# Patient Record
Sex: FEMALE | Race: WHITE | Employment: OTHER | URBAN - METROPOLITAN AREA
[De-identification: names, ages, dates, MRNs, and addresses within clinical notes are randomized per-mention and may not be internally consistent; named-entity substitution may affect disease eponyms.]

---

## 2017-02-22 ENCOUNTER — OFFICE VISIT (OUTPATIENT)
Dept: INTERNAL MEDICINE CLINIC | Age: 62
End: 2017-02-22

## 2017-02-22 VITALS
WEIGHT: 230 LBS | TEMPERATURE: 98.2 F | DIASTOLIC BLOOD PRESSURE: 71 MMHG | HEART RATE: 61 BPM | BODY MASS INDEX: 34.86 KG/M2 | SYSTOLIC BLOOD PRESSURE: 111 MMHG | OXYGEN SATURATION: 97 % | HEIGHT: 68 IN | RESPIRATION RATE: 16 BRPM

## 2017-02-22 DIAGNOSIS — G62.9 PERIPHERAL POLYNEUROPATHY: ICD-10-CM

## 2017-02-22 DIAGNOSIS — F32.A DEPRESSION, UNSPECIFIED DEPRESSION TYPE: ICD-10-CM

## 2017-02-22 DIAGNOSIS — I10 ESSENTIAL HYPERTENSION: Primary | ICD-10-CM

## 2017-02-22 DIAGNOSIS — R73.9 HYPERGLYCEMIA: ICD-10-CM

## 2017-02-22 DIAGNOSIS — E78.5 HYPERLIPIDEMIA, UNSPECIFIED HYPERLIPIDEMIA TYPE: ICD-10-CM

## 2017-02-22 RX ORDER — LISINOPRIL 10 MG/1
TABLET ORAL
Qty: 90 TAB | Refills: 2 | Status: SHIPPED | OUTPATIENT
Start: 2017-02-22 | End: 2017-02-22 | Stop reason: SDUPTHER

## 2017-02-22 RX ORDER — LISINOPRIL 10 MG/1
10 TABLET ORAL DAILY
Qty: 90 TAB | Refills: 3 | Status: SHIPPED | OUTPATIENT
Start: 2017-02-22 | End: 2018-01-09 | Stop reason: SDUPTHER

## 2017-02-22 RX ORDER — BUPROPION HYDROCHLORIDE 300 MG/1
TABLET ORAL
Qty: 90 TAB | Refills: 3 | Status: SHIPPED | OUTPATIENT
Start: 2017-02-22 | End: 2018-01-09 | Stop reason: SDUPTHER

## 2017-02-22 RX ORDER — OMEPRAZOLE 20 MG/1
CAPSULE, DELAYED RELEASE ORAL
Qty: 90 CAP | Refills: 2 | Status: SHIPPED | OUTPATIENT
Start: 2017-02-22 | End: 2017-09-28 | Stop reason: SDUPTHER

## 2017-02-22 RX ORDER — ATORVASTATIN CALCIUM 20 MG/1
20 TABLET, FILM COATED ORAL DAILY
Qty: 90 TAB | Refills: 3 | Status: SHIPPED | OUTPATIENT
Start: 2017-02-22 | End: 2018-01-09 | Stop reason: SDUPTHER

## 2017-02-22 RX ORDER — RISPERIDONE 2 MG/1
TABLET, FILM COATED ORAL
Qty: 90 TAB | Refills: 3 | Status: SHIPPED | OUTPATIENT
Start: 2017-02-22 | End: 2018-04-15 | Stop reason: SDUPTHER

## 2017-02-22 RX ORDER — OMEPRAZOLE 20 MG/1
20 CAPSULE, DELAYED RELEASE ORAL DAILY
Qty: 90 CAP | Refills: 3 | Status: SHIPPED | OUTPATIENT
Start: 2017-02-22 | End: 2017-09-28 | Stop reason: SDUPTHER

## 2017-02-22 RX ORDER — BUPROPION HYDROCHLORIDE 300 MG/1
TABLET ORAL
Qty: 90 TAB | Refills: 2 | Status: SHIPPED | OUTPATIENT
Start: 2017-02-22 | End: 2017-02-22 | Stop reason: SDUPTHER

## 2017-02-22 RX ORDER — ATENOLOL 50 MG/1
TABLET ORAL
Qty: 90 TAB | Refills: 2 | Status: SHIPPED | OUTPATIENT
Start: 2017-02-22 | End: 2017-02-22 | Stop reason: SDUPTHER

## 2017-02-22 RX ORDER — OMEPRAZOLE 20 MG/1
CAPSULE, DELAYED RELEASE ORAL
Qty: 90 CAP | Refills: 2 | Status: CANCELLED | OUTPATIENT
Start: 2017-02-22

## 2017-02-22 RX ORDER — ATENOLOL 50 MG/1
TABLET ORAL
Qty: 90 TAB | Refills: 3 | Status: SHIPPED | OUTPATIENT
Start: 2017-02-22 | End: 2018-01-09 | Stop reason: SDUPTHER

## 2017-02-22 RX ORDER — RISPERIDONE 2 MG/1
TABLET, FILM COATED ORAL
Qty: 90 TAB | Refills: 2 | Status: SHIPPED | OUTPATIENT
Start: 2017-02-22 | End: 2017-02-22 | Stop reason: SDUPTHER

## 2017-02-22 RX ORDER — HYDROCHLOROTHIAZIDE 25 MG/1
25 TABLET ORAL DAILY
Qty: 90 TAB | Refills: 3 | Status: SHIPPED | OUTPATIENT
Start: 2017-02-22 | End: 2018-01-09 | Stop reason: SDUPTHER

## 2017-02-22 RX ORDER — ATORVASTATIN CALCIUM 20 MG/1
TABLET, FILM COATED ORAL
Qty: 90 TAB | Refills: 2 | Status: SHIPPED | OUTPATIENT
Start: 2017-02-22 | End: 2017-02-22 | Stop reason: SDUPTHER

## 2017-02-22 RX ORDER — HYDROCHLOROTHIAZIDE 25 MG/1
TABLET ORAL
Qty: 90 TAB | Refills: 2 | Status: SHIPPED | OUTPATIENT
Start: 2017-02-22 | End: 2017-02-22 | Stop reason: SDUPTHER

## 2017-02-22 NOTE — PROGRESS NOTES
SUBJECTIVE:   Ms. Timi Headley is a 57 y.o. female who is here for follow up of routine medical issues. Her  is my patient Glen Olivas. Chief Complaint   Patient presents with    Hypertension    Follow-up     pt here today for 4 month f.u       She has worsened constipation. Takes senna every day now \"used to be every other day. \"   L ankle pain is a bit worse. It is recalled that she saw orthopedist for L ankle pain. \"She wanted me to wear a device\", that was expensive. The pain got better with new shoes. Hurts if sits at desk too long. Hip pain is \"occasionally bothering me when I walk. \"    Back is doing okay. She saw Dr. Mae for knee pain. Has gotten injections. Neuropathy: She is still having a lot of numbness and tingling in feet. \"Pretty severe. \"  Doing the same as last visit. It is recalled that she has had high blood sugars, as well as an A1C that was up. L>R hip pain is better. We have discussed the R hip pain before in 2015: Discussed how she had a L bone spur, and favored that side. Now wearing soft cushioned soles. Palpitations have gotten better. Saw Dr. Tamia Vegas planning to follow up further unless becomes problem again. We noted in 2014: She was having palpitations and saw Dr. Margie Dockery in January. She had EKG, echo and stress test--\"I think everything is okay. \"  The palpitations went away since getting treated for INDER. She is on CPAP for INDER. Mood okay, \"I have been depressed since I was a teenager. \"  \"I tried to go off the meds, but I got down in the dumps. \"  Back on meds. No SI at this time. Sees Dr. Octavio Lara currently (previously Dr. Ashlee Dodd). At this time, she is otherwise doing well and has brought no other complaints to my attention today. For a list of the medical issues addressed today, see the assessment and plan below.     PMH:   Past Medical History:   Diagnosis Date    Cancer [facility])     skin cancer removed from nose    Depression 1/9/2014  GERD (gastroesophageal reflux disease)     Hot flashes     Hyperglycemia 2014    Hyperlipidemia 2014    Hypertension     Left bundle branch block     Neuropathy, peripheral ([address] 2014    INDER on CPAP 2014       Past Surgical History:   Procedure Laterality Date    COLONOSCOPY  2010     Dr. Navya Cesar          HX [facility]; resection of skin cancer from nose       All: She is allergic to erythromycin. (rash). Current Outpatient Prescriptions   Medication Sig    atenolol (TENORMIN) 50 mg tablet TAKE 1 TABLET DAILY    lisinopril (PRINIVIL, ZESTRIL) 10 mg tablet TAKE 1 TABLET DAILY    omeprazole (PRILOSEC) 20 mg capsule TAKE 1 CAPSULE DAILY    atorvastatin (LIPITOR) 20 mg tablet TAKE 1 TABLET DAILY    hydroCHLOROthiazide (HYDRODIURIL) 25 mg tablet TAKE 1 TABLET DAILY    risperiDONE (RISPERDAL) 2 mg tablet TAKE 1 TABLET NIGHTLY AS NEEDED    buPROPion XL (WELLBUTRIN XL) 300 mg XL tablet TAKE 1 TABLET EVERY MORNING    omeprazole (PRILOSEC) 20 mg capsule     SENNOSIDES (SENOKOT PO) Take  by mouth.  Cholecalciferol, Vitamin D3, (VITAMIN D3) 2,000 unit cap capsule Take  by mouth daily.  multivitamin, stress formula (STRESS TAB) tablet Take 1 Tab by mouth daily.  ascorbic acid (VITAMIN C) 1,000 mg tablet Take 500 mg by mouth daily.  vitamin e (E GEMS) 100 unit capsule Take 400 Units by mouth daily.  calcium-vitamin D (CALCIUM 500+D) 500 mg(1,250mg) -200 unit per tablet Take 2 tablets by mouth daily.  aspirin delayed-release 81 mg tablet Take 81 mg by mouth daily.  albuterol (PROVENTIL HFA, VENTOLIN HFA, PROAIR HFA) 90 mcg/actuation inhaler Take 1 Puff by inhalation every four (4) hours as needed for Wheezing.  DOCOSAHEXANOIC ACID/EPA (FISH OIL PO) Take 1 tablet by mouth daily. No current facility-administered medications for this visit. FH: Mother  breast cancer.   Father  of CHF.    SH: Retired insurance . She reports that she has never smoked. She does not have any smokeless tobacco history on file. She reports that she does not drink alcohol or use illicit drugs. ROS: See above; Complete ROS otherwise negative. OBJECTIVE:   Vitals:   Visit Vitals    /71 (BP 1 Location: Left arm, BP Patient Position: Sitting)    Pulse 61    Temp 98.2 °F (36.8 °C) (Oral)    Resp 16    Ht 5' 8\" (1.727 m)    Wt 230 lb (104.3 kg)    SpO2 97%    BMI 34.97 kg/m2      Gen: Pleasant 57 y.o.  female in NAD. HEENT: PERRLA. EOMI. OP moist and pink. Neck: Supple. No LAD. HEART: RRR, No M/G/R.    LUNGS: CTAB No W/R. ABDOMEN: S, NT, ND, BS+. EXTREMITIES: Warm. No C/C/E.  MUSCULOSKELETAL: Normal ROM, muscle strength 5/5 all groups. NEURO: Alert and oriented x 3. Cranial nerves grossly intact. No focal sensory or motor deficits noted. SKIN: Warm. Dry. No rashes or other lesions noted. Lab Results   Component Value Date/Time    Hemoglobin A1c 5.8 10/17/2016 09:19 AM     Lab Results   Component Value Date/Time    Sodium 141 10/17/2016 09:19 AM    Potassium 4.6 10/17/2016 09:19 AM    Chloride 102 10/17/2016 09:19 AM    CO2 26 10/17/2016 09:19 AM    Glucose 115 10/17/2016 09:19 AM    BUN 16 10/17/2016 09:19 AM    Creatinine 1.08 10/17/2016 09:19 AM    BUN/Creatinine ratio 15 10/17/2016 09:19 AM    GFR est AA 63 10/17/2016 09:19 AM    GFR est non-AA 55 10/17/2016 09:19 AM    Calcium 10.0 10/17/2016 09:19 AM         ASSESSMENT/ PLAN:     Hip pain: L arthritis. Constipation: Recommend adding miralax + more fiber. Neuropathy, LE: Stable. Likely arising from her metabolic syndrome / prediabetes. Sometimes neuropathy predates the diagnosis of DM. Neg RPR, ESR, TSH, UJNE, in 2014    Hypertension: Normal today; watch this for now. - atenolol (TENORMIN) 50 mg tablet; Take 1 Tab by mouth daily. - hydrochlorothiazide (HYDRODIURIL) 25 mg tablet;  Take 1 Tab by mouth daily.  - METABOLIC PANEL, COMPREHENSIVE  - LIPID PANEL  - CBC WITH AUTOMATED DIFF    Depression: Stable. - risperiDONE (RISPERDAL) 2 mg tablet; Take 1 Tab by mouth nightly as needed. - buPROPion XL (WELLBUTRIN XL) 300 mg XL tablet; Take 1 Tab by mouth every morning. Hyperglycemia: \"Prediabetes\" or \"borderline DM\". - METABOLIC PANEL, COMPREHENSIVE  - HEMOGLOBIN A1C    GERD (gastroesophageal reflux disease): Stable. - omeprazole (PRILOSEC) 20 mg capsule; Take 1 Cap by mouth daily. Hyperlipidemia: Reviewed her labs. - atorvastatin (LIPITOR) 20 mg tablet; Take 1 Tab by mouth daily.  - LIPID PANEL    INDER on CPAP:  - REFERRAL TO SLEEP STUDIES    Overweight: Increasing again--work on diet and exercise. Wt Readings from Last 3 Encounters:   02/22/17 230 lb (104.3 kg)   12/15/16 229 lb (103.9 kg)   12/06/16 229 lb (103.9 kg)       Follow-up Disposition:  Return in about 4 months (around 6/22/2017) for HTN. I have reviewed the patient's medications and risks/side effects/benefits were discussed. Diagnosis(-es) explained to patient and questions answered. Literature provided where appropriate.

## 2017-02-22 NOTE — ACP (ADVANCE CARE PLANNING)
1. Have you been to the ER, urgent care clinic since your last visit? Hospitalized since your last visit?no    2. Have you seen or consulted any other health care providers outside of the [address] [place] [address] since your last visit? Include any pap smears or colon screening.  no

## 2017-02-22 NOTE — MR AVS SNAPSHOT
Visit Information Date & Time Provider Department Dept. Phone Encounter #  
 2/22/2017  1:45 PM Polly Diop, [address] [place] [address],[address] [phone] [id number] Follow-up Instructions Return in about 4 months (around 6/22/2017) for HTN. Upcoming Health Maintenance Date Due  
 PAP AKA CERVICAL CYTOLOGY 7/1/2016 BREAST CANCER SCRN MAMMOGRAM 10/18/2018 COLONOSCOPY 8/30/2024 DTaP/Tdap/Td series (2 - Td) 12/2/2024 Allergies as of 2/22/2017  Review Complete On: 2/22/2017 By: Polly Diop MD  
  
 Severity Noted Reaction Type Reactions Erythromycin Medium 08/30/2010   Systemic Rash Current Immunizations  Reviewed on 12/2/2014 Name Date Influenza Vaccine 9/1/2013 Influenza Vaccine (Quad) PF 9/29/2016 Influenza Vaccine PF 10/6/2015, 9/16/2014 Tdap 12/2/2014 Zoster Vaccine, Live 12/1/2012 Not reviewed this visit You Were Diagnosed With   
  
 Codes Comments Essential hypertension    -  Primary ICD-10-CM: I10 
ICD-9-CM: 401.9 Peripheral polyneuropathy     ICD-10-CM: G62.9 ICD-9-CM: 356.9 Hyperlipidemia, unspecified hyperlipidemia type     ICD-10-CM: E78.5 ICD-9-CM: 272.4 Hyperglycemia     ICD-10-CM: R73.9 ICD-9-CM: 790.29 Depression, unspecified depression type     ICD-10-CM: F32.9 ICD-9-CM: 362 Vitals BP  
  
  
  
  
  
 111/71 (BP 1 Location: Left arm, BP Patient Position: Sitting) Vitals History BMI and BSA Data Body Mass Index Body Surface Area 34.97 kg/m 2 2.24 m 2 Preferred Pharmacy Pharmacy Name Phone [facility] [facility] [address] [phone] Your Updated Medication List  
  
   
This list is accurate as of: 2/22/17  2:29 PM.  Always use your most recent med list.  
  
  
  
  
 aspirin delayed-release 81 mg tablet Take 81 mg by mouth daily. atenolol 50 mg tablet Commonly known as:  TENORMIN  
TAKE 1 TABLET DAILY atorvastatin 20 mg tablet Commonly known as:  LIPITOR Take 1 Tab by mouth daily. buPROPion  mg XL tablet Commonly known as:  WELLBUTRIN XL  
TAKE 1 TABLET EVERY MORNING  
  
 CALCIUM 500+D 500 mg(1,250mg) -200 unit per tablet Generic drug:  calcium-vitamin D Take 2 tablets by mouth daily. hydroCHLOROthiazide 25 mg tablet Commonly known as:  HYDRODIURIL Take 1 Tab by mouth daily. lisinopril 10 mg tablet Commonly known as:  Marge Lisa Take 1 Tab by mouth daily. multivitamin, stress formula tablet Commonly known as:  STRESS TAB Take 1 Tab by mouth daily. * omeprazole 20 mg capsule Commonly known as:  PRILOSEC  
TAKE 1 CAPSULE DAILY  
  
 * omeprazole 20 mg capsule Commonly known as:  PRILOSEC Take 1 Cap by mouth daily. risperiDONE 2 mg tablet Commonly known as:  RisperDAL TAKE 1 TABLET NIGHTLY AS NEEDED  
  
 SENOKOT PO Take  by mouth. VITAMIN C 1,000 mg tablet Generic drug:  ascorbic acid (vitamin C) Take 500 mg by mouth daily. VITAMIN D3 2,000 unit Cap capsule Generic drug:  Cholecalciferol (Vitamin D3) Take  by mouth daily. vitamin e 100 unit capsule Commonly known as:  E GEMS Take 400 Units by mouth daily. * Notice: This list has 2 medication(s) that are the same as other medications prescribed for you. Read the directions carefully, and ask your doctor or other care provider to review them with you. Prescriptions Sent to Pharmacy Refills  
 atenolol (TENORMIN) 50 mg tablet 3 Sig: TAKE 1 TABLET DAILY Class: Normal  
 Pharmacy: [id number] [facility] Ph #: [phone]  
 lisinopril (PRINIVIL, ZESTRIL) 10 mg tablet 3 Sig: Take 1 Tab by mouth daily. Class: Normal  
 Pharmacy: [address] [facility], [address] [facility] Ph #: [phone]  Route: Oral  
 atorvastatin (LIPITOR) 20 mg tablet 3  
 Sig: Take 1 Tab by mouth daily. Class: Normal  
 Pharmacy: [address] [facility], [address] [facility] Ph #: [phone] Route: Oral  
 hydroCHLOROthiazide (HYDRODIURIL) 25 mg tablet 3 Sig: Take 1 Tab by mouth daily. Class: Normal  
 Pharmacy: [address] [facility], [address] [facility] Ph #: [phone] Route: Oral  
 risperiDONE (RISPERDAL) 2 mg tablet 3 Sig: TAKE 1 TABLET NIGHTLY AS NEEDED Class: Normal  
 Pharmacy: [address] [place] [address], [address] [place] [address] Ph #: [phone]  
 buPROPion XL (WELLBUTRIN XL) 300 mg XL tablet 3 Sig: TAKE 1 TABLET EVERY MORNING Class: Normal  
 Pharmacy: [address] [place] [address], [address] [place] [address] Ph #: [phone]  
 omeprazole (PRILOSEC) 20 mg capsule 3 Sig: Take 1 Cap by mouth daily. Class: Normal  
 Pharmacy: [address] [facility], [address] [facility] Ph #: [phone] Route: Oral  
  
Follow-up Instructions Return in about 4 months (around 6/22/2017) for HTN. To-Do List   
 05/23/2017 Lab:  CBC WITH AUTOMATED DIFF   
  
 05/23/2017 Lab:  HEMOGLOBIN A1C WITH EAG   
  
 05/23/2017 Lab:  LIPID PANEL   
  
 05/23/2017 Lab:  METABOLIC PANEL, COMPREHENSIVE Patient Instructions Learning About Healthy Weight What is a healthy weight? A healthy weight is the weight at which you feel good about yourself and have energy for work and play. It's also one that lowers your risk for health problems. What can you do to stay at a healthy weight? It can be hard to stay at a healthy weight, especially when fast food, vending-machine snacks, and processed foods are so easy to find. And with your busy lifestyle, activity may be low on your list of things to do. But staying at a healthy weight may be easier than you think. Here are some dos and don'ts for staying at a healthy weight: 
Do eat healthy foods The kinds of foods you eat have a big impact on both your weight and your health. Reaching and staying at a healthy weight is not about going on a diet. It's about making healthier food choices every day and changing your diet for good. Healthy eating means eating a variety of foods so that you get all the nutrients you need. Your body needs protein, carbohydrate, and fats for energy. They keep your heart beating, your brain active, and your muscles working. On most days, try to eat from each food group. This means eating a variety of: · Whole grains, such as whole wheat breads and pastas. · Fruits and vegetables. · Dairy products, such as low-fat milk, yogurt, and cheese. · Lean proteins, such as all types of fish, chicken without the skin, and beans. Don't have too much or too little of one thing. All foods, if eaten in moderation, can be part of healthy eating. Even sweets can be okay. If your favorite foods are high in fat, salt, sugar, or calories, limit how often you eat them. Eat smaller servings, or look for healthy substitutes. Do watch what you eat Many people eat more than their bodies need. Part of staying at a healthy weight means learning how much food you really need from day to day and not eating more than that. Even with healthy foods, eating too much can make you gain weight. Having a well-balanced diet means that you eat enough, but not too much, and that your food gives you the nutrients you need to stay healthy. So listen to your body. Eat when you're hungry. Stop when you feel satisfied. It's a good idea to have healthy snacks ready for when you get hungry. Keep healthy snacks with you at work, in your car, and at home. If you have a healthy snack easily available, you'll be less likely to pick a candy bar or bag of chips from a vending machine instead.  
Some healthy snacks you might want to keep on hand are fruit, low-fat yogurt, string cheese, low-fat microwave popcorn, raisins and other dried fruit, nuts, whole wheat crackers, pretzels, carrots, celery sticks, and broccoli. Do some physical activity A big part of reaching and staying at a healthy weight is being active. When you're active, you burn calories. This makes it easier to reach and stay at a healthy weight. When you're active on a regular basis, your body burns more calories, even when you're at rest. Being active helps you lose fat and build lean muscle. Try to be active for at least 1 hour every day. This may sound like a lot, but it's okay to be active in smaller blocks of time that add up to 1 hour a day. Any activity that makes your heart beat faster and keeps it there for a while counts. A brisk walk, run, or swim will get your heart beating faster. So will climbing stairs, shooting baskets, or cycling. Even some household chores like vacuuming and mowing the lawn will get your heart rate up. Pick activities that you enjoyones that make your heart beat faster, your muscles stronger, and your muscles and joints more flexible. If you find more than one thing you like doing, do them all. You don't have to do the same thing every day. Don't diet Diets don't work. Diets are temporary. Because you give up so much when you diet, you may be hungry and think about food all the time. And after you stop dieting, you also may overeat to make up for what you missed. Most people who diet end up gaining back the pounds they lostand more. Remember that healthy bodies come in lots of shapes and sizes. Everyone can get healthier by eating better and being more active. Where can you learn more? Go to http://chayito-eugenio.info/. Enter 823 8766 in the search box to learn more about \"Learning About Healthy Weight. \" Current as of: February 16, 2016 Content Version: 11.1 © 4435-3697 [employer].  Care instructions adapted under license by [address] (which disclaims liability or warranty for this information). If you have questions about a medical condition or this instruction, always ask your healthcare professional. [address] any warranty or liability for your use of this information. Introducing [facility]! Dear Cristina Cooper: 
Thank you for requesting a SUPR account. Our records indicate that you already have an active SUPR account. You can access your account anytime at [email or web address] Did you know that you can access your hospital and ER discharge instructions at any time in SUPR? You can also review all of your test results from your hospital stay or ER visit. Additional Information If you have questions, please visit the Frequently Asked Questions section of the SUPR website at [email or web address] Remember, SUPR is NOT to be used for urgent needs. For medical emergencies, dial 911. Now available from your iPhone and Android! Please provide this summary of care documentation to your next provider. Your primary care clinician is listed as [place]. If you have any questions after today's visit, please call [phone].

## 2017-02-22 NOTE — PATIENT INSTRUCTIONS
Learning About Healthy Weight  What is a healthy weight? A healthy weight is the weight at which you feel good about yourself and have energy for work and play. It's also one that lowers your risk for health problems. What can you do to stay at a healthy weight? It can be hard to stay at a healthy weight, especially when fast food, vending-machine snacks, and processed foods are so easy to find. And with your busy lifestyle, activity may be low on your list of things to do. But staying at a healthy weight may be easier than you think. Here are some dos and don'ts for staying at a healthy weight:  Do eat healthy foods  The kinds of foods you eat have a big impact on both your weight and your health. Reaching and staying at a healthy weight is not about going on a diet. It's about making healthier food choices every day and changing your diet for good. Healthy eating means eating a variety of foods so that you get all the nutrients you need. Your body needs protein, carbohydrate, and fats for energy. They keep your heart beating, your brain active, and your muscles working. On most days, try to eat from each food group. This means eating a variety of:  · Whole grains, such as whole wheat breads and pastas. · Fruits and vegetables. · Dairy products, such as low-fat milk, yogurt, and cheese. · Lean proteins, such as all types of fish, chicken without the skin, and beans. Don't have too much or too little of one thing. All foods, if eaten in moderation, can be part of healthy eating. Even sweets can be okay. If your favorite foods are high in fat, salt, sugar, or calories, limit how often you eat them. Eat smaller servings, or look for healthy substitutes. Do watch what you eat  Many people eat more than their bodies need. Part of staying at a healthy weight means learning how much food you really need from day to day and not eating more than that.  Even with healthy foods, eating too much can make you gain weight. Having a well-balanced diet means that you eat enough, but not too much, and that your food gives you the nutrients you need to stay healthy. So listen to your body. Eat when you're hungry. Stop when you feel satisfied. It's a good idea to have healthy snacks ready for when you get hungry. Keep healthy snacks with you at work, in your car, and at home. If you have a healthy snack easily available, you'll be less likely to pick a candy bar or bag of chips from a vending machine instead. Some healthy snacks you might want to keep on hand are fruit, low-fat yogurt, string cheese, low-fat microwave popcorn, raisins and other dried fruit, nuts, whole wheat crackers, pretzels, carrots, celery sticks, and broccoli. Do some physical activity  A big part of reaching and staying at a healthy weight is being active. When you're active, you burn calories. This makes it easier to reach and stay at a healthy weight. When you're active on a regular basis, your body burns more calories, even when you're at rest. Being active helps you lose fat and build lean muscle. Try to be active for at least 1 hour every day. This may sound like a lot, but it's okay to be active in smaller blocks of time that add up to 1 hour a day. Any activity that makes your heart beat faster and keeps it there for a while counts. A brisk walk, run, or swim will get your heart beating faster. So will climbing stairs, shooting baskets, or cycling. Even some household chores like vacuuming and mowing the lawn will get your heart rate up. Pick activities that you enjoy--ones that make your heart beat faster, your muscles stronger, and your muscles and joints more flexible. If you find more than one thing you like doing, do them all. You don't have to do the same thing every day. Don't diet  Diets don't work. Diets are temporary. Because you give up so much when you diet, you may be hungry and think about food all the time.  And after you stop dieting, you also may overeat to make up for what you missed. Most people who diet end up gaining back the pounds they lost--and more. Remember that healthy bodies come in lots of shapes and sizes. Everyone can get healthier by eating better and being more active. Where can you learn more? Go to http://chayito-eugenio.info/. Enter 005 8240 in the search box to learn more about \"Learning About Healthy Weight. \"  Current as of: February 16, 2016  Content Version: 11.1  © 6891-8545 [employer]. Care instructions adapted under license by "[employer]" (which disclaims liability or warranty for this information). If you have questions about a medical condition or this instruction, always ask your healthcare professional. [address] any warranty or liability for your use of this information.

## 2017-04-04 ENCOUNTER — PATIENT MESSAGE (OUTPATIENT)
Dept: INTERNAL MEDICINE CLINIC | Age: 62
End: 2017-04-04

## 2017-04-05 ENCOUNTER — CLINICAL SUPPORT (OUTPATIENT)
Dept: INTERNAL MEDICINE CLINIC | Age: 62
End: 2017-04-05

## 2017-04-05 DIAGNOSIS — R00.2 PALPITATIONS: Primary | ICD-10-CM

## 2017-04-05 NOTE — PROGRESS NOTES
Has had some symptoms that worry her, including nonexertional chest pains, and some palpitations the other day. She would like EKG checked. EKG today shows LBBB, and sinus bradycardia. Not much different from 2014. If she develops more CP, then would recommend ER.   Might otherwise consider making appt with her cardiologist.

## 2017-04-06 NOTE — TELEPHONE ENCOUNTER
From: Aniya Olivas  To: Jayden Charlton MD  Sent: 4/4/2017 9:39 AM EDT  Subject: Non-Urgent Medical Question    Please advise asap if you can do an EKG there. I think I need one, but have a $6,000 deductible on my insurance for specialists/hospitals. Thank you.

## 2017-06-14 ENCOUNTER — LAB ONLY (OUTPATIENT)
Dept: INTERNAL MEDICINE CLINIC | Age: 62
End: 2017-06-14

## 2017-06-14 DIAGNOSIS — I10 ESSENTIAL HYPERTENSION: ICD-10-CM

## 2017-06-14 DIAGNOSIS — E78.5 HYPERLIPIDEMIA, UNSPECIFIED HYPERLIPIDEMIA TYPE: ICD-10-CM

## 2017-06-14 DIAGNOSIS — R73.9 HYPERGLYCEMIA: ICD-10-CM

## 2017-06-15 LAB
ALBUMIN SERPL-MCNC: 4.3 G/DL (ref 3.6–4.8)
ALBUMIN/GLOB SERPL: 2 {RATIO} (ref 1.2–2.2)
ALP SERPL-CCNC: 57 IU/L (ref 39–117)
ALT SERPL-CCNC: 22 IU/L (ref 0–32)
AST SERPL-CCNC: 16 IU/L (ref 0–40)
BASOPHILS # BLD AUTO: 0 X10E3/UL (ref 0–0.2)
BASOPHILS NFR BLD AUTO: 1 %
BILIRUB SERPL-MCNC: 0.4 MG/DL (ref 0–1.2)
BUN SERPL-MCNC: 23 MG/DL (ref 8–27)
BUN/CREAT SERPL: 21 (ref 12–28)
CALCIUM SERPL-MCNC: 10.5 MG/DL (ref 8.7–10.3)
CHLORIDE SERPL-SCNC: 103 MMOL/L (ref 96–106)
CHOLEST SERPL-MCNC: 143 MG/DL (ref 100–199)
CO2 SERPL-SCNC: 25 MMOL/L (ref 18–29)
CREAT SERPL-MCNC: 1.07 MG/DL (ref 0.57–1)
EOSINOPHIL # BLD AUTO: 0.2 X10E3/UL (ref 0–0.4)
EOSINOPHIL NFR BLD AUTO: 4 %
ERYTHROCYTE [DISTWIDTH] IN BLOOD BY AUTOMATED COUNT: 13.7 % (ref 12.3–15.4)
EST. AVERAGE GLUCOSE BLD GHB EST-MCNC: 126 MG/DL
GLOBULIN SER CALC-MCNC: 2.2 G/DL (ref 1.5–4.5)
GLUCOSE SERPL-MCNC: 117 MG/DL (ref 65–99)
HBA1C MFR BLD: 6 % (ref 4.8–5.6)
HCT VFR BLD AUTO: 36.6 % (ref 34–46.6)
HDLC SERPL-MCNC: 52 MG/DL
HGB BLD-MCNC: 11.9 G/DL (ref 11.1–15.9)
IMM GRANULOCYTES # BLD: 0 X10E3/UL (ref 0–0.1)
IMM GRANULOCYTES NFR BLD: 0 %
LDLC SERPL CALC-MCNC: 73 MG/DL (ref 0–99)
LYMPHOCYTES # BLD AUTO: 1.1 X10E3/UL (ref 0.7–3.1)
LYMPHOCYTES NFR BLD AUTO: 29 %
MCH RBC QN AUTO: 29.7 PG (ref 26.6–33)
MCHC RBC AUTO-ENTMCNC: 32.5 G/DL (ref 31.5–35.7)
MCV RBC AUTO: 91 FL (ref 79–97)
MONOCYTES # BLD AUTO: 0.4 X10E3/UL (ref 0.1–0.9)
MONOCYTES NFR BLD AUTO: 12 %
NEUTROPHILS # BLD AUTO: 2 X10E3/UL (ref 1.4–7)
NEUTROPHILS NFR BLD AUTO: 54 %
PLATELET # BLD AUTO: 269 X10E3/UL (ref 150–379)
POTASSIUM SERPL-SCNC: 4.5 MMOL/L (ref 3.5–5.2)
PROT SERPL-MCNC: 6.5 G/DL (ref 6–8.5)
RBC # BLD AUTO: 4.01 X10E6/UL (ref 3.77–5.28)
SODIUM SERPL-SCNC: 142 MMOL/L (ref 134–144)
TRIGL SERPL-MCNC: 88 MG/DL (ref 0–149)
VLDLC SERPL CALC-MCNC: 18 MG/DL (ref 5–40)
WBC # BLD AUTO: 3.7 X10E3/UL (ref 3.4–10.8)

## 2017-06-26 ENCOUNTER — OFFICE VISIT (OUTPATIENT)
Dept: INTERNAL MEDICINE CLINIC | Age: 62
End: 2017-06-26

## 2017-06-26 VITALS
TEMPERATURE: 97.9 F | WEIGHT: 233.6 LBS | OXYGEN SATURATION: 98 % | SYSTOLIC BLOOD PRESSURE: 128 MMHG | DIASTOLIC BLOOD PRESSURE: 69 MMHG | RESPIRATION RATE: 14 BRPM | HEART RATE: 60 BPM | BODY MASS INDEX: 35.4 KG/M2 | HEIGHT: 68 IN

## 2017-06-26 DIAGNOSIS — E78.5 HYPERLIPIDEMIA, UNSPECIFIED HYPERLIPIDEMIA TYPE: ICD-10-CM

## 2017-06-26 DIAGNOSIS — I10 ESSENTIAL HYPERTENSION: Primary | ICD-10-CM

## 2017-06-26 DIAGNOSIS — R73.9 HYPERGLYCEMIA: ICD-10-CM

## 2017-06-26 DIAGNOSIS — R68.89 HEAT INTOLERANCE: ICD-10-CM

## 2017-06-26 DIAGNOSIS — R61 UNEXPLAINED NIGHT SWEATS: ICD-10-CM

## 2017-06-26 NOTE — PROGRESS NOTES
SUBJECTIVE:   Ms. Farideh Magallanes is a 57 y.o. female who is here for follow up of routine medical issues. Her  is my patient [place]. Chief Complaint   Patient presents with    Hypertension    Follow-up     pt here today for 4 month f.u       She has a high deductible plan ($6000)--she is waiting   \"I sweat a lot. \"  Can occur any time. Hot flashes. No fevers / chills. No N/V. No URI symptoms. She has a little SOB sometimes, and MUÑOZ, but no cough. She still has constipation. Takes senna-S and stool softener daily. L ankle pain \"still bothers me at times. \"  It is recalled that she saw orthopedist for L ankle pain. \"She wanted me to wear a device\", that was expensive. The pain got better with new shoes. Hurts if sits at desk too long. Back is doing okay. She saw Dr. Arian Jackson for knee pain--doing better. Has gotten injections. Neuropathy: She is still having a lot of numbness and tingling in feet. \"Pretty severe. \"  Doing the same as last visit. It is recalled that she has had high blood sugars, as well as an A1C that was up. L>R hip pain is better. We have discussed the R hip pain before in 2015: Discussed how she had a L bone spur, and favored that side. Now wearing soft cushioned soles. Palpitations have gotten better. Saw Dr. Fabrice Almanza planning to follow up further unless becomes problem again. We noted in 2014: She was having palpitations and saw Dr. Ellie Gillette in January. She had EKG, echo and stress test--\"I think everything is okay. \"  The palpitations went away since getting treated for INDER. She is on CPAP for INDER. Mood okay, \"I have been depressed since I was a teenager. \"  \"I tried to go off the meds, but I got down in the dumps. \"  Back on meds. No SI at this time. Sees Dr. Christine Carbajal currently (previously Dr. Stalin Calabrese). At this time, she is otherwise doing well and has brought no other complaints to my attention today.   For a list of the medical issues addressed today, see the assessment and plan below. PMH:   Past Medical History:   Diagnosis Date    Cancer [facility])     skin cancer removed from nose    Depression 2014    GERD (gastroesophageal reflux disease)     Hot flashes     Hyperglycemia 2014    Hyperlipidemia 2014    Hypertension     Left bundle branch block     Neuropathy, peripheral ([address] 2014    INDER on CPAP 2014       Past Surgical History:   Procedure Laterality Date    COLONOSCOPY  2010     Dr. Thais Yanes          HX [place]      Dr. Connor Cárdenas; resection of skin cancer from nose       All: She is allergic to erythromycin. (rash). Current Outpatient Prescriptions   Medication Sig    atenolol (TENORMIN) 50 mg tablet TAKE 1 TABLET DAILY    lisinopril (PRINIVIL, ZESTRIL) 10 mg tablet Take 1 Tab by mouth daily.  atorvastatin (LIPITOR) 20 mg tablet Take 1 Tab by mouth daily.  hydroCHLOROthiazide (HYDRODIURIL) 25 mg tablet Take 1 Tab by mouth daily.  risperiDONE (RISPERDAL) 2 mg tablet TAKE 1 TABLET NIGHTLY AS NEEDED    buPROPion XL (WELLBUTRIN XL) 300 mg XL tablet TAKE 1 TABLET EVERY MORNING    SENNOSIDES (SENOKOT PO) Take  by mouth.  Cholecalciferol, Vitamin D3, (VITAMIN D3) 2,000 unit cap capsule Take  by mouth daily.  multivitamin, stress formula (STRESS TAB) tablet Take 1 Tab by mouth daily.  ascorbic acid (VITAMIN C) 1,000 mg tablet Take 500 mg by mouth daily.  vitamin e (E GEMS) 100 unit capsule Take 400 Units by mouth daily.  calcium-vitamin D (CALCIUM 500+D) 500 mg(1,250mg) -200 unit per tablet Take 2 tablets by mouth daily.  aspirin delayed-release 81 mg tablet Take 81 mg by mouth daily.  omeprazole (PRILOSEC) 20 mg capsule TAKE 1 CAPSULE DAILY    omeprazole (PRILOSEC) 20 mg capsule Take 1 Cap by mouth daily. No current facility-administered medications for this visit. FH: Mother  breast cancer.   Father  of CHF.    SH: Retired insurance . She reports that she has never smoked. She does not have any smokeless tobacco history on file. She reports that she does not drink alcohol or use illicit drugs. ROS: See above; Complete ROS otherwise negative. OBJECTIVE:   Vitals:   Visit Vitals    /69 (BP 1 Location: Left arm, BP Patient Position: Sitting)    Pulse 60    Temp 97.9 °F (36.6 °C) (Oral)    Resp 14    Ht 5' 8\" (1.727 m)    Wt 233 lb 9.6 oz (106 kg)    SpO2 98%    BMI 35.52 kg/m2      Gen: Pleasant 57 y.o.  female in NAD. HEENT: PERRLA. EOMI. OP moist and pink. Neck: Supple. No LAD. HEART: RRR, No M/G/R.    LUNGS: CTAB No W/R. ABDOMEN: S, NT, ND, BS+. EXTREMITIES: Warm. No C/C/E.  MUSCULOSKELETAL: Normal ROM, muscle strength 5/5 all groups. NEURO: Alert and oriented x 3. Cranial nerves grossly intact. No focal sensory or motor deficits noted. SKIN: Warm. Dry. No rashes or other lesions noted. Lab Results   Component Value Date/Time    Hemoglobin A1c 6.0 06/14/2017 09:41 AM     Lab Results   Component Value Date/Time    Sodium 142 06/14/2017 09:41 AM    Potassium 4.5 06/14/2017 09:41 AM    Chloride 103 06/14/2017 09:41 AM    CO2 25 06/14/2017 09:41 AM    Glucose 117 06/14/2017 09:41 AM    BUN 23 06/14/2017 09:41 AM    Creatinine 1.07 06/14/2017 09:41 AM    BUN/Creatinine ratio 21 06/14/2017 09:41 AM    GFR est AA 63 06/14/2017 09:41 AM    GFR est non-AA 55 06/14/2017 09:41 AM    Calcium 10.5 06/14/2017 09:41 AM         ASSESSMENT/ PLAN:     Sweats / heat intolerance. DDx includes infections, TB, thyroid and other endocrine disorders. Orders Placed This Encounter    QUANTIFERON TB GOLD    XR CHEST PA LAT     Standing Status:   Future     Standing Expiration Date:   7/26/2018     Order Specific Question:   Reason for Exam     Answer:   Dyspnea, sweats    TSH 3RD GENERATION    SED RATE (ESR)       Hypertension: Normal today; watch this for now.    - atenolol (TENORMIN) 50 mg tablet; Take 1 Tab by mouth daily. - hydrochlorothiazide (HYDRODIURIL) 25 mg tablet; Take 1 Tab by mouth daily.  - METABOLIC PANEL, COMPREHENSIVE  - LIPID PANEL  - CBC WITH AUTOMATED DIFF    Hip pain: L arthritis. Stable. Constipation: Ongoing. Controlled with laxative and stool softener. Consider adding more fiber to diet. Neuropathy, LE: Stable. Likely arising from her metabolic syndrome / prediabetes. Sometimes neuropathy predates the diagnosis of DM. Neg RPR, ESR, TSH, JUNE, in 2014    Depression: Stable. No SI.   - risperiDONE (RISPERDAL) 2 mg tablet; Take 1 Tab by mouth nightly as needed. - buPROPion XL (WELLBUTRIN XL) 300 mg XL tablet; Take 1 Tab by mouth every morning. Hyperglycemia: \"Prediabetes\" or \"borderline DM\". - METABOLIC PANEL, COMPREHENSIVE  - HEMOGLOBIN A1C    GERD (gastroesophageal reflux disease): Stable. - omeprazole (PRILOSEC) 20 mg capsule; Take 1 Cap by mouth daily. Hyperlipidemia: Reviewed her labs. - atorvastatin (LIPITOR) 20 mg tablet; Take 1 Tab by mouth daily.  - LIPID PANEL    INDER on CPAP:  - REFERRAL TO SLEEP STUDIES    Overweight: Increasing again--work on diet and exercise. Wt Readings from Last 3 Encounters:   06/26/17 233 lb 9.6 oz (106 kg)   02/22/17 230 lb (104.3 kg)   12/15/16 229 lb (103.9 kg)       Follow-up Disposition:  Return in about 6 months (around 12/26/2017) for HTN. I have reviewed the patient's medications and risks/side effects/benefits were discussed. Diagnosis(-es) explained to patient and questions answered. Literature provided where appropriate.

## 2017-06-26 NOTE — MR AVS SNAPSHOT
Visit Information Date & Time Provider Department Dept. Phone Encounter #  
 6/26/2017  4:00 PM Niurka Dai, [address] [phone] [id number] Follow-up Instructions Return in about 4 months (around 10/26/2017) for HTN. Upcoming Health Maintenance Date Due INFLUENZA AGE 9 TO ADULT 8/1/2017 BREAST CANCER SCRN MAMMOGRAM 10/18/2018 PAP AKA CERVICAL CYTOLOGY 12/14/2019 COLONOSCOPY 8/30/2024 DTaP/Tdap/Td series (2 - Td) 12/2/2024 Allergies as of 6/26/2017  Review Complete On: 6/26/2017 By: Niurka Dai MD  
  
 Severity Noted Reaction Type Reactions Erythromycin Medium 08/30/2010   Systemic Rash Current Immunizations  Reviewed on 12/2/2014 Name Date Influenza Vaccine 9/1/2013 Influenza Vaccine (Quad) PF 9/29/2016 Influenza Vaccine PF 10/6/2015, 9/16/2014 Tdap 12/2/2014 Zoster Vaccine, Live 12/1/2012 Not reviewed this visit You Were Diagnosed With   
  
 Codes Comments Essential hypertension    -  Primary ICD-10-CM: I10 
ICD-9-CM: 401.9 Hyperglycemia     ICD-10-CM: R73.9 ICD-9-CM: 790.29 Hyperlipidemia, unspecified hyperlipidemia type     ICD-10-CM: E78.5 ICD-9-CM: 272.4 Unexplained night sweats     ICD-10-CM: R61 
ICD-9-CM: 780.8 Heat intolerance     ICD-10-CM: R68.89 ICD-9-CM: 780.99 Vitals BP Pulse Temp Resp Height(growth percentile) Weight(growth percentile) 128/69 (BP 1 Location: Left arm, BP Patient Position: Sitting) 60 97.9 °F (36.6 °C) (Oral) 14 5' 8\" (1.727 m) 233 lb 9.6 oz (106 kg) SpO2 BMI Smoking Status 98% 35.52 kg/m2 Never Smoker Vitals History BMI and BSA Data Body Mass Index Body Surface Area 35.52 kg/m 2 2.26 m 2 Preferred Pharmacy Pharmacy Name Phone [address], [facility] [phone] Your Updated Medication List  
  
   
 This list is accurate as of: 6/26/17  4:36 PM.  Always use your most recent med list.  
  
  
  
  
 aspirin delayed-release 81 mg tablet Take 81 mg by mouth daily. atenolol 50 mg tablet Commonly known as:  TENORMIN  
TAKE 1 TABLET DAILY  
  
 atorvastatin 20 mg tablet Commonly known as:  LIPITOR Take 1 Tab by mouth daily. buPROPion  mg XL tablet Commonly known as:  WELLBUTRIN XL  
TAKE 1 TABLET EVERY MORNING  
  
 CALCIUM 500+D 500 mg(1,250mg) -200 unit per tablet Generic drug:  calcium-vitamin D Take 2 tablets by mouth daily. hydroCHLOROthiazide 25 mg tablet Commonly known as:  HYDRODIURIL Take 1 Tab by mouth daily. lisinopril 10 mg tablet Commonly known as:  Eula Maxi Take 1 Tab by mouth daily. multivitamin, stress formula tablet Commonly known as:  STRESS TAB Take 1 Tab by mouth daily. * omeprazole 20 mg capsule Commonly known as:  PRILOSEC  
TAKE 1 CAPSULE DAILY  
  
 * omeprazole 20 mg capsule Commonly known as:  PRILOSEC Take 1 Cap by mouth daily. risperiDONE 2 mg tablet Commonly known as:  RisperDAL TAKE 1 TABLET NIGHTLY AS NEEDED  
  
 SENOKOT PO Take  by mouth. VITAMIN C 1,000 mg tablet Generic drug:  ascorbic acid (vitamin C) Take 500 mg by mouth daily. VITAMIN D3 2,000 unit Cap capsule Generic drug:  Cholecalciferol (Vitamin D3) Take  by mouth daily. vitamin e 100 unit capsule Commonly known as:  E GEMS Take 400 Units by mouth daily. * Notice: This list has 2 medication(s) that are the same as other medications prescribed for you. Read the directions carefully, and ask your doctor or other care provider to review them with you. We Performed the Following QUANTIFERON TB GOLD [[id number] Custom] SED RATE (ESR) [id number] CPT(R)] TSH 3RD GENERATION [34229 CPT(R)] Follow-up Instructions Return in about 4 months (around 10/26/2017) for HTN. To-Do List   
 06/26/2017 Imaging:  XR CHEST PA LAT   
  
 11/19/2017 Lab:  CBC WITH AUTOMATED DIFF   
  
 11/19/2017 Lab:  HEMOGLOBIN A1C WITH EAG   
  
 11/19/2017 Lab:  LIPID PANEL   
  
 11/19/2017 Lab:  METABOLIC PANEL, COMPREHENSIVE \[facility]\"" & HEALTH SERVICES! Dear Radha Ser: 
Thank you for requesting a [employer] account. Our records indicate that you already have an active Captalis account. You can access your account anytime at [email or web address] Did you know that you can access your hospital and ER discharge instructions at any time in Captalis? You can also review all of your test results from your hospital stay or ER visit. Additional Information If you have questions, please visit the Frequently Asked Questions section of the Captalis website at [email or web address] Remember, Captalis is NOT to be used for urgent needs. For medical emergencies, dial 911. Now available from your iPhone and Android! Please provide this summary of care documentation to your next provider. Your primary care clinician is listed as [place]. If you have any questions after today's visit, please call [phone].

## 2017-06-26 NOTE — PROGRESS NOTES
1. Have you been to the ER, urgent care clinic since your last visit? Hospitalized since your last visit? NO    2. Have you seen or consulted any other health care providers outside of the [address] [place] [address] since your last visit? Include any pap smears or colon screening.  NO

## 2017-06-28 DIAGNOSIS — R68.89 OTHER ABNORMAL CLINICAL FINDING: Primary | ICD-10-CM

## 2017-09-28 ENCOUNTER — CLINICAL SUPPORT (OUTPATIENT)
Dept: INTERNAL MEDICINE CLINIC | Age: 62
End: 2017-09-28

## 2017-09-28 VITALS
WEIGHT: 233 LBS | TEMPERATURE: 98 F | BODY MASS INDEX: 35.31 KG/M2 | RESPIRATION RATE: 18 BRPM | OXYGEN SATURATION: 100 % | HEIGHT: 68 IN

## 2017-09-28 DIAGNOSIS — Z23 ENCOUNTER FOR IMMUNIZATION: Primary | ICD-10-CM

## 2017-09-28 NOTE — MR AVS SNAPSHOT
Visit Information Date & Time Provider Department Dept. Phone Encounter #  
 9/28/2017  1:35 PM Carri Hopson  [phone] [id number] Follow-up Instructions Return if symptoms worsen or fail to improve. Your Appointments 10/24/2017 11:15 AM  
ROUTINE CARE with MD ALIX Hooper [facility]) Appt Note: 4 month follow up  
 [phone]  
[address] [facility] [address] [place] [address] Upcoming Health Maintenance Date Due INFLUENZA AGE 9 TO ADULT 8/1/2017 BREAST CANCER SCRN MAMMOGRAM 10/18/2018 PAP AKA CERVICAL CYTOLOGY 12/14/2019 COLONOSCOPY 8/30/2024 DTaP/Tdap/Td series (2 - Td) 12/2/2024 Allergies as of 9/28/2017  Review Complete On: 9/28/2017 By: Rena Ortiz LPN Severity Noted Reaction Type Reactions Erythromycin Medium 08/30/2010   Systemic Rash Current Immunizations  Reviewed on 12/2/2014 Name Date Influenza Vaccine 9/1/2013 Influenza Vaccine (Quad) PF  Incomplete, 9/29/2016 Influenza Vaccine PF 10/6/2015, 9/16/2014 Tdap 12/2/2014 Zoster Vaccine, Live 12/1/2012 Not reviewed this visit You Were Diagnosed With   
  
 Codes Comments Encounter for immunization    -  Primary ICD-10-CM: M10 ICD-9-CM: V03.89 Vitals Temp Resp Height(growth percentile) Weight(growth percentile) SpO2 BMI  
 98 °F (36.7 °C) (Oral) 18 5' 8\" (1.727 m) 233 lb (105.7 kg) 100% 35.43 kg/m2 OB Status Smoking Status Postmenopausal Never Smoker BMI and BSA Data Body Mass Index Body Surface Area  
 35.43 kg/m 2 2.25 m 2 Preferred Pharmacy Pharmacy Name Phone [address] [facility] [phone] Your Updated Medication List  
  
   
 This list is accurate as of: 9/28/17  2:42 PM.  Always use your most recent med list.  
  
  
  
  
 aspirin delayed-release 81 mg tablet Take 81 mg by mouth daily. atenolol 50 mg tablet Commonly known as:  TENORMIN  
TAKE 1 TABLET DAILY  
  
 atorvastatin 20 mg tablet Commonly known as:  LIPITOR Take 1 Tab by mouth daily. buPROPion  mg XL tablet Commonly known as:  WELLBUTRIN XL  
TAKE 1 TABLET EVERY MORNING  
  
 CALCIUM 500+D 500 mg(1,250mg) -200 unit per tablet Generic drug:  calcium-vitamin D Take 2 tablets by mouth daily. hydroCHLOROthiazide 25 mg tablet Commonly known as:  HYDRODIURIL Take 1 Tab by mouth daily. lisinopril 10 mg tablet Commonly known as:  Angie Drought Take 1 Tab by mouth daily. multivitamin, stress formula tablet Commonly known as:  STRESS TAB Take 1 Tab by mouth daily. risperiDONE 2 mg tablet Commonly known as:  RisperDAL TAKE 1 TABLET NIGHTLY AS NEEDED  
  
 SENOKOT PO Take  by mouth. VITAMIN C 1,000 mg tablet Generic drug:  ascorbic acid (vitamin C) Take 500 mg by mouth daily. VITAMIN D3 2,000 unit Cap capsule Generic drug:  Cholecalciferol (Vitamin D3) Take  by mouth daily. vitamin e 100 unit capsule Commonly known as:  E GEMS Take 400 Units by mouth daily. We Performed the Following INFLUENZA VIRUS VAC QUAD,SPLIT,PRESV FREE SYRINGE IM [id number] CPT(R)] Follow-up Instructions Return if symptoms worsen or fail to improve. Patient Instructions Vaccine Information Statement Influenza (Flu) Vaccine (Inactivated or Recombinant): What you need to know Many Vaccine Information Statements are available in Maltese and other languages. See www.immunize.org/vis Hojas de Información Sobre Vacunas están disponibles en Español y en muchos otros idiomas. Visite www.immunize.org/vis 1. Why get vaccinated? Influenza (flu) is a contagious disease that spreads around the [facility] every year, usually between October and May. Flu is caused by influenza viruses, and is spread mainly by coughing, sneezing, and close contact. Anyone can get flu. Flu strikes suddenly and can last several days. Symptoms vary by age, but can include: 
 fever/chills  sore throat  muscle aches  fatigue  cough  headache  runny or stuffy nose Flu can also lead to pneumonia and blood infections, and cause diarrhea and seizures in children. If you have a medical condition, such as heart or lung disease, flu can make it worse. Flu is more dangerous for some people. Infants and young children, people 72years of age and older, pregnant women, and people with certain health conditions or a weakened immune system are at greatest risk. Each year thousands of people in the [facility] die from flu, and many more are hospitalized. Flu vaccine can: 
 keep you from getting flu, 
 make flu less severe if you do get it, and 
 keep you from spreading flu to your family and other people. 2. Inactivated and recombinant flu vaccines A dose of flu vaccine is recommended every flu season. Children 6 months through 6years of age may need two doses during the same flu season. Everyone else needs only one dose each flu season. Some inactivated flu vaccines contain a very small amount of a mercury-based preservative called thimerosal. Studies have not shown thimerosal in vaccines to be harmful, but flu vaccines that do not contain thimerosal are available. There is no live flu virus in flu shots. They cannot cause the flu. There are many flu viruses, and they are always changing. Each year a new flu vaccine is made to protect against three or four viruses that are likely to cause disease in the upcoming flu season.  But even when the vaccine doesnt exactly match these viruses, it may still provide some protection Flu vaccine cannot prevent: 
 flu that is caused by a virus not covered by the vaccine, or 
 illnesses that look like flu but are not. It takes about 2 weeks for protection to develop after vaccination, and protection lasts through the flu season. 3. Some people should not get this vaccine Tell the person who is giving you the vaccine:  If you have any severe, life-threatening allergies. If you ever had a life-threatening allergic reaction after a dose of flu vaccine, or have a severe allergy to any part of this vaccine, you may be advised not to get vaccinated. Most, but not all, types of flu vaccine contain a small amount of egg protein.  If you ever had Guillain-Barré Syndrome (also called GBS). Some people with a history of GBS should not get this vaccine. This should be discussed with your doctor.  If you are not feeling well. It is usually okay to get flu vaccine when you have a mild illness, but you might be asked to come back when you feel better. 4. Risks of a vaccine reaction With any medicine, including vaccines, there is a chance of reactions. These are usually mild and go away on their own, but serious reactions are also possible. Most people who get a flu shot do not have any problems with it. Minor problems following a flu shot include:  
 soreness, redness, or swelling where the shot was given  hoarseness  sore, red or itchy eyes  cough  fever  aches  headache  itching  fatigue If these problems occur, they usually begin soon after the shot and last 1 or 2 days. More serious problems following a flu shot can include the following:  There may be a small increased risk of Guillain-Barré Syndrome (GBS) after inactivated flu vaccine.   This risk has been estimated at 1 or 2 additional cases per million people vaccinated. This is much lower than the risk of severe complications from flu, which can be prevented by flu vaccine.  Young children who get the flu shot along with pneumococcal vaccine (PCV13) and/or DTaP vaccine at the same time might be slightly more likely to have a seizure caused by fever. Ask your doctor for more information. Tell your doctor if a child who is getting flu vaccine has ever had a seizure. Problems that could happen after any injected vaccine:  People sometimes faint after a medical procedure, including vaccination. Sitting or lying down for about 15 minutes can help prevent fainting, and injuries caused by a fall. Tell your doctor if you feel dizzy, or have vision changes or ringing in the ears.  Some people get severe pain in the shoulder and have difficulty moving the arm where a shot was given. This happens very rarely.  Any medication can cause a severe allergic reaction. Such reactions from a vaccine are very rare, estimated at about 1 in a million doses, and would happen within a few minutes to a few hours after the vaccination. As with any medicine, there is a very remote chance of a vaccine causing a serious injury or death. The safety of vaccines is always being monitored. For more information, visit: www.cdc.gov/vaccinesafety/ 
 
5. What if there is a serious reaction? What should I look for?  Look for anything that concerns you, such as signs of a severe allergic reaction, very high fever, or unusual behavior. Signs of a severe allergic reaction can include hives, swelling of the face and throat, difficulty breathing, a fast heartbeat, dizziness, and weakness  usually within a few minutes to a few hours after the vaccination. What should I do?  
 
 If you think it is a severe allergic reaction or other emergency that cant wait, call 9-1-1 and get the person to the nearest hospital. Otherwise, call your doctor.  Reactions should be reported to the Vaccine Adverse Event Reporting System (VAERS). Your doctor should file this report, or you can do it yourself through  the VAERS web site at www.vaers. hhs.gov, or by calling 6-703.372.1717. VAERS does not give medical advice. 6. The National Vaccine Injury Compensation Program 
 
The [facility] Vaccine Injury Compensation Program (VICP) is a federal program that was created to compensate people who may have been injured by certain vaccines. Persons who believe they may have been injured by a vaccine can learn about the program and about filing a claim by calling 6-816.978.2175 or visiting the [employer] website at www.[facility].gov/vaccinecompensation. There is a time limit to file a claim for compensation. 7. How can I learn more?  Ask your healthcare provider. He or she can give you the vaccine package insert or suggest other sources of information.  Call your local or state health department.  Contact the Centers for Disease Control and Prevention (CDC): 
- Call 3-483.532.9334 (1-800-CDC-INFO) or 
- Visit CDCs website at www.cdc.gov/flu Vaccine Information Statement Inactivated Influenza Vaccine 8/7/2015 
[address] UGeovanna Leonard Rasp [id number] [facility] and [employer] Centers for Disease Control and Prevention Office Use Only Introducing [facility]! Dear Daria Vega: 
Thank you for requesting a [employer] account. Our records indicate that you already have an active [employer] account. You can access your account anytime at [email or web address] Did you know that you can access your hospital and ER discharge instructions at any time in [employer]? You can also review all of your test results from your hospital stay or ER visit. Additional Information If you have questions, please visit the Frequently Asked Questions section of the [employer] website at [email or web address] Remember, MyChart is NOT to be used for urgent needs. For medical emergencies, dial 911. Now available from your iPhone and Android! Please provide this summary of care documentation to your next provider. Your primary care clinician is listed as [place]. If you have any questions after today's visit, please call [phone].

## 2017-09-28 NOTE — PATIENT INSTRUCTIONS
Vaccine Information Statement    Influenza (Flu) Vaccine (Inactivated or Recombinant): What you need to know    Many Vaccine Information Statements are available in Azeri and other languages. See www.immunize.org/vis  Hojas de Información Sobre Vacunas están disponibles en Español y en muchos otros idiomas. Visite www.immunize.org/vis    1. Why get vaccinated? Influenza (flu) is a contagious disease that spreads around the [place] every year, usually between October and May. Flu is caused by influenza viruses, and is spread mainly by coughing, sneezing, and close contact. Anyone can get flu. Flu strikes suddenly and can last several days. Symptoms vary by age, but can include:   fever/chills   sore throat   muscle aches   fatigue   cough   headache    runny or stuffy nose    Flu can also lead to pneumonia and blood infections, and cause diarrhea and seizures in children. If you have a medical condition, such as heart or lung disease, flu can make it worse. Flu is more dangerous for some people. Infants and young children, people 72years of age and older, pregnant women, and people with certain health conditions or a weakened immune system are at greatest risk. Each year thousands of people in the [facility] die from flu, and many more are hospitalized. Flu vaccine can:   keep you from getting flu,   make flu less severe if you do get it, and   keep you from spreading flu to your family and other people. 2. Inactivated and recombinant flu vaccines    A dose of flu vaccine is recommended every flu season. Children 6 months through 6years of age may need two doses during the same flu season. Everyone else needs only one dose each flu season.        Some inactivated flu vaccines contain a very small amount of a mercury-based preservative called thimerosal. Studies have not shown thimerosal in vaccines to be harmful, but flu vaccines that do not contain thimerosal are available. There is no live flu virus in flu shots. They cannot cause the flu. There are many flu viruses, and they are always changing. Each year a new flu vaccine is made to protect against three or four viruses that are likely to cause disease in the upcoming flu season. But even when the vaccine doesnt exactly match these viruses, it may still provide some protection    Flu vaccine cannot prevent:   flu that is caused by a virus not covered by the vaccine, or   illnesses that look like flu but are not. It takes about 2 weeks for protection to develop after vaccination, and protection lasts through the flu season. 3. Some people should not get this vaccine    Tell the person who is giving you the vaccine:     If you have any severe, life-threatening allergies. If you ever had a life-threatening allergic reaction after a dose of flu vaccine, or have a severe allergy to any part of this vaccine, you may be advised not to get vaccinated. Most, but not all, types of flu vaccine contain a small amount of egg protein.  If you ever had Guillain-Barré Syndrome (also called GBS). Some people with a history of GBS should not get this vaccine. This should be discussed with your doctor.  If you are not feeling well. It is usually okay to get flu vaccine when you have a mild illness, but you might be asked to come back when you feel better. 4. Risks of a vaccine reaction    With any medicine, including vaccines, there is a chance of reactions. These are usually mild and go away on their own, but serious reactions are also possible. Most people who get a flu shot do not have any problems with it.      Minor problems following a flu shot include:    soreness, redness, or swelling where the shot was given     hoarseness   sore, red or itchy eyes   cough   fever   aches   headache   itching   fatigue  If these problems occur, they usually begin soon after the shot and last 1 or 2 days. More serious problems following a flu shot can include the following:     There may be a small increased risk of Guillain-Barré Syndrome (GBS) after inactivated flu vaccine. This risk has been estimated at 1 or 2 additional cases per million people vaccinated. This is much lower than the risk of severe complications from flu, which can be prevented by flu vaccine.  Young children who get the flu shot along with pneumococcal vaccine (PCV13) and/or DTaP vaccine at the same time might be slightly more likely to have a seizure caused by fever. Ask your doctor for more information. Tell your doctor if a child who is getting flu vaccine has ever had a seizure. Problems that could happen after any injected vaccine:      People sometimes faint after a medical procedure, including vaccination. Sitting or lying down for about 15 minutes can help prevent fainting, and injuries caused by a fall. Tell your doctor if you feel dizzy, or have vision changes or ringing in the ears.  Some people get severe pain in the shoulder and have difficulty moving the arm where a shot was given. This happens very rarely.  Any medication can cause a severe allergic reaction. Such reactions from a vaccine are very rare, estimated at about 1 in a million doses, and would happen within a few minutes to a few hours after the vaccination. As with any medicine, there is a very remote chance of a vaccine causing a serious injury or death. The safety of vaccines is always being monitored. For more information, visit: www.cdc.gov/vaccinesafety/    5. What if there is a serious reaction? What should I look for?  Look for anything that concerns you, such as signs of a severe allergic reaction, very high fever, or unusual behavior.     Signs of a severe allergic reaction can include hives, swelling of the face and throat, difficulty breathing, a fast heartbeat, dizziness, and weakness - usually within a few minutes to a few hours after the vaccination. What should I do?  If you think it is a severe allergic reaction or other emergency that cant wait, call 9-1-1 and get the person to the nearest hospital. Otherwise, call your doctor.  Reactions should be reported to the Vaccine Adverse Event Reporting System (VAERS). Your doctor should file this report, or you can do it yourself through  the VAERS web site at www.vaers. [facility].gov, or by calling 7-981.185.3676. VAERS does not give medical advice. 6. The National Vaccine Injury Compensation Program    The [facility] Vaccine Injury Compensation Program (VICP) is a federal program that was created to compensate people who may have been injured by certain vaccines. Persons who believe they may have been injured by a vaccine can learn about the program and about filing a claim by calling 3-361.505.4534 or visiting the [employer] website at www.[facility].gov/vaccinecompensation. There is a time limit to file a claim for compensation. 7. How can I learn more?  Ask your healthcare provider. He or she can give you the vaccine package insert or suggest other sources of information.  Call your local or state health department.  Contact the Centers for Disease Control and Prevention (CDC):  - Call 5-492.706.4920 (1-800-CDC-INFO) or  - Visit CDCs website at www.cdc.gov/flu    Vaccine Information Statement   Inactivated Influenza Vaccine   8/7/2015  42 CANDIDA Vargas 120AW-57    Department of Health and Human Services  Centers for Disease Control and Prevention    Office Use Only

## 2017-09-28 NOTE — PROGRESS NOTES
Chief Complaint   Patient presents with    Immunization/Injection     1. Have you been to the ER, urgent care clinic since your last visit? Hospitalized since your last visit? No    2. Have you seen or consulted any other health care providers outside of the [address] [place] [address] since your last visit? Include any pap smears or colon screening. No    In the event something were to happen to you and you were unable to speak on your behalf, do you have an Advance Directive/ Living Will in place stating your wishes? No     If yes, do we have a copy on file: N/A    If no, would you like information:   Patient declines at this time. Verbal order with readback received to administer 0.5 ml of Influenza Vaccine Flulaval Quadrivalent 2017/2018 Formula. After obtaining written consent from the patient, Flu vaccine was administered to left deltoid by Ankur Samano LPN. NDC: [id number], Lot:[id number], Exp: 04/30/2018 Manf: [employer].  Patient provided VIS & observed with no s/s of adverse reactions.

## 2017-10-24 ENCOUNTER — OFFICE VISIT (OUTPATIENT)
Dept: INTERNAL MEDICINE CLINIC | Age: 62
End: 2017-10-24

## 2017-10-24 VITALS
DIASTOLIC BLOOD PRESSURE: 58 MMHG | HEART RATE: 48 BPM | HEIGHT: 68 IN | WEIGHT: 233.6 LBS | OXYGEN SATURATION: 97 % | TEMPERATURE: 98.1 F | RESPIRATION RATE: 16 BRPM | SYSTOLIC BLOOD PRESSURE: 105 MMHG | BODY MASS INDEX: 35.4 KG/M2

## 2017-10-24 DIAGNOSIS — E78.5 HYPERLIPIDEMIA, UNSPECIFIED HYPERLIPIDEMIA TYPE: ICD-10-CM

## 2017-10-24 DIAGNOSIS — I10 ESSENTIAL HYPERTENSION: Primary | ICD-10-CM

## 2017-10-24 DIAGNOSIS — R06.00 DYSPNEA, UNSPECIFIED TYPE: ICD-10-CM

## 2017-10-24 DIAGNOSIS — K21.9 GASTROESOPHAGEAL REFLUX DISEASE WITHOUT ESOPHAGITIS: ICD-10-CM

## 2017-10-24 DIAGNOSIS — R73.9 HYPERGLYCEMIA: ICD-10-CM

## 2017-10-24 NOTE — PROGRESS NOTES
SUBJECTIVE:   Ms. Marcy Leal is a 57 y.o. female who is here for follow up of routine medical issues. Her  is my patient Glen Olivas. Chief Complaint   Patient presents with    Hypertension     pt here today for routine f.u    Hip Pain     pt c/o pain in L hip when walking       She is still having hot flashes and sweats. Can occur any time. No fevers / chills. No N/V. No URI symptoms. She has a little SOB sometimes, and MUÑOZ, but no cough. She still has constipation--\"It's a little better. Off laxative but I still take the stool softener daily. L ankle pain \"still bothers me at times. \"  It is recalled that she saw orthopedist for L ankle pain. \"She wanted me to wear a device\", that was expensive. The pain got better with new shoes. Hurts if sits at desk too long. Back is doing okay. She saw Dr. Mae for knee pain--doing better. Has gotten injections. Neuropathy: She is still having a lot of numbness and tingling in feet. \"Pretty severe. \"  Doing the same as last visit. It is recalled that she has had high blood sugars, as well as an A1C that was up. L>R hip pain is better. We have discussed the R hip pain before in 2015: Discussed how she had a L bone spur, and favored that side. Now wearing soft cushioned soles. Palpitations have gotten better. Saw Dr. Nehal Amanda planning to follow up further unless becomes problem again. We noted in 2014: She was having palpitations and saw Dr. Sven Champion in January. She had EKG, echo and stress test--\"I think everything is okay. \"  The palpitations went away since getting treated for INDER. She is on CPAP for INDER. Mood okay, stress better. As noted before: \"I have been depressed since I was a teenager. \"  \"I tried to go off the meds, but I got down in the dumps. \"  Back on meds. No SI at this time. Sees Dr. Cuba Bang currently (previously Dr. Jonathan Mcgraw).    At this time, she is otherwise doing well and has brought no other complaints to my attention today. For a list of the medical issues addressed today, see the assessment and plan below. PMH:   Past Medical History:   Diagnosis Date    Cancer ([address]     skin cancer removed from nose    Depression 2014    GERD (gastroesophageal reflux disease)     Hot flashes     Hyperglycemia 2014    Hyperlipidemia 2014    Hypertension     Left bundle branch block     Neuropathy, peripheral 2014    INDER on CPAP 2014       Past Surgical History:   Procedure Laterality Date    COLONOSCOPY  2010     Dr. Marie Mean          HX Silva Flores; resection of skin cancer from nose       All: She is allergic to erythromycin. (rash). Current Outpatient Prescriptions   Medication Sig    DOCUSATE CALCIUM (STOOL SOFTENER PO) Take  by mouth.  atenolol (TENORMIN) 50 mg tablet TAKE 1 TABLET DAILY    lisinopril (PRINIVIL, ZESTRIL) 10 mg tablet Take 1 Tab by mouth daily.  atorvastatin (LIPITOR) 20 mg tablet Take 1 Tab by mouth daily.  hydroCHLOROthiazide (HYDRODIURIL) 25 mg tablet Take 1 Tab by mouth daily.  risperiDONE (RISPERDAL) 2 mg tablet TAKE 1 TABLET NIGHTLY AS NEEDED    buPROPion XL (WELLBUTRIN XL) 300 mg XL tablet TAKE 1 TABLET EVERY MORNING    Cholecalciferol, Vitamin D3, (VITAMIN D3) 2,000 unit cap capsule Take  by mouth daily.  multivitamin, stress formula (STRESS TAB) tablet Take 1 Tab by mouth daily.  ascorbic acid (VITAMIN C) 1,000 mg tablet Take 500 mg by mouth daily.  vitamin e (E GEMS) 100 unit capsule Take 400 Units by mouth daily.  calcium-vitamin D (CALCIUM 500+D) 500 mg(1,250mg) -200 unit per tablet Take 2 tablets by mouth daily.  aspirin delayed-release 81 mg tablet Take 81 mg by mouth daily.  SENNOSIDES (SENOKOT PO) Take  by mouth. No current facility-administered medications for this visit. FH: Mother  breast cancer. Father  of CHF. SH: Retired insurance . She reports that she has never smoked. She has never used smokeless tobacco. She reports that she does not drink alcohol or use illicit drugs. ROS: See above; Complete ROS otherwise negative. OBJECTIVE:   Vitals:   Visit Vitals    /58 (BP 1 Location: Left arm, BP Patient Position: Sitting)    Pulse (!) 48    Temp 98.1 °F (36.7 °C) (Oral)    Resp 16    Ht 5' 8\" (1.727 m)    Wt 233 lb 9.6 oz (106 kg)    SpO2 97%    BMI 35.52 kg/m2      Gen: Pleasant 57 y.o.  female in NAD. HEENT: PERRLA. EOMI. OP moist and pink. Neck: Supple. No LAD. HEART: RRR, No M/G/R.    LUNGS: CTAB No W/R. ABDOMEN: S, NT, ND, BS+. EXTREMITIES: Warm. No C/C/E.  MUSCULOSKELETAL: Normal ROM, muscle strength 5/5 all groups. NEURO: Alert and oriented x 3. Cranial nerves grossly intact. No focal sensory or motor deficits noted. SKIN: Warm. Dry. No rashes or other lesions noted. Lab Results   Component Value Date/Time    Hemoglobin A1c 6.0 06/14/2017 09:41 AM     Lab Results   Component Value Date/Time    Sodium 142 06/14/2017 09:41 AM    Potassium 4.5 06/14/2017 09:41 AM    Chloride 103 06/14/2017 09:41 AM    CO2 25 06/14/2017 09:41 AM    Glucose 117 06/14/2017 09:41 AM    BUN 23 06/14/2017 09:41 AM    Creatinine 1.07 06/14/2017 09:41 AM    BUN/Creatinine ratio 21 06/14/2017 09:41 AM    GFR est AA 63 06/14/2017 09:41 AM    GFR est non-AA 55 06/14/2017 09:41 AM    Calcium 10.5 06/14/2017 09:41 AM         ASSESSMENT/ PLAN:     Sweats / heat intolerance. DDx includes infections, TB, thyroid and other endocrine disorders. Awaiting labs. Hypertension: Normal today; watch this for now. - atenolol (TENORMIN) 50 mg tablet; Take 1 Tab by mouth daily. - hydrochlorothiazide (HYDRODIURIL) 25 mg tablet; Take 1 Tab by mouth daily.  - METABOLIC PANEL, COMPREHENSIVE  - LIPID PANEL  - CBC WITH AUTOMATED DIFF    Hip pain: L arthritis. A bit worse.   Sees Dr. Renny Wood as desired. Constipation: Ongoing. Controlled with stool softener. Consider adding more fiber to diet. Neuropathy, LE: Stable. Likely arising from her metabolic syndrome / prediabetes. Sometimes neuropathy predates the diagnosis of DM. Neg RPR, ESR, TSH, JUNE, in 2014    Depression: Stable. No SI.   - risperiDONE (RISPERDAL) 2 mg tablet; Take 1 Tab by mouth nightly as needed. - buPROPion XL (WELLBUTRIN XL) 300 mg XL tablet; Take 1 Tab by mouth every morning. Hyperglycemia: \"Prediabetes\" or \"borderline DM\". - METABOLIC PANEL, COMPREHENSIVE  - HEMOGLOBIN A1C    GERD (gastroesophageal reflux disease): Stable. - omeprazole (PRILOSEC) 20 mg capsule; Take 1 Cap by mouth daily. Hyperlipidemia: Reviewed her labs. - atorvastatin (LIPITOR) 20 mg tablet; Take 1 Tab by mouth daily.  - LIPID PANEL    INDER on CPAP:  - REFERRAL TO SLEEP STUDIES    Overweight: stable--work on diet and exercise. Wt Readings from Last 3 Encounters:   10/24/17 233 lb 9.6 oz (106 kg)   09/28/17 233 lb (105.7 kg)   06/26/17 233 lb 9.6 oz (106 kg)       Follow-up Disposition:  Return in about 4 months (around 2/24/2018) for HTN. I have reviewed the patient's medications and risks/side effects/benefits were discussed. Diagnosis(-es) explained to patient and questions answered. Literature provided where appropriate.

## 2017-10-24 NOTE — PROGRESS NOTES
1. Have you been to the ER, urgent care clinic since your last visit? Hospitalized since your last visit?no    2. Have you seen or consulted any other health care providers outside of the [address] [place] [address] since your last visit? Include any pap smears or colon screening.  no

## 2017-10-24 NOTE — MR AVS SNAPSHOT
Visit Information Date & Time Provider Department Dept. Phone Encounter #  
 10/24/2017 11:15 AM Jackelyn Mcnamara, [address] [id number] Follow-up Instructions Return in about 4 months (around 2/24/2018) for HTN. Follow-up and Disposition History Upcoming Health Maintenance Date Due  
 BREAST CANCER SCRN MAMMOGRAM 10/18/2018 PAP AKA CERVICAL CYTOLOGY 12/14/2019 COLONOSCOPY 8/30/2024 DTaP/Tdap/Td series (2 - Td) 12/2/2024 Allergies as of 10/24/2017  Review Complete On: 10/24/2017 By: Aliya Medrano Severity Noted Reaction Type Reactions Erythromycin Medium 08/30/2010   Systemic Rash Current Immunizations  Reviewed on 9/28/2017 Name Date Influenza Vaccine 9/1/2013 Influenza Vaccine (Quad) PF 9/28/2017, 9/29/2016 Influenza Vaccine PF 10/6/2015, 9/16/2014 Tdap 12/2/2014 Zoster Vaccine, Live 12/1/2012 Not reviewed this visit You Were Diagnosed With   
  
 Codes Comments Essential hypertension    -  Primary ICD-10-CM: I10 
ICD-9-CM: 401.9 Hyperglycemia     ICD-10-CM: R73.9 ICD-9-CM: 790.29 Gastroesophageal reflux disease without esophagitis     ICD-10-CM: K21.9 ICD-9-CM: 530.81 Hyperlipidemia, unspecified hyperlipidemia type     ICD-10-CM: E78.5 ICD-9-CM: 272.4 Dyspnea, unspecified type     ICD-10-CM: R06.00 
ICD-9-CM: 786.09 Vitals BP Pulse Temp Resp Height(growth percentile) Weight(growth percentile) 105/58 (BP 1 Location: Left arm, BP Patient Position: Sitting) (!) 48 98.1 °F (36.7 °C) (Oral) 16 5' 8\" (1.727 m) 233 lb 9.6 oz (106 kg) SpO2 BMI OB Status Smoking Status 97% 35.52 kg/m2 Postmenopausal Never Smoker Vitals History BMI and BSA Data Body Mass Index Body Surface Area 35.52 kg/m 2 2.26 m 2 Preferred Pharmacy Pharmacy Name Phone  [facility] [address] [place] [address] [phone] Your Updated Medication List  
  
   
This list is accurate as of: 10/24/17 11:39 AM.  Always use your most recent med list.  
  
  
  
  
 aspirin delayed-release 81 mg tablet Take 81 mg by mouth daily. atenolol 50 mg tablet Commonly known as:  TENORMIN  
TAKE 1 TABLET DAILY  
  
 atorvastatin 20 mg tablet Commonly known as:  LIPITOR Take 1 Tab by mouth daily. buPROPion  mg XL tablet Commonly known as:  WELLBUTRIN XL  
TAKE 1 TABLET EVERY MORNING  
  
 CALCIUM 500+D 500 mg(1,250mg) -200 unit per tablet Generic drug:  calcium-vitamin D Take 2 tablets by mouth daily. hydroCHLOROthiazide 25 mg tablet Commonly known as:  HYDRODIURIL Take 1 Tab by mouth daily. lisinopril 10 mg tablet Commonly known as:  Merilynn Leandro Take 1 Tab by mouth daily. multivitamin, stress formula tablet Commonly known as:  STRESS TAB Take 1 Tab by mouth daily. risperiDONE 2 mg tablet Commonly known as:  RisperDAL TAKE 1 TABLET NIGHTLY AS NEEDED  
  
 SENOKOT PO Take  by mouth. STOOL SOFTENER PO Take  by mouth. VITAMIN C 1,000 mg tablet Generic drug:  ascorbic acid (vitamin C) Take 500 mg by mouth daily. VITAMIN D3 2,000 unit Cap capsule Generic drug:  Cholecalciferol (Vitamin D3) Take  by mouth daily. vitamin e 100 unit capsule Commonly known as:  E GEMS Take 400 Units by mouth daily. Follow-up Instructions Return in about 4 months (around 2/24/2018) for HTN. To-Do List   
 10/24/2017 PFT:  PULMONARY FUNCTION TEST [facility]! Dear Mohinder Rice: 
Thank you for requesting a [facility] account. Our records indicate that you already have an active [facility] account. You can access your account anytime at [email or web address] Did you know that you can access your hospital and ER discharge instructions at any time in [facility]? You can also review all of your test results from your hospital stay or ER visit. Additional Information If you have questions, please visit the Frequently Asked Questions section of the [employer] website at [email or web address] Remember, [employer] is NOT to be used for urgent needs. For medical emergencies, dial 911. Now available from your iPhone and Android! Please provide this summary of care documentation to your next provider. Your primary care clinician is listed as [place]. If you have any questions after today's visit, please call [phone].

## 2018-01-08 ENCOUNTER — TELEPHONE (OUTPATIENT)
Dept: SLEEP MEDICINE | Age: 63
End: 2018-01-08

## 2018-01-08 DIAGNOSIS — G47.33 OBSTRUCTIVE SLEEP APNEA SYNDROME: Primary | ICD-10-CM

## 2018-01-08 NOTE — TELEPHONE ENCOUNTER
Patient called to get a new supply order faxed to [facility]. She is scheduled to see Dr. Irma Rose on 04/05/2018.

## 2018-01-09 RX ORDER — ATORVASTATIN CALCIUM 20 MG/1
20 TABLET, FILM COATED ORAL DAILY
Qty: 90 TAB | Refills: 3 | Status: SHIPPED | OUTPATIENT
Start: 2018-01-09 | End: 2018-12-18 | Stop reason: SDUPTHER

## 2018-01-09 RX ORDER — LISINOPRIL 10 MG/1
10 TABLET ORAL DAILY
Qty: 90 TAB | Refills: 3 | Status: SHIPPED | OUTPATIENT
Start: 2018-01-09 | End: 2018-12-18 | Stop reason: SDUPTHER

## 2018-01-09 RX ORDER — BUPROPION HYDROCHLORIDE 300 MG/1
TABLET ORAL
Qty: 90 TAB | Refills: 3 | Status: SHIPPED | OUTPATIENT
Start: 2018-01-09 | End: 2018-12-18 | Stop reason: SDUPTHER

## 2018-01-09 RX ORDER — ATENOLOL 50 MG/1
TABLET ORAL
Qty: 90 TAB | Refills: 3 | Status: SHIPPED | OUTPATIENT
Start: 2018-01-09 | End: 2018-12-18 | Stop reason: SDUPTHER

## 2018-01-09 RX ORDER — HYDROCHLOROTHIAZIDE 25 MG/1
25 TABLET ORAL DAILY
Qty: 90 TAB | Refills: 3 | Status: SHIPPED | OUTPATIENT
Start: 2018-01-09 | End: 2018-12-18 | Stop reason: SDUPTHER

## 2018-01-09 NOTE — TELEPHONE ENCOUNTER
From: Adelita Olivas  To:  Ronald Mistry MD  Sent: 1/9/2018 9:23 AM EST  Subject: Medication Renewal Request    Original authorizing provider: Ronald Mistry MD    [facility] would like a refill of the following medications:  atenolol (TENORMIN) 50 mg tablet Ronald Mistry MD]  lisinopril (PRINIVIL, ZESTRIL) 10 mg tablet Ronald Mistry MD]  atorvastatin (LIPITOR) 20 mg tablet Ronald Mistry MD]  hydroCHLOROthiazide (HYDRODIURIL) 25 mg tablet Ronald Mistry MD]  buPROPion XL (WELLBUTRIN XL) 300 mg XL tablet Ronald Mistry MD]    Preferred pharmacy: Other - [facility], [id number], [facility] [place]:

## 2018-01-10 ENCOUNTER — DOCUMENTATION ONLY (OUTPATIENT)
Dept: SLEEP MEDICINE | Age: 63
End: 2018-01-10

## 2018-01-31 DIAGNOSIS — E78.5 HYPERLIPIDEMIA, UNSPECIFIED HYPERLIPIDEMIA TYPE: ICD-10-CM

## 2018-01-31 DIAGNOSIS — I10 ESSENTIAL HYPERTENSION: Primary | ICD-10-CM

## 2018-01-31 DIAGNOSIS — R53.83 FATIGUE, UNSPECIFIED TYPE: ICD-10-CM

## 2018-01-31 DIAGNOSIS — R73.9 HYPERGLYCEMIA: ICD-10-CM

## 2018-02-19 ENCOUNTER — TELEPHONE (OUTPATIENT)
Dept: INTERNAL MEDICINE CLINIC | Age: 63
End: 2018-02-19

## 2018-02-19 DIAGNOSIS — Z77.018 EXPOSURE TO MERCURY: Primary | ICD-10-CM

## 2018-02-19 NOTE — TELEPHONE ENCOUNTER
----- Message from Mariangel Velasco sent at 2/19/2018  1:41 PM EST -----  Regarding: Non-Urgent Medical Question  Contact: [phone]  Dr. Eunice Ochoa,  I am sorry about the last minute notice, but I am going for my pre-appointment lab work tomorrow morning. In addition to the other blood work that you ordered, can you please order tests to determine what my mercury level is. I eat solid white tuna fish every day for several years, and have recently been looking into the health risks. I am going to cut back, but want to know where my levels are now. Thanks so much,  Kari MADERA

## 2018-02-20 ENCOUNTER — APPOINTMENT (OUTPATIENT)
Dept: INTERNAL MEDICINE CLINIC | Age: 63
End: 2018-02-20

## 2018-02-20 ENCOUNTER — HOSPITAL ENCOUNTER (OUTPATIENT)
Dept: LAB | Age: 63
Discharge: HOME | End: 2018-02-20
Payer: MEDICARE

## 2018-02-20 DIAGNOSIS — R06.00 DYSPNEA, UNSPECIFIED TYPE: ICD-10-CM

## 2018-02-20 DIAGNOSIS — R73.9 HYPERGLYCEMIA: ICD-10-CM

## 2018-02-20 DIAGNOSIS — I10 ESSENTIAL HYPERTENSION: ICD-10-CM

## 2018-02-20 DIAGNOSIS — E78.5 HYPERLIPIDEMIA, UNSPECIFIED HYPERLIPIDEMIA TYPE: ICD-10-CM

## 2018-02-20 PROCEDURE — 84439 ASSAY OF FREE THYROXINE: CPT

## 2018-02-20 PROCEDURE — 80061 LIPID PANEL: CPT

## 2018-02-20 PROCEDURE — 36415 COLL VENOUS BLD VENIPUNCTURE: CPT

## 2018-02-20 PROCEDURE — 83036 HEMOGLOBIN GLYCOSYLATED A1C: CPT

## 2018-02-20 PROCEDURE — 80053 COMPREHEN METABOLIC PANEL: CPT

## 2018-02-20 PROCEDURE — 84443 ASSAY THYROID STIM HORMONE: CPT

## 2018-02-20 PROCEDURE — 85025 COMPLETE CBC W/AUTO DIFF WBC: CPT

## 2018-02-21 LAB
ALBUMIN SERPL-MCNC: 4.3 G/DL (ref 3.6–4.8)
ALBUMIN/GLOB SERPL: 2 {RATIO} (ref 1.2–2.2)
ALP SERPL-CCNC: 61 IU/L (ref 39–117)
ALT SERPL-CCNC: 21 IU/L (ref 0–32)
AST SERPL-CCNC: 15 IU/L (ref 0–40)
BASOPHILS # BLD AUTO: 0.1 X10E3/UL (ref 0–0.2)
BASOPHILS NFR BLD AUTO: 1 %
BILIRUB SERPL-MCNC: 0.4 MG/DL (ref 0–1.2)
BUN SERPL-MCNC: 24 MG/DL (ref 8–27)
BUN/CREAT SERPL: 21 (ref 12–28)
CALCIUM SERPL-MCNC: 10.8 MG/DL (ref 8.7–10.3)
CHLORIDE SERPL-SCNC: 103 MMOL/L (ref 96–106)
CHOLEST SERPL-MCNC: 137 MG/DL (ref 100–199)
CO2 SERPL-SCNC: 25 MMOL/L (ref 18–29)
CREAT SERPL-MCNC: 1.14 MG/DL (ref 0.57–1)
EOSINOPHIL # BLD AUTO: 0.1 X10E3/UL (ref 0–0.4)
EOSINOPHIL NFR BLD AUTO: 3 %
ERYTHROCYTE [DISTWIDTH] IN BLOOD BY AUTOMATED COUNT: 13.8 % (ref 12.3–15.4)
EST. AVERAGE GLUCOSE BLD GHB EST-MCNC: 117 MG/DL
GFR SERPLBLD CREATININE-BSD FMLA CKD-EPI: 51 ML/MIN/{1.73_M2}
GFR SERPLBLD CREATININE-BSD FMLA CKD-EPI: 58 ML/MIN/{1.73_M2}
GLOBULIN SER CALC-MCNC: 2.2 G/L (ref 1.5–4.5)
GLUCOSE SERPL-MCNC: 124 MG/DL (ref 65–99)
HBA1C MFR BLD: 5.7 % (ref 4.8–5.6)
HCT VFR BLD AUTO: 36.2 % (ref 34–46.6)
HDLC SERPL-MCNC: 45 MG/DL
HGB BLD-MCNC: 11.8 G/DL (ref 11.1–15.9)
IMM GRANULOCYTES # BLD: 0 X10E3/UL (ref 0–0.1)
IMM GRANULOCYTES NFR BLD: 0 %
LDLC SERPL CALC-MCNC: 71 MG/DL (ref 0–99)
LYMPHOCYTES # BLD AUTO: 1.3 X10E3/UL (ref 0.7–3.1)
LYMPHOCYTES NFR BLD AUTO: 30 %
MCH RBC QN AUTO: 29.5 PG (ref 26.6–33)
MCHC RBC AUTO-ENTMCNC: 32.6 G/DL (ref 31.5–35.7)
MCV RBC AUTO: 91 FL (ref 79–97)
MONOCYTES # BLD AUTO: 0.3 X10E3/UL (ref 0.1–0.9)
MONOCYTES NFR BLD AUTO: 7 %
NEUTROPHILS # BLD AUTO: 2.5 X10E3/UL (ref 1.4–7)
NEUTROPHILS NFR BLD AUTO: 59 %
PLATELET # BLD AUTO: 266 X10E3/UL (ref 150–379)
POTASSIUM SERPL-SCNC: 4.5 MMOL/L (ref 3.5–5.2)
PROT SERPL-MCNC: 6.5 G/DL (ref 6–8.5)
RBC # BLD AUTO: 4 X10E6/UL (ref 3.77–5.28)
SODIUM SERPL-SCNC: 142 MMOL/L (ref 134–144)
T4 FREE SERPL-MCNC: 1.25 NG/DL (ref 0.82–1.77)
TRIGL SERPL-MCNC: 105 MG/DL (ref 0–149)
TSH SERPL DL<=0.005 MIU/L-ACNC: 1.48 UIU/ML (ref 0.45–4.5)
VLDLC SERPL CALC-MCNC: 21 MG/DL (ref 5–40)
WBC # BLD AUTO: 4.3 X10E3/UL (ref 3.4–10.8)

## 2018-02-27 ENCOUNTER — OFFICE VISIT (OUTPATIENT)
Dept: INTERNAL MEDICINE CLINIC | Age: 63
End: 2018-02-27

## 2018-02-27 ENCOUNTER — HOSPITAL ENCOUNTER (OUTPATIENT)
Dept: LAB | Age: 63
Discharge: HOME | End: 2018-02-27
Payer: MEDICARE

## 2018-02-27 VITALS
SYSTOLIC BLOOD PRESSURE: 108 MMHG | WEIGHT: 234 LBS | DIASTOLIC BLOOD PRESSURE: 66 MMHG | HEIGHT: 68 IN | HEART RATE: 62 BPM | RESPIRATION RATE: 14 BRPM | BODY MASS INDEX: 35.46 KG/M2 | TEMPERATURE: 97.9 F | OXYGEN SATURATION: 98 %

## 2018-02-27 DIAGNOSIS — R73.9 HYPERGLYCEMIA: ICD-10-CM

## 2018-02-27 DIAGNOSIS — E78.5 HYPERLIPIDEMIA, UNSPECIFIED HYPERLIPIDEMIA TYPE: ICD-10-CM

## 2018-02-27 DIAGNOSIS — K21.9 GASTROESOPHAGEAL REFLUX DISEASE WITHOUT ESOPHAGITIS: ICD-10-CM

## 2018-02-27 DIAGNOSIS — E83.52 HYPERCALCEMIA: Primary | ICD-10-CM

## 2018-02-27 DIAGNOSIS — E83.52 HYPERCALCEMIA: ICD-10-CM

## 2018-02-27 DIAGNOSIS — I10 ESSENTIAL HYPERTENSION: Primary | ICD-10-CM

## 2018-02-27 PROCEDURE — 36415 COLL VENOUS BLD VENIPUNCTURE: CPT

## 2018-02-27 PROCEDURE — 83735 ASSAY OF MAGNESIUM: CPT

## 2018-02-27 PROCEDURE — 82306 VITAMIN D 25 HYDROXY: CPT

## 2018-02-27 PROCEDURE — 80048 BASIC METABOLIC PNL TOTAL CA: CPT

## 2018-02-27 PROCEDURE — 83970 ASSAY OF PARATHORMONE: CPT

## 2018-02-27 PROCEDURE — 82330 ASSAY OF CALCIUM: CPT

## 2018-02-27 NOTE — PROGRESS NOTES
SUBJECTIVE:   Ms. Rony Echavarria is a 70 y.o. female who is here for follow up of routine medical issues. Her  is my patient Glen Olivas. Chief Complaint   Patient presents with    Hypertension     pt here today for routine f.u       She is still having hot flashes and sweats. Can occur any time. No fevers / chills. No N/V. She still has constipation--\"It's a little better. Off laxative but I still take the stool softener daily. \"I have a little plantar fasciitis. \"  L ankle pain \"still bothers me at times. \"  It is recalled that she saw orthopedist for L ankle pain. \"She wanted me to wear a device\", that was expensive. The pain got better with new shoes. Hurts if sits at desk too long. Back is doing okay. Knee is better. No longer seeing Dr. Mae for knee pain--Has gotten injections in past.   Neuropathy: She is still having a lot of numbness and tingling in feet. \"Pretty severe. \"  Doing the same as last visit. It is recalled that she has had high blood sugars, as well as an A1C that was up. L>R hip pain is better. We have discussed the R hip pain before in 2015: Discussed how she had a L bone spur, and favored that side. Now wearing soft cushioned soles. Palpitations have gotten better. Saw Dr. Matty Estrada planning to follow up further unless it becomes a problem again. We noted in 2014: She was having palpitations and saw Dr. Rebecca Lopez in January. She had EKG, echo and stress test--\"I think everything is okay. \"  The palpitations went away since getting treated for INDER. She is on CPAP for INDER. Mood okay, stress better. \"I pray and that helps. \" As noted before: \"I have been depressed since I was a teenager. \"  \"I tried to go off the meds, but I got down in the dumps. \"  Back on meds. No SI at this time. Sees Dr. Anand Salgado currently (previously Dr. Atiya Osborne). At this time, she is otherwise doing well and has brought no other complaints to my attention today.   For a list of the medical issues addressed today, see the assessment and plan below. PMH:   Past Medical History:   Diagnosis Date    Cancer ([facility] [address]     skin cancer removed from nose    Depression 2014    GERD (gastroesophageal reflux disease)     Hot flashes     Hyperglycemia 2014    Hyperlipidemia 2014    Hypertension     Left bundle branch block     Neuropathy, peripheral 2014    INDER on CPAP 2014       Past Surgical History:   Procedure Laterality Date    COLONOSCOPY  2010     Dr. James Mancini          HX Ramon Sep      Dr. Romelle Cooks; resection of skin cancer from nose       All: She is allergic to erythromycin. (rash). Current Outpatient Prescriptions   Medication Sig    atenolol (TENORMIN) 50 mg tablet TAKE 1 TABLET DAILY    lisinopril (PRINIVIL, ZESTRIL) 10 mg tablet Take 1 Tab by mouth daily.  atorvastatin (LIPITOR) 20 mg tablet Take 1 Tab by mouth daily.  hydroCHLOROthiazide (HYDRODIURIL) 25 mg tablet Take 1 Tab by mouth daily.  buPROPion XL (WELLBUTRIN XL) 300 mg XL tablet TAKE 1 TABLET EVERY MORNING    DOCUSATE CALCIUM (STOOL SOFTENER PO) Take  by mouth.  risperiDONE (RISPERDAL) 2 mg tablet TAKE 1 TABLET NIGHTLY AS NEEDED    Cholecalciferol, Vitamin D3, (VITAMIN D3) 2,000 unit cap capsule Take  by mouth daily.  multivitamin, stress formula (STRESS TAB) tablet Take 1 Tab by mouth daily.  ascorbic acid (VITAMIN C) 1,000 mg tablet Take 500 mg by mouth daily.  vitamin e (E GEMS) 100 unit capsule Take 400 Units by mouth daily.  calcium-vitamin D (CALCIUM 500+D) 500 mg(1,250mg) -200 unit per tablet Take 2 tablets by mouth daily.  aspirin delayed-release 81 mg tablet Take 81 mg by mouth daily.  SENNOSIDES (SENOKOT PO) Take  by mouth. No current facility-administered medications for this visit. FH: Mother  breast cancer. Father  of CHF.     SH: Retired insurance underwriting technician. She reports that she has never smoked. She has never used smokeless tobacco. She reports that she does not drink alcohol or use illicit drugs. ROS: See above; Complete ROS otherwise negative. OBJECTIVE:   Vitals:   Visit Vitals    /66 (BP 1 Location: Left arm, BP Patient Position: Sitting)    Pulse 62    Temp 97.9 °F (36.6 °C) (Oral)    Resp 14    Ht 5' 8\" (1.727 m)    Wt 234 lb (106.1 kg)    SpO2 98%    BMI 35.58 kg/m2      Gen: Pleasant 70 y.o.  female in NAD. HEENT: PERRLA. EOMI. OP moist and pink. Neck: Supple. No LAD. HEART: RRR, No M/G/R.    LUNGS: CTAB No W/R. ABDOMEN: S, NT, ND, BS+. EXTREMITIES: Warm. No C/C/E.  MUSCULOSKELETAL: Normal ROM, muscle strength 5/5 all groups. NEURO: Alert and oriented x 3. Cranial nerves grossly intact. No focal sensory or motor deficits noted. SKIN: Warm. Dry. No rashes or other lesions noted. Lab Results   Component Value Date/Time    Hemoglobin A1c 5.7 (H) 02/20/2018 09:33 AM     Lab Results   Component Value Date/Time    Sodium 142 02/20/2018 09:33 AM    Potassium 4.5 02/20/2018 09:33 AM    Chloride 103 02/20/2018 09:33 AM    CO2 25 02/20/2018 09:33 AM    Glucose 124 (H) 02/20/2018 09:33 AM    BUN 24 02/20/2018 09:33 AM    Creatinine 1.14 (H) 02/20/2018 09:33 AM    BUN/Creatinine ratio 21 02/20/2018 09:33 AM    GFR est AA 58 (L) 02/20/2018 09:33 AM    GFR est non-AA 51 (L) 02/20/2018 09:33 AM    Calcium 10.8 (H) 02/20/2018 09:33 AM         ASSESSMENT/ PLAN:     Sweats / heat intolerance. DDx includes infections, TB, thyroid and other endocrine disorders. Labs have been unremarkable. Could be transient hypoglycemia (gvien her prediabetes), or hormonal causes/post-menopause. Hypercalcemia: Ca 10.8, though Ca chinedu = 10.3. It is noted that she is up to date on mammograms. Check Xray chest--might detect bony anomalies, masses, sarcoid. Check PTH, Mg, ionized ca. Hypertension: Normal today; watch this for now.    - atenolol (TENORMIN) 50 mg tablet; Take 1 Tab by mouth daily. - hydrochlorothiazide (HYDRODIURIL) 25 mg tablet; Take 1 Tab by mouth daily.  - METABOLIC PANEL, COMPREHENSIVE  - LIPID PANEL  - CBC WITH AUTOMATED DIFF    Hip pain: L arthritis. A bit better. Has seen Dr. Azeb Hilton as desired. Constipation: Ongoing. Controlled with stool softener. Consider adding more fiber to diet. Neuropathy, LE: Stable. Ongoing. Likely arising from her metabolic syndrome / prediabetes. Sometimes neuropathy predates the diagnosis of DM. Neg RPR, ESR, TSH, JUNE, in 2014    Depression: Stable. No SI.   - risperiDONE (RISPERDAL) 2 mg tablet; Take 1 Tab by mouth nightly as needed. - buPROPion XL (WELLBUTRIN XL) 300 mg XL tablet; Take 1 Tab by mouth every morning. Hyperglycemia: \"Prediabetes\" or \"borderline DM\". - METABOLIC PANEL, COMPREHENSIVE  - HEMOGLOBIN A1C    GERD (gastroesophageal reflux disease): Stable. - omeprazole (PRILOSEC) 20 mg capsule; Take 1 Cap by mouth daily. Hyperlipidemia: Reviewed her labs. - atorvastatin (LIPITOR) 20 mg tablet; Take 1 Tab by mouth daily.  - LIPID PANEL    INDER on CPAP:  - REFERRAL TO SLEEP STUDIES    Overweight: stable--work on diet and exercise. Wt Readings from Last 3 Encounters:   02/27/18 234 lb (106.1 kg)   10/24/17 233 lb 9.6 oz (106 kg)   09/28/17 233 lb (105.7 kg)       Follow-up Disposition:  Return in about 4 months (around 6/27/2018) for HTN. I have reviewed the patient's medications and risks/side effects/benefits were discussed. Diagnosis(-es) explained to patient and questions answered. Literature provided where appropriate.

## 2018-02-27 NOTE — PROGRESS NOTES
1. Have you been to the ER, urgent care clinic since your last visit? Hospitalized since your last visit?no    2. Have you seen or consulted any other health care providers outside of the [address] [place] [address] since your last visit? Include any pap smears or colon screening.  no

## 2018-02-27 NOTE — MR AVS SNAPSHOT
[address] [facility] 
[phone] Patient: Kari Olivas MRN: [id number] FGN:[birth year] Visit Information Date & Time Provider Department Dept. Phone Encounter #  
 2/27/2018 10:30 AM Patricia Leal, [address] [id number] Follow-up Instructions Return in about 4 months (around 6/27/2018) for HTN. Your Appointments 4/12/2018 10:00 AM  
Any with Rosa Maria Moyer MD  
[facility] ([facility]) Appt Note: 1 year f/up- bring machine; pt r/s  
 [address] [facility], [address] [id number] [address] [place] [address], [address] [address] Upcoming Health Maintenance Date Due  
 GLAUCOMA SCREENING Q2Y 1/7/2018 OSTEOPOROSIS SCREENING (DEXA) 1/7/2018 Pneumococcal 65+ Low/Medium Risk (1 of 2 - PCV13) 1/7/2018 MEDICARE YEARLY EXAM 1/7/2018 BREAST CANCER SCRN MAMMOGRAM 10/18/2018 COLONOSCOPY 8/30/2024 DTaP/Tdap/Td series (2 - Td) 12/2/2024 Allergies as of 2/27/2018  Review Complete On: 2/27/2018 By: Patricia Leal MD  
  
 Severity Noted Reaction Type Reactions Erythromycin Medium 08/30/2010   Systemic Rash Current Immunizations  Reviewed on 9/28/2017 Name Date Influenza Vaccine 9/1/2013 Influenza Vaccine (Quad) PF 9/28/2017, 9/29/2016 Influenza Vaccine PF 10/6/2015, 9/16/2014 Tdap 12/2/2014 Zoster Vaccine, Live 12/1/2012 Not reviewed this visit You Were Diagnosed With   
  
 Codes Comments Essential hypertension    -  Primary ICD-10-CM: I10 
ICD-9-CM: 401.9 Hyperglycemia     ICD-10-CM: R73.9 ICD-9-CM: 790.29 Gastroesophageal reflux disease without esophagitis     ICD-10-CM: K21.9 ICD-9-CM: 530.81 Hyperlipidemia, unspecified hyperlipidemia type     ICD-10-CM: E78.5 ICD-9-CM: 272.4 Hypercalcemia     ICD-10-CM: K45.11 
ICD-9-CM: 275.42 Vitals BP Pulse Temp Resp Height(growth percentile) Weight(growth percentile) 108/66 (BP 1 Location: Left arm, BP Patient Position: Sitting) 62 97.9 °F (36.6 °C) (Oral) 14 5' 8\" (1.727 m) 234 lb (106.1 kg) SpO2 BMI OB Status Smoking Status 98% 35.58 kg/m2 Postmenopausal Never Smoker Vitals History BMI and BSA Data Body Mass Index Body Surface Area 35.58 kg/m 2 2.26 m 2 Preferred Pharmacy Pharmacy Name Phone [id number] [place], [address] [phone] Your Updated Medication List  
  
   
This list is accurate as of 2/27/18 11:03 AM.  Always use your most recent med list.  
  
  
  
  
 aspirin delayed-release 81 mg tablet Take 81 mg by mouth daily. atenolol 50 mg tablet Commonly known as:  TENORMIN  
TAKE 1 TABLET DAILY  
  
 atorvastatin 20 mg tablet Commonly known as:  LIPITOR Take 1 Tab by mouth daily. buPROPion  mg XL tablet Commonly known as:  WELLBUTRIN XL  
TAKE 1 TABLET EVERY MORNING  
  
 CALCIUM 500+D 500 mg(1,250mg) -200 unit per tablet Generic drug:  calcium-vitamin D Take 2 tablets by mouth daily. hydroCHLOROthiazide 25 mg tablet Commonly known as:  HYDRODIURIL Take 1 Tab by mouth daily. lisinopril 10 mg tablet Commonly known as:  Rheta Rehan Take 1 Tab by mouth daily. multivitamin, stress formula tablet Commonly known as:  STRESS TAB Take 1 Tab by mouth daily. risperiDONE 2 mg tablet Commonly known as:  RisperDAL TAKE 1 TABLET NIGHTLY AS NEEDED  
  
 SENOKOT PO Take  by mouth. STOOL SOFTENER PO Take  by mouth. VITAMIN C 1,000 mg tablet Generic drug:  ascorbic acid (vitamin C) Take 500 mg by mouth daily. VITAMIN D3 2,000 unit Cap capsule Generic drug:  Cholecalciferol (Vitamin D3) Take  by mouth daily. vitamin e 100 unit capsule Commonly known as:  E GEMS Take 400 Units by mouth daily. We Performed the Following PTH INTACT [69489 CPT(R)] VITAMIN D, 25 HYDROXY [id number] CPT(R)] Follow-up Instructions Return in about 4 months (around 6/27/2018) for HTN. To-Do List   
 02/27/2018 Imaging:  XR CHEST PA LAT Introducing [facility]! Dear Jing Soto: 
Thank you for requesting a [employer]wn [employer] account. Our records indicate that you already have an active [employer] account. You can access your account anytime at [email or web address] Did you know that you can access your hospital and ER discharge instructions at any time in [employer]wn [employer] Scale? You can also review all of your test results from your hospital stay or ER visit. Additional Information If you have questions, please visit the Frequently Asked Questions section of the [employer]wn [employer] website at [email or web address] Remember, [employer] is NOT to be used for urgent needs. For medical emergencies, dial 911. Now available from your iPhone and Android! Please provide this summary of care documentation to your next provider. Your primary care clinician is listed as [place]. If you have any questions after today's visit, please call [phone].

## 2018-02-28 LAB
25(OH)D3+25(OH)D2 SERPL-MCNC: 45.2 NG/ML (ref 30–100)
BUN SERPL-MCNC: 18 MG/DL (ref 8–27)
BUN/CREAT SERPL: 16 (ref 12–28)
CA-I SERPL ISE-MCNC: 6 MG/DL (ref 4.5–5.6)
CALCIUM SERPL-MCNC: 11.2 MG/DL (ref 8.7–10.3)
CHLORIDE SERPL-SCNC: 101 MMOL/L (ref 96–106)
CO2 SERPL-SCNC: 30 MMOL/L (ref 18–29)
CREAT SERPL-MCNC: 1.1 MG/DL (ref 0.57–1)
GFR SERPLBLD CREATININE-BSD FMLA CKD-EPI: 53 ML/MIN/1.73
GFR SERPLBLD CREATININE-BSD FMLA CKD-EPI: 61 ML/MIN/1.73
GLUCOSE SERPL-MCNC: 90 MG/DL (ref 65–99)
MAGNESIUM SERPL-MCNC: 2.2 MG/DL (ref 1.6–2.3)
POTASSIUM SERPL-SCNC: 4.4 MMOL/L (ref 3.5–5.2)
PTH-INTACT SERPL-MCNC: 42 PG/ML (ref 15–65)
SODIUM SERPL-SCNC: 142 MMOL/L (ref 134–144)

## 2018-03-02 ENCOUNTER — TELEPHONE (OUTPATIENT)
Dept: INTERNAL MEDICINE CLINIC | Age: 63
End: 2018-03-02

## 2018-04-04 ENCOUNTER — TELEPHONE (OUTPATIENT)
Dept: SLEEP MEDICINE | Age: 63
End: 2018-04-04

## 2018-04-04 NOTE — TELEPHONE ENCOUNTER
Patient called to cancel upcoming 1 year CPAP follow up. She declined rescheduling and stated she is discontinuing usage of pap therapy and will follow up with our office if she decides to start therapy again in future.

## 2018-04-16 RX ORDER — RISPERIDONE 2 MG/1
TABLET, FILM COATED ORAL
Qty: 90 TAB | Refills: 3 | Status: SHIPPED | OUTPATIENT
Start: 2018-04-16 | End: 2019-03-15 | Stop reason: SDUPTHER

## 2018-04-16 NOTE — TELEPHONE ENCOUNTER
PCP: Jenna Vega MD    Last appt: 2/27/2018  Future Appointments  Date Time Provider Mady Tejada   6/26/2018 10:30 AM Jenna Vega MD [facility] 87       Requested Prescriptions     Pending Prescriptions Disp Refills    risperiDONE (RISPERDAL) 2 mg tablet 90 Tab 3     Sig: TAKE 1 TABLET NIGHTLY AS NEEDED

## 2018-04-16 NOTE — TELEPHONE ENCOUNTER
From: Yariel Olivas  To:  Taryn Victor MD  Sent: 4/15/2018 2:39 PM EDT  Subject: Medication Renewal Request    Original authorizing provider: Taryn Victor MD    Enriqueta Santiago would like a refill of the following medications:  risperiDONE (RISPERDAL) 2 mg tablet Taryn Victor MD]    Preferred pharmacy: [id number]- [address]:

## 2018-04-17 ENCOUNTER — TELEPHONE (OUTPATIENT)
Dept: INTERNAL MEDICINE CLINIC | Age: 63
End: 2018-04-17

## 2018-04-17 NOTE — TELEPHONE ENCOUNTER
Prior Authorization approval obtained for risperidone 2mg. Approval valid 03/18/18-04/17/19.   [facility] notified [phone]

## 2018-05-03 ENCOUNTER — OFFICE VISIT (OUTPATIENT)
Dept: SLEEP MEDICINE | Age: 63
End: 2018-05-03

## 2018-05-03 ENCOUNTER — DOCUMENTATION ONLY (OUTPATIENT)
Dept: SLEEP MEDICINE | Age: 63
End: 2018-05-03

## 2018-05-03 VITALS
SYSTOLIC BLOOD PRESSURE: 130 MMHG | DIASTOLIC BLOOD PRESSURE: 71 MMHG | OXYGEN SATURATION: 97 % | TEMPERATURE: 98.2 F | WEIGHT: 226 LBS | BODY MASS INDEX: 34.25 KG/M2 | HEIGHT: 68 IN | HEART RATE: 67 BPM

## 2018-05-03 DIAGNOSIS — G47.33 OBSTRUCTIVE SLEEP APNEA SYNDROME: Primary | ICD-10-CM

## 2018-05-03 DIAGNOSIS — I10 ESSENTIAL HYPERTENSION: ICD-10-CM

## 2018-05-03 NOTE — PROGRESS NOTES
Patient was given list of DME companies in [facility].  She will call back with her choice of DME company

## 2018-05-03 NOTE — PATIENT INSTRUCTIONS
[address] [facility] [place]. [place], [address]  Tel.  [phone]  Fax. 100 [facility] 60  [place], [address]  Tel.  [phone]  Fax. [phone] [address]  Tel.  [phone]  Fax. [phone]     PROPER SLEEP HYGIENE    What to avoid  · Do not have drinks with caffeine, such as coffee or black tea, for 8 hours before bed. · Do not smoke or use other types of tobacco near bedtime. Nicotine is a stimulant and can keep you awake. · Avoid drinking alcohol late in the evening, because it can cause you to wake in the middle of the night. · Do not eat a big meal close to bedtime. If you are hungry, eat a light snack. · Do not drink a lot of water close to bedtime, because the need to urinate may wake you up during the night. · Do not read or watch TV in bed. Use the bed only for sleeping and sexual activity. What to try  · Go to bed at the same time every night, and wake up at the same time every morning. Do not take naps during the day. · Keep your bedroom quiet, dark, and cool. · Get regular exercise, but not within 3 to 4 hours of your bedtime. .  · Sleep on a comfortable pillow and mattress. · If watching the clock makes you anxious, turn it facing away from you so you cannot see the time. · If you worry when you lie down, start a worry book. Well before bedtime, write down your worries, and then set the book and your concerns aside. · Try meditation or other relaxation techniques before you go to bed. · If you cannot fall asleep, get up and go to another room until you feel sleepy. Do something relaxing. Repeat your bedtime routine before you go to bed again. · Make your house quiet and calm about an hour before bedtime. Turn down the lights, turn off the TV, log off the computer, and turn down the volume on music. This can help you relax after a busy day.     Drowsy Driving  The [address] [place] [address] Road Traffic Safety Administration cites drowsiness as a causing factor in more than 984,600 police reported crashes annually, resulting in 76,000 injuries and 1,500 deaths. Other surveys suggest 55% of people polled have driven while drowsy in the past year, 23% had fallen asleep but not crashed, 3% crashed, and 2% had and accident due to drowsy driving. Who is at risk? Young Drivers: One study of drowsy driving accidents states that 55% of the drivers were under 25 years. Of those, 75% were male. Shift Workers and Travelers: People who work overnight or travel across time zones frequently are at higher risk of experiencing Circadian Rhythm Disorders. They are trying to work and function when their body is programed to sleep. Sleep Deprived: Lack of sleep has a serious impact on your ability to pay attention or focus on a task. Consistently getting less than the average of 8 hours your body needs creates partial or cumulative sleep deprivation. Untreated Sleep Disorders: Sleep Apnea, Narcolepsy, R.L.S., and other sleep disorders (untreated) prevent a person from getting enough restful sleep. This leads to excessive daytime sleepiness and increases the risk for drowsy driving accidents by up to 7 times. Medications / Alcohol: Even over the counter medications can cause drowsiness. Medications that impair a drivers attention should have a warning label. Alcohol naturally makes you sleepy and on its own can cause accidents. Combined with excessive drowsiness its effects are amplified. Signs of Drowsy Driving:   * You don't remember driving the last few miles   * You may drift out of your lady   * You are unable to focus and your thoughts wander   * You may yawn more often than normal   * You have difficulty keeping your eyes open / nodding off   * Missing traffic signs, speeding, or tailgating  Prevention-   Good sleep hygiene, lifestyle and behavioral choices have the most impact on drowsy driving.  There is no substitute for sleep and the average person requires 8 hours nightly. If you find yourself driving drowsy, stop and sleep. Consider the sleep hygiene tips provided during your visit as well. Medication Refill Policy: Refills for all medications require 1 week advance notice. Please have your pharmacy fax a refill request. We are unable to fax, or call in \"controled substance\" medications and you will need to pick these prescriptions up from our office. [employer] Activation    Thank you for requesting access to Graffiti. Please follow the instructions below to securely access and download your online medical record. Graffiti allows you to send messages to your doctor, view your test results, renew your prescriptions, schedule appointments, and more. How Do I Sign Up? 1. In your internet browser, go to [email or web address]. 2. Click on the First Time User? Click Here link in the Sign In box. You will see the New Member Sign Up page. 3. Enter your Graffiti Access Code exactly as it appears below. You will not need to use this code after youve completed the sign-up process. If you do not sign up before the expiration date, you must request a new code. Graffiti Access Code: Activation code not generated  Current Graffiti Status: Active (This is the date your Graffiti access code will )    4. Enter the last four digits of your Social Security Number (xxxx) and Date of Birth (mm/dd/yyyy) as indicated and click Submit. You will be taken to the next sign-up page. 5. Create a Graffiti ID. This will be your Graffiti login ID and cannot be changed, so think of one that is secure and easy to remember. 6. Create a Graffiti password. You can change your password at any time. 7. Enter your Password Reset Question and Answer. This can be used at a later time if you forget your password. 8. Enter your e-mail address. You will receive e-mail notification when new information is available in [address]   9. Click Sign Up. You can now view and download portions of your medical record. 10. Click the Download Summary menu link to download a portable copy of your medical information. Additional Information    If you have questions, please call [phone]. Remember, [employer] is NOT to be used for urgent needs. For medical emergencies, dial 911.

## 2018-05-03 NOTE — PROGRESS NOTES
.                    [address] [facility]. [place], [address]  Tel.  [phone]  Fax. 100 [facility] 60  [place], [address]  Tel.  [phone]  Fax. [phone] [address] [facility] [address]   Tel.  [phone]  Fax. [phone]     S>Kari Olivas is a 70 y.o. female seen for a positive airway pressure follow-up. She reports no problems using the device. The following problems are identified:    Drowsiness no Problems exhaling no   Snoring no Forget to put on Yes, wanted to stop since it took too much room on her nightstand but she slept terribly and went back to it (bought a cart for it)   Mask Comfortable yes Can't fall asleep no   Dry Mouth no Mask falls off no   Air Leaking no Frequent awakenings no     Download reviewed. She admits that her sleep has improved. Therapy Apnea Index averaged over PAP use: 1.8 /hr which reflects improved sleep breathing condition. Allergies   Allergen Reactions    Erythromycin Rash       She has a current medication list which includes the following prescription(s): risperidone, atenolol, lisinopril, atorvastatin, hydrochlorothiazide, bupropion xl, cholecalciferol (vitamin d3), multivitamin, stress formula, ascorbic acid (vitamin c), vitamin e, calcium-vitamin d, aspirin delayed-release, docusate sodium, and sennosides. .      She  has a past medical history of Cancer ([address] Depression (1/9/2014); GERD (gastroesophageal reflux disease); Hot flashes; Hyperglycemia (4/8/2014); Hyperlipidemia (4/8/2014); Hypertension; Left bundle branch block; Neuropathy, peripheral (4/9/2014); and INDER on CPAP (4/8/2014). [place] Sleepiness Score: 1   and Modified F.O.S.Q. Score Total / 2: 19   which reflect improved sleep quality over therapy time.     O>    Visit Vitals    /71    Pulse 67    Temp 98.2 °F (36.8 °C)    Ht 5' 8\" (1.727 m)    Wt 226 lb (102.5 kg)    SpO2 97%    BMI 34.36 kg/m2           General:   Alert, oriented, not in distress   Neck:   No JVD    Chest/Lungs:  symetrical lung expansion , no accessory muscle use    Extremities:  no obvious rashes , negative edema    Neuro:  No focal deficits ; No obvious tremor    Psych:  Normal affect ,  Normal countenance ;         A>    ICD-10-CM ICD-9-CM    1. Obstructive sleep apnea syndrome G47.33 327.23 AMB SUPPLY ORDER   2. Essential hypertension I10 401.9      AHI = 69(2013). On CPAP :  10 cmH2O. Compliant:      yes    Therapeutic Response:  Positive    P>      * Follow-up Disposition:  Return in about 1 year (around 5/3/2019). she will continue on her current pressure settings. I have ordered replacement supplies  PAP therapy is effective and remains necessary for treatment of sleep apnea    * She was asked to contact our office for any problems regarding PAP therapy. * Counseling was provided regarding the importance of regular PAP use and on proper sleep hygiene and safe driving. * Re-enforced proper and regular cleaning for the device. 2. Hypertension - she continues on her current regimen. I have reviewed the relationship between hypertension as it relates to sleep-disordered breathing.      Selam Christie MD  Diplomate in Sleep Medicine  [facility]

## 2018-05-04 ENCOUNTER — DOCUMENTATION ONLY (OUTPATIENT)
Dept: SLEEP MEDICINE | Age: 63
End: 2018-05-04

## 2018-05-24 ENCOUNTER — TELEPHONE (OUTPATIENT)
Dept: INTERNAL MEDICINE CLINIC | Age: 63
End: 2018-05-24

## 2018-05-24 NOTE — TELEPHONE ENCOUNTER
Pt would like to have see if she can have shots and Medicare Wellness done on 6/26/18 with her 4 month f/u  or if she needs separate appt.  Best contact is [phone]       Message received & copied from [facility]

## 2018-06-01 ENCOUNTER — HOSPITAL ENCOUNTER (OUTPATIENT)
Dept: ULTRASOUND IMAGING | Age: 63
Discharge: HOME | End: 2018-06-01
Attending: INTERNAL MEDICINE
Payer: MEDICARE

## 2018-06-01 ENCOUNTER — OFFICE VISIT (OUTPATIENT)
Dept: INTERNAL MEDICINE CLINIC | Age: 63
End: 2018-06-01

## 2018-06-01 VITALS
OXYGEN SATURATION: 97 % | HEIGHT: 68 IN | HEART RATE: 56 BPM | TEMPERATURE: 97.4 F | RESPIRATION RATE: 16 BRPM | WEIGHT: 224.6 LBS | BODY MASS INDEX: 34.04 KG/M2 | SYSTOLIC BLOOD PRESSURE: 118 MMHG | DIASTOLIC BLOOD PRESSURE: 69 MMHG

## 2018-06-01 DIAGNOSIS — M79.605 LEFT LEG PAIN: ICD-10-CM

## 2018-06-01 DIAGNOSIS — M79.605 LEFT LEG PAIN: Primary | ICD-10-CM

## 2018-06-01 PROCEDURE — 93971 EXTREMITY STUDY: CPT

## 2018-06-01 NOTE — PROGRESS NOTES
1. Have you been to the ER, urgent care clinic since your last visit? Hospitalized since your last visit?no  2. Have you seen or consulted any other health care providers outside of the [facility] since your last visit? Include any pap smears or colon screening.  no

## 2018-06-01 NOTE — PROGRESS NOTES
SUBJECTIVE  Ms. Angelito Connors presents today acutely for     Chief Complaint   Patient presents with    Leg Pain     pt here today c.o pain in L leg x 1 week; pt states that pain is above L knee; pt rates pain 3/10    Swelling     pt c.o bilateral ankle swelling       For a few days. She is concerned about DVT. No dyspnea, pleurisy, CP.     OBJECTIVE  Visit Vitals    /69 (BP 1 Location: Left arm, BP Patient Position: Sitting)    Pulse (!) 56    Temp 97.4 °F (36.3 °C) (Oral)    Resp 16    Ht 5' 8\" (1.727 m)    Wt 224 lb 9.6 oz (101.9 kg)    SpO2 97%    BMI 34.15 kg/m2     Gen: Pleasant 70 y.o.  female in NAD.      HEART: RRR, No M/G/R.   LUNGS: CTAB No W/R.   ABDOMEN: S, NT, ND, BS+.   EXTREMITIES: Minimal pain to palpation L distal thigh. Trace to 1+ edema L > R.    ASSESSMENT / PLAN    ICD-10-CM ICD-9-CM    1. Left leg pain M79.605 729.5 DUPLEX LOWER EXT VENOUS LEFT       I have reviewed with the patient details of the assessment and plan and all questions were answered. Relevant patient education was performed. Follow-up Disposition: As planned.

## 2018-06-01 NOTE — MR AVS SNAPSHOT
[address] 
[phone] Patient: Kari Olivas MRN: [id number] ZMT:[birth year] Visit Information Date & Time Provider Department Dept. Phone Encounter #  
 6/1/2018 11:45 AM Bhavik Malave, [address] [id number] Your Appointments 6/26/2018  8:45 AM  
ROUTINE CARE with Bhavik Malave MD  
[facility] Appt Note: follow up and Wellness [phone]  
[address] [facility]  [address] Upcoming Health Maintenance Date Due  
 GLAUCOMA SCREENING Q2Y 1/7/2018 Bone Densitometry (Dexa) Screening 1/7/2018 Pneumococcal 65+ Low/Medium Risk (1 of 2 - PCV13) 1/7/2018 MEDICARE YEARLY EXAM 3/14/2018 Influenza Age 5 to Adult 8/1/2018 BREAST CANCER SCRN MAMMOGRAM 10/18/2018 COLONOSCOPY 8/30/2024 DTaP/Tdap/Td series (2 - Td) 12/2/2024 Allergies as of 6/1/2018  Review Complete On: 6/1/2018 By: Bhavik Malave MD  
  
 Severity Noted Reaction Type Reactions Erythromycin Medium 08/30/2010   Systemic Rash Current Immunizations  Reviewed on 9/28/2017 Name Date Influenza Vaccine 9/1/2013 Influenza Vaccine (Quad) PF 9/28/2017, 9/29/2016 Influenza Vaccine PF 10/6/2015, 9/16/2014 Tdap 12/2/2014 Zoster Vaccine, Live 12/1/2012 Not reviewed this visit You Were Diagnosed With   
  
 Codes Comments Left leg pain    -  Primary ICD-10-CM: M79.605 ICD-9-CM: 729.5 Vitals BP Pulse Temp Resp Height(growth percentile) Weight(growth percentile)  
 118/69 (BP 1 Location: Left arm, BP Patient Position: Sitting) (!) 56 97.4 °F (36.3 °C) (Oral) 16 5' 8\" (1.727 m) 224 lb 9.6 oz (101.9 kg) SpO2 BMI OB Status Smoking Status 97% 34.15 kg/m2 Postmenopausal Never Smoker Vitals History BMI and BSA Data Body Mass Index Body Surface Area  
 34.15 kg/m 2 2.21 m 2 Preferred Pharmacy Pharmacy Name Phone [id number] [place], [address] [phone] Your Updated Medication List  
  
   
This list is accurate as of 6/1/18 12:20 PM.  Always use your most recent med list.  
  
  
  
  
 aspirin delayed-release 81 mg tablet Take 81 mg by mouth daily. atenolol 50 mg tablet Commonly known as:  TENORMIN  
TAKE 1 TABLET DAILY  
  
 atorvastatin 20 mg tablet Commonly known as:  LIPITOR Take 1 Tab by mouth daily. buPROPion  mg XL tablet Commonly known as:  WELLBUTRIN XL  
TAKE 1 TABLET EVERY MORNING  
  
 CALCIUM 500+D 500 mg(1,250mg) -200 unit per tablet Generic drug:  calcium-vitamin D Take 2 tablets by mouth daily. hydroCHLOROthiazide 25 mg tablet Commonly known as:  HYDRODIURIL Take 1 Tab by mouth daily. lisinopril 10 mg tablet Commonly known as:  [place] Take 1 Tab by mouth daily. multivitamin, stress formula tablet Commonly known as:  STRESS TAB Take 1 Tab by mouth daily. risperiDONE 2 mg tablet Commonly known as:  RisperDAL TAKE 1 TABLET NIGHTLY AS NEEDED  
  
 SENOKOT PO Take  by mouth. STOOL SOFTENER PO Take  by mouth. VITAMIN C 1,000 mg tablet Generic drug:  ascorbic acid (vitamin C) Take 500 mg by mouth daily. VITAMIN D3 2,000 unit Cap capsule Generic drug:  Cholecalciferol (Vitamin D3) Take  by mouth daily. vitamin e 100 unit capsule Commonly known as:  E GEMS Take 400 Units by mouth daily. To-Do List   
 06/01/2018 Imaging:  DUPLEX LOWER EXT VENOUS LEFT   
  
 06/01/2018 4:30 PM  
  Appointment with Otis White at [facility] Ultrasound ([phone]) No Prep  GENERAL INSTRUCTIONS 1.  Bring any non [facility] facility films/reports pertaining to the area being studied with you on the day of appointment. 2. A written order with a valid diagnosis and Physicians signature is required for all scheduled tests. 3. Check in at registration 30 minutes before your appointment time unless you were instructed to do otherwise. Introducing [facility]! Dear Zakia Reilly: 
Thank you for requesting a [facility] account. Our records indicate that you already have an active [facility] account. You can access your account anytime at [email or web address] Did you know that you can access your hospital and ER discharge instructions at any time in [facility]? You can also review all of your test results from your hospital stay or ER visit. Additional Information If you have questions, please visit the Frequently Asked Questions section of the [facility] website at [email or web address] Remember, [facility] is NOT to be used for urgent needs. For medical emergencies, dial 911. Now available from your iPhone and Android! Please provide this summary of care documentation to your next provider. Your primary care clinician is listed as [place]. If you have any questions after today's visit, please call [phone].

## 2018-06-02 NOTE — PROGRESS NOTES
Please call the patient and let the patient know that her test result(s) is/are normal.  Thanks. Barbie Phillip.

## 2018-06-04 ENCOUNTER — TELEPHONE (OUTPATIENT)
Dept: INTERNAL MEDICINE CLINIC | Age: 63
End: 2018-06-04

## 2018-06-04 NOTE — TELEPHONE ENCOUNTER
----- Message from Laila Mazariegos MD sent at 6/2/2018 11:33 AM EDT -----  Please call the patient and let the patient know that her test result(s) is/are normal.  Thanks. Beth.

## 2018-06-04 NOTE — TELEPHONE ENCOUNTER
Identified patient 2 identifiers verified. Patient aware of duplex results and is till reporting pain in legs. Patient encouraged to call the office if she needed a sooner appointment.

## 2018-06-26 ENCOUNTER — OFFICE VISIT (OUTPATIENT)
Dept: INTERNAL MEDICINE CLINIC | Age: 63
End: 2018-06-26

## 2018-06-26 VITALS
RESPIRATION RATE: 20 BRPM | OXYGEN SATURATION: 97 % | HEIGHT: 68 IN | TEMPERATURE: 98.9 F | SYSTOLIC BLOOD PRESSURE: 110 MMHG | HEART RATE: 60 BPM | BODY MASS INDEX: 34.34 KG/M2 | WEIGHT: 226.6 LBS | DIASTOLIC BLOOD PRESSURE: 60 MMHG

## 2018-06-26 DIAGNOSIS — M25.552 LEFT HIP PAIN: ICD-10-CM

## 2018-06-26 DIAGNOSIS — Z00.00 WELCOME TO MEDICARE PREVENTIVE VISIT: Primary | ICD-10-CM

## 2018-06-26 DIAGNOSIS — Z23 ENCOUNTER FOR IMMUNIZATION: ICD-10-CM

## 2018-06-26 DIAGNOSIS — I10 ESSENTIAL HYPERTENSION: ICD-10-CM

## 2018-06-26 DIAGNOSIS — R73.9 HYPERGLYCEMIA: ICD-10-CM

## 2018-06-26 DIAGNOSIS — Z13.39 SCREENING FOR ALCOHOLISM: ICD-10-CM

## 2018-06-26 NOTE — PROGRESS NOTES
This is a \"Welcome to [employer]"  Initial Preventive Physical Examination (IPPE) providing Personalized Prevention Plan Services (Performed in the first 12 months of enrollment)    I have reviewed the patient's medical history in detail and updated the computerized patient record. History     Past Medical History:   Diagnosis Date    Cancer ([address]     skin cancer removed from nose    Depression 2014    GERD (gastroesophageal reflux disease)     Hot flashes     Hyperglycemia 2014    Hyperlipidemia 2014    Hypertension     Left bundle branch block     Neuropathy, peripheral 2014    INDER on CPAP 2014      Past Surgical History:   Procedure Laterality Date    COLONOSCOPY  2010     Dr. Seb Ramirez          HX Kevin Estrada; resection of skin cancer from nose     Current Outpatient Prescriptions   Medication Sig Dispense Refill    risperiDONE (RISPERDAL) 2 mg tablet TAKE 1 TABLET NIGHTLY AS NEEDED 90 Tab 3    atenolol (TENORMIN) 50 mg tablet TAKE 1 TABLET DAILY 90 Tab 3    lisinopril (PRINIVIL, ZESTRIL) 10 mg tablet Take 1 Tab by mouth daily. 90 Tab 3    atorvastatin (LIPITOR) 20 mg tablet Take 1 Tab by mouth daily. 90 Tab 3    hydroCHLOROthiazide (HYDRODIURIL) 25 mg tablet Take 1 Tab by mouth daily. 90 Tab 3    buPROPion XL (WELLBUTRIN XL) 300 mg XL tablet TAKE 1 TABLET EVERY MORNING 90 Tab 3    Cholecalciferol, Vitamin D3, (VITAMIN D3) 2,000 unit cap capsule Take  by mouth daily.  multivitamin, stress formula (STRESS TAB) tablet Take 1 Tab by mouth daily.  ascorbic acid (VITAMIN C) 1,000 mg tablet Take 500 mg by mouth daily.  vitamin e (E GEMS) 100 unit capsule Take 400 Units by mouth daily.  calcium-vitamin D (CALCIUM 500+D) 500 mg(1,250mg) -200 unit per tablet Take 2 tablets by mouth daily.  DOCUSATE CALCIUM (STOOL SOFTENER PO) Take  by mouth.       SENNOSIDES (SENOKOT PO) Take  by mouth.      aspirin delayed-release 81 mg tablet Take 81 mg by mouth daily. Allergies   Allergen Reactions    Erythromycin Rash     Family History   Problem Relation Age of Onset    Heart Disease Mother     Heart Disease Father      Social History   Substance Use Topics    Smoking status: Never Smoker    Smokeless tobacco: Never Used    Alcohol use No     Diet, Lifestyle: No special diet    Exercise level: sedentary    Depression Risk Screen   Insomnia some days--awakens \"thinking about things. \"  Denies feeling down/depressed--\"The bupropion works. \"       Alcohol Risk Screen   You do not drink alcohol or very rarely. Functional Ability and Level of Safety   Hearing Loss  Hearing is good. Vision Screening  Vision screening done--see nurse notes. Activities of Daily Living  The home contains: Shower bench, ramp, handrails and grab bars  Patient does total self care    Fall Risk Screen  Fall Risk Assessment, last 12 mths 2/27/2018   Able to walk? Yes   Fall in past 12 months? No       Abuse Screen  Patient is not abused    Screening EKG   EKG order placed: Yes    Patient Care Team   Patient Care Team:  Baron Gary MD as PCP - General (Internal Medicine)  Baron Gary MD (Internal Medicine)  Dotty Roman MD as Physician (Sleep Medicine)     End of Life Planning   Advanced care planning directives were discussed with the patient and /or family/caregiver. Assessment/Plan   Education and counseling provided:  Are appropriate based on today's review and evaluation        Health Maintenance Due   Topic Date Due    GLAUCOMA SCREENING Q2Y  01/07/2018    Bone Densitometry (Dexa) Screening  01/07/2018    Pneumococcal 65+ Low/Medium Risk (1 of 2 - PCV13) 01/07/2018    MEDICARE YEARLY EXAM  03/14/2018    BREAST CANCER SCRN MAMMOGRAM  10/18/2018       Discussed the patient's BMI with her.   The BMI follow up plan is as follows:     dietary management education, guidance, and counseling  encourage exercise  monitor weight  prescribed dietary intake    An After Visit Summary was printed and given to the patient.

## 2018-06-26 NOTE — MR AVS SNAPSHOT
[address] 
[phone] Patient: Kari Olivas MRN: [id number] LAJ:2[birth year]8 Visit Information Date & Time Provider Department Dept. Phone Encounter #  
 6/26/2018  8:45 AM Jenna Vega, [address] [place] [address],[address] [phone] [id number] Follow-up Instructions Return in about 4 months (around 10/26/2018) for HTN. Upcoming Health Maintenance Date Due Pneumococcal 65+ Low/Medium Risk (1 of 2 - PCV13) 1/7/2018 MEDICARE YEARLY EXAM 3/14/2018 BREAST CANCER SCRN MAMMOGRAM 10/18/2018 Influenza Age 5 to Adult 8/1/2018 GLAUCOMA SCREENING Q2Y 1/1/2020 COLONOSCOPY 8/30/2024 DTaP/Tdap/Td series (2 - Td) 12/2/2024 Allergies as of 6/26/2018  Review Complete On: 6/26/2018 By: Jenna Vega MD  
  
 Severity Noted Reaction Type Reactions Erythromycin Medium 08/30/2010   Systemic Rash Current Immunizations  Reviewed on 9/28/2017 Name Date Influenza Vaccine 9/1/2013 Influenza Vaccine (Quad) PF 9/28/2017, 9/29/2016 Influenza Vaccine PF 10/6/2015, 9/16/2014 Pneumococcal Conjugate (PCV-13)  Incomplete Tdap 12/2/2014 Zoster Vaccine, Live 12/1/2012 Not reviewed this visit You Were Diagnosed With   
  
 Codes Comments Welcome to Medicare preventive visit    -  Primary ICD-10-CM: Z00.00 ICD-9-CM: V70.0 Screening for alcoholism     ICD-10-CM: Z13.89 ICD-9-CM: V79.1 Encounter for immunization     ICD-10-CM: A68 ICD-9-CM: V03.89 Left hip pain     ICD-10-CM: M25.552 ICD-9-CM: 719.45 Essential hypertension     ICD-10-CM: I10 
ICD-9-CM: 401.9 Hyperglycemia     ICD-10-CM: R73.9 ICD-9-CM: 790.29 Vitals BP Pulse Temp Resp Height(growth percentile) Weight(growth percentile) 110/60 (BP 1 Location: Left arm, BP Patient Position: Sitting) 60 98.9 °F (37.2 °C) (Oral) 20 5' 8\" (1.727 m) 226 lb 9.6 oz (102.8 kg) SpO2 BMI OB Status Smoking Status 97% 34.45 kg/m2 Postmenopausal Never Smoker Vitals History BMI and BSA Data Body Mass Index Body Surface Area 34.45 kg/m 2 2.22 m 2 Preferred Pharmacy Pharmacy Name Phone [id number] [address] [phone] Your Updated Medication List  
  
   
This list is accurate as of 6/26/18  9:27 AM.  Always use your most recent med list.  
  
  
  
  
 aspirin delayed-release 81 mg tablet Take 81 mg by mouth daily. atenolol 50 mg tablet Commonly known as:  TENORMIN  
TAKE 1 TABLET DAILY  
  
 atorvastatin 20 mg tablet Commonly known as:  LIPITOR Take 1 Tab by mouth daily. buPROPion  mg XL tablet Commonly known as:  WELLBUTRIN XL  
TAKE 1 TABLET EVERY MORNING  
  
 CALCIUM 500+D 500 mg(1,250mg) -200 unit per tablet Generic drug:  calcium-vitamin D Take 2 tablets by mouth daily. hydroCHLOROthiazide 25 mg tablet Commonly known as:  HYDRODIURIL Take 1 Tab by mouth daily. lisinopril 10 mg tablet Commonly known as:  Luis Boehringer Take 1 Tab by mouth daily. multivitamin, stress formula tablet Commonly known as:  STRESS TAB Take 1 Tab by mouth daily. risperiDONE 2 mg tablet Commonly known as:  RisperDAL TAKE 1 TABLET NIGHTLY AS NEEDED  
  
 SENOKOT PO Take  by mouth. STOOL SOFTENER PO Take  by mouth.  
  
 varicella-zoster recombinant (PF) 50 mcg/0.5 mL Susr injection Commonly known as:  SHINGRIX (PF)  
0.5 mL by IntraMUSCular route once for 1 dose. VITAMIN C 1,000 mg tablet Generic drug:  ascorbic acid (vitamin C) Take 500 mg by mouth daily. VITAMIN D3 2,000 unit Cap capsule Generic drug:  Cholecalciferol (Vitamin D3) Take  by mouth daily. vitamin e 100 unit capsule Commonly known as:  E GEMS Take 400 Units by mouth daily. Prescriptions Printed Refills  
 varicella-zoster recombinant, PF, (SHINGRIX, PF,) 50 mcg/0.5 mL susr injection 1 Si.5 mL by IntraMUSCular route once for 1 dose. Class: Print Route: IntraMUSCular We Performed the Following AMB POC EKG ROUTINE W/ 12 LEADS, SCREEN () [ [facility]] PNEUMOCOCCAL CONJ VACCINE 13 VALENT IM [id number] CPT(R)] OR ANNUAL ALCOHOL SCREEN 15 MIN [id number] [facility]] REFERRAL TO ORTHOPEDIC SURGERY [REF62 Custom] Follow-up Instructions Return in about 4 months (around 10/26/2018) for HTN. To-Do List   
 2018 Lab:  CBC WITH AUTOMATED DIFF   
  
 2018 Lab:  HEMOGLOBIN A1C WITH EAG   
  
 2018 Lab:  LIPID PANEL   
  
 2018 Lab:  METABOLIC PANEL, COMPREHENSIVE Referral Information Referral ID Referred By Referred To  
  
 [id number] Dianna Brumfield MD   
   [address] [address] [address] [facility] Phone: [phone] Fax: [phone] Visits Status Start Date End Date 1 New Request 18 If your referral has a status of pending review or denied, additional information will be sent to support the outcome of this decision. Patient Instructions Medicare Wellness Visit, Female The best way to live healthy is to have a lifestyle where you eat a well-balanced diet, exercise regularly, limit alcohol use, and quit all forms of tobacco/nicotine, if applicable. Regular preventive services are another way to keep healthy. Preventive services (vaccines, screening tests, monitoring & exams) can help personalize your care plan, which helps you manage your own care. Screening tests can find health problems at the earliest stages, when they are easiest to treat.  
  
[facility] follows the current, evidence-based guidelines published by the [facility] [place] (USPSTF) when recommending preventive services for our patients. Because we follow these guidelines, sometimes recommendations change over time as research supports it. (For example, mammograms used to be recommended annually. Even though Medicare will still pay for an annual mammogram, the newer guidelines recommend a mammogram every two years for women of average risk.) Of course, you and your provider may decide to screen more often for some diseases, based on your risk and co-morbidities (chronic disease you are already diagnosed with). Preventive services for you include: - Medicare offers their members a free annual wellness visit, which is time for you and your primary care provider to discuss and plan for your preventive service needs. Take advantage of this benefit every year! 
 
-All people over age 72 should receive the recommended pneumonia vaccines. Current USPSTF guidelines recommend a series of two vaccines for the best pneumonia protection.  
 
-All adults should have a yearly flu vaccine and a tetanus vaccine every 10 years. All adults age 61 years should receive a shingles vaccine once in their lifetime.   
 
-A bone mass density test is recommended when a woman turns 65 to screen for osteoporosis. This test is only recommended once as a screening. Some providers will use this same test as a disease monitoring tool if you already have osteoporosis.  
 
-All adults age 38-68 years who are overweight should have a diabetes screening test once every three years.  
 
-Other screening tests & preventive services for persons with diabetes include: an eye exam to screen for diabetic retinopathy, a kidney function test, a foot exam, and stricter control over your cholesterol.  
 
-Cardiovascular screening for adults with routine risk involves an electrocardiogram (ECG) at intervals determined by the provider.  
 
-Colorectal cancer screenings should be done for adults age 54-65 years with normal risk. There are a number of acceptable methods of screening for this type of cancer. Each test has its own benefits and drawbacks. Discuss with your provider what is most appropriate for you during your annual wellness visit. The different tests include: colonoscopy (considered the best screening method), a fecal occult blood test, a fecal DNA test, and sigmoidoscopy. -Breast cancer screenings are recommended every other year for women of normal risk age 54-69 years.  
 
-Cervical cancer screenings for women over age 72 are only recommended with certain risk factors.  
 
-All adults born between [facility] should be screened once for Hepatitis C. Here is a list of your current Health Maintenance items (your personalized list of preventive services) with a due date: 
Health Maintenance Due Topic Date Due  Glaucoma Screening   01/07/2018  Bone Mineral Density   01/07/2018  Pneumococcal Vaccine (1 of 2 - PCV13) 01/07/2018 [facility] Annual Well Visit  03/14/2018  Breast Cancer Screening  10/18/2018 Body Mass Index: Care Instructions Your Care Instructions Body mass index (BMI) can help you see if your weight is raising your risk for health problems. It uses a formula to compare how much you weigh with how tall you are. · A BMI lower than 18.5 is considered underweight. · A BMI between 18.5 and 24.9 is considered healthy. · A BMI between 25 and 29.9 is considered overweight. A BMI of 30 or higher is considered obese. If your BMI is in the normal range, it means that you have a lower risk for weight-related health problems. If your BMI is in the overweight or obese range, you may be at increased risk for weight-related health problems, such as high blood pressure, heart disease, stroke, arthritis or joint pain, and diabetes.  If your BMI is in the underweight range, you may be at increased risk for health problems such as fatigue, lower protection (immunity) against illness, muscle loss, bone loss, hair loss, and hormone problems. BMI is just one measure of your risk for weight-related health problems. You may be at higher risk for health problems if you are not active, you eat an unhealthy diet, or you drink too much alcohol or use tobacco products. Follow-up care is a key part of your treatment and safety. Be sure to make and go to all appointments, and call your doctor if you are having problems. It's also a good idea to know your test results and keep a list of the medicines you take. How can you care for yourself at home? · Practice healthy eating habits. This includes eating plenty of fruits, vegetables, whole grains, lean protein, and low-fat dairy. · If your doctor recommends it, get more exercise. Walking is a good choice. Bit by bit, increase the amount you walk every day. Try for at least 30 minutes on most days of the week. · Do not smoke. Smoking can increase your risk for health problems. If you need help quitting, talk to your doctor about stop-smoking programs and medicines. These can increase your chances of quitting for good. · Limit alcohol to 2 drinks a day for men and 1 drink a day for women. Too much alcohol can cause health problems. If you have a BMI higher than 25 · Your doctor may do other tests to check your risk for weight-related health problems. This may include measuring the distance around your waist. A waist measurement of more than 40 inches in men or 35 inches in women can increase the risk of weight-related health problems. · Talk with your doctor about steps you can take to stay healthy or improve your health. You may need to make lifestyle changes to lose weight and stay healthy, such as changing your diet and getting regular exercise. If you have a BMI lower than 18.5 · Your doctor may do other tests to check your risk for health problems.  
· Talk with your doctor about steps you can take to stay healthy or improve your health. You may need to make lifestyle changes to gain or maintain weight and stay healthy, such as getting more healthy foods in your diet and doing exercises to build muscle. Where can you learn more? Go to http://chayito-eugenio.info/. Enter S176 in the search box to learn more about \"Body Mass Index: Care Instructions. \" Current as of: October 13, 2016 Content Version: 11.4 © 6238-2786 [employer]. Care instructions adapted under license by [employer] (which disclaims liability or warranty for this information). If you have questions about a medical condition or this instruction, always ask your healthcare professional. [address] [address] any warranty or liability for your use of this information. Insomnia: Care Instructions Your Care Instructions Insomnia is the inability to sleep well. It is a common problem for most people at some time. Insomnia may make it hard for you to get to sleep, stay asleep, or sleep as long as you need to. This can make you tired and grouchy during the day. It can also make you forgetful, less effective at work, and unhappy. Insomnia can be caused by conditions such as depression or anxiety. Pain can also affect your ability to sleep. When these problems are solved, the insomnia usually clears up. But sometimes bad sleep habits can cause insomnia. If insomnia is affecting your work or your enjoyment of life, you can take steps to improve your sleep. Follow-up care is a key part of your treatment and safety. Be sure to make and go to all appointments, and call your doctor if you are having problems. It's also a good idea to know your test results and keep a list of the medicines you take. How can you care for yourself at home? What to avoid · Do not have drinks with caffeine, such as coffee or black tea, for 8 hours before bed. · Do not smoke or use other types of tobacco near bedtime. Nicotine is a stimulant and can keep you awake. · Avoid drinking alcohol late in the evening, because it can cause you to wake in the middle of the night. · Do not eat a big meal close to bedtime. If you are hungry, eat a light snack. · Do not drink a lot of water close to bedtime, because the need to urinate may wake you up during the night. · Do not read or watch TV in bed. Use the bed only for sleeping and sexual activity. What to try · Go to bed at the same time every night, and wake up at the same time every morning. Do not take naps during the day. · Keep your bedroom quiet, dark, and cool. · Sleep on a comfortable pillow and mattress. · If watching the clock makes you anxious, turn it facing away from you so you cannot see the time. · If you worry when you lie down, start a worry book. Well before bedtime, write down your worries, and then set the book and your concerns aside. · Try meditation or other relaxation techniques before you go to bed. · If you cannot fall asleep, get up and go to another room until you feel sleepy. Do something relaxing. Repeat your bedtime routine before you go to bed again. · Make your house quiet and calm about an hour before bedtime. Turn down the lights, turn off the TV, log off the computer, and turn down the volume on music. This can help you relax after a busy day. When should you call for help? Watch closely for changes in your health, and be sure to contact your doctor if: 
? · Your efforts to improve your sleep do not work. ? · Your insomnia gets worse. ? · You have been feeling down, depressed, or hopeless or have lost interest in things that you usually enjoy. Where can you learn more? Go to http://chayito-eugenio.info/. Enter P513 in the search box to learn more about \"Insomnia: Care Instructions. \" Current as of: July 26, 2016 Content Version: 11.4 © 7701-2609 [employer]. Care instructions adapted under license by [employer] (which disclaims liability or warranty for this information). If you have questions about a medical condition or this instruction, always ask your healthcare professional. [address] any warranty or liability for your use of this information. OTC sleep aids to try as a second step: 1. Sedating antihistamines: Benadryl, Unisom, Tylenol PM, etc.  
2. Melatonin Introducing [facility]! Dear Moreno Hair: 
Thank you for requesting a [facility] account. Our records indicate that you already have an active [facility] account. You can access your account anytime at [email or web address] Did you know that you can access your hospital and ER discharge instructions at any time in [facility]? You can also review all of your test results from your hospital stay or ER visit. Additional Information If you have questions, please visit the Frequently Asked Questions section of the [facility] website at [email or web address] Remember, [facility] is NOT to be used for urgent needs. For medical emergencies, dial 911. Now available from your iPhone and Android! Please provide this summary of care documentation to your next provider. Your primary care clinician is listed as [place]. If you have any questions after today's visit, please call [phone].

## 2018-06-26 NOTE — PATIENT INSTRUCTIONS
Medicare Wellness Visit, Female    The best way to live healthy is to have a lifestyle where you eat a well-balanced diet, exercise regularly, limit alcohol use, and quit all forms of tobacco/nicotine, if applicable. Regular preventive services are another way to keep healthy. Preventive services (vaccines, screening tests, monitoring & exams) can help personalize your care plan, which helps you manage your own care. Screening tests can find health problems at the earliest stages, when they are easiest to treat. [facility] follows the current, evidence-based guidelines published by the [facility] ([facility]) when recommending preventive services for our patients. Because we follow these guidelines, sometimes recommendations change over time as research supports it. (For example, mammograms used to be recommended annually. Even though Medicare will still pay for an annual mammogram, the newer guidelines recommend a mammogram every two years for women of average risk.)    Of course, you and your provider may decide to screen more often for some diseases, based on your risk and co-morbidities (chronic disease you are already diagnosed with). Preventive services for you include:    - Medicare offers their members a free annual wellness visit, which is time for you and your primary care provider to discuss and plan for your preventive service needs. Take advantage of this benefit every year!    -All people over age 72 should receive the recommended pneumonia vaccines. Current USPSTF guidelines recommend a series of two vaccines for the best pneumonia protection.     -All adults should have a yearly flu vaccine and a tetanus vaccine every 10 years. All adults age 61 years should receive a shingles vaccine once in their lifetime.      -A bone mass density test is recommended when a woman turns 65 to screen for osteoporosis.  This test is only recommended once as a screening. Some providers will use this same test as a disease monitoring tool if you already have osteoporosis. -All adults age 38-68 years who are overweight should have a diabetes screening test once every three years.     -Other screening tests & preventive services for persons with diabetes include: an eye exam to screen for diabetic retinopathy, a kidney function test, a foot exam, and stricter control over your cholesterol.     -Cardiovascular screening for adults with routine risk involves an electrocardiogram (ECG) at intervals determined by the provider.     -Colorectal cancer screenings should be done for adults age 54-65 years with normal risk. There are a number of acceptable methods of screening for this type of cancer. Each test has its own benefits and drawbacks. Discuss with your provider what is most appropriate for you during your annual wellness visit. The different tests include: colonoscopy (considered the best screening method), a fecal occult blood test, a fecal DNA test, and sigmoidoscopy. -Breast cancer screenings are recommended every other year for women of normal risk age 54-69 years.     -Cervical cancer screenings for women over age 72 are only recommended with certain risk factors.     -All adults born between [facility] should be screened once for Hepatitis C. Here is a list of your current Health Maintenance items (your personalized list of preventive services) with a due date:  Health Maintenance Due   Topic Date Due    Glaucoma Screening   01/07/2018    Bone Mineral Density   01/07/2018    Pneumococcal Vaccine (1 of 2 - PCV13) 01/07/2018    Annual Well Visit  03/14/2018    Breast Cancer Screening  10/18/2018            Body Mass Index: Care Instructions  Your Care Instructions    Body mass index (BMI) can help you see if your weight is raising your risk for health problems. It uses a formula to compare how much you weigh with how tall you are.   · A BMI lower than 18.5 is considered underweight. · A BMI between 18.5 and 24.9 is considered healthy. · A BMI between 25 and 29.9 is considered overweight. A BMI of 30 or higher is considered obese. If your BMI is in the normal range, it means that you have a lower risk for weight-related health problems. If your BMI is in the overweight or obese range, you may be at increased risk for weight-related health problems, such as high blood pressure, heart disease, stroke, arthritis or joint pain, and diabetes. If your BMI is in the underweight range, you may be at increased risk for health problems such as fatigue, lower protection (immunity) against illness, muscle loss, bone loss, hair loss, and hormone problems. BMI is just one measure of your risk for weight-related health problems. You may be at higher risk for health problems if you are not active, you eat an unhealthy diet, or you drink too much alcohol or use tobacco products. Follow-up care is a key part of your treatment and safety. Be sure to make and go to all appointments, and call your doctor if you are having problems. It's also a good idea to know your test results and keep a list of the medicines you take. How can you care for yourself at home? · Practice healthy eating habits. This includes eating plenty of fruits, vegetables, whole grains, lean protein, and low-fat dairy. · If your doctor recommends it, get more exercise. Walking is a good choice. Bit by bit, increase the amount you walk every day. Try for at least 30 minutes on most days of the week. · Do not smoke. Smoking can increase your risk for health problems. If you need help quitting, talk to your doctor about stop-smoking programs and medicines. These can increase your chances of quitting for good. · Limit alcohol to 2 drinks a day for men and 1 drink a day for women. Too much alcohol can cause health problems.   If you have a BMI higher than 25  · Your doctor may do other tests to check your risk for weight-related health problems. This may include measuring the distance around your waist. A waist measurement of more than 40 inches in men or 35 inches in women can increase the risk of weight-related health problems. · Talk with your doctor about steps you can take to stay healthy or improve your health. You may need to make lifestyle changes to lose weight and stay healthy, such as changing your diet and getting regular exercise. If you have a BMI lower than 18.5  · Your doctor may do other tests to check your risk for health problems. · Talk with your doctor about steps you can take to stay healthy or improve your health. You may need to make lifestyle changes to gain or maintain weight and stay healthy, such as getting more healthy foods in your diet and doing exercises to build muscle. Where can you learn more? Go to http://chayito-eugenio.info/. Enter S176 in the search box to learn more about \"Body Mass Index: Care Instructions. \"  Current as of: October 13, 2016  Content Version: 11.4  © 6674-3226 [employer]. Care instructions adapted under license by [employer] (which disclaims liability or warranty for this information). If you have questions about a medical condition or this instruction, always ask your healthcare professional. [place] [address] any warranty or liability for your use of this information. Insomnia: Care Instructions  Your Care Instructions    Insomnia is the inability to sleep well. It is a common problem for most people at some time. Insomnia may make it hard for you to get to sleep, stay asleep, or sleep as long as you need to. This can make you tired and grouchy during the day. It can also make you forgetful, less effective at work, and unhappy. Insomnia can be caused by conditions such as depression or anxiety. Pain can also affect your ability to sleep.  When these problems are solved, the insomnia usually clears up. But sometimes bad sleep habits can cause insomnia. If insomnia is affecting your work or your enjoyment of life, you can take steps to improve your sleep. Follow-up care is a key part of your treatment and safety. Be sure to make and go to all appointments, and call your doctor if you are having problems. It's also a good idea to know your test results and keep a list of the medicines you take. How can you care for yourself at home? What to avoid  · Do not have drinks with caffeine, such as coffee or black tea, for 8 hours before bed. · Do not smoke or use other types of tobacco near bedtime. Nicotine is a stimulant and can keep you awake. · Avoid drinking alcohol late in the evening, because it can cause you to wake in the middle of the night. · Do not eat a big meal close to bedtime. If you are hungry, eat a light snack. · Do not drink a lot of water close to bedtime, because the need to urinate may wake you up during the night. · Do not read or watch TV in bed. Use the bed only for sleeping and sexual activity. What to try  · Go to bed at the same time every night, and wake up at the same time every morning. Do not take naps during the day. · Keep your bedroom quiet, dark, and cool. · Sleep on a comfortable pillow and mattress. · If watching the clock makes you anxious, turn it facing away from you so you cannot see the time. · If you worry when you lie down, start a worry book. Well before bedtime, write down your worries, and then set the book and your concerns aside. · Try meditation or other relaxation techniques before you go to bed. · If you cannot fall asleep, get up and go to another room until you feel sleepy. Do something relaxing. Repeat your bedtime routine before you go to bed again. · Make your house quiet and calm about an hour before bedtime. Turn down the lights, turn off the TV, log off the computer, and turn down the volume on music.  This can help you relax after a busy day. When should you call for help? Watch closely for changes in your health, and be sure to contact your doctor if:  ? · Your efforts to improve your sleep do not work. ? · Your insomnia gets worse. ? · You have been feeling down, depressed, or hopeless or have lost interest in things that you usually enjoy. Where can you learn more? Go to http://chayito-eugenio.info/. Enter P513 in the search box to learn more about \"Insomnia: Care Instructions. \"  Current as of: July 26, 2016  Content Version: 11.4  © 4695-2197 [employer]. Care instructions adapted under license by [employer] (which disclaims liability or warranty for this information). If you have questions about a medical condition or this instruction, always ask your healthcare professional. [address] any warranty or liability for your use of this information. OTC sleep aids to try as a second step:   1.  Sedating antihistamines: Benadryl, Unisom, Tylenol PM, etc.   2. Melatonin

## 2018-06-26 NOTE — PROGRESS NOTES
SUBJECTIVE  Ms. Quynh Chávez presents today acutely for     Chief Complaint   Patient presents with    Welcome To Medicare    Hip Pain     pt c/o pain in L hip/leg; pt states that pain has gotten worse in the past month; pt stats that when weight is placed on leg, there pain; pt wants to discuss getting a referal     Swelling     pt concerned of swelling in both ankles    Insomnia     pt states that she wakes up in the middle of that night and can not get back to sleep       Hip pain present for a year or so, progressively worsening. Ankle swelling. She was here for this on June 1. She had normal duplex scan. OBJECTIVE  Visit Vitals    /60 (BP 1 Location: Left arm, BP Patient Position: Sitting)    Pulse 60    Temp 98.9 °F (37.2 °C) (Oral)    Resp 20    Ht 5' 8\" (1.727 m)    Wt 226 lb 9.6 oz (102.8 kg)    SpO2 97%    BMI 34.45 kg/m2     Gen: Pleasant 70 y.o.  female in NAD.   HEENT: PERRLA. EOMI. OP moist and pink.  Neck: Supple.  No LAD.  HEART: RRR, No M/G/R.   LUNGS: CTAB No W/R.   ABDOMEN: S, NT, ND, BS+.   EXTREMITIES: Warm. 2+ edema. MUSCULOSKELETAL: Normal ROM, muscle strength 5/5 all groups. NEURO: Alert and oriented x 3.  Cranial nerves grossly intact.  No focal sensory or motor deficits noted. SKIN: Warm. Dry. No rashes or other lesions noted. ASSESSMENT / PLAN    ICD-10-CM ICD-9-CM    1. Left hip pain M25.552 719.45 REFERRAL TO ORTHOPEDIC SURGERY   2. Essential hypertension U96 331.5 METABOLIC PANEL, COMPREHENSIVE      CBC WITH AUTOMATED DIFF      LIPID PANEL   3. Hyperglycemia R73.9 790.29 HEMOGLOBIN A1C WITH EAG     Re insomnia, handout on sleep hygiene. Can try OTC agents such as Unisom. I have reviewed with the patient details of the assessment and plan and all questions were answered. Relevant patient education was performed. Follow-up Disposition:  Return in about 4 months (around 10/26/2018) for HTN.

## 2018-07-16 ENCOUNTER — TELEPHONE (OUTPATIENT)
Dept: SLEEP MEDICINE | Age: 63
End: 2018-07-16

## 2018-07-16 DIAGNOSIS — G47.33 OBSTRUCTIVE SLEEP APNEA (ADULT) (PEDIATRIC): Primary | ICD-10-CM

## 2018-07-16 NOTE — TELEPHONE ENCOUNTER
Patient would like to get new device. Prefers the [employer]med. Needs an order sent to [facility]. Patient would like to be notified of status.

## 2018-07-20 ENCOUNTER — DOCUMENTATION ONLY (OUTPATIENT)
Dept: SLEEP MEDICINE | Age: 63
End: 2018-07-20

## 2018-09-24 ENCOUNTER — OFFICE VISIT (OUTPATIENT)
Dept: SLEEP MEDICINE | Age: 63
End: 2018-09-24

## 2018-09-24 VITALS
DIASTOLIC BLOOD PRESSURE: 74 MMHG | BODY MASS INDEX: 34.71 KG/M2 | WEIGHT: 229 LBS | OXYGEN SATURATION: 97 % | HEART RATE: 60 BPM | HEIGHT: 68 IN | SYSTOLIC BLOOD PRESSURE: 128 MMHG

## 2018-09-24 DIAGNOSIS — G47.33 OBSTRUCTIVE SLEEP APNEA (ADULT) (PEDIATRIC): Primary | ICD-10-CM

## 2018-09-24 DIAGNOSIS — I10 ESSENTIAL HYPERTENSION: ICD-10-CM

## 2018-09-24 NOTE — PROGRESS NOTES
[address] [facility] [place]. [place], [address]  Tel.  [phone]  Fax. [address] [facility] 60  [place], [address] [facility]  Tel.  [phone]  Fax. [phone] [address] [facility] [place] [address]al   Tel.  [phone]  Fax. [phone]     S>Kari Olivas is a 70 y.o. female seen for a positive airway pressure follow-up. She reports no problems using the device. The following problems are identified:    Drowsiness no Problems exhaling no   Snoring no Forget to put on no   Mask Comfortable yes Can't fall asleep no   Dry Mouth no Mask falls off no   Air Leaking Yes at times Frequent awakenings no       Download reviewed. She admits that her sleep has improved. Therapy Apnea Index averaged over PAP use: 1 /hr which reflects improved sleep breathing condition. Allergies   Allergen Reactions    Erythromycin Rash       She has a current medication list which includes the following prescription(s): risperidone, atenolol, lisinopril, atorvastatin, hydrochlorothiazide, bupropion xl, cholecalciferol (vitamin d3), multivitamin, stress formula, ascorbic acid (vitamin c), vitamin e, calcium-vitamin d, aspirin delayed-release, docusate sodium, and sennosides. .      She  has a past medical history of Cancer ([address] Depression (1/9/2014); GERD (gastroesophageal reflux disease); Hot flashes; Hyperglycemia (4/8/2014); Hyperlipidemia (4/8/2014); Hypertension; Left bundle branch block; Neuropathy, peripheral (4/9/2014); and INDER on CPAP (4/8/2014). Oxford Sleepiness Score: 3   and Modified F.O.S.Q. Score Total / 2: 18.5   which reflect improved sleep quality over therapy time.     O>    Visit Vitals    /74    Pulse 60    Ht 5' 8\" (1.727 m)    Wt 229 lb (103.9 kg)    SpO2 97%    BMI 34.82 kg/m2           General:   Alert, oriented, not in distress   Neck:   No JVD    Chest/Lungs:  symetrical lung expansion , no accessory muscle use    Extremities:  no obvious rashes , negative edema    Neuro:  No focal deficits ; No obvious tremor    Psych:  Normal affect ,  Normal countenance ;           A>    ICD-10-CM ICD-9-CM    1. Obstructive sleep apnea (adult) (pediatric) G47.33 327.23 AMB SUPPLY ORDER   2. Essential hypertension I10 401.9      AHI = 69(2013). On CPAP :  9-11 cmH2O. Compliant:      yes    Therapeutic Response:  Positive    P>      * Follow-up Disposition:  Return in about 1 year (around 9/24/2019). she will continue on her current pressure settings. I have reviewed medicare requirements regarding PAP usage    * She was asked to contact our office for any problems regarding PAP therapy. * Counseling was provided regarding the importance of regular PAP use and on proper sleep hygiene and safe driving. * Re-enforced proper and regular cleaning for the device. I have reviewed/discussed the above normal BMI with the patient. I have recommended the following interventions: dietary management education, guidance, and counseling . .     2.,Hypertension - she continues on her current regimen. I have reviewed the relationship between hypertension as it relates to sleep-disordered breathing.      Electronically signed by    Renetta Mota MD  Diplomate in Sleep Medicine  [facility]

## 2018-09-24 NOTE — PATIENT INSTRUCTIONS
[address] [facility] [place]. [place], [address]  Tel.  [phone]  Fax. 100 [facility] 60  [place], [address]  Tel.  [phone]  Fax. [phone] [address]  Tel.  [phone]  Fax. [phone]     PROPER SLEEP HYGIENE    What to avoid  · Do not have drinks with caffeine, such as coffee or black tea, for 8 hours before bed. · Do not smoke or use other types of tobacco near bedtime. Nicotine is a stimulant and can keep you awake. · Avoid drinking alcohol late in the evening, because it can cause you to wake in the middle of the night. · Do not eat a big meal close to bedtime. If you are hungry, eat a light snack. · Do not drink a lot of water close to bedtime, because the need to urinate may wake you up during the night. · Do not read or watch TV in bed. Use the bed only for sleeping and sexual activity. What to try  · Go to bed at the same time every night, and wake up at the same time every morning. Do not take naps during the day. · Keep your bedroom quiet, dark, and cool. · Get regular exercise, but not within 3 to 4 hours of your bedtime. .  · Sleep on a comfortable pillow and mattress. · If watching the clock makes you anxious, turn it facing away from you so you cannot see the time. · If you worry when you lie down, start a worry book. Well before bedtime, write down your worries, and then set the book and your concerns aside. · Try meditation or other relaxation techniques before you go to bed. · If you cannot fall asleep, get up and go to another room until you feel sleepy. Do something relaxing. Repeat your bedtime routine before you go to bed again. · Make your house quiet and calm about an hour before bedtime. Turn down the lights, turn off the TV, log off the computer, and turn down the volume on music. This can help you relax after a busy day.     Drowsy Driving  The [address] [place] [address] Road Traffic Safety Administration cites drowsiness as a causing factor in more than 379,163 police reported crashes annually, resulting in 76,000 injuries and 1,500 deaths. Other surveys suggest 55% of people polled have driven while drowsy in the past year, 23% had fallen asleep but not crashed, 3% crashed, and 2% had and accident due to drowsy driving. Who is at risk? Young Drivers: One study of drowsy driving accidents states that 55% of the drivers were under 25 years. Of those, 75% were male. Shift Workers and Travelers: People who work overnight or travel across time zones frequently are at higher risk of experiencing Circadian Rhythm Disorders. They are trying to work and function when their body is programed to sleep. Sleep Deprived: Lack of sleep has a serious impact on your ability to pay attention or focus on a task. Consistently getting less than the average of 8 hours your body needs creates partial or cumulative sleep deprivation. Untreated Sleep Disorders: Sleep Apnea, Narcolepsy, R.L.S., and other sleep disorders (untreated) prevent a person from getting enough restful sleep. This leads to excessive daytime sleepiness and increases the risk for drowsy driving accidents by up to 7 times. Medications / Alcohol: Even over the counter medications can cause drowsiness. Medications that impair a drivers attention should have a warning label. Alcohol naturally makes you sleepy and on its own can cause accidents. Combined with excessive drowsiness its effects are amplified. Signs of Drowsy Driving:   * You don't remember driving the last few miles   * You may drift out of your lady   * You are unable to focus and your thoughts wander   * You may yawn more often than normal   * You have difficulty keeping your eyes open / nodding off   * Missing traffic signs, speeding, or tailgating  Prevention-   Good sleep hygiene, lifestyle and behavioral choices have the most impact on drowsy driving.  There is no substitute for sleep and the average person requires 8 hours nightly. If you find yourself driving drowsy, stop and sleep. Consider the sleep hygiene tips provided during your visit as well. Medication Refill Policy: Refills for all medications require 1 week advance notice. Please have your pharmacy fax a refill request. We are unable to fax, or call in \"controled substance\" medications and you will need to pick these prescriptions up from our office. [employer] Activation    Thank you for requesting access to [employer]ffiti. Please follow the instructions below to securely access and download your online medical record. Graffiti allows you to send messages to your doctor, view your test results, renew your prescriptions, schedule appointments, and more. How Do I Sign Up? 1. In your internet browser, go to [email or web address]. 2. Click on the First Time User? Click Here link in the Sign In box. You will see the New Member Sign Up page. 3. Enter your Graffiti Access Code exactly as it appears below. You will not need to use this code after youve completed the sign-up process. If you do not sign up before the expiration date, you must request a new code. Graffiti Access Code: Activation code not generated  Current Graffiti Status: Active (This is the date your Graffiti access code will )    4. Enter the last four digits of your Social Security Number (xxxx) and Date of Birth (mm/dd/yyyy) as indicated and click Submit. You will be taken to the next sign-up page. 5. Create a Graffiti ID. This will be your Graffiti login ID and cannot be changed, so think of one that is secure and easy to remember. 6. Create a Graffiti password. You can change your password at any time. 7. Enter your Password Reset Question and Answer. This can be used at a later time if you forget your password. 8. Enter your e-mail address. You will receive e-mail notification when new information is available in [address]   9. Click Sign Up. You can now view and download portions of your medical record. 10. Click the Download Summary menu link to download a portable copy of your medical information. Additional Information    If you have questions, please call [phone]. Remember, [employer] is NOT to be used for urgent needs. For medical emergencies, dial 911.

## 2018-09-25 ENCOUNTER — DOCUMENTATION ONLY (OUTPATIENT)
Dept: SLEEP MEDICINE | Age: 63
End: 2018-09-25

## 2018-10-26 ENCOUNTER — OFFICE VISIT (OUTPATIENT)
Dept: INTERNAL MEDICINE CLINIC | Age: 63
End: 2018-10-26

## 2018-10-26 VITALS
DIASTOLIC BLOOD PRESSURE: 71 MMHG | RESPIRATION RATE: 14 BRPM | TEMPERATURE: 98.8 F | WEIGHT: 227.6 LBS | BODY MASS INDEX: 34.49 KG/M2 | OXYGEN SATURATION: 97 % | SYSTOLIC BLOOD PRESSURE: 120 MMHG | HEIGHT: 68 IN | HEART RATE: 73 BPM

## 2018-10-26 DIAGNOSIS — Z23 ENCOUNTER FOR IMMUNIZATION: Primary | ICD-10-CM

## 2018-10-26 NOTE — PROGRESS NOTES
SUBJECTIVE:   Ms. Aaron Duron is a 70 y.o. female who is here for follow up of routine medical issues. Her  is my patient Glen Olivas. Chief Complaint   Patient presents with    Hypertension     4 month f.u    Immunization/Injection     pt wants to discuss flu shot and pneumonia shot     Hip Pain     pt c.o pain in R hip and upper thigh x 3 months; pt takes ibuprofen ; pt has pain when walking; pt rates pain at 4/10       Now working for [employer] as . She has some pain in R proximal thigh, into groin. No swelling. Constipation is better. L ankle pain \"still bothers me at times. \"  It is recalled that she saw orthopedist for L ankle pain. \"She wanted me to wear a device\", that was expensive. The pain got better with new shoes. Hurts if sits at desk too long. Back is doing okay. Knee is better. No longer seeing Dr. Mae for knee pain--Has gotten injections in past.   Neuropathy: Unschanged numbness and tingling in feet. It is recalled that she has had high blood sugars, as well as an A1C that was up. L>R hip pain is better. We have discussed the R hip pain before in 2015: Discussed how she had a L bone spur, and favored that side. Now wearing soft cushioned soles. Palpitations have gotten better. Saw Dr. Mathew Wells planning to follow up further unless it becomes a problem again. We noted in 2014: She was having palpitations and saw Dr. Rudy Marie in January. She had EKG, echo and stress test--\"I think everything is okay. \"  The palpitations went away since getting treated for INDER. She is on CPAP for INDER. Mood okay, stress better. \"I pray and that helps. \" As noted before: \"I have been depressed since I was a teenager. \"  \"I tried to go off the meds, but I got down in the dumps. \"  Back on meds. No SI at this time. Sees Dr. Tejinder Anna currently (previously Dr. Inocencio Alvarado). At this time, she is otherwise doing well and has brought no other complaints to my attention today. For a list of the medical issues addressed today, see the assessment and plan below. PMH:   Past Medical History:   Diagnosis Date    Cancer ([facility] [address]     skin cancer removed from nose    Depression 2014    GERD (gastroesophageal reflux disease)     Hot flashes     Hyperglycemia 2014    Hyperlipidemia 2014    Hypertension     Left bundle branch block     Neuropathy, peripheral 2014    INDER on CPAP 2014       Past Surgical History:   Procedure Laterality Date    COLONOSCOPY  2010     Dr. Deuce Werner          HX [facility]      Dr. Ocasio Counts; resection of skin cancer from nose       All: She is allergic to erythromycin. (rash). Current Outpatient Medications   Medication Sig    risperiDONE (RISPERDAL) 2 mg tablet TAKE 1 TABLET NIGHTLY AS NEEDED    atenolol (TENORMIN) 50 mg tablet TAKE 1 TABLET DAILY    lisinopril (PRINIVIL, ZESTRIL) 10 mg tablet Take 1 Tab by mouth daily.  atorvastatin (LIPITOR) 20 mg tablet Take 1 Tab by mouth daily.  hydroCHLOROthiazide (HYDRODIURIL) 25 mg tablet Take 1 Tab by mouth daily.  buPROPion XL (WELLBUTRIN XL) 300 mg XL tablet TAKE 1 TABLET EVERY MORNING    Cholecalciferol, Vitamin D3, (VITAMIN D3) 2,000 unit cap capsule Take  by mouth daily.  multivitamin, stress formula (STRESS TAB) tablet Take 1 Tab by mouth daily.  ascorbic acid (VITAMIN C) 1,000 mg tablet Take 500 mg by mouth daily.  vitamin e (E GEMS) 100 unit capsule Take 400 Units by mouth daily.  calcium-vitamin D (CALCIUM 500+D) 500 mg(1,250mg) -200 unit per tablet Take 2 tablets by mouth daily.  aspirin delayed-release 81 mg tablet Take 81 mg by mouth daily.  DOCUSATE CALCIUM (STOOL SOFTENER PO) Take  by mouth.  SENNOSIDES (SENOKOT PO) Take  by mouth. No current facility-administered medications for this visit. FH: Mother  breast cancer. Father  of CHF.     SH: Retired insurance underwriting technician. She reports that  has never smoked. she has never used smokeless tobacco. She reports that she does not drink alcohol or use drugs. ROS: See above; Complete ROS otherwise negative. OBJECTIVE:   Vitals:   Visit Vitals  /71 (BP 1 Location: Left arm, BP Patient Position: Sitting)   Pulse 73   Temp 98.8 °F (37.1 °C) (Oral)   Resp 14   Ht 5' 8\" (1.727 m)   Wt 227 lb 9.6 oz (103.2 kg)   SpO2 97%   BMI 34.61 kg/m²      Gen: Pleasant 70 y.o.  female in NAD. HEENT: PERRLA. EOMI. OP moist and pink. Neck: Supple. No LAD. HEART: RRR, No M/G/R.    LUNGS: CTAB No W/R. ABDOMEN: S, NT, ND, BS+. EXTREMITIES: Warm. No C/C/E.  MUSCULOSKELETAL: Normal ROM, muscle strength 5/5 all groups. NEURO: Alert and oriented x 3. Cranial nerves grossly intact. No focal sensory or motor deficits noted. SKIN: Warm. Dry. No rashes or other lesions noted. Lab Results   Component Value Date/Time    Hemoglobin A1c 5.7 (H) 02/20/2018 09:33 AM     Lab Results   Component Value Date/Time    Sodium 142 02/27/2018 11:21 AM    Potassium 4.4 02/27/2018 11:21 AM    Chloride 101 02/27/2018 11:21 AM    CO2 30 (H) 02/27/2018 11:21 AM    Glucose 90 02/27/2018 11:21 AM    BUN 18 02/27/2018 11:21 AM    Creatinine 1.10 (H) 02/27/2018 11:21 AM    BUN/Creatinine ratio 16 02/27/2018 11:21 AM    GFR est AA 61 02/27/2018 11:21 AM    GFR est non-AA 53 (L) 02/27/2018 11:21 AM    Calcium 11.2 (H) 02/27/2018 11:21 AM         ASSESSMENT/ PLAN:     Hypertension: Normal today; watch this for now. - atenolol (TENORMIN) 50 mg tablet; Take 1 Tab by mouth daily. - hydrochlorothiazide (HYDRODIURIL) 25 mg tablet; Take 1 Tab by mouth daily.  - METABOLIC PANEL, COMPREHENSIVE  - LIPID PANEL  - CBC WITH AUTOMATED DIFF    Hip pain: Can return to Dr. Dl Klein. OTC analgesics. Constipation: Controlled with stool softener. Neuropathy, LE: Stable. Ongoing. Likely arising from her metabolic syndrome / prediabetes.   Sometimes neuropathy predates the diagnosis of DM. Neg RPR, ESR, TSH, JUNE, in 2014    Depression: Stable. No SI.   - risperiDONE (RISPERDAL) 2 mg tablet; Take 1 Tab by mouth nightly as needed. - buPROPion XL (WELLBUTRIN XL) 300 mg XL tablet; Take 1 Tab by mouth every morning. Hyperglycemia: \"Prediabetes\" / Gregor Gross DM\". - METABOLIC PANEL, COMPREHENSIVE  - HEMOGLOBIN A1C    GERD (gastroesophageal reflux disease): Stable. - omeprazole (PRILOSEC) 20 mg capsule; Take 1 Cap by mouth daily. Hyperlipidemia: Reviewed her labs. - atorvastatin (LIPITOR) 20 mg tablet; Take 1 Tab by mouth daily.  - LIPID PANEL    INDER on CPAP:  - REFERRAL TO SLEEP STUDIES    Obesity: stable--work on diet and exercise. Wt Readings from Last 3 Encounters:   10/26/18 227 lb 9.6 oz (103.2 kg)   09/24/18 229 lb (103.9 kg)   06/26/18 226 lb 9.6 oz (102.8 kg)       Follow-up Disposition:  Return in about 4 months (around 2/26/2019) for HTN. I have reviewed the patient's medications and risks/side effects/benefits were discussed. Diagnosis(-es) explained to patient and questions answered. Literature provided where appropriate.

## 2018-11-15 ENCOUNTER — APPOINTMENT (OUTPATIENT)
Dept: INTERNAL MEDICINE CLINIC | Age: 63
End: 2018-11-15

## 2018-11-15 ENCOUNTER — HOSPITAL ENCOUNTER (OUTPATIENT)
Dept: LAB | Age: 63
Discharge: HOME | End: 2018-11-15
Payer: MEDICARE

## 2018-11-15 DIAGNOSIS — R73.9 HYPERGLYCEMIA: ICD-10-CM

## 2018-11-15 DIAGNOSIS — I10 ESSENTIAL HYPERTENSION: ICD-10-CM

## 2018-11-15 PROCEDURE — 80053 COMPREHEN METABOLIC PANEL: CPT

## 2018-11-15 PROCEDURE — 83036 HEMOGLOBIN GLYCOSYLATED A1C: CPT

## 2018-11-15 PROCEDURE — 80061 LIPID PANEL: CPT

## 2018-11-15 PROCEDURE — 36415 COLL VENOUS BLD VENIPUNCTURE: CPT

## 2018-11-15 PROCEDURE — 85025 COMPLETE CBC W/AUTO DIFF WBC: CPT

## 2018-11-16 ENCOUNTER — TELEPHONE (OUTPATIENT)
Dept: INTERNAL MEDICINE CLINIC | Age: 63
End: 2018-11-16

## 2018-11-16 LAB
ALBUMIN SERPL-MCNC: 4.2 G/DL (ref 3.6–4.8)
ALBUMIN/GLOB SERPL: 2 {RATIO} (ref 1.2–2.2)
ALP SERPL-CCNC: 69 IU/L (ref 39–117)
ALT SERPL-CCNC: 31 IU/L (ref 0–32)
AST SERPL-CCNC: 18 IU/L (ref 0–40)
BASOPHILS # BLD AUTO: 0 X10E3/UL (ref 0–0.2)
BASOPHILS NFR BLD AUTO: 1 %
BILIRUB SERPL-MCNC: 0.3 MG/DL (ref 0–1.2)
BUN SERPL-MCNC: 23 MG/DL (ref 8–27)
BUN/CREAT SERPL: 19 (ref 12–28)
CALCIUM SERPL-MCNC: 10.9 MG/DL (ref 8.7–10.3)
CHLORIDE SERPL-SCNC: 107 MMOL/L (ref 96–106)
CHOLEST SERPL-MCNC: 112 MG/DL (ref 100–199)
CO2 SERPL-SCNC: 26 MMOL/L (ref 20–29)
CREAT SERPL-MCNC: 1.2 MG/DL (ref 0.57–1)
EOSINOPHIL # BLD AUTO: 0.2 X10E3/UL (ref 0–0.4)
EOSINOPHIL NFR BLD AUTO: 4 %
ERYTHROCYTE [DISTWIDTH] IN BLOOD BY AUTOMATED COUNT: 13.4 % (ref 12.3–15.4)
EST. AVERAGE GLUCOSE BLD GHB EST-MCNC: 126 MG/DL
GLOBULIN SER CALC-MCNC: 2.1 G/DL (ref 1.5–4.5)
GLUCOSE SERPL-MCNC: 114 MG/DL (ref 65–99)
HBA1C MFR BLD: 6 % (ref 4.8–5.6)
HCT VFR BLD AUTO: 34.3 % (ref 34–46.6)
HDLC SERPL-MCNC: 45 MG/DL
HGB BLD-MCNC: 11.3 G/DL (ref 11.1–15.9)
IMM GRANULOCYTES # BLD: 0 X10E3/UL (ref 0–0.1)
IMM GRANULOCYTES NFR BLD: 0 %
LDLC SERPL CALC-MCNC: 53 MG/DL (ref 0–99)
LYMPHOCYTES # BLD AUTO: 1.5 X10E3/UL (ref 0.7–3.1)
LYMPHOCYTES NFR BLD AUTO: 30 %
MCH RBC QN AUTO: 30 PG (ref 26.6–33)
MCHC RBC AUTO-ENTMCNC: 32.9 G/DL (ref 31.5–35.7)
MCV RBC AUTO: 91 FL (ref 79–97)
MONOCYTES # BLD AUTO: 0.4 X10E3/UL (ref 0.1–0.9)
MONOCYTES NFR BLD AUTO: 9 %
NEUTROPHILS # BLD AUTO: 2.7 X10E3/UL (ref 1.4–7)
NEUTROPHILS NFR BLD AUTO: 56 %
PLATELET # BLD AUTO: 253 X10E3/UL (ref 150–379)
POTASSIUM SERPL-SCNC: 4.7 MMOL/L (ref 3.5–5.2)
PROT SERPL-MCNC: 6.3 G/DL (ref 6–8.5)
RBC # BLD AUTO: 3.77 X10E6/UL (ref 3.77–5.28)
SODIUM SERPL-SCNC: 146 MMOL/L (ref 134–144)
TRIGL SERPL-MCNC: 70 MG/DL (ref 0–149)
VLDLC SERPL CALC-MCNC: 14 MG/DL (ref 5–40)
WBC # BLD AUTO: 4.8 X10E3/UL (ref 3.4–10.8)

## 2018-12-18 RX ORDER — LISINOPRIL 10 MG/1
TABLET ORAL
Qty: 90 TAB | Refills: 2 | Status: SHIPPED | OUTPATIENT
Start: 2018-12-18 | End: 2019-01-11 | Stop reason: DRUGHIGH

## 2018-12-18 RX ORDER — HYDROCHLOROTHIAZIDE 25 MG/1
TABLET ORAL
Qty: 90 TAB | Refills: 2 | Status: SHIPPED | OUTPATIENT
Start: 2018-12-18 | End: 2019-01-11

## 2018-12-18 RX ORDER — ATENOLOL 50 MG/1
TABLET ORAL
Qty: 90 TAB | Refills: 2 | Status: SHIPPED | OUTPATIENT
Start: 2018-12-18 | End: 2019-09-11 | Stop reason: SDUPTHER

## 2018-12-18 RX ORDER — ATORVASTATIN CALCIUM 20 MG/1
TABLET, FILM COATED ORAL
Qty: 90 TAB | Refills: 2 | Status: SHIPPED | OUTPATIENT
Start: 2018-12-18 | End: 2019-09-11 | Stop reason: SDUPTHER

## 2018-12-18 RX ORDER — BUPROPION HYDROCHLORIDE 300 MG/1
TABLET ORAL
Qty: 90 TAB | Refills: 2 | Status: SHIPPED | OUTPATIENT
Start: 2018-12-18 | End: 2019-09-11 | Stop reason: SDUPTHER

## 2019-01-11 ENCOUNTER — OFFICE VISIT (OUTPATIENT)
Dept: INTERNAL MEDICINE CLINIC | Age: 64
End: 2019-01-11

## 2019-01-11 VITALS
OXYGEN SATURATION: 99 % | DIASTOLIC BLOOD PRESSURE: 78 MMHG | HEIGHT: 68 IN | TEMPERATURE: 98.9 F | HEART RATE: 69 BPM | BODY MASS INDEX: 35.4 KG/M2 | WEIGHT: 233.6 LBS | SYSTOLIC BLOOD PRESSURE: 142 MMHG | RESPIRATION RATE: 16 BRPM

## 2019-01-11 DIAGNOSIS — E83.52 HYPERCALCEMIA: ICD-10-CM

## 2019-01-11 DIAGNOSIS — M25.551 RIGHT HIP PAIN: ICD-10-CM

## 2019-01-11 DIAGNOSIS — G57.12 MERALGIA PARAESTHETICA, LEFT: ICD-10-CM

## 2019-01-11 DIAGNOSIS — M79.651 RIGHT THIGH PAIN: ICD-10-CM

## 2019-01-11 DIAGNOSIS — R73.9 BORDERLINE HYPERGLYCEMIA: ICD-10-CM

## 2019-01-11 DIAGNOSIS — M79.672 LEFT FOOT PAIN: Primary | ICD-10-CM

## 2019-01-11 PROBLEM — E66.01 SEVERE OBESITY (HCC): Status: ACTIVE | Noted: 2019-01-11

## 2019-01-11 RX ORDER — HYDROCHLOROTHIAZIDE 25 MG/1
12.5 TABLET ORAL DAILY
Qty: 90 TAB | Refills: 3 | Status: SHIPPED | OUTPATIENT
Start: 2019-01-11 | End: 2019-03-28

## 2019-01-11 RX ORDER — LISINOPRIL 20 MG/1
20 TABLET ORAL DAILY
Qty: 90 TAB | Refills: 11 | Status: SHIPPED | OUTPATIENT
Start: 2019-01-11 | End: 2019-09-24

## 2019-01-11 NOTE — PROGRESS NOTES
SUBJECTIVE  Ms. Ariana Amanda presents today acutely for     Chief Complaint   Patient presents with    Numbness     pt here today c.o burning/numbness & tingling in L leg x 2 months    Leg Pain     pt c.o pain in muscle in upper thigh of R thigh/hip for several months    Results     pt wants to discuss lab results       The left leg has been bothering her for 1 1/2 - 2 months. Has some R proximal thigh \"muscle pain\"--She told us about this on October 23:     R hip pain. \"The hip hurts when I walk and when I lay on my right side. I have two bone spurs on my left foot that cause me to walk funny, and I wonder if it's all related. \"    She has questions about labs, which we reviewed today. Shows IFG and CKD3. She is concerned about HCTZ causing DM and other health problems. OBJECTIVE  Visit Vitals  /78 (BP 1 Location: Left arm, BP Patient Position: Sitting)   Pulse 69   Temp 98.9 °F (37.2 °C) (Oral)   Resp 16   Ht 5' 8\" (1.727 m)   Wt 233 lb 9.6 oz (106 kg)   SpO2 99%   BMI 35.52 kg/m²     Gen: Pleasant 75 y.o.  female in NAD.   HEENT: PERRLA. EOMI. OP moist and pink.  Neck: Supple.  No LAD.  HEART: RRR, No M/G/R.   LUNGS: CTAB No W/R.   ABDOMEN: S, NT, ND, BS+.   EXTREMITIES: Warm. No C/C/E. MUSCULOSKELETAL: Normal ROM, muscle strength 5/5 all groups. NEURO: Alert and oriented x 3.  Cranial nerves grossly intact.  No focal sensory or motor deficits noted. SKIN: Warm. Dry. No rashes or other lesions noted. ASSESSMENT / PLAN    ICD-10-CM ICD-9-CM    1. Left foot pain M79.672 729.5 REFERRAL TO PODIATRY   2. Meralgia paraesthetica, left G57.12 355.1 Handout given. 3. Right hip pain M25.551 719.45 REFERRAL TO ORTHOPEDICS   4. Right thigh pain M79.651 729.5 REFERRAL TO ORTHOPEDICS   5. Borderline hyperglycemia R96.9 730.63 METABOLIC PANEL, BASIC   6.  Hypercalcemia E03.93 536.58 METABOLIC PANEL, BASIC       I have reviewed with the patient details of the assessment and plan and all questions were answered. Relevant patient education was performed. Follow-up Disposition:  Return if symptoms worsen or fail to improve.

## 2019-01-11 NOTE — PATIENT INSTRUCTIONS
Meralgia Paresthetica: Care Instructions  Your Care Instructions  Meralgia paresthetica (say \"muh-RAL-juh twc-rag-[id number]-ick-uh\") is pain and numbness in the outer part of your thigh. The pain might get worse after you walk or stand for a long time. This pain and numbness occur when a nerve in your thigh is pinched (compressed). Sometimes the problem is caused by wearing tight clothing or being overweight. Most of the time the problem goes away on its own in a few months. Lowering any pressure on the thigh area may help. Wear loose clothes, and lose weight if you need to. Follow-up care is a key part of your treatment and safety. Be sure to make and go to all appointments, and call your doctor if you are having problems. It's also a good idea to know your test results and keep a list of the medicines you take. How can you care for yourself at home? · Most times the problem gets better on its own. Try wearing loose clothing to see if this helps. · Lose weight if you need to. Talk with your doctor if you need help. When should you call for help? Watch closely for changes in your health, and be sure to contact your doctor if:    · You have new symptoms, such as pain that gets worse or new numbness in your thigh.     · You do not get better as expected. Where can you learn more? Go to http://chayito-eugenio.info/. Enter J405 in the search box to learn more about \"Meralgia Paresthetica: Care Instructions. \"  Current as of: June 4, 2018  Content Version: 11.8  © 4228-7513 [employer]. Care instructions adapted under license by [employer] (which disclaims liability or warranty for this information). If you have questions about a medical condition or this instruction, always ask your healthcare professional. [address] any warranty or liability for your use of this information. Look into Metformin for Impaired Fasting Glucose.

## 2019-01-11 NOTE — PROGRESS NOTES
1. Have you been to the ER, urgent care clinic since your last visit? Hospitalized since your last visit?no    2. Have you seen or consulted any other health care providers outside of the [address] [place] [address] since your last visit? Include any pap smears or colon screening.  no

## 2019-03-15 RX ORDER — RISPERIDONE 2 MG/1
TABLET, FILM COATED ORAL
Qty: 90 TAB | Refills: 3 | Status: SHIPPED | OUTPATIENT
Start: 2019-03-15 | End: 2019-06-06 | Stop reason: SDUPTHER

## 2019-03-28 ENCOUNTER — OFFICE VISIT (OUTPATIENT)
Dept: INTERNAL MEDICINE CLINIC | Age: 64
End: 2019-03-28

## 2019-03-28 VITALS
DIASTOLIC BLOOD PRESSURE: 74 MMHG | HEART RATE: 59 BPM | HEIGHT: 68 IN | OXYGEN SATURATION: 97 % | BODY MASS INDEX: 35.71 KG/M2 | SYSTOLIC BLOOD PRESSURE: 131 MMHG | TEMPERATURE: 97.9 F | RESPIRATION RATE: 18 BRPM | WEIGHT: 235.6 LBS

## 2019-03-28 DIAGNOSIS — Z99.89 OSA ON CPAP: ICD-10-CM

## 2019-03-28 DIAGNOSIS — I10 ESSENTIAL HYPERTENSION: Primary | ICD-10-CM

## 2019-03-28 DIAGNOSIS — E78.00 PURE HYPERCHOLESTEROLEMIA: ICD-10-CM

## 2019-03-28 DIAGNOSIS — R73.9 HYPERGLYCEMIA: ICD-10-CM

## 2019-03-28 DIAGNOSIS — E66.01 SEVERE OBESITY (HCC): ICD-10-CM

## 2019-03-28 DIAGNOSIS — G47.33 OSA ON CPAP: ICD-10-CM

## 2019-03-28 NOTE — PROGRESS NOTES
SUBJECTIVE:   Ms. Flores Perla is a 75 y.o. female who is here for follow up of routine medical issues. Her  is my patient Glen Olivas. Chief Complaint   Patient presents with    Hypertension     pt here today for routine f.u    Palpitations     pt states that since she no longer taking hydrochlorothiazide- she has been getting palpitations     She stopped working. \"I was hearing bad things about the HCTZ so I stopped it. \"   She has some pain in R proximal thigh, into groin. No swelling. Hot flashes are a frequent occurrence. Constipation is better. L ankle pain \"still bothers me at times. \"  It is recalled that she saw orthopedist for L ankle pain. \"She wanted me to wear a device\", that was expensive. The pain got better with new shoes. Hurts if sits at desk too long. Back is doing okay. Knee is better. No longer seeing Dr. Alondra Vazquez for knee pain--Has gotten injections in past.   Neuropathy: Unschanged numbness and tingling in feet. It is recalled that she has had high blood sugars, as well as an A1C that was up. L>R hip pain is better. We have discussed the R hip pain before in 2015: Discussed how she had a L bone spur, and favored that side. Now wearing soft cushioned soles. Palpitations have persisted. Saw Dr. Marlys Cisse in 2013. We noted in 2014: She was having palpitations and saw Dr. Nabil Ruiz in January. She had EKG, echo and stress test--\"I think everything is okay. \"  The palpitations went away since getting treated for INDER. She is on CPAP for INDER. Mood okay, stress better. \"I have to admit I'm back in a bit of a funk. \" As noted before: \"I have been depressed since I was a teenager. \"  \"I tried to go off the meds, but I got down in the dumps. \"  Back on meds. No SI at this time. Sees Dr. Adria De Los Santos currently (previously Dr. Hagan Friend). At this time, she is otherwise doing well and has brought no other complaints to my attention today.   For a list of the medical issues addressed today, see the assessment and plan below. PMH:   Past Medical History:   Diagnosis Date    Cancer ([facility] [address]     skin cancer removed from nose    Depression 2014    GERD (gastroesophageal reflux disease)     Hot flashes     Hyperglycemia 2014    Hyperlipidemia 2014    Hypertension     Left bundle branch block     Neuropathy, peripheral 2014    INDER on CPAP 2014       Past Surgical History:   Procedure Laterality Date    COLONOSCOPY  2010     Dr. Luz France          HX [facility]; resection of skin cancer from nose       All: She is allergic to erythromycin. (rash). Current Outpatient Medications   Medication Sig    risperiDONE (RISPERDAL) 2 mg tablet TAKE 1 TABLET NIGHTLY AS NEEDED    lisinopril (PRINIVIL, ZESTRIL) 20 mg tablet Take 1 Tab by mouth daily.  buPROPion XL (WELLBUTRIN XL) 300 mg XL tablet TAKE 1 TABLET BY MOUTH EVERY MORNING    atenolol (TENORMIN) 50 mg tablet TAKE 1 TABLET BY MOUTH EVERY DAY    atorvastatin (LIPITOR) 20 mg tablet TAKE 1 TABLET BY MOUTH EVERY DAY    Cholecalciferol, Vitamin D3, (VITAMIN D3) 2,000 unit cap capsule Take  by mouth daily.  multivitamin, stress formula (STRESS TAB) tablet Take 1 Tab by mouth daily.  ascorbic acid (VITAMIN C) 1,000 mg tablet Take 500 mg by mouth daily.  vitamin e (E GEMS) 100 unit capsule Take 400 Units by mouth daily.  aspirin delayed-release 81 mg tablet Take 81 mg by mouth daily.  hydroCHLOROthiazide (HYDRODIURIL) 25 mg tablet Take 0.5 Tabs by mouth daily. No current facility-administered medications for this visit. FH: Mother  breast cancer. Father  of CHF. SH: Retired insurance . She reports that she has never smoked. She has never used smokeless tobacco. She reports that she does not drink alcohol or use drugs. ROS: See above; Complete ROS otherwise negative. OBJECTIVE:   Vitals:   Visit Vitals  /74 (BP 1 Location: Left arm, BP Patient Position: Sitting)   Pulse (!) 59   Temp 97.9 °F (36.6 °C) (Oral)   Resp 18   Ht 5' 8\" (1.727 m)   Wt 235 lb 9.6 oz (106.9 kg)   SpO2 97%   BMI 35.82 kg/m²      Gen: Pleasant 75 y.o.  female in NAD. HEENT: PERRLA. EOMI. OP moist and pink. Neck: Supple. No LAD. HEART: RRR, No M/G/R.    LUNGS: CTAB No W/R. ABDOMEN: S, NT, ND, BS+. EXTREMITIES: Warm. No C/C/E.  MUSCULOSKELETAL: Normal ROM, muscle strength 5/5 all groups. NEURO: Alert and oriented x 3. Cranial nerves grossly intact. No focal sensory or motor deficits noted. SKIN: Warm. Dry. No rashes or other lesions noted. Lab Results   Component Value Date/Time    Hemoglobin A1c 6.0 (H) 11/15/2018 09:06 AM     Lab Results   Component Value Date/Time    Sodium 146 (H) 11/15/2018 09:06 AM    Potassium 4.7 11/15/2018 09:06 AM    Chloride 107 (H) 11/15/2018 09:06 AM    CO2 26 11/15/2018 09:06 AM    Glucose 114 (H) 11/15/2018 09:06 AM    BUN 23 11/15/2018 09:06 AM    Creatinine 1.20 (H) 11/15/2018 09:06 AM    BUN/Creatinine ratio 19 11/15/2018 09:06 AM    GFR est AA 55 (L) 11/15/2018 09:06 AM    GFR est non-AA 48 (L) 11/15/2018 09:06 AM    Calcium 10.9 (H) 11/15/2018 09:06 AM         ASSESSMENT/ PLAN:     Hypertension: borderline today; watch this for now. - atenolol (TENORMIN) 50 mg tablet; Take 1 Tab by mouth daily.  - METABOLIC PANEL, COMPREHENSIVE  - LIPID PANEL  - CBC WITH AUTOMATED DIFF    Hip pain: Can return to Dr. Soraya Velez. OTC analgesics. Constipation: Controlled with stool softener. Neuropathy, LE: Stable. Ongoing. Likely arising from her metabolic syndrome / prediabetes. Sometimes neuropathy predates the diagnosis of DM. Neg RPR, ESR, TSH, JUNE, in 2014    Depression: Stable. No SI.   - risperiDONE (RISPERDAL) 2 mg tablet; Take 1 Tab by mouth nightly as needed. - buPROPion XL (WELLBUTRIN XL) 300 mg XL tablet;  Take 1 Tab by mouth every morning. Hyperglycemia: \"Prediabetes\" / [address]enny [address] DM\". - METABOLIC PANEL, COMPREHENSIVE  - HEMOGLOBIN A1C    GERD (gastroesophageal reflux disease): Stable. - omeprazole (PRILOSEC) 20 mg capsule; Take 1 Cap by mouth daily. Hyperlipidemia: Reviewed her labs. - atorvastatin (LIPITOR) 20 mg tablet; Take 1 Tab by mouth daily.  - LIPID PANEL    INDER on CPAP:  - REFERRAL TO SLEEP STUDIES    Obesity: stable--work on diet and exercise. Wt Readings from Last 3 Encounters:   03/28/19 235 lb 9.6 oz (106.9 kg)   01/11/19 233 lb 9.6 oz (106 kg)   10/26/18 227 lb 9.6 oz (103.2 kg)       Follow-up and Dispositions    · Return in about 6 months (around 9/28/2019) for HTN. I have reviewed the patient's medications and risks/side effects/benefits were discussed. Diagnosis(-es) explained to patient and questions answered. Literature provided where appropriate.

## 2019-03-28 NOTE — PROGRESS NOTES
1. Have you been to the ER, urgent care clinic since your last visit? Hospitalized since your last visit?no    2. Have you seen or consulted any other health care providers outside of the [address] [place] [address] since your last visit? Include any pap smears or colon screening.  no

## 2019-03-29 ENCOUNTER — HOSPITAL ENCOUNTER (OUTPATIENT)
Dept: LAB | Age: 64
Discharge: HOME | End: 2019-03-29
Payer: MEDICARE

## 2019-03-29 PROCEDURE — 80061 LIPID PANEL: CPT

## 2019-03-29 PROCEDURE — 36415 COLL VENOUS BLD VENIPUNCTURE: CPT

## 2019-03-29 PROCEDURE — 85025 COMPLETE CBC W/AUTO DIFF WBC: CPT

## 2019-03-29 PROCEDURE — 80053 COMPREHEN METABOLIC PANEL: CPT

## 2019-03-29 PROCEDURE — 83036 HEMOGLOBIN GLYCOSYLATED A1C: CPT

## 2019-03-30 LAB
ALBUMIN SERPL-MCNC: 4.3 G/DL (ref 3.6–4.8)
ALBUMIN/GLOB SERPL: 2 {RATIO} (ref 1.2–2.2)
ALP SERPL-CCNC: 83 IU/L (ref 39–117)
ALT SERPL-CCNC: 23 IU/L (ref 0–32)
AST SERPL-CCNC: 18 IU/L (ref 0–40)
BASOPHILS # BLD AUTO: 0.1 X10E3/UL (ref 0–0.2)
BASOPHILS NFR BLD AUTO: 1 %
BILIRUB SERPL-MCNC: 0.3 MG/DL (ref 0–1.2)
BUN SERPL-MCNC: 14 MG/DL (ref 8–27)
BUN/CREAT SERPL: 12 (ref 12–28)
CALCIUM SERPL-MCNC: 11.3 MG/DL (ref 8.7–10.3)
CHLORIDE SERPL-SCNC: 107 MMOL/L (ref 96–106)
CHOLEST SERPL-MCNC: 140 MG/DL (ref 100–199)
CO2 SERPL-SCNC: 24 MMOL/L (ref 20–29)
CREAT SERPL-MCNC: 1.15 MG/DL (ref 0.57–1)
EOSINOPHIL # BLD AUTO: 0.1 X10E3/UL (ref 0–0.4)
EOSINOPHIL NFR BLD AUTO: 4 %
ERYTHROCYTE [DISTWIDTH] IN BLOOD BY AUTOMATED COUNT: 13.5 % (ref 12.3–15.4)
EST. AVERAGE GLUCOSE BLD GHB EST-MCNC: 120 MG/DL
GLOBULIN SER CALC-MCNC: 2.2 G/DL (ref 1.5–4.5)
GLUCOSE SERPL-MCNC: 106 MG/DL (ref 65–99)
HBA1C MFR BLD: 5.8 % (ref 4.8–5.6)
HCT VFR BLD AUTO: 36.7 % (ref 34–46.6)
HDLC SERPL-MCNC: 53 MG/DL
HGB BLD-MCNC: 12 G/DL (ref 11.1–15.9)
IMM GRANULOCYTES # BLD AUTO: 0 X10E3/UL (ref 0–0.1)
IMM GRANULOCYTES NFR BLD AUTO: 0 %
LDLC SERPL CALC-MCNC: 72 MG/DL (ref 0–99)
LYMPHOCYTES # BLD AUTO: 1.1 X10E3/UL (ref 0.7–3.1)
LYMPHOCYTES NFR BLD AUTO: 27 %
MCH RBC QN AUTO: 29.6 PG (ref 26.6–33)
MCHC RBC AUTO-ENTMCNC: 32.7 G/DL (ref 31.5–35.7)
MCV RBC AUTO: 91 FL (ref 79–97)
MONOCYTES # BLD AUTO: 0.3 X10E3/UL (ref 0.1–0.9)
MONOCYTES NFR BLD AUTO: 8 %
NEUTROPHILS # BLD AUTO: 2.4 X10E3/UL (ref 1.4–7)
NEUTROPHILS NFR BLD AUTO: 60 %
PLATELET # BLD AUTO: 247 X10E3/UL (ref 150–379)
POTASSIUM SERPL-SCNC: 4.7 MMOL/L (ref 3.5–5.2)
PROT SERPL-MCNC: 6.5 G/DL (ref 6–8.5)
RBC # BLD AUTO: 4.05 X10E6/UL (ref 3.77–5.28)
SODIUM SERPL-SCNC: 142 MMOL/L (ref 134–144)
TRIGL SERPL-MCNC: 75 MG/DL (ref 0–149)
VLDLC SERPL CALC-MCNC: 15 MG/DL (ref 5–40)
WBC # BLD AUTO: 3.9 X10E3/UL (ref 3.4–10.8)

## 2019-06-06 RX ORDER — RISPERIDONE 2 MG/1
TABLET, FILM COATED ORAL
Qty: 90 TAB | Refills: 0 | Status: SHIPPED | OUTPATIENT
Start: 2019-06-06 | End: 2019-09-12 | Stop reason: SDUPTHER

## 2019-09-11 RX ORDER — BUPROPION HYDROCHLORIDE 300 MG/1
TABLET ORAL
Qty: 90 TAB | Refills: 2 | Status: SHIPPED | OUTPATIENT
Start: 2019-09-11 | End: 2020-06-22 | Stop reason: SDUPTHER

## 2019-09-11 RX ORDER — ATORVASTATIN CALCIUM 20 MG/1
TABLET, FILM COATED ORAL
Qty: 90 TAB | Refills: 2 | Status: SHIPPED | OUTPATIENT
Start: 2019-09-11 | End: 2020-06-01

## 2019-09-11 RX ORDER — ATENOLOL 50 MG/1
TABLET ORAL
Qty: 90 TAB | Refills: 2 | Status: SHIPPED | OUTPATIENT
Start: 2019-09-11 | End: 2020-05-19 | Stop reason: ALTCHOICE

## 2019-09-12 RX ORDER — RISPERIDONE 2 MG/1
TABLET, FILM COATED ORAL
Qty: 90 TAB | Refills: 0 | Status: SHIPPED | OUTPATIENT
Start: 2019-09-12 | End: 2019-12-09 | Stop reason: SDUPTHER

## 2019-09-24 ENCOUNTER — OFFICE VISIT (OUTPATIENT)
Dept: INTERNAL MEDICINE CLINIC | Age: 64
End: 2019-09-24

## 2019-09-24 VITALS
RESPIRATION RATE: 20 BRPM | TEMPERATURE: 98 F | HEIGHT: 68 IN | OXYGEN SATURATION: 98 % | DIASTOLIC BLOOD PRESSURE: 58 MMHG | BODY MASS INDEX: 35.92 KG/M2 | SYSTOLIC BLOOD PRESSURE: 117 MMHG | WEIGHT: 237 LBS | HEART RATE: 53 BPM

## 2019-09-24 DIAGNOSIS — E78.5 HYPERLIPIDEMIA, UNSPECIFIED HYPERLIPIDEMIA TYPE: ICD-10-CM

## 2019-09-24 DIAGNOSIS — R73.9 HYPERGLYCEMIA: ICD-10-CM

## 2019-09-24 DIAGNOSIS — G62.9 PERIPHERAL POLYNEUROPATHY: ICD-10-CM

## 2019-09-24 DIAGNOSIS — I10 ESSENTIAL HYPERTENSION: Primary | ICD-10-CM

## 2019-09-24 RX ORDER — CANDESARTAN 32 MG/1
32 TABLET ORAL DAILY
Qty: 90 TAB | Refills: 3 | Status: SHIPPED | OUTPATIENT
Start: 2019-09-24 | End: 2019-09-25

## 2019-09-24 NOTE — PROGRESS NOTES
1. Have you been to the ER, urgent care clinic since your last visit? Hospitalized since your last visit?no    2. Have you seen or consulted any other health care providers outside of the [address] [place] [address] since your last visit? Include any pap smears or colon screening.  no  ]

## 2019-09-24 NOTE — PROGRESS NOTES
SUBJECTIVE:   Ms. Jose López is a 75 y.o. female who is here for follow up of routine medical issues. Her  is my patient Glen Olivas. Chief Complaint   Patient presents with    Hypertension     6 month f.u     She has a cough, \"tickle\" which she thinks is from CPAP. Now clean. Hot flashes are still a frequent occurrence. \"Constant, all day all night. \"  Sees gynecologist yearly. Has had mammogram.  Had dexa. She went to eye doctor recently. Constipation is better. L ankle pain \"still bothers me at times. \"  It is recalled that she saw orthopedist for L ankle pain. \"She wanted me to wear a device\", that was expensive. The pain got better with new shoes. Hurts if sits at desk too long. Back is doing okay. Knee is better. No longer seeing Dr. Mae for knee pain--Has gotten injections in past.   Neuropathy: Unschanged numbness and tingling in feet. It is recalled that she has had high blood sugars, as well as an A1C that was up. Hip pain: Still present, R side. \"When I lay on my right hip. \" We have discussed the R hip pain before in 2015: Discussed how she had a L bone spur, and favored that side. Now wearing soft cushioned soles. Palpitations have persisted. Saw Dr. Neftali Downs in 2013. We noted in 2014: She was having palpitations and saw Dr. Kirk Chanel in January. She had EKG, echo and stress test--\"I think everything is okay. \"  The palpitations went away since getting treated for INDER. She is on CPAP for INDER. Mood okay, stress better. \"I have to admit I'm back in a bit of a funk. \" As noted before: \"I have been depressed since I was a teenager. \"  \"I tried to go off the meds, but I got down in the dumps. \"  Back on meds. No SI at this time. Sees Dr. Shwetha Tian currently (previously Dr. Xiomara Irizarry). At this time, she is otherwise doing well and has brought no other complaints to my attention today.   For a list of the medical issues addressed today, see the assessment and plan below.    PMH:   Past Medical History:   Diagnosis Date    Cancer ([facility] [address]     skin cancer removed from nose    Depression 2014    GERD (gastroesophageal reflux disease)     Hot flashes     Hyperglycemia 2014    Hyperlipidemia 2014    Hypertension     Left bundle branch block     Neuropathy, peripheral 2014    INDER on CPAP 2014       Past Surgical History:   Procedure Laterality Date    COLONOSCOPY  2010     Dr. Ede Zyaas          HX Asa Chloride      Dr. Ana Garcia; resection of skin cancer from nose       All: She is allergic to erythromycin. (rash). Current Outpatient Medications   Medication Sig    risperiDONE (RISPERDAL) 2 mg tablet TAKE 1 TABLET NIGHTLY AS NEEDED    atenolol (TENORMIN) 50 mg tablet TAKE 1 TABLET BY MOUTH EVERY DAY    atorvastatin (LIPITOR) 20 mg tablet TAKE 1 TABLET BY MOUTH EVERY DAY    buPROPion XL (WELLBUTRIN XL) 300 mg XL tablet TAKE 1 TABLET BY MOUTH EVERY MORNING    lisinopril (PRINIVIL, ZESTRIL) 20 mg tablet Take 1 Tab by mouth daily.  Cholecalciferol, Vitamin D3, (VITAMIN D3) 2,000 unit cap capsule Take  by mouth daily.  multivitamin, stress formula (STRESS TAB) tablet Take 1 Tab by mouth daily.  ascorbic acid (VITAMIN C) 1,000 mg tablet Take 500 mg by mouth daily.  vitamin e (E GEMS) 100 unit capsule Take 400 Units by mouth daily.  aspirin delayed-release 81 mg tablet Take 81 mg by mouth daily. No current facility-administered medications for this visit. FH: Mother  breast cancer. Father  of CHF. SH: Retired insurance . She reports that she has never smoked. She has never used smokeless tobacco. She reports that she does not drink alcohol or use drugs. ROS: See above; Complete ROS otherwise negative.      OBJECTIVE:   Vitals:   Visit Vitals  /58 (BP 1 Location: Left arm, BP Patient Position: Sitting)   Pulse (!) 53   Temp 98 °F (36.7 °C) (Oral)   Resp 20   Ht 5' 8\" (1.727 m)   Wt 237 lb (107.5 kg)   SpO2 98%   BMI 36.04 kg/m²      Gen: Pleasant 75 y.o.  female in NAD. HEENT: PERRLA. EOMI. OP moist and pink. Neck: Supple. No LAD. HEART: RRR, No M/G/R.    LUNGS: CTAB No W/R. ABDOMEN: S, NT, ND, BS+. EXTREMITIES: Warm. No C/C/E.  MUSCULOSKELETAL: Normal ROM, muscle strength 5/5 all groups. NEURO: Alert and oriented x 3. Cranial nerves grossly intact. No focal sensory or motor deficits noted. SKIN: Warm. Dry. No rashes or other lesions noted. Lab Results   Component Value Date/Time    Hemoglobin A1c 5.8 (H) 03/29/2019 09:21 AM     Lab Results   Component Value Date/Time    Sodium 142 03/29/2019 09:21 AM    Potassium 4.7 03/29/2019 09:21 AM    Chloride 107 (H) 03/29/2019 09:21 AM    CO2 24 03/29/2019 09:21 AM    Glucose 106 (H) 03/29/2019 09:21 AM    BUN 14 03/29/2019 09:21 AM    Creatinine 1.15 (H) 03/29/2019 09:21 AM    BUN/Creatinine ratio 12 03/29/2019 09:21 AM    GFR est AA 57 (L) 03/29/2019 09:21 AM    GFR est non-AA 50 (L) 03/29/2019 09:21 AM    Calcium 11.3 (H) 03/29/2019 09:21 AM         ASSESSMENT/ PLAN:     Cough: Replace lisinopril with ARB    Hypertension: borderline today; watch this for now. - atenolol (TENORMIN) 50 mg tablet; Take 1 Tab by mouth daily.  - METABOLIC PANEL, COMPREHENSIVE  - LIPID PANEL  - CBC WITH AUTOMATED DIFF    Hip pain: Still present, R side, mainly. Can return to Dr. Maya Res. OTC analgesics. Constipation: Controlled with stool softener. Neuropathy, LE: Stable. Ongoing. Likely arising from her metabolic syndrome / prediabetes. Sometimes neuropathy predates the diagnosis of DM. Neg RPR, ESR, TSH, JUNE, in 2014    Depression: Stable. No SI.   - risperiDONE (RISPERDAL) 2 mg tablet; Take 1 Tab by mouth nightly as needed. - buPROPion XL (WELLBUTRIN XL) 300 mg XL tablet; Take 1 Tab by mouth every morning. Hyperglycemia: \"Prediabetes\" / Tresea Rump DM\".    - METABOLIC PANEL, COMPREHENSIVE  - HEMOGLOBIN A1C    GERD (gastroesophageal reflux disease): Stable. - omeprazole (PRILOSEC) 20 mg capsule; Take 1 Cap by mouth daily. Hyperlipidemia: Reviewed her labs. - atorvastatin (LIPITOR) 20 mg tablet; Take 1 Tab by mouth daily.  - LIPID PANEL    INDER on CPAP:    Obesity: stable--work on diet and exercise. Wt Readings from Last 3 Encounters:   09/24/19 237 lb (107.5 kg)   03/28/19 235 lb 9.6 oz (106.9 kg)   01/11/19 233 lb 9.6 oz (106 kg)       Follow-up and Dispositions    · Return in about 6 months (around 3/24/2020) for HTN. I have reviewed the patient's medications and risks/side effects/benefits were discussed. Diagnosis(-es) explained to patient and questions answered. Literature provided where appropriate.

## 2019-09-24 NOTE — PATIENT INSTRUCTIONS
Office Policies    Phone calls/patient messages:            Please allow up to 24 hours for someone in the office to contact you about your call or message. Be mindful your provider may be out of the office or your message may require further review. We encourage you to use Launch[employer] for your messages as this is a faster, more efficient way to communicate with our office                         Medication Refills:            Prescription medications require 48-72 business hours to process. We encourage you to use [employer]Side for your refills. For controlled medications: Please allow 72 business hours to process. Certain medications may require you to  a written prescription at our office. NO narcotic/controlled medications will be prescribed after 4pm Monday through Friday or on weekends              Form/Paperwork Completion:            Please note a $25 fee may incur for all paperwork for completed by our providers. We ask that you allow 7-10 business days. Pre-payment is due prior to picking up/faxing the completed form. You may also download your forms to LaunchSide to have your doctor print off.

## 2019-09-25 ENCOUNTER — DOCUMENTATION ONLY (OUTPATIENT)
Dept: INTERNAL MEDICINE CLINIC | Age: 64
End: 2019-09-25

## 2019-09-25 RX ORDER — OLMESARTAN MEDOXOMIL 40 MG/1
40 TABLET ORAL DAILY
Qty: 30 TAB | Refills: 2 | Status: SHIPPED | OUTPATIENT
Start: 2019-09-25 | End: 2019-10-21

## 2019-09-25 NOTE — PROGRESS NOTES
Pharmacy Progress Note - Medication Access    Contacted [facility] relating to Ms. Kari Olivas 64 y.o.'s candesartan prescription and cost.   Candesartan's copay is $76 w/ insurance. Confirmed with pharmacy. Given patient's concern about recalls, olmesartan is an alternative that was not affected by recent ARB recalls.    Olmesartan 40 mg daily x 1 month is only $4.      Thank you for the consult,  Kimberly Paredes, PharmD, CDE

## 2019-10-02 ENCOUNTER — CLINICAL SUPPORT (OUTPATIENT)
Dept: INTERNAL MEDICINE CLINIC | Age: 64
End: 2019-10-02

## 2019-10-02 ENCOUNTER — HOSPITAL ENCOUNTER (OUTPATIENT)
Dept: LAB | Age: 64
Discharge: HOME | End: 2019-10-02
Payer: MEDICARE

## 2019-10-02 DIAGNOSIS — Z23 ENCOUNTER FOR IMMUNIZATION: ICD-10-CM

## 2019-10-02 PROCEDURE — 85025 COMPLETE CBC W/AUTO DIFF WBC: CPT

## 2019-10-02 PROCEDURE — 83036 HEMOGLOBIN GLYCOSYLATED A1C: CPT

## 2019-10-02 PROCEDURE — 36415 COLL VENOUS BLD VENIPUNCTURE: CPT

## 2019-10-02 PROCEDURE — 80061 LIPID PANEL: CPT

## 2019-10-02 PROCEDURE — 80053 COMPREHEN METABOLIC PANEL: CPT

## 2019-10-02 NOTE — PROGRESS NOTES
Pt here for flu clinic. After obtaining consent, injected 0.5ml in the right deltoid. No complaints from pt.

## 2019-10-03 ENCOUNTER — TELEPHONE (OUTPATIENT)
Dept: INTERNAL MEDICINE CLINIC | Age: 64
End: 2019-10-03

## 2019-10-03 LAB
ALBUMIN SERPL-MCNC: 4 G/DL (ref 3.6–4.8)
ALBUMIN/GLOB SERPL: 1.8 {RATIO} (ref 1.2–2.2)
ALP SERPL-CCNC: 89 IU/L (ref 39–117)
ALT SERPL-CCNC: 21 IU/L (ref 0–32)
AST SERPL-CCNC: 15 IU/L (ref 0–40)
BASOPHILS # BLD AUTO: 0 X10E3/UL (ref 0–0.2)
BASOPHILS NFR BLD AUTO: 1 %
BILIRUB SERPL-MCNC: 0.4 MG/DL (ref 0–1.2)
BUN SERPL-MCNC: 18 MG/DL (ref 8–27)
BUN/CREAT SERPL: 16 (ref 12–28)
CALCIUM SERPL-MCNC: 11.1 MG/DL (ref 8.7–10.3)
CHLORIDE SERPL-SCNC: 107 MMOL/L (ref 96–106)
CHOLEST SERPL-MCNC: 127 MG/DL (ref 100–199)
CO2 SERPL-SCNC: 27 MMOL/L (ref 20–29)
CREAT SERPL-MCNC: 1.11 MG/DL (ref 0.57–1)
EOSINOPHIL # BLD AUTO: 0.2 X10E3/UL (ref 0–0.4)
EOSINOPHIL NFR BLD AUTO: 4 %
ERYTHROCYTE [DISTWIDTH] IN BLOOD BY AUTOMATED COUNT: 12.1 % (ref 12.3–15.4)
EST. AVERAGE GLUCOSE BLD GHB EST-MCNC: 120 MG/DL
GLOBULIN SER CALC-MCNC: 2.2 G/DL (ref 1.5–4.5)
GLUCOSE SERPL-MCNC: 116 MG/DL (ref 65–99)
HBA1C MFR BLD: 5.8 % (ref 4.8–5.6)
HCT VFR BLD AUTO: 34.7 % (ref 34–46.6)
HDLC SERPL-MCNC: 46 MG/DL
HGB BLD-MCNC: 11.6 G/DL (ref 11.1–15.9)
IMM GRANULOCYTES # BLD AUTO: 0 X10E3/UL (ref 0–0.1)
IMM GRANULOCYTES NFR BLD AUTO: 0 %
LDLC SERPL CALC-MCNC: 65 MG/DL (ref 0–99)
LYMPHOCYTES # BLD AUTO: 1.2 X10E3/UL (ref 0.7–3.1)
LYMPHOCYTES NFR BLD AUTO: 27 %
MCH RBC QN AUTO: 30.4 PG (ref 26.6–33)
MCHC RBC AUTO-ENTMCNC: 33.4 G/DL (ref 31.5–35.7)
MCV RBC AUTO: 91 FL (ref 79–97)
MONOCYTES # BLD AUTO: 0.5 X10E3/UL (ref 0.1–0.9)
MONOCYTES NFR BLD AUTO: 12 %
NEUTROPHILS # BLD AUTO: 2.4 X10E3/UL (ref 1.4–7)
NEUTROPHILS NFR BLD AUTO: 56 %
PLATELET # BLD AUTO: 255 X10E3/UL (ref 150–450)
POTASSIUM SERPL-SCNC: 4.4 MMOL/L (ref 3.5–5.2)
PROT SERPL-MCNC: 6.2 G/DL (ref 6–8.5)
RBC # BLD AUTO: 3.81 X10E6/UL (ref 3.77–5.28)
SODIUM SERPL-SCNC: 144 MMOL/L (ref 134–144)
TRIGL SERPL-MCNC: 82 MG/DL (ref 0–149)
VLDLC SERPL CALC-MCNC: 16 MG/DL (ref 5–40)
WBC # BLD AUTO: 4.2 X10E3/UL (ref 3.4–10.8)

## 2019-10-21 ENCOUNTER — TELEPHONE (OUTPATIENT)
Dept: INTERNAL MEDICINE CLINIC | Age: 64
End: 2019-10-21

## 2019-10-21 RX ORDER — IRBESARTAN 300 MG/1
300 TABLET ORAL
Qty: 30 TAB | Refills: 11 | Status: SHIPPED | OUTPATIENT
Start: 2019-10-21 | End: 2019-10-24

## 2019-10-21 RX ORDER — TELMISARTAN 80 MG/1
80 TABLET ORAL DAILY
Qty: 30 TAB | Refills: 11 | Status: SHIPPED | OUTPATIENT
Start: 2019-10-21 | End: 2019-10-22 | Stop reason: SDUPTHER

## 2019-10-21 NOTE — TELEPHONE ENCOUNTER
Today [facility] pharmacy faxed our office stating that olmesartan is on backorder due to recall; pharmacy states they have plenty of losartan 100mg cap.     Please escribe

## 2019-10-22 ENCOUNTER — TELEPHONE (OUTPATIENT)
Dept: INTERNAL MEDICINE CLINIC | Age: 64
End: 2019-10-22

## 2019-10-22 RX ORDER — TELMISARTAN 80 MG/1
80 TABLET ORAL DAILY
Qty: 30 TAB | Refills: 11 | Status: SHIPPED | OUTPATIENT
Start: 2019-10-22 | End: 2019-10-24

## 2019-10-22 NOTE — TELEPHONE ENCOUNTER
Identified patient 2 identifiers verified. Patient wants -to know if the Macardis would be okay for her to take with Kidney disease. [facility] has not received the [facility] proscription yet.

## 2019-10-23 NOTE — TELEPHONE ENCOUNTER
I called pt, 2 pt identifies verified, pt notified that lab work will be checked at her next visit and that micardis was sent her her pharmacy.

## 2019-10-24 RX ORDER — LOSARTAN POTASSIUM 100 MG/1
100 TABLET ORAL DAILY
Qty: 30 TAB | Refills: 11 | Status: SHIPPED | OUTPATIENT
Start: 2019-10-24 | End: 2020-09-01 | Stop reason: SDUPTHER

## 2019-11-04 DIAGNOSIS — R35.0 URINARY FREQUENCY: Primary | ICD-10-CM

## 2019-12-09 ENCOUNTER — OFFICE VISIT (OUTPATIENT)
Dept: SLEEP MEDICINE | Age: 64
End: 2019-12-09

## 2019-12-09 ENCOUNTER — DOCUMENTATION ONLY (OUTPATIENT)
Dept: SLEEP MEDICINE | Age: 64
End: 2019-12-09

## 2019-12-09 VITALS
BODY MASS INDEX: 36.83 KG/M2 | SYSTOLIC BLOOD PRESSURE: 130 MMHG | OXYGEN SATURATION: 96 % | TEMPERATURE: 98.1 F | DIASTOLIC BLOOD PRESSURE: 68 MMHG | WEIGHT: 243 LBS | HEIGHT: 68 IN | HEART RATE: 78 BPM

## 2019-12-09 DIAGNOSIS — G47.33 OBSTRUCTIVE SLEEP APNEA (ADULT) (PEDIATRIC): Primary | ICD-10-CM

## 2019-12-09 DIAGNOSIS — I10 ESSENTIAL HYPERTENSION: ICD-10-CM

## 2019-12-09 RX ORDER — RISPERIDONE 2 MG/1
TABLET, FILM COATED ORAL
Qty: 90 TAB | Refills: 0 | Status: SHIPPED | OUTPATIENT
Start: 2019-12-09 | End: 2020-08-12 | Stop reason: SDUPTHER

## 2019-12-09 RX ORDER — LISINOPRIL 20 MG/1
TABLET ORAL
COMMUNITY
Start: 2019-12-08 | End: 2020-01-30

## 2019-12-09 NOTE — PATIENT INSTRUCTIONS
[address] [place], [address]  Tel.  [phone]  Fax. [address]  [place], [address]  Tel.  [phone]  Fax. [phone] [address] [facility] [address]  Tel.  [phone]  Fax. [phone]     PROPER SLEEP HYGIENE    What to avoid  · Do not have drinks with caffeine, such as coffee or black tea, for 8 hours before bed. · Do not smoke or use other types of tobacco near bedtime. Nicotine is a stimulant and can keep you awake. · Avoid drinking alcohol late in the evening, because it can cause you to wake in the middle of the night. · Do not eat a big meal close to bedtime. If you are hungry, eat a light snack. · Do not drink a lot of water close to bedtime, because the need to urinate may wake you up during the night. · Do not read or watch TV in bed. Use the bed only for sleeping and sexual activity. What to try  · Go to bed at the same time every night, and wake up at the same time every morning. Do not take naps during the day. · Keep your bedroom quiet, dark, and cool. · Get regular exercise, but not within 3 to 4 hours of your bedtime. .  · Sleep on a comfortable pillow and mattress. · If watching the clock makes you anxious, turn it facing away from you so you cannot see the time. · If you worry when you lie down, start a worry book. Well before bedtime, write down your worries, and then set the book and your concerns aside. · Try meditation or other relaxation techniques before you go to bed. · If you cannot fall asleep, get up and go to another room until you feel sleepy. Do something relaxing. Repeat your bedtime routine before you go to bed again. · Make your house quiet and calm about an hour before bedtime. Turn down the lights, turn off the TV, log off the computer, and turn down the volume on music. This can help you relax after a busy day.     Drowsy Driving  The [address] [place] [address] Road Traffic Safety Administration cites drowsiness as a causing factor in more than 583,607 police reported crashes annually, resulting in 76,000 injuries and 1,500 deaths. Other surveys suggest 55% of people polled have driven while drowsy in the past year, 23% had fallen asleep but not crashed, 3% crashed, and 2% had and accident due to drowsy driving. Who is at risk? Young Drivers: One study of drowsy driving accidents states that 55% of the drivers were under 25 years. Of those, 75% were male. Shift Workers and Travelers: People who work overnight or travel across time zones frequently are at higher risk of experiencing Circadian Rhythm Disorders. They are trying to work and function when their body is programed to sleep. Sleep Deprived: Lack of sleep has a serious impact on your ability to pay attention or focus on a task. Consistently getting less than the average of 8 hours your body needs creates partial or cumulative sleep deprivation. Untreated Sleep Disorders: Sleep Apnea, Narcolepsy, R.L.S., and other sleep disorders (untreated) prevent a person from getting enough restful sleep. This leads to excessive daytime sleepiness and increases the risk for drowsy driving accidents by up to 7 times. Medications / Alcohol: Even over the counter medications can cause drowsiness. Medications that impair a drivers attention should have a warning label. Alcohol naturally makes you sleepy and on its own can cause accidents. Combined with excessive drowsiness its effects are amplified. Signs of Drowsy Driving:   * You don't remember driving the last few miles   * You may drift out of your lady   * You are unable to focus and your thoughts wander   * You may yawn more often than normal   * You have difficulty keeping your eyes open / nodding off   * Missing traffic signs, speeding, or tailgating  Prevention-   Good sleep hygiene, lifestyle and behavioral choices have the most impact on drowsy driving.  There is no substitute for sleep and the average person requires 8 hours nightly. If you find yourself driving drowsy, stop and sleep. Consider the sleep hygiene tips provided during your visit as well. Medication Refill Policy: Refills for all medications require 1 week advance notice. Please have your pharmacy fax a refill request. We are unable to fax, or call in \"controled substance\" medications and you will need to pick these prescriptions up from our office. [employer] Activation    Thank you for requesting access to Invaluable. Please follow the instructions below to securely access and download your online medical record. Invaluable allows you to send messages to your doctor, view your test results, renew your prescriptions, schedule appointments, and more. How Do I Sign Up? 1. In your internet browser, go to [email or web address]. 2. Click on the First Time User? Click Here link in the Sign In box. You will see the New Member Sign Up page. 3. Enter your Invaluable Access Code exactly as it appears below. You will not need to use this code after youve completed the sign-up process. If you do not sign up before the expiration date, you must request a new code. Invaluable Access Code: Activation code not generated  Current Invaluable Status: Active (This is the date your Invaluable access code will )    4. Enter the last four digits of your Social Security Number (xxxx) and Date of Birth (mm/dd/yyyy) as indicated and click Submit. You will be taken to the next sign-up page. 5. Create a Invaluable ID. This will be your Invaluable login ID and cannot be changed, so think of one that is secure and easy to remember. 6. Create a Invaluable password. You can change your password at any time. 7. Enter your Password Reset Question and Answer. This can be used at a later time if you forget your password. 8. Enter your e-mail address. You will receive e-mail notification when new information is available in [address]   9. Click Sign Up. You can now view and download portions of your medical record. 10. Click the Download Summary menu link to download a portable copy of your medical information. Additional Information    If you have questions, please call [phone]. Remember, [employer]ious is NOT to be used for urgent needs. For medical emergencies, dial 911.

## 2019-12-09 NOTE — PROGRESS NOTES
[address] [facility] [facility]. [place], [address]  Tel.  [phone]  Fax. [address] [facility] [address]  [place], [address] [facility]  Tel.  [phone]  Fax. [phone] [address]   Tel.  [phone]  Fax. [phone]     S>Kari Olivas is a 75 y.o. female seen for a positive airway pressure follow-up. She reports no problems using the device. The following problems are identified:    Drowsiness no Problems exhaling no   Snoring no Forget to put on no   Mask Comfortable yes Can't fall asleep no   Dry Mouth no Mask falls off no   Air Leaking no Frequent awakenings no     Download reviewed. She admits that her sleep has improved. Therapy Apnea Index averaged over PAP use: 1 /hr which reflects improved sleep breathing condition. Allergies   Allergen Reactions    Erythromycin Rash    Erythromycin With Ethanol Rash       She has a current medication list which includes the following prescription(s): losartan, risperidone, atenolol, atorvastatin, bupropion xl, cholecalciferol, multivitamin, stress formula, ascorbic acid (vitamin c), vitamin e, aspirin delayed-release, and lisinopril. .      She  has a past medical history of Cancer [facility]), Depression (1/9/2014), GERD (gastroesophageal reflux disease), Hot flashes, Hyperglycemia (4/8/2014), Hyperlipidemia (4/8/2014), Hypertension, Left bundle branch block, Neuropathy, peripheral (4/9/2014), and INDER on CPAP (4/8/2014). [place] Sleepiness Score: 6   and Modified F.O.S.Q. Score Total / 2: 19   which reflect improved sleep quality over therapy time.     O>    Visit Vitals  /68 (BP 1 Location: Left arm, BP Patient Position: Sitting)   Pulse 78   Temp 98.1 °F (36.7 °C)   Ht 5' 8\" (1.727 m)   Wt 243 lb (110.2 kg)   SpO2 96%   BMI 36.95 kg/m²           General:   Alert, oriented, not in distress   Neck:   No JVD    Chest/Lungs:  symetrical lung expansion , no accessory muscle use    Extremities:  no obvious rashes , negative edema    Neuro:  No focal deficits ; No obvious tremor    Psych:  Normal affect ,  Normal countenance ;         A>    ICD-10-CM ICD-9-CM    1. Obstructive sleep apnea (adult) (pediatric) G47.33 327.23 AMB SUPPLY ORDER   2. Essential hypertension I10 401.9      AHI = 69(2013). On CPAP :  9-11 cmH2O. Compliant:      yes    Therapeutic Response:  Positive    P>      *   she is compliant with PAP therapy and PAP continues to benefit patient and remains necessary for control of her sleep apnea. she will continue on her current pressure settings. I have ordered replacement supplies  I have counseled the patient regarding the benefits of weight loss. * She was asked to contact our office for any problems regarding PAP therapy. * Counseling was provided regarding the importance of regular PAP use and on proper sleep hygiene and safe driving. * Re-enforced proper and regular cleaning for the device. 2. Hypertension - she continues on her current regimen. I have reviewed the relationship between hypertension as it relates to sleep-disordered breathing.      Electronically signed by    Berenice Salgado MD  Diplomate in Sleep Medicine  [facility]

## 2020-01-14 ENCOUNTER — PATIENT MESSAGE (OUTPATIENT)
Dept: INTERNAL MEDICINE CLINIC | Age: 65
End: 2020-01-14

## 2020-01-14 DIAGNOSIS — K21.9 GASTROESOPHAGEAL REFLUX DISEASE WITHOUT ESOPHAGITIS: Primary | ICD-10-CM

## 2020-01-14 RX ORDER — OMEPRAZOLE 20 MG/1
20 CAPSULE, DELAYED RELEASE ORAL DAILY
Qty: 90 CAP | Refills: 1 | Status: SHIPPED | OUTPATIENT
Start: 2020-01-14 | End: 2020-06-24

## 2020-01-14 NOTE — TELEPHONE ENCOUNTER
From: Alli Olivas  To: Natan Robles MD  Sent: 1/14/2020 12:27 PM EST  Subject: Prescription Question    Dr. Nehal Zurita,  For the past 3 months or so, I have been getting acid reflux at night and sometimes during the day. I am now sleeping on 2 pillows, which helps. But, I previously took omeprazole 20 mg for a long time, and would like to start taking that again. Could you please send a prescription to [address] for omeprazole? FYI, I am having surgery on 2/5/2020 for post menstral bleeding since 11/2019. I believe the surgery is for a D & C and biopsy. I would like to link my records at [facility] with Amber Lindo, but do not see that they participate. How can I send my records from their office to you? Thank you,  Marbin Rodriguez.

## 2020-01-14 NOTE — TELEPHONE ENCOUNTER
PCP: Bud Zaman MD    Last appt: 10/2/2019  Future Appointments   Date Time Provider Mady Tejada   3/26/2020 10:30 AM Bud Zaman MD [address] [address]   12/15/2020 10:40 AM Raul Campbell  [address]       Requested Prescriptions     Pending Prescriptions Disp Refills    omeprazole (PRILOSEC) 20 mg capsule 90 Cap 1     Sig: Take 1 Cap by mouth daily.

## 2020-01-14 NOTE — TELEPHONE ENCOUNTER
From: Erick Olivas  To: Aurelio Kurtz MD  Sent: 1/14/2020 12:30 PM EST  Subject: Prescription Question    On the request for the prescription for omeprazole, please send quantity of 90 days with refills. Thank you.

## 2020-01-30 NOTE — PERIOP NOTES
[facility]  Ambulatory Surgery Unit  Pre-operative Instructions    Surgery/Procedure Date  2/05/2020            Tentative Arrival Time 0745    1. On the day of your surgery/procedure, please report to the Ambulatory Surgery Unit Registration Desk and sign in at your designated time. The Ambulatory Surgery Unit is located in [facility] on the [facility] side of the [facility] across from the [address] [place] [address]. Please have all of your health insurance cards and a photo ID. 2. You must have someone with you to drive you home, as you should not drive a car for 24 hours following anesthesia. Please make arrangements for a responsible adult friend or family member to stay with you for at least the first 24 hours after your surgery. 3. Do not have anything to eat or drink (including water, gum, mints, coffee, juice) 12mn. This may not apply to medications prescribed by your physician. (Please note below the special instructions with medications to take the morning of surgery, if applicable.)    4. We recommend you do not drink any alcoholic beverages for 24 hours before and after your surgery. 5. Contact your surgeons office for instructions on the following medications: non-steroidal anti-inflammatory drugs (i.e. Advil, Aleve), vitamins, and supplements. (Some surgeons will want you to stop these medications prior to surgery and others may allow you to take them)   **If you are currently taking Plavix, Coumadin, Aspirin and/or other blood-thinning agents, contact your surgeon for instructions. ** Your surgeon will partner with the physician prescribing these medications to determine if it is safe to stop or if you need to continue taking. Please do not stop taking these medications without instructions from your surgeon.     6. In an effort to help prevent surgical site infection, we ask that you shower with an anti-bacterial soap (i.e. Dial/Safeguard, or the soap provided to you at your preadmission testing appointment) for 3 days prior to and on the morning of surgery, using a fresh towel after each shower. (Please begin this process with fresh bed linens.) Do not apply any lotions, powders, or deodorants after the shower on the day of your procedure. If applicable, please do not shave the operative site for 48 hours prior to surgery. 7. Wear comfortable clothes. Wear glasses instead of contacts. Do not bring any jewelry or money (other than copays or fees as instructed). Do not wear make-up, particularly mascara, the morning of your surgery. Do not wear nail polish, particularly if you are having foot /hand surgery. Wear your hair loose or down, no ponytails, buns, richie pins or clips. All body piercings must be removed. 8. You should understand that if you do not follow these instructions your surgery may be cancelled. If your physical condition changes (i.e. fever, cold or flu) please contact your surgeon as soon as possible. 9. It is important that you be on time. If a situation occurs where you may be late, or if you have any questions or problems, please call [phone].    10. Your surgery time may be subject to change. You will receive a phone call the day prior to surgery to confirm your arrival time. Special Instructions: Take all medications and inhalers, as prescribed, on the morning of surgery with a sip of water EXCEPT:none      I understand a pre-operative phone call will be made to verify my surgery time. In the event that I am not available, I give permission for a message to be left on my answering service and/or with another person?       Yes    Reviewed instructions with pt./ verbalizes understanding         ___________________      ___________________      ________________  (Signature of Patient)          (Witness)                   (Date and Time)

## 2020-02-04 ENCOUNTER — ANESTHESIA EVENT (OUTPATIENT)
Dept: SURGERY | Age: 65
End: 2020-02-04
Payer: MEDICARE

## 2020-02-05 ENCOUNTER — HOSPITAL ENCOUNTER (OUTPATIENT)
Age: 65
Setting detail: OUTPATIENT SURGERY
Discharge: HOME | End: 2020-02-05
Attending: OBSTETRICS & GYNECOLOGY | Admitting: OBSTETRICS & GYNECOLOGY
Payer: MEDICARE

## 2020-02-05 ENCOUNTER — ANESTHESIA (OUTPATIENT)
Dept: SURGERY | Age: 65
End: 2020-02-05
Payer: MEDICARE

## 2020-02-05 VITALS
RESPIRATION RATE: 17 BRPM | BODY MASS INDEX: 36.53 KG/M2 | SYSTOLIC BLOOD PRESSURE: 161 MMHG | WEIGHT: 241 LBS | DIASTOLIC BLOOD PRESSURE: 70 MMHG | HEART RATE: 58 BPM | TEMPERATURE: 96.8 F | OXYGEN SATURATION: 98 % | HEIGHT: 68 IN

## 2020-02-05 PROCEDURE — 88305 TISSUE EXAM BY PATHOLOGIST: CPT

## 2020-02-05 PROCEDURE — 77030037417 HC DEV TISS RMVL HOLO -H: Performed by: OBSTETRICS & GYNECOLOGY

## 2020-02-05 PROCEDURE — 77030021352 HC CBL LD SYS DISP COVD -B: Performed by: OBSTETRICS & GYNECOLOGY

## 2020-02-05 PROCEDURE — 74011250636 HC RX REV CODE- 250/636: Performed by: REGISTERED NURSE

## 2020-02-05 PROCEDURE — 76030000000 HC AMB SURG OR TIME 0.5 TO 1: Performed by: OBSTETRICS & GYNECOLOGY

## 2020-02-05 PROCEDURE — 76210000035 HC AMBSU PH I REC 1 TO 1.5 HR: Performed by: OBSTETRICS & GYNECOLOGY

## 2020-02-05 PROCEDURE — 74011000250 HC RX REV CODE- 250: Performed by: REGISTERED NURSE

## 2020-02-05 PROCEDURE — 77030003666 HC NDL SPINAL BD -A: Performed by: OBSTETRICS & GYNECOLOGY

## 2020-02-05 PROCEDURE — 77030018836 HC SOL IRR NACL ICUM -A: Performed by: OBSTETRICS & GYNECOLOGY

## 2020-02-05 PROCEDURE — 74011250637 HC RX REV CODE- 250/637: Performed by: OBSTETRICS & GYNECOLOGY

## 2020-02-05 PROCEDURE — 74011250636 HC RX REV CODE- 250/636: Performed by: ANESTHESIOLOGY

## 2020-02-05 PROCEDURE — 76210000046 HC AMBSU PH II REC FIRST 0.5 HR: Performed by: OBSTETRICS & GYNECOLOGY

## 2020-02-05 PROCEDURE — 77030033137 HC TBNG OUTFLO AQUILEX ST HOLO -B: Performed by: OBSTETRICS & GYNECOLOGY

## 2020-02-05 PROCEDURE — 76060000061 HC AMB SURG ANES 0.5 TO 1 HR: Performed by: OBSTETRICS & GYNECOLOGY

## 2020-02-05 PROCEDURE — 77030033136 HC TBNG INFLO AQUILEX ST HOLO -C: Performed by: OBSTETRICS & GYNECOLOGY

## 2020-02-05 RX ORDER — DIPHENHYDRAMINE HYDROCHLORIDE 50 MG/ML
12.5 INJECTION, SOLUTION INTRAMUSCULAR; INTRAVENOUS AS NEEDED
Status: DISCONTINUED | OUTPATIENT
Start: 2020-02-05 | End: 2020-02-05 | Stop reason: HOSPADM

## 2020-02-05 RX ORDER — KETOROLAC TROMETHAMINE 30 MG/ML
INJECTION, SOLUTION INTRAMUSCULAR; INTRAVENOUS AS NEEDED
Status: DISCONTINUED | OUTPATIENT
Start: 2020-02-05 | End: 2020-02-05 | Stop reason: HOSPADM

## 2020-02-05 RX ORDER — SODIUM CHLORIDE 0.9 % (FLUSH) 0.9 %
5-40 SYRINGE (ML) INJECTION EVERY 8 HOURS
Status: DISCONTINUED | OUTPATIENT
Start: 2020-02-05 | End: 2020-02-05 | Stop reason: HOSPADM

## 2020-02-05 RX ORDER — LIDOCAINE HYDROCHLORIDE 20 MG/ML
INJECTION, SOLUTION EPIDURAL; INFILTRATION; INTRACAUDAL; PERINEURAL AS NEEDED
Status: DISCONTINUED | OUTPATIENT
Start: 2020-02-05 | End: 2020-02-05 | Stop reason: HOSPADM

## 2020-02-05 RX ORDER — LIDOCAINE HYDROCHLORIDE 10 MG/ML
INJECTION, SOLUTION EPIDURAL; INFILTRATION; INTRACAUDAL; PERINEURAL AS NEEDED
Status: DISCONTINUED | OUTPATIENT
Start: 2020-02-05 | End: 2020-02-05 | Stop reason: HOSPADM

## 2020-02-05 RX ORDER — PROPOFOL 10 MG/ML
INJECTION, EMULSION INTRAVENOUS
Status: DISCONTINUED | OUTPATIENT
Start: 2020-02-05 | End: 2020-02-05 | Stop reason: HOSPADM

## 2020-02-05 RX ORDER — LIDOCAINE HYDROCHLORIDE 10 MG/ML
0.1 INJECTION, SOLUTION EPIDURAL; INFILTRATION; INTRACAUDAL; PERINEURAL AS NEEDED
Status: DISCONTINUED | OUTPATIENT
Start: 2020-02-05 | End: 2020-02-05 | Stop reason: HOSPADM

## 2020-02-05 RX ORDER — MIDAZOLAM HYDROCHLORIDE 1 MG/ML
INJECTION, SOLUTION INTRAMUSCULAR; INTRAVENOUS AS NEEDED
Status: DISCONTINUED | OUTPATIENT
Start: 2020-02-05 | End: 2020-02-05 | Stop reason: HOSPADM

## 2020-02-05 RX ORDER — IBUPROFEN 600 MG/1
600 TABLET ORAL
Qty: 30 TAB | Refills: 0 | Status: SHIPPED | OUTPATIENT
Start: 2020-02-05 | End: 2020-02-14

## 2020-02-05 RX ORDER — MORPHINE SULFATE 10 MG/ML
2 INJECTION, SOLUTION INTRAMUSCULAR; INTRAVENOUS
Status: DISCONTINUED | OUTPATIENT
Start: 2020-02-05 | End: 2020-02-05 | Stop reason: HOSPADM

## 2020-02-05 RX ORDER — SODIUM CHLORIDE 0.9 % (FLUSH) 0.9 %
5-40 SYRINGE (ML) INJECTION AS NEEDED
Status: DISCONTINUED | OUTPATIENT
Start: 2020-02-05 | End: 2020-02-05 | Stop reason: HOSPADM

## 2020-02-05 RX ORDER — FENTANYL CITRATE 50 UG/ML
25 INJECTION, SOLUTION INTRAMUSCULAR; INTRAVENOUS
Status: DISCONTINUED | OUTPATIENT
Start: 2020-02-05 | End: 2020-02-05 | Stop reason: HOSPADM

## 2020-02-05 RX ORDER — OXYCODONE AND ACETAMINOPHEN 5; 325 MG/1; MG/1
1 TABLET ORAL
Status: DISCONTINUED | OUTPATIENT
Start: 2020-02-05 | End: 2020-02-05 | Stop reason: HOSPADM

## 2020-02-05 RX ORDER — SODIUM CHLORIDE, SODIUM LACTATE, POTASSIUM CHLORIDE, CALCIUM CHLORIDE 600; 310; 30; 20 MG/100ML; MG/100ML; MG/100ML; MG/100ML
25 INJECTION, SOLUTION INTRAVENOUS CONTINUOUS
Status: DISCONTINUED | OUTPATIENT
Start: 2020-02-05 | End: 2020-02-05 | Stop reason: HOSPADM

## 2020-02-05 RX ORDER — ACETAMINOPHEN 10 MG/ML
1000 INJECTION, SOLUTION INTRAVENOUS ONCE
Status: COMPLETED | OUTPATIENT
Start: 2020-02-05 | End: 2020-02-05

## 2020-02-05 RX ORDER — HYDROMORPHONE HYDROCHLORIDE 1 MG/ML
.2-.5 INJECTION, SOLUTION INTRAMUSCULAR; INTRAVENOUS; SUBCUTANEOUS ONCE
Status: DISCONTINUED | OUTPATIENT
Start: 2020-02-05 | End: 2020-02-05 | Stop reason: HOSPADM

## 2020-02-05 RX ADMIN — Medication 3 AMPULE: at 08:42

## 2020-02-05 RX ADMIN — KETOROLAC TROMETHAMINE 30 MG: 30 INJECTION, SOLUTION INTRAMUSCULAR; INTRAVENOUS at 09:45

## 2020-02-05 RX ADMIN — SODIUM CHLORIDE, SODIUM LACTATE, POTASSIUM CHLORIDE, AND CALCIUM CHLORIDE 25 ML/HR: 600; 310; 30; 20 INJECTION, SOLUTION INTRAVENOUS at 08:37

## 2020-02-05 RX ADMIN — LIDOCAINE HYDROCHLORIDE 60 MG: 20 INJECTION, SOLUTION EPIDURAL; INFILTRATION; INTRACAUDAL; PERINEURAL at 09:33

## 2020-02-05 RX ADMIN — PROPOFOL 120 MCG/KG/MIN: 10 INJECTION, EMULSION INTRAVENOUS at 09:33

## 2020-02-05 RX ADMIN — MIDAZOLAM HYDROCHLORIDE 2 MG: 1 INJECTION, SOLUTION INTRAMUSCULAR; INTRAVENOUS at 09:27

## 2020-02-05 RX ADMIN — ACETAMINOPHEN 1000 MG: 10 INJECTION, SOLUTION INTRAVENOUS at 08:37

## 2020-02-05 NOTE — INTERVAL H&P NOTE
H&P Update: Kari Olivas was seen and examined. History and physical has been reviewed. The patient has been examined.  There have been no significant clinical changes since the completion of the originally dated History and Physical.

## 2020-02-05 NOTE — ANESTHESIA PREPROCEDURE EVALUATION
Relevant Problems   No relevant active problems       Anesthetic History   No history of anesthetic complications            Review of Systems / Medical History  Patient summary reviewed, nursing notes reviewed and pertinent labs reviewed    Pulmonary        Sleep apnea: CPAP           Neuro/Psych             Comments: Peripheral neuropathy Cardiovascular    Hypertension              Exercise tolerance: >4 METS  Comments: LBBB (not new)    Stress Test negative 2014   GI/Hepatic/Renal     GERD           Endo/Other        Obesity     Other Findings   Comments: prediabetes           Physical Exam    Airway  Mallampati: III  TM Distance: < 4 cm  Neck ROM: normal range of motion   Mouth opening: Normal     Cardiovascular    Rhythm: regular  Rate: normal         Dental  No notable dental hx       Pulmonary  Breath sounds clear to auscultation               Abdominal  GI exam deferred       Other Findings            Anesthetic Plan    ASA: 3  Anesthesia type: MAC and general - backup          Induction: Intravenous  Anesthetic plan and risks discussed with: Patient      Took BB at 7 am

## 2020-02-05 NOTE — H&P
Pre-operative Evaluation / History & Physical    Sent From: Sent To: [address]    [facility]   Phone: [phone] Fax: [phone]      Patient Information  Patient Name Ted Taylor Sex ALIREZA    1955 Age 66yo   Address [address] [place] [address] Phone H: [phone]  M: [phone]   Primary Insurance [employer] ([address])  ID: [id number]  Policy Reyes: HÉCTOR [facility] (Medicare Supplement)  ID: [id number]  Policy Reyes: HÉCTOR [address] [facility] Visit and Medical History  Chief Complaint None recorded   History of Present Illness Rm 1 77year old patient presents for a pre op visit. Hysteroscopy, Directed Endometrial Biopsies with Myosure 2020  Patient reports her bleeding is a little heavier than it was before. Patient presents for follow up of  bleeding. Started seeing some very light bleeding back in November. it was mostly pink or bright red. it was not heavy. she would see in on the tissue when she wiped and sometimes on her liner. refused office EMB. ultrasound:  Suboptimal. Long 5.4 cm xap 4.6 cm x tr 5.8 cm. Vol 74.9 cm³. Position: anteverted   Myometrium: homogeneous   Endometrium: could not be seen clearly. Endometrial thickness, total 20.4 mm. Cervix details: normal. Cervical length 40.3 mm. Right Ovary Not visualized, the ovary was not visualized transvaginally or transabdominally. Left Ovary Not visualized, the ovary was not visualized transvaginally or transabdominally. Cul de Sac No free fluid visualized. Impression Thickened endometrium, otherwise normal uterus. Non visualization of the ovaries with otherwise normal adnexa. No free fluid. Past Medical History Discussed Past Medical History  Hypercholesterolemia (high cholesterol): Y  Hypertension (high blood pressure):  Y  Renal / Kidney Disease: Y - possible kidney disease  Sleep Apnea: (no answer) - CPAP   Surgical History Reviewed Surgical History      delivery   Tonsillectomy   Gynecological / Obstetrical History Reviewed GYN History  Date of LMP: (Notes: postmenopausal). Menses Monthly: N. Date of Last Pap Smear: 01/10/2018 (Notes: NIL). Date of HPV testin2016 (Notes: negative). Abnormal Pap: N. Sexually Active?: N.  STIs/STDs: N.  Current Birth Control Method: None. Date of Last Mammogram: 2019 (Notes: BIRADS 2). Most Recent Bone Density: 2018 (Notes: normal, next due in 5 years). Date of Last Colonoscopy: 2010 (Notes: normal, q10).    Social History Discussed Social History  OB/GYN Social History  Tobacco Smoking Status: Never smoker  Most Recent Tobacco Use Screenin2019  Are you working: Retired  On average, how many days per week do you engage in moderate to strenuous EXERCISE (like walking fast, running, jogging, dancing, swimming, biking, or other activities that cause a light or heavy sweat)?: 0  How often do you have a DRINK containing ALCOHOL?: Never   Family History Discussed Family History    Mother - Malignant tumor of breast (onset age: 62)                    Paternal [de-identified] - Malignant tumor of kidney   Maternal Aunt - Malignant tumor of colon     - Heart disease   Paternal Uncle - Leukemia   Maternal Uncle                  - Heart disease  - x2      Allergies List Reviewed Allergies     ERYTHROMYCIN WITH ETHANOL: Rash (Moderate)       Medications Reviewed Medications     Adult Low Dose Aspirin 81 mg tablet,delayed release  Prescribed by [facility] MD  Internal Note: Entered By: Orlin Alberto - Team 2 MECSigned By: Mukul Carballo MDUncoded: NBMN: N, start 2006   filled    atenoloL 50 mg tablet  Prescribed by [facility] MD  Internal Note: Entered By: Francisca Lee MASigned By: Za Espinosa MDUncoded: NBMN: N, start 2009   filled    atorvastatin 20 mg tablet  Take 1 tablet(s) every day by oral route. 11/14/19   entered    buPROPion HCL  mg 24 hr tablet, extended release  Prescribed by [facility]  Internal Note: Entered By: Joyce Moore MA- Team 3 MECSigned By: Dimas Perez MDUncoded: NBMN: N, start 12/07/2016 12/07/16   filled    losartan 100 mg tablet  Take 1 tablet(s) every day by oral route. 11/14/19   entered    miSOPROStoL 200 mcg tablet  place one tablet vaginally the night prior to procedure 01/23/20   prescribed                     Multivitamins tablet  Prescribed by [facility]  Internal Note: Entered By: Susan Valadez - Team 2 MECSigned By: Dimas Perez MDUncoded: NBMN: N, start 03/02/2006 03/02/06   filled    omeprazole 20 mg capsule,delayed release  Take 1 capsule(s) every day by oral route. 01/23/20   entered    risperiDONE 2 mg tablet  Prescribed by [facility]  Internal Note: Entered By: Joyce Moore MA- Team 3 MECSigned By: Dimas Perez MDUncoded: NBMN: N, start 12/07/2016 12/07/16   filled    Vitamin C 100 mg tablet  Internal Note: OTCEntered By: Joyce Moore MA- Team 3 MECSigned By: Dimas Perez MDUncoded: NBMN: N, start 12/07/2016 12/07/16   filled    Vitamin D3 11/14/19   entered    vitamin E 400 unit capsule  Prescribed by [facility]  Internal Note: Entered By: Susan Valadez - Team 2 MECSigned By: Dimas Perez MDUncoded: NBMN: N, start 08/25/2014 08/25/14   filled       Review of Systems Additionally reports: Except as noted in the HPI, the review of systems is negative for General, Breast, , Resp, GI, CV, Endo, MS, Psych and Heme. Vital Signs Ht:                  5 ft 8 in (172.72 cm) 01/23/2020 10:00 am Wt:                  242.3 lbs (109.91 kg) 01/23/2020 10:00 am BMI:                  36.8 01/23/2020 10:00 am   BP:                  152/76 L arm 01/23/2020 10:06 am          Physical Exam Patient is a 69-year-old female.     Constitutional: General Appearance: well developed and nourished and pleasant. Level of Distress: no acute distress. Ambulation: ambulating normally. Head: Head: normocephalic. Eyes: Extraocular Movements extraocular movements intact. Abdomen: Inspection and Palpation: no tenderness, guarding, masses, rebound tenderness, or CVA tenderness and soft and non-distended. Liver: non-tender; no masses. Spleen: no masses. Hernia: none palpable. Female : External genitalia: no lesions, rash, or erythema. Vagina: no mass or tenderness; atrophic mucosa and abnormal discharge (pink, watery discharge noted); and non-erythematous mucosa. Cervix: no discharge or cervical lesion. Uterus: midline, non-tender, no mass, and not enlarged. Adnexae: no adnexal mass or tenderness. Bladder and Urethra: no urethral discharge or mass. Lymphatics: Inguinal no inguinal lymphadenopathy. Mental Status Exam: Orientation oriented to person, place, and time. exam by dr Sherryle Burden and Plan 1. Endometrium thickened -  Reviewed imaging findings. Recommend hysteroscopy, directed endometrial sampling with myosure  Reviewed risks of bleeding, infection, uterine perforation with damage to bowel or bladder, inability to complete procedure, need for additional procedures. Reviewed postop pain and bleeding expectations. All questions answered and consent signed.   Follow up for postop appt 2 weeks postop.  plan preop cytotec  declines percocet  R93.89: Abnormal findings on diagnostic imaging of other specified body structures   misoprostol 200 mcg tablet -  place one tablet vaginally the night prior to procedure     Qty: 1 tablet(s)     Refills: 0     Pharmacy: [facility]/PHARMACY #[id number]     Return to Office  Alban Spencer MD for Surgery at [facility] on 02/05/2020 at 09:05 AM  Bee Morales MD for Established Patient at [facility] on 02/11/2020 at 10:30 AM   606/706 [facility] Mammography [address] [place] [address] for Mammogram, Routine at [facility] Office on 02/11/2020 at 10:00 AM   Марина Corral MD for Established Patient at [facility] Office on 02/20/2020 at 10:00 AM   Current Problems (Diagnoses) Reviewed Problems     Microscopic hematuria - Onset: 11/18/2019   Postmenopausal bleeding - Onset: 11/20/2019 - 2019 refused EMB   Right lower quadrant pain - Onset: 01/29/2019    Hysteroscopy, Directed Endometrial Biopsies with Myosure [id number] 9:05am FRANCES Pineda per anesthesia         Electronically Signed by: Leydi Juarez MD    _____________________________________________   Ordered/Documented by:   Visit Date: 01/23/2020

## 2020-02-05 NOTE — PERIOP NOTES
Catalina Morris Jasperruth  1955  [id number]    Situation:  Verbal report given from: KATELYN King CRNA, RN  Procedure: Procedure(s):   HYSTEROSCOPY/DIRECTED ENDOMETRIAL BIOPSIES WITH MYOSURE (CHOICE)    Background:    Preoperative diagnosis: THICK ENDOMETRIUM/POST MENOPAUSAL BLEEDING    Postoperative diagnosis: THICK ENDOMETRIUM/POST MENOPAUSAL BLEEDING    :  Dr. Elena Bailey    Assistant(s): Circ-1: Gracy Jane RN  Scrub Tech-1: Krystal Juarez    Specimens:   ID Type Source Tests Collected by Time Destination   1 : Endometrial polyp and currettings Preservative Endometrial  Toby Lynn MD 2/5/2020 1013 Pathology       Assessment:  Intra-procedure medications         Anesthesia gave intra-procedure sedation and medications, see anesthesia flow sheet     Intravenous fluids: LR@ KVO     Vital signs stable       Recommendation:

## 2020-02-05 NOTE — OP NOTES
HYSTEROSCOPY WITH MYOSURE FULL OP NOTE           DATE OF PROCEDURE:  2/5/20     PREOPERATIVE DIAGNOSIS:  Postmenopausal bleeding, thickened endometrium     POSTOPERATIVE: Same, plus endometrial polyp     PROCEDURE: Hysteroscopic polypectomy with directed endometrial sampling with myosure     SURGEON:  Nini Soto MD     ASSISTANT: none     ANESTHESIA: MAC and paracervical block     EBL:  minimal     FINDINGS: large polyp filling the entire uterine cavity and into the cervix. Resected to its base. Fluid deficit 450cc     Specimen: Endometrial polyp and curettings. PROCEDURE: Patient was placed on the operating table in the supine position. Time out was done to confirm the operating procedure, surgeon, patient and site. Once confirmed by the team, procedure was started. Patient was placed under MAC. She was prepped and draped in the usual fashion for vaginal surgery. Cervix was visualized with the aid of a Graves vaginal speculum and grasped with a single-tooth tenaculum. Approximately 10 cc of 1% lidocaine was then injected to create a paracervical block in the usual fashion. The cervix was then dilated to 6mm. The MyoSure hysteroscope was then inserted into the uterus under direct visualization. Survey of the uterus revealed a large polyp filling the entire uterine cavity and into the cervix. This tissue was resected with the MyoSure Lite device. Global sampling of the endometrium was then done with the MyoSure. There was noted to be good hemostasis and no evidence of uterine perforation. The hysteroscope was then removed from the uterus. All instruments were then removed from the uterus. The tenaculum was removed and tenaculum sites were made hemostatic with pressure. All instruments were removed. She tolerated the procedure well and was transferred to the PACU in stable condition. Instrument counts were correct x 2.

## 2020-02-05 NOTE — ANESTHESIA POSTPROCEDURE EVALUATION
Procedure(s): HYSTEROSCOPY/DIRECTED ENDOMETRIAL BIOPSIES WITH MYOSURE (CHOICE).     total IV anesthesia, general    Anesthesia Post Evaluation      Multimodal analgesia: multimodal analgesia used between 6 hours prior to anesthesia start to PACU discharge  Patient location during evaluation: PACU  Patient participation: complete - patient participated  Level of consciousness: sleepy but conscious and responsive to verbal stimuli  Pain score: 3  Airway patency: patent  Anesthetic complications: no  Cardiovascular status: acceptable  Respiratory status: acceptable  Hydration status: acceptable  Post anesthesia nausea and vomiting:  none      Vitals Value Taken Time   /68 2/5/2020 10:53 AM   Temp 35.9 °C (96.6 °F) 2/5/2020 10:33 AM   Pulse 59 2/5/2020 10:53 AM   Resp 17 2/5/2020 10:53 AM   SpO2 100 % 2/5/2020 10:53 AM

## 2020-02-05 NOTE — PERIOP NOTES
Permission received to review discharge instructions and discuss private health information with , Stan Wilson. Patient states that  , Stan Parkslulu will be with pt for at least 24 hours following today's procedure. Ariela Hammond applied at this time pt declined having it hooked to air.

## 2020-02-05 NOTE — PERIOP NOTES
Pt presents to recovery room with low temperature. Maryneal blanket applied. Pt reports mild cramping, but tolerable for her    1102- brought back to bedside, updated on pt's status. Micheal blanket remains in effect, pt does not feel cold, will continue to monitor. 1122-D/c instructions reviewed, all questions answered. Reviewed when to call the doctor, when to take pain meds and what meds to take, diet and activity. 1155- Pt voided x 1 in bathroom. Pt's temperature normalized, pain remains tolerable. 1200-Transported via w/c to awaiting transportation.

## 2020-02-05 NOTE — DISCHARGE INSTRUCTIONS
>>>You received Toradol during your surgery. You may not take any form of NSAIDS (non steroidal anti inflammatory drugs) such as Advil, Ibuprofen, Aleve, Motrin until 3:45 pm today.<<<      >>>You received an IV form of Tylenol 1000mg (Ofirmev) during your surgery, you may take tylenol (or pain medication containing Tylenol or Acetaminophen) in 4 hours at 12:40 pm today. <<<    After Care Instructions For Your Hysteroscopy      1. You may resume your usual diet once the nausea resolves. Initially, try sips of warm fluids and a bland diet. 2. Avoid heavy lifting and straining. Gradually increase your activity. First, try walking and doing light activity around the house. Resume your normal habits if no significant discomfort or bleeding develops. Most women can return to work within one to four days after this procedure. 3. You may take showers. Avoid using a tub bath, swimming pool or hot tub until after your check-up. 4. Do not place anything in your vagina until after your postoperative visit. Do not   douche, use tampons, or have intercourse because this may cause bleeding and   infection. 5. You may initially experience a heavy bloody discharge. This should not be more than your menstrual flow. Over the next several days, the flow should steadily decrease. 6. Typically following the procedure, there is little or no pain. You may feel cramps in your lower abdomen. Tylenol may relieve mild cramping. If pain medication does not improve your symptoms, you should contact your physician. 7. Contact the office if you have excessive bleeding (saturating a pad an hour for two hours or passing large clots). It is also necessary to speak with your physician if you develop chills, a temperature greater than 100.4, difficulty voiding or burning on urination. 8. Your physician may want to see you in the office after your D&C. Please call for an appointment if this has not already been arranged. Our office phone number is [phone]. If appropriate, the microscopic results from your procedure will be discussed at this follow-up visit. DO NOT TAKE TYLENOL/ACETAMINOPHEN WITH PERCOCET, [employer]ORT[employer], [address] TAKE NARCOTIC PAIN MEDICATIONS WITH FOOD     Narcotics tend to be constipating, we suggest taking a stool softener such as Colace or Miralax (follow package instructions). DO NOT DRIVE WHILE TAKING NARCOTIC PAIN MEDICATIONS. DO NOT TAKE SLEEPING MEDICATIONS OR ANTIANXIETY MEDICATIONS WHILE TAKING NARCOTIC PAIN MEDICATIONS,  ESPECIALLY THE NIGHT OF ANESTHESIA! CPAP PATIENTS BE SURE TO WEAR MACHINE WHENEVER NAPPING OR SLEEPING! DISCHARGE SUMMARY from Nurse    The following personal items collected during your admission are returned to you:   Dental Appliance: Dental Appliances: None  Vision: Visual Aid: Glasses(reading )  Hearing Aid:    Jewelry: Jewelry: None  Clothing: Clothing: Other (comment)(street clothes)  Other Valuables: Other Valuables: Cell Phone, Eyeglasses(cell and glasses with )  Valuables sent to safe:        PATIENT INSTRUCTIONS:    After General Anesthesia or Intravenous Sedation, for 24 hours or while taking prescription Narcotics:        Someone should be with you for the next 24 hours. For your own safety, a responsible adult must drive you home. · Limit your activities  · Recommended activity: Rest today, up with assistance today. Do not climb stairs or shower unattended for the next 24 hours. · Please start with a soft bland diet and advance as tolerated (no nausea) to regular diet. · If you have a sore throat you should try the following: fluids, warm salt water gargles, or throat lozenges. If it does not improve after several days please follow up with your primary physician.   · Do not drive and operate hazardous machinery  · Do not make important personal or business decisions  · Do  not drink alcoholic beverages  · If you have not urinated within 8 hours after discharge, please contact your surgeon on call. Report the following to your surgeon:  · Excessive pain, swelling, redness or odor of or around the surgical area  · Temperature over 100.5  · Nausea and vomiting lasting longer than 4 hours or if unable to take medications  · Any signs of decreased circulation or nerve impairment to extremity: change in color, persistent  numbness, tingling, coldness or increase pain      · You will receive a Post Operative Call from one of the Recovery Room Nurses on the day after your surgery to check on you. It is very important for us to know how you are recovering after your surgery. If you have an issue or need to speak with someone, please call your surgeon, do not wait for the post operative call. · You may receive an e-mail or letter in the mail from [employer] regarding your experience with us in the Ambulatory Surgery Unit. Your feedback is valuable to us and we appreciate your participation in the survey. · If the above instructions are not adequate or you are having problems after your surgery, call the physician at their office number. · We wish you a speedy recovery ? What to do at Home:      *  Please give a list of your current medications to your Primary Care Provider. *  Please update this list whenever your medications are discontinued, doses are      changed, or new medications (including over-the-counter products) are added. *  Please carry medication information at all times in case of emergency situations. If you have not received your influenza and/or pneumococcal vaccine, please follow up with your primary care physician. The discharge information has been reviewed with the patient and caregiver. The patient and caregiver verbalized understanding.

## 2020-02-11 ENCOUNTER — OFFICE VISIT (OUTPATIENT)
Dept: INTERNAL MEDICINE CLINIC | Age: 65
End: 2020-02-11

## 2020-02-11 VITALS
RESPIRATION RATE: 16 BRPM | HEIGHT: 68 IN | SYSTOLIC BLOOD PRESSURE: 152 MMHG | TEMPERATURE: 98.6 F | BODY MASS INDEX: 36.71 KG/M2 | OXYGEN SATURATION: 97 % | DIASTOLIC BLOOD PRESSURE: 80 MMHG | WEIGHT: 242.2 LBS | HEART RATE: 70 BPM

## 2020-02-11 DIAGNOSIS — J20.9 ACUTE BRONCHITIS, UNSPECIFIED ORGANISM: Primary | ICD-10-CM

## 2020-02-11 DIAGNOSIS — R05.9 COUGH: Primary | ICD-10-CM

## 2020-02-11 DIAGNOSIS — I10 ESSENTIAL HYPERTENSION: Primary | ICD-10-CM

## 2020-02-11 DIAGNOSIS — R73.9 HYPERGLYCEMIA: ICD-10-CM

## 2020-02-11 DIAGNOSIS — R05.9 COUGH: ICD-10-CM

## 2020-02-11 LAB
QUICKVUE INFLUENZA TEST: NEGATIVE
VALID INTERNAL CONTROL?: YES

## 2020-02-11 RX ORDER — DOXYCYCLINE 100 MG/1
100 CAPSULE ORAL 2 TIMES DAILY
Qty: 14 CAP | Refills: 0 | Status: SHIPPED | OUTPATIENT
Start: 2020-02-11 | End: 2020-02-18

## 2020-02-11 RX ORDER — BENZONATATE 100 MG/1
100 CAPSULE ORAL
Qty: 30 CAP | Refills: 1 | Status: SHIPPED | OUTPATIENT
Start: 2020-02-11 | End: 2020-02-18

## 2020-02-11 NOTE — PROGRESS NOTES
SUBJECTIVE  Ms. Jose Guadalupe Fry presents today acutely for     Chief Complaint   Patient presents with    URI     pt here today c.o coughing up thick green mucous from deep within chest, sore throat, and discomfort in lungs x 5 days     She had surgery a week ago--hysteroscopy. The next day she developed URI symptoms, as above. Incidentally, she has been found to have endometrial cancer and will need hysterectomy. OBJECTIVE  Visit Vitals  /80 (BP 1 Location: Left arm, BP Patient Position: Sitting)   Pulse 70   Temp 98.6 °F (37 °C) (Oral)   Resp 16   Ht 5' 8\" (1.727 m)   Wt 242 lb 3.2 oz (109.9 kg)   SpO2 97%   BMI 36.83 kg/m²     Gen: Pleasant 77 y.o.  female in NAD.   HEENT: PERRLA. EOMI. OP moist and pink.  Neck: Supple.  No LAD.  HEART: RRR, No M/G/R.   LUNGS: Coarse upper airway sounds; frequent cough.    ABDOMEN: S, NT, ND, BS+.   EXTREMITIES: Warm. ASSESSMENT / PLAN    ICD-10-CM ICD-9-CM    1. Acute bronchitis, unspecified organism J20.9 466.0 doxycycline (MONODOX) 100 mg capsule      AMB POC RAPID INFLUENZA TEST   2. Cough R05 786.2 XR CHEST PA LAT      benzonatate (TESSALON) 100 mg capsule     I have reviewed with the patient details of the assessment and plan and all questions were answered. Relevant patient education was performed. Follow-up and Dispositions    · Return if symptoms worsen or fail to improve.

## 2020-02-11 NOTE — PATIENT INSTRUCTIONS
Office Policies    Phone calls/patient messages:            Please allow up to 24 hours for someone in the office to contact you about your call or message. Be mindful your provider may be out of the office or your message may require further review. We encourage you to use [employer] for your messages as this is a faster, more efficient way to communicate with our office                         Medication Refills:            Prescription medications require 48-72 business hours to process. We encourage you to use [employer] for your refills. For controlled medications: Please allow 72 business hours to process. Certain medications may require you to  a written prescription at our office. NO narcotic/controlled medications will be prescribed after 4pm Monday through Friday or on weekends              Form/Paperwork Completion:            Please note a $25 fee may incur for all paperwork for completed by our providers. We ask that you allow 7-10 business days. Pre-payment is due prior to picking up/faxing the completed form. You may also download your forms to [employer] to have your doctor print off.      1. Have you been to the ER, urgent care clinic since your last visit? Hospitalized since your last visit?no  2. Have you seen or consulted any other health care providers outside of the [address] [place] [address] since your last visit? Include any pap smears or colon screening.  no

## 2020-02-12 ENCOUNTER — OFFICE VISIT (OUTPATIENT)
Dept: GYNECOLOGY | Age: 65
End: 2020-02-12

## 2020-02-12 VITALS
WEIGHT: 241.8 LBS | DIASTOLIC BLOOD PRESSURE: 72 MMHG | BODY MASS INDEX: 36.65 KG/M2 | HEIGHT: 68 IN | HEART RATE: 61 BPM | SYSTOLIC BLOOD PRESSURE: 142 MMHG

## 2020-02-12 DIAGNOSIS — E78.5 HYPERLIPIDEMIA, UNSPECIFIED HYPERLIPIDEMIA TYPE: ICD-10-CM

## 2020-02-12 DIAGNOSIS — G47.33 OSA ON CPAP: ICD-10-CM

## 2020-02-12 DIAGNOSIS — E66.01 SEVERE OBESITY (HCC): Primary | ICD-10-CM

## 2020-02-12 DIAGNOSIS — C54.1 ENDOMETRIAL CANCER (HCC): ICD-10-CM

## 2020-02-12 DIAGNOSIS — I10 ESSENTIAL HYPERTENSION: ICD-10-CM

## 2020-02-12 DIAGNOSIS — Z99.89 OSA ON CPAP: ICD-10-CM

## 2020-02-12 NOTE — PROGRESS NOTES
Referred by Dr. Ramy Reese for endometrial cancer, pt states she has a rash on her \"bottom\", she noticed one week before her surgery on 2/5/2020, she states it has progressively gotten worse, no current vaginal bleeding, she states she has had a cough and saw her PCP yesterday, treated with ABX and Tessalon , no fever, no nausea or vomiting    1. Have you been to the ER, urgent care clinic since your last visit? Hospitalized since your last visit? Yes, outpatient surgery    2. Have you seen or consulted any other health care providers outside of the [address] [place] [address] since your last visit? Include any pap smears or colon screening.    Yes, Dr. Ramy Reese

## 2020-02-12 NOTE — LETTER
2/12/20 Patient: Kari Olivas YOB: 1955 Date of Visit: 2/12/2020 Juan Wall MD 
[address] [id number] VIA In Basket Dear Juan Wall MD, Thank you for referring Ms. Kari Olivas to [facility] for evaluation. My notes for this consultation are attached. If you have questions, please do not hesitate to call me. I look forward to following your patient along with you.  
 
 
Sincerely, 
 
Arlen Alexandre MD

## 2020-02-12 NOTE — PROGRESS NOTES
[address] [place] [address] [address], [address] [facility] [phone]  F [phone]    Office Note  Patient ID:  Name:  Deja Castañeda  MRN:  [id number]  :  1955/65 y.o. Date:  2020      HISTORY OF PRESENT ILLNESS:  Ms. Deja Castañeda is a 77 y.o.  postmenopausal female who presents in consultation from Dr. Trixie Jones for FIGO Grade 1 endometrial cancer. The patient first reports vaginal spotting/bleeding in 2019. She was ultimately referred to Dr. Trixie Jones by her PCP. On 2020 underwent hysteroscopy/D&C with final pathology consistent with FIGO Grade 1 endometrial cancer. Reports some spotting since her surgery. History of urinary incontinence for which she wears a pad. Denies hematuria or hematochezia. Denies change in appetite or bowel habits, nausea, vomiting, diarrhea, CP, SOB, fevers, or chills. Reports long history of constipation for which she uses senna S. Pertinent PMH/PSH: h/o , obesity, HTN, INDER on CPAP      Active, no restrictions. Pathology Review:   2020:  Endometrium and polyp, curetting:  Well differentiated endometrioid adenocarcinoma, FIGO grade 1 in a background of extensive atypical complex hyperplasia. ROS:  A comprehensive review of systems was negative except for that written in the History of Present Illness.  , 10 point ROS    OB/GYN ROS:  Per HPI    ECOG ndGndrndanddndend:nd nd2nd Problem List:  Patient Active Problem List    Diagnosis Date Noted    Endometrial cancer ([address] 2020    Severe obesity ([address] 2019    Nuclear cataract 2016    Posterior subcapsular polar senile cataract 2016    Neuropathy, peripheral 2014    Hypertension 2014    Hyperglycemia 2014    GERD (gastroesophageal reflux disease) 2014    Hyperlipidemia 2014    INDER on CPAP 2014    Depression 2014     PMH:  Past Medical History:   Diagnosis Date    Cancer ([address]     skin cancer removed from nose    Depression 2014    GERD (gastroesophageal reflux disease)     Hyperlipidemia     Hypertension     Left bundle branch block     Neuropathy, peripheral 2014    INDER on CPAP 2014    compliant      PSH:  Past Surgical History:   Procedure Laterality Date    COLONOSCOPY  2010     Dr. Izzy Mendoza HX GYN          HX HEENT      tonsil    HX OTHER SURGICAL      hysteroscopy surg      Social History:  Social History     Tobacco Use    Smoking status: Never Smoker    Smokeless tobacco: Never Used   Substance Use Topics    Alcohol use: No     Alcohol/week: 0.0 standard drinks      Family History:  Family History   Problem Relation Age of Onset    Heart Disease Mother     Breast Cancer Mother     Heart Disease Father     Kidney Disease Paternal Aunt         renal cancer    Colon Cancer Maternal Aunt     Cancer Paternal Uncle         leukemia      Medications: (reviewed)  Current Outpatient Medications   Medication Sig    doxycycline (MONODOX) 100 mg capsule Take 1 Cap by mouth two (2) times a day for 7 days.  benzonatate (TESSALON) 100 mg capsule Take 1 Cap by mouth three (3) times daily as needed for Cough for up to 7 days.  omeprazole (PRILOSEC) 20 mg capsule Take 1 Cap by mouth daily.  risperiDONE (RISPERDAL) 2 mg tablet TAKE 1 TABLET NIGHTLY AS NEEDED    losartan (COZAAR) 100 mg tablet Take 1 Tab by mouth daily.  atenolol (TENORMIN) 50 mg tablet TAKE 1 TABLET BY MOUTH EVERY DAY    atorvastatin (LIPITOR) 20 mg tablet TAKE 1 TABLET BY MOUTH EVERY DAY    buPROPion XL (WELLBUTRIN XL) 300 mg XL tablet TAKE 1 TABLET BY MOUTH EVERY MORNING    Cholecalciferol, Vitamin D3, (VITAMIN D3) 2,000 unit cap capsule Take  by mouth daily.  multivitamin, stress formula (STRESS TAB) tablet Take 1 Tab by mouth daily.  ascorbic acid (VITAMIN C) 1,000 mg tablet Take 500 mg by mouth daily.     vitamin e (E GEMS) 100 unit capsule Take 400 Units by mouth daily.  aspirin delayed-release 81 mg tablet Take 81 mg by mouth daily.  ibuprofen (MOTRIN) 600 mg tablet Take 1 Tab by mouth every eight (8) hours as needed for Pain. No current facility-administered medications for this visit. Allergies: (reviewed)  Allergies   Allergen Reactions    Erythromycin Rash    Erythromycin With Ethanol Rash        Gyn History:   Last pap: normal 12/2016  History of abnormal pap: denies      OBJECTIVE:    Physical Exam:  VITAL SIGNS: Vitals:    02/12/20 1046   BP: 142/72   Pulse: 61   Weight: 241 lb 12.8 oz (109.7 kg)   Height: 5' 8\" (1.727 m)     Body mass index is 36.77 kg/m². GENERAL MARISSA: Conversant, alert, oriented. No acute distress. HEENT: HEENT. No thyroid enlargement. No JVD. Neck: Supple without restrictions. RESPIRATORY: Clear to auscultation and percussion to the bases. No CVAT. CARDIOVASC: RRR without murmur/rub. GASTROINT: soft, non-tender, without masses or organomegaly, well-healed midline incision   MUSCULOSKEL: no joint tenderness, deformity or swelling       EXTREMITIES: extremities normal, atraumatic, no cyanosis or edema   PELVIC: Exam chaperoned by nurse. Normal appearing external genitalia. On speculum exam, normal appearing vagina and cervix. Atrophic vagina. On bimanual exam, the cervix and uterus are normal size and mobile. No evidence of adnexal masses or nodularity. RECTAL: deferred   STORMY SURVEY: No suspicious lymphadenopathy or edema noted. NEURO: Grossly intact. No acute deficit.        Lab Date as available:    Lab Results   Component Value Date/Time    WBC 4.2 10/02/2019 08:50 AM    HGB 11.6 10/02/2019 08:50 AM    HCT 34.7 10/02/2019 08:50 AM    PLATELET 156 34/65/9140 08:50 AM    MCV 91 10/02/2019 08:50 AM     Lab Results   Component Value Date/Time    Sodium 144 10/02/2019 08:50 AM    Potassium 4.4 10/02/2019 08:50 AM    Chloride 107 (H) 10/02/2019 08:50 AM    CO2 27 10/02/2019 08:50 AM    Glucose 116 (H) 10/02/2019 08:50 AM    BUN 18 10/02/2019 08:50 AM    Creatinine 1.11 (H) 10/02/2019 08:50 AM    BUN/Creatinine ratio 16 10/02/2019 08:50 AM    GFR est AA 60 10/02/2019 08:50 AM    GFR est non-AA 52 (L) 10/02/2019 08:50 AM    Calcium 11.1 (H) 10/02/2019 08:50 AM         IMPRESSION/PLAN:    Ms. Vida Peterson is a 77 y.o. female with a working diagnosis of FIGO Grade 1 endometrial cancer    Problems:     Patient Active Problem List    Diagnosis Date Noted    [facility]omet[facility] 02/12/2020    Severe obesity ([facility] 01/11/2019    Nuclear cataract 02/05/2016    Posterior subcapsular polar senile cataract 02/05/2016    Neuropathy, peripheral 04/09/2014    Hypertension 04/08/2014    Hyperglycemia 04/08/2014    GERD (gastroesophageal reflux disease) 04/08/2014    Hyperlipidemia 04/08/2014    INDER on CPAP 04/08/2014    Depression 01/09/2014       I reviewed Ms. Kari Olivas's course to date, including her medical records, recent studies, physical exam, and review of symptoms. Counseled patient regarding standard of care recommendations for endometrial cancer including surgical staging. Plan for robotic-assisted TLH/BSO/SLND, possible pelvic and/or para-aortic LND, possible exploratory laparotomy on 2/20/2020. Plan for CBCD, CMP, HbA1c, EKG, and CXR prior to surgery. Counseled patient regarding risks, benefits, indications, and alternatives to surgery. Plan to sign consents day of surgery. All questions and concerns were addressed with the patient and she is comfortable with the plan.      Defined Sensitive Document    >50% of total time allocated to visit dedicated to counseling, 60 minutes total.    Signed By: Deepika Emanuel MD     2/12/2020/8:57 AM

## 2020-02-12 NOTE — H&P (VIEW-ONLY)
[facility], [address] [place] [phone]  F [phone] Office Note Patient ID: 
Name:  Rick Olivas MRN:  [id number] :   y.o. Date:  2020 HISTORY OF PRESENT ILLNESS: 
Ms. Hermelindo Villegas is a 77 y.o.  postmenopausal female who presents in consultation from Dr. Mik Bueno for FIGO Grade 1 endometrial cancer. The patient first reports vaginal spotting/bleeding in 2019. She was ultimately referred to Dr. Mik Bueno by her PCP. On 2020 underwent hysteroscopy/D&C with final pathology consistent with FIGO Grade 1 endometrial cancer. Reports some spotting since her surgery. History of urinary incontinence for which she wears a pad. Denies hematuria or hematochezia. Denies change in appetite or bowel habits, nausea, vomiting, diarrhea, CP, SOB, fevers, or chills. Reports long history of constipation for which she uses senna S. Pertinent PMH/PSH: h/o , obesity, HTN, INDER on CPAP Active, no restrictions. Pathology Review:  
2020: 
Endometrium and polyp, curetting: 
Well differentiated endometrioid adenocarcinoma, FIGO grade 1 in a background of extensive atypical complex hyperplasia. ROS: 
A comprehensive review of systems was negative except for that written in the History of Present Illness. , 10 point ROS 
 
OB/GYN ROS: 
Per HPI 
 
ECOG ndGndrndanddndend:nd nd2nd Problem List: 
Patient Active Problem List  
 Diagnosis Date Noted  Endometrial cancer ([address] 2020  Severe obesity ([address] 2019  Nuclear cataract 2016  Posterior subcapsular polar senile cataract 2016  Neuropathy, peripheral 2014  Hypertension 2014  Hyperglycemia 2014  GERD (gastroesophageal reflux disease) 2014  Hyperlipidemia 2014  INDER on CPAP 2014  Depression 2014 PMH: 
Past Medical History:  
Diagnosis Date  Cancer ([address] skin cancer removed from nose  Depression 2014  GERD (gastroesophageal reflux disease)  Hyperlipidemia  Hypertension  Left bundle branch block   Neuropathy, peripheral 2014  INDER on CPAP 2014  
 compliant PSH: 
Past Surgical History:  
Procedure Laterality Date  COLONOSCOPY  2010 Dr. Michelle Gutiérrez  HX GYN    
   HX HEENT    
 tonsil  HX OTHER SURGICAL    
 hysteroscopy surg Social History: 
Social History Tobacco Use  Smoking status: Never Smoker  Smokeless tobacco: Never Used Substance Use Topics  Alcohol use: No  
  Alcohol/week: 0.0 standard drinks Family History: 
Family History Problem Relation Age of Onset  Heart Disease Mother  Breast Cancer Mother  Heart Disease Father  Kidney Disease Paternal Aunt   
     renal cancer  Colon Cancer Maternal Aunt  Cancer Paternal Uncle   
     leukemia Medications: (reviewed) Current Outpatient Medications Medication Sig  
 doxycycline (MONODOX) 100 mg capsule Take 1 Cap by mouth two (2) times a day for 7 days.  benzonatate (TESSALON) 100 mg capsule Take 1 Cap by mouth three (3) times daily as needed for Cough for up to 7 days.  omeprazole (PRILOSEC) 20 mg capsule Take 1 Cap by mouth daily.  risperiDONE (RISPERDAL) 2 mg tablet TAKE 1 TABLET NIGHTLY AS NEEDED  
 losartan (COZAAR) 100 mg tablet Take 1 Tab by mouth daily.  atenolol (TENORMIN) 50 mg tablet TAKE 1 TABLET BY MOUTH EVERY DAY  atorvastatin (LIPITOR) 20 mg tablet TAKE 1 TABLET BY MOUTH EVERY DAY  buPROPion XL (WELLBUTRIN XL) 300 mg XL tablet TAKE 1 TABLET BY MOUTH EVERY MORNING  Cholecalciferol, Vitamin D3, (VITAMIN D3) 2,000 unit cap capsule Take  by mouth daily.  multivitamin, stress formula (STRESS TAB) tablet Take 1 Tab by mouth daily.  ascorbic acid (VITAMIN C) 1,000 mg tablet Take 500 mg by mouth daily.  vitamin e (E GEMS) 100 unit capsule Take 400 Units by mouth daily.  aspirin delayed-release 81 mg tablet Take 81 mg by mouth daily.  ibuprofen (MOTRIN) 600 mg tablet Take 1 Tab by mouth every eight (8) hours as needed for Pain. No current facility-administered medications for this visit. Allergies: (reviewed) Allergies Allergen Reactions  Erythromycin Rash  Erythromycin With Ethanol Rash Gyn History:  
Last pap: normal 12/2016 History of abnormal pap: denies OBJECTIVE: 
 
Physical Exam: VITAL SIGNS: Vitals:  
 02/12/20 1046 BP: 142/72 Pulse: 61 Weight: 241 lb 12.8 oz (109.7 kg) Height: 5' 8\" (1.727 m) Body mass index is 36.77 kg/m². GENERAL MARISSA: Conversant, alert, oriented. No acute distress. HEENT: HEENT. No thyroid enlargement. No JVD. Neck: Supple without restrictions. RESPIRATORY: Clear to auscultation and percussion to the bases. No CVAT. CARDIOVASC: RRR without murmur/rub. GASTROINT: soft, non-tender, without masses or organomegaly, well-healed midline incision MUSCULOSKEL: no joint tenderness, deformity or swelling EXTREMITIES: extremities normal, atraumatic, no cyanosis or edema PELVIC: Exam chaperoned by nurse. Normal appearing external genitalia. On speculum exam, normal appearing vagina and cervix. Atrophic vagina. On bimanual exam, the cervix and uterus are normal size and mobile. No evidence of adnexal masses or nodularity. RECTAL: deferred STORMY SURVEY: No suspicious lymphadenopathy or edema noted. NEURO: Grossly intact. No acute deficit. Lab Date as available: 
 
Lab Results Component Value Date/Time WBC 4.2 10/02/2019 08:50 AM  
 HGB 11.6 10/02/2019 08:50 AM  
 HCT 34.7 10/02/2019 08:50 AM  
 PLATELET 994 43/61/7475 08:50 AM  
 MCV 91 10/02/2019 08:50 AM  
 
Lab Results Component Value Date/Time  Sodium 144 10/02/2019 08:50 AM  
 Potassium 4.4 10/02/2019 08:50 AM  
 Chloride 107 (H) 10/02/2019 08:50 AM  
 CO2 27 10/02/2019 08:50 AM  
 Glucose 116 (H) 10/02/2019 08:50 AM  
 BUN 18 10/02/2019 08:50 AM  
 Creatinine 1.11 (H) 10/02/2019 08:50 AM  
 BUN/Creatinine ratio 16 10/02/2019 08:50 AM  
 GFR est AA 60 10/02/2019 08:50 AM  
 GFR est non-AA 52 (L) 10/02/2019 08:50 AM  
 Calcium 11.1 (H) 10/02/2019 08:50 AM  
 
 
 
IMPRESSION/PLAN: 
 
Ms. Kingston Patterson is a 77 y.o. female with a working diagnosis of FIGO Grade 1 endometrial cancer Problems: 
  
Patient Active Problem List  
 Diagnosis Date Noted  Endometrial cancer ([facility] 02/12/2020  Severe obesity ([facility] 01/11/2019  Nuclear cataract 02/05/2016  Posterior subcapsular polar senile cataract 02/05/2016  Neuropathy, peripheral 04/09/2014  Hypertension 04/08/2014  Hyperglycemia 04/08/2014  GERD (gastroesophageal reflux disease) 04/08/2014  Hyperlipidemia 04/08/2014  INDER on CPAP 04/08/2014  Depression 01/09/2014 I reviewed Ms. Kari Olivas's course to date, including her medical records, recent studies, physical exam, and review of symptoms. Counseled patient regarding standard of care recommendations for endometrial cancer including surgical staging. Plan for robotic-assisted TLH/BSO/SLND, possible pelvic and/or para-aortic LND, possible exploratory laparotomy on 2/20/2020. Plan for CBCD, CMP, HbA1c, EKG, and CXR prior to surgery. Counseled patient regarding risks, benefits, indications, and alternatives to surgery. Plan to sign consents day of surgery. All questions and concerns were addressed with the patient and she is comfortable with the plan.   
 
Defined Sensitive Document 
 
>50% of total time allocated to visit dedicated to counseling, 60 minutes total. 
 
Signed By: Chito Erickson MD   
 2/12/2020/8:57 AM

## 2020-02-14 ENCOUNTER — HOSPITAL ENCOUNTER (OUTPATIENT)
Dept: PREADMISSION TESTING | Age: 65
Discharge: HOME | End: 2020-02-14
Payer: MEDICARE

## 2020-02-14 VITALS
BODY MASS INDEX: 36.68 KG/M2 | WEIGHT: 242 LBS | SYSTOLIC BLOOD PRESSURE: 131 MMHG | DIASTOLIC BLOOD PRESSURE: 55 MMHG | HEIGHT: 68 IN | TEMPERATURE: 97.9 F | HEART RATE: 65 BPM

## 2020-02-14 LAB
ALBUMIN SERPL-MCNC: 3.7 G/DL (ref 3.5–5)
ALBUMIN/GLOB SERPL: 1.5 {RATIO} (ref 1.1–2.2)
ALP SERPL-CCNC: 100 U/L (ref 45–117)
ALT SERPL-CCNC: 29 U/L (ref 12–78)
ANION GAP SERPL CALC-SCNC: 3 MMOL/L (ref 5–15)
AST SERPL-CCNC: 14 U/L (ref 15–37)
ATRIAL RATE: 60 BPM
BASOPHILS # BLD: 0.1 K/UL (ref 0–0.1)
BASOPHILS NFR BLD: 1 % (ref 0–1)
BILIRUB SERPL-MCNC: 0.3 MG/DL (ref 0.2–1)
BUN SERPL-MCNC: 16 MG/DL (ref 6–20)
BUN/CREAT SERPL: 14 (ref 12–20)
CALCIUM SERPL-MCNC: 10.9 MG/DL (ref 8.5–10.1)
CALCULATED P AXIS, ECG09: 63 DEGREES
CALCULATED R AXIS, ECG10: 79 DEGREES
CALCULATED T AXIS, ECG11: 18 DEGREES
CHLORIDE SERPL-SCNC: 107 MMOL/L (ref 97–108)
CO2 SERPL-SCNC: 31 MMOL/L (ref 21–32)
CREAT SERPL-MCNC: 1.11 MG/DL (ref 0.55–1.02)
DIAGNOSIS, 93000: NORMAL
DIFFERENTIAL METHOD BLD: ABNORMAL
EOSINOPHIL # BLD: 0.2 K/UL (ref 0–0.4)
EOSINOPHIL NFR BLD: 3 % (ref 0–7)
ERYTHROCYTE [DISTWIDTH] IN BLOOD BY AUTOMATED COUNT: 13.1 % (ref 11.5–14.5)
EST. AVERAGE GLUCOSE BLD GHB EST-MCNC: 120 MG/DL
GLOBULIN SER CALC-MCNC: 2.5 G/DL (ref 2–4)
GLUCOSE SERPL-MCNC: 109 MG/DL (ref 65–100)
HBA1C MFR BLD: 5.8 % (ref 4–5.6)
HCT VFR BLD AUTO: 35.5 % (ref 35–47)
HGB BLD-MCNC: 11.4 G/DL (ref 11.5–16)
IMM GRANULOCYTES # BLD AUTO: 0 K/UL (ref 0–0.04)
IMM GRANULOCYTES NFR BLD AUTO: 0 % (ref 0–0.5)
LYMPHOCYTES # BLD: 1.2 K/UL (ref 0.8–3.5)
LYMPHOCYTES NFR BLD: 24 % (ref 12–49)
MCH RBC QN AUTO: 30.4 PG (ref 26–34)
MCHC RBC AUTO-ENTMCNC: 32.1 G/DL (ref 30–36.5)
MCV RBC AUTO: 94.7 FL (ref 80–99)
MONOCYTES # BLD: 0.6 K/UL (ref 0–1)
MONOCYTES NFR BLD: 12 % (ref 5–13)
NEUTS SEG # BLD: 3.1 K/UL (ref 1.8–8)
NEUTS SEG NFR BLD: 60 % (ref 32–75)
NRBC # BLD: 0 K/UL (ref 0–0.01)
NRBC BLD-RTO: 0 PER 100 WBC
P-R INTERVAL, ECG05: 176 MS
PLATELET # BLD AUTO: 236 K/UL (ref 150–400)
PMV BLD AUTO: 10.2 FL (ref 8.9–12.9)
POTASSIUM SERPL-SCNC: 4.4 MMOL/L (ref 3.5–5.1)
PROT SERPL-MCNC: 6.2 G/DL (ref 6.4–8.2)
Q-T INTERVAL, ECG07: 462 MS
QRS DURATION, ECG06: 164 MS
QTC CALCULATION (BEZET), ECG08: 462 MS
RBC # BLD AUTO: 3.75 M/UL (ref 3.8–5.2)
SODIUM SERPL-SCNC: 141 MMOL/L (ref 136–145)
VENTRICULAR RATE, ECG03: 60 BPM
WBC # BLD AUTO: 5.2 K/UL (ref 3.6–11)

## 2020-02-14 PROCEDURE — 83036 HEMOGLOBIN GLYCOSYLATED A1C: CPT

## 2020-02-14 PROCEDURE — 93005 ELECTROCARDIOGRAM TRACING: CPT

## 2020-02-14 PROCEDURE — 80053 COMPREHEN METABOLIC PANEL: CPT

## 2020-02-14 PROCEDURE — 85025 COMPLETE CBC W/AUTO DIFF WBC: CPT

## 2020-02-14 RX ORDER — ASCORBIC ACID 500 MG
500 TABLET ORAL
COMMUNITY

## 2020-02-17 DIAGNOSIS — I44.7 LEFT BUNDLE BRANCH BLOCK: Primary | ICD-10-CM

## 2020-02-17 NOTE — PROGRESS NOTES
Roberta Suárez,     Can you see if Ms. Rodney Olivas has a Cardiologist? She has a slightly abnormal EKG. If she does not then we will need to get her a referral and clearance from Cardiology prior to surgery.      Thanks,    Roge Cuenca MD

## 2020-02-17 NOTE — PROGRESS NOTES
I spoke with patient, she does not have a cardiologist, can you please put in referral so we can get page to set her up with one

## 2020-02-18 ENCOUNTER — TELEPHONE (OUTPATIENT)
Dept: INTERNAL MEDICINE CLINIC | Age: 65
End: 2020-02-18

## 2020-02-18 ENCOUNTER — OFFICE VISIT (OUTPATIENT)
Dept: CARDIOLOGY CLINIC | Age: 65
End: 2020-02-18

## 2020-02-18 VITALS
HEIGHT: 68 IN | RESPIRATION RATE: 16 BRPM | HEART RATE: 68 BPM | BODY MASS INDEX: 36.37 KG/M2 | OXYGEN SATURATION: 98 % | SYSTOLIC BLOOD PRESSURE: 140 MMHG | WEIGHT: 240 LBS | DIASTOLIC BLOOD PRESSURE: 68 MMHG

## 2020-02-18 DIAGNOSIS — E66.01 SEVERE OBESITY (HCC): ICD-10-CM

## 2020-02-18 DIAGNOSIS — I10 ESSENTIAL HYPERTENSION: ICD-10-CM

## 2020-02-18 DIAGNOSIS — I44.7 LEFT BUNDLE BRANCH BLOCK: Primary | ICD-10-CM

## 2020-02-18 DIAGNOSIS — C54.1 ENDOMETRIAL CANCER (HCC): ICD-10-CM

## 2020-02-18 DIAGNOSIS — E78.5 HYPERLIPIDEMIA, UNSPECIFIED HYPERLIPIDEMIA TYPE: ICD-10-CM

## 2020-02-18 NOTE — PROGRESS NOTES
[facility]: Promise Armstrong  [phone]          Cardiology Consult/Progress Note      Requesting/referring provider: , Dr. Pily Dominguez  Reason for Consult:cardiac clearance    HPI: Nisa Brock, a 77y.o. year-old who presents for evaluation for perioperative risk stratification. She was recently diagnosed with endometrial cancer and is planned to undergo hyterectomy. She was identified to have LBBB on her ECG and she has been referred for cardiac clearance. She reports no palpitation, syncope, chest pain, pedal edema. She has baseline dyspnea which she attributes to beng overweight and this has not significantly changes in past few months. She is able to walk 2-4 block on a flat surface and able to go up 2 flights of stairs without limiting symptoms. No heart failure symptoms are reported. She has no prior history of CKD, CVA or CHF. Incidentally she reports that she was identified to have a left bundle branch block and possible mitral valve prolapse many years ago. She underwent an echocardiogram in 2014 which had shown normal LV systolic function no significant mitral regurgitation despite having left bundle branch block. ECG February 2020: Sinus rhythm, left bundle branch block  Echocardiogram 2014: EF 50 to 54% no significant valvular abnormalities. Assessment/Plan:  1. Essential hypertension  2. Hyperlipidemia  3. History of possible mitral valve prolapse without significant mitral regurgitation  4. History of longstanding left bundle branch block  5. Morbid obesity  6. Recently diagnosed endometrial cancer  7. Obstructive sleep apnea  8. Peripheral neuropathy of uncertain etiology      Mrs. Hairston was seen at the request of Dr. Flavio Narayanan for preoperative risk stratification prior to upcoming radical hysterectomy for endometrial cancer. Patient has reasonable functional tolerance equivalent or more than 4 METS.   She does not require any further testing from an ischemic standpoint. Specifically she has denied any symptoms consistent with ischemia. She does not demonstrate any surgical signs consistent with aortic stenosis or significant heart failure. She should be at usual risk for upcoming surgery. Recently identified left bundle branch block appears to be chronic and was also mentioned back in 2014. This does not require any immediate interventions. Long-term will be reasonable to follow-up with echocardiogram to ensure that her LV systolic function does not deteriorate. This could be done electively in the next 5 to 6 months. Surgery does not need to be held up for the specific reason. I suggested her on benefits of weight loss, continued CPAP for sleep apnea, treatment of hypertension and hyperlipidemia. Blood pressure as well as lipids are at recommended targets. []    High complexity decision making was performed  []    Patient is at high-risk of decompensation with multiple organ involvement  She  has a past medical history of Arthritis, Cancer ([facility] [address] (2005), Depression (1/9/2014), Endometrial cancer ([facility] [address] (2020), GERD (gastroesophageal reflux disease), Hyperlipidemia, Hypertension, Left bundle branch block (2013), Neuropathy, peripheral (4/9/2014), and INDER on CPAP (4/8/2014). Review of system:Patient reports no dyspnea/PND/Orthpnea/CP. She reports no cough/fever/focal ne urological deficits/abdominal pain. All other systems negative except as above.    Family History   Problem Relation Age of Onset    Heart Disease Mother     Breast Cancer Mother     Heart Disease Father     Kidney Disease Paternal Aunt         renal cancer    Colon Cancer Maternal Aunt     Cancer Paternal Uncle         leukemia    Anesth Problems Neg Hx       Social History     Socioeconomic History    Marital status:      Spouse name: Not on file    Number of children: Not on file    Years of education: Not on file    Highest education level: Not on file   Tobacco Use    Smoking status: Never Smoker    Smokeless tobacco: Never Used   Substance and Sexual Activity    Alcohol use: No     Alcohol/week: 0.0 standard drinks    Drug use: No    Sexual activity: Not Currently      PE  Vitals:    02/18/20 0907   BP: 140/68   Pulse: 68   Resp: 16   SpO2: 98%   Weight: 240 lb (108.9 kg)   Height: 5' 7.75\" (1.721 m)    Body mass index is 36.76 kg/m². General:    Alert, cooperative, no distress. Psychiatric:    Normal Mood and affect    Eye/ENT:      Pupils equal, No asymmetry, Conjunctival pink. Able to hear voice at normal amplitude   Lungs:      Visibly symmetric chest expansion, No palpable tenderness. Clear to auscultation bilaterally. Heart[de-identified]    Regular rate and rhythm, S1, S2 normal, no murmur, click, rub or gallop. No JVD, Normal palpable peripheral pulses. No cyanosis   Abdomen:     Soft, non-tender. Bowel sounds normal. No masses,  No      organomegaly. Extremities:   Extremities normal, atraumatic, no edema. Neurologic:   CN II-XII grossly intact.  No gross focal deficits           Recent Labs:  Lab Results   Component Value Date/Time    Cholesterol, total 127 10/02/2019 08:50 AM    HDL Cholesterol 46 10/02/2019 08:50 AM    LDL, calculated 65 10/02/2019 08:50 AM    Triglyceride 82 10/02/2019 08:50 AM     Lab Results   Component Value Date/Time    Creatinine 1.11 (H) 02/14/2020 11:59 AM     Lab Results   Component Value Date/Time    BUN 16 02/14/2020 11:59 AM     Lab Results   Component Value Date/Time    Potassium 4.4 02/14/2020 11:59 AM     Lab Results   Component Value Date/Time    Hemoglobin A1c 5.8 (H) 02/14/2020 11:59 AM     Lab Results   Component Value Date/Time    HGB 11.4 (L) 02/14/2020 11:59 AM     Lab Results   Component Value Date/Time    PLATELET 789 21/65/1471 11:59 AM       Reviewed:  Past Medical History:   Diagnosis Date    Arthritis     OSTEO    Cancer ([facility] [address] 2005    skin cancer removed from nose    Depression 1/9/2014    Endometrial cancer ([facility] [address] 2020    GERD (gastroesophageal reflux disease)     Hyperlipidemia     Hypertension     Left bundle branch block 2013    Neuropathy, peripheral 4/9/2014    INDER on CPAP 4/8/2014    compliant     Social History     Tobacco Use   Smoking Status Never Smoker   Smokeless Tobacco Never Used     Social History     Substance and Sexual Activity   Alcohol Use No    Alcohol/week: 0.0 standard drinks     Allergies   Allergen Reactions    Erythromycin Rash     Family History   Problem Relation Age of Onset    Heart Disease Mother     Breast Cancer Mother     Heart Disease Father     Kidney Disease Paternal Aunt         renal cancer    Colon Cancer Maternal Aunt     Cancer Paternal Uncle         leukemia    Anesth Problems Neg Hx         Current Outpatient Medications   Medication Sig    ascorbic acid, vitamin C, (VITAMIN C) 500 mg tablet Take 500 mg by mouth daily (after breakfast).  omeprazole (PRILOSEC) 20 mg capsule Take 1 Cap by mouth daily. (Patient taking differently: Take 20 mg by mouth nightly.)    risperiDONE (RISPERDAL) 2 mg tablet TAKE 1 TABLET NIGHTLY AS NEEDED    losartan (COZAAR) 100 mg tablet Take 1 Tab by mouth daily. (Patient taking differently: Take 100 mg by mouth daily (after breakfast). )    atenolol (TENORMIN) 50 mg tablet TAKE 1 TABLET BY MOUTH EVERY DAY (Patient taking differently: daily (after breakfast). TAKE 1 TABLET BY MOUTH EVERY DAY)    atorvastatin (LIPITOR) 20 mg tablet TAKE 1 TABLET BY MOUTH EVERY DAY (Patient taking differently: daily (after breakfast). )    buPROPion XL (WELLBUTRIN XL) 300 mg XL tablet TAKE 1 TABLET BY MOUTH EVERY MORNING    Cholecalciferol, Vitamin D3, (VITAMIN D3) 2,000 unit cap capsule Take  by mouth daily (after breakfast).  multivitamin, stress formula (STRESS TAB) tablet Take 1 Tab by mouth daily.  vitamin e (E GEMS) 100 unit capsule Take 400 Units by mouth daily.     aspirin delayed-release 81 mg tablet Take 81 mg by mouth daily.  doxycycline (MONODOX) 100 mg capsule Take 1 Cap by mouth two (2) times a day for 7 days. (Patient taking differently: Take 100 mg by mouth two (2) times a day. Indications: STARTED ON 2/11/20- END 2/18/20)    benzonatate (TESSALON) 100 mg capsule Take 1 Cap by mouth three (3) times daily as needed for Cough for up to 7 days. (Patient taking differently: Take 100 mg by mouth three (3) times daily as needed for Cough. Indications: STARTED ON 2/11/20-END 2/18/20)     No current facility-administered medications for this visit. Mir Parham MD02/18/20 There are other unrelated non-urgent complaints, but due to the busy schedule and the amount of time I've already spent with her, time does not permit me to address these routine issues at today's visit. I've requested another appointment to review these additional issues. ATTENTION:   This medical record was transcribed using an electronic medical records/speech recognition system. Although proofread, it may and can contain electronic, spelling and other errors. Corrections may be executed at a later time. Please feel free to contact us for any clarifications as needed.     [employer] [facility]  [address], [address] [place] [address]

## 2020-02-18 NOTE — LETTER
2/18/20 Patient: Kari Olivas YOB: 1955 Date of Visit: 2/18/2020 Ollie Alexander MD 
[address] [address] VIA In Basket Dear Ollie Alexander MD, Thank you for referring Ms. Kari Olivas to [facility] for evaluation. My notes for this consultation are attached. If you have questions, please do not hesitate to call me. I look forward to following your patient along with you.  
 
 
Sincerely, 
 
Sarah Sam MD

## 2020-02-18 NOTE — TELEPHONE ENCOUNTER
Called, spoke to pt  Received two pt identifiers  Pt informed ACP notes are ready to be picked up. Pt verbalizes understanding of the instructions and has no further questions at this time.

## 2020-02-18 NOTE — TELEPHONE ENCOUNTER
Patient states she needs a call back in reference to getting the Advanced Directive completed today that was dropped off yesterday as patient has upcoming Surgery on Thursday, 2/20/20. Please call to advise if can't be done as patient needs to have completed Prior to Surgery.  Thank you

## 2020-02-20 ENCOUNTER — ANESTHESIA (OUTPATIENT)
Dept: SURGERY | Age: 65
End: 2020-02-20
Payer: MEDICARE

## 2020-02-20 ENCOUNTER — HOSPITAL ENCOUNTER (OUTPATIENT)
Age: 65
Setting detail: OBSERVATION
Discharge: HOME | End: 2020-02-21
Attending: OBSTETRICS & GYNECOLOGY | Admitting: OBSTETRICS & GYNECOLOGY
Payer: MEDICARE

## 2020-02-20 ENCOUNTER — ANESTHESIA EVENT (OUTPATIENT)
Dept: SURGERY | Age: 65
End: 2020-02-20
Payer: MEDICARE

## 2020-02-20 DIAGNOSIS — G89.18 POSTOPERATIVE PAIN: Primary | ICD-10-CM

## 2020-02-20 DIAGNOSIS — C54.1 ENDOMETRIAL CANCER (HCC): ICD-10-CM

## 2020-02-20 LAB
ABO + RH BLD: NORMAL
BLOOD GROUP ANTIBODIES SERPL: NORMAL
SPECIMEN EXP DATE BLD: NORMAL

## 2020-02-20 PROCEDURE — 99218 HC RM OBSERVATION: CPT

## 2020-02-20 PROCEDURE — 77030031492 HC PRT ACC BLNT AIRSEAL CNMD -B: Performed by: OBSTETRICS & GYNECOLOGY

## 2020-02-20 PROCEDURE — 77030008771 HC TU NG SALEM SUMP -A: Performed by: ANESTHESIOLOGY

## 2020-02-20 PROCEDURE — 74011250637 HC RX REV CODE- 250/637: Performed by: ANESTHESIOLOGY

## 2020-02-20 PROCEDURE — 74011000258 HC RX REV CODE- 258: Performed by: OBSTETRICS & GYNECOLOGY

## 2020-02-20 PROCEDURE — 74011250636 HC RX REV CODE- 250/636: Performed by: PHYSICIAN ASSISTANT

## 2020-02-20 PROCEDURE — 77030040830 HC CATH URETH FOL MDII -A: Performed by: OBSTETRICS & GYNECOLOGY

## 2020-02-20 PROCEDURE — 77030011640 HC PAD GRND REM COVD -A: Performed by: OBSTETRICS & GYNECOLOGY

## 2020-02-20 PROCEDURE — 77030035029 HC NDL INSUF VERES DISP COVD -B: Performed by: OBSTETRICS & GYNECOLOGY

## 2020-02-20 PROCEDURE — 74011000254 HC RX REV CODE- 254: Performed by: OBSTETRICS & GYNECOLOGY

## 2020-02-20 PROCEDURE — 76210000017 HC OR PH I REC 1.5 TO 2 HR: Performed by: OBSTETRICS & GYNECOLOGY

## 2020-02-20 PROCEDURE — 77030008756 HC TU IRR SUC STRY -B: Performed by: OBSTETRICS & GYNECOLOGY

## 2020-02-20 PROCEDURE — 86900 BLOOD TYPING SEROLOGIC ABO: CPT

## 2020-02-20 PROCEDURE — 88112 CYTOPATH CELL ENHANCE TECH: CPT

## 2020-02-20 PROCEDURE — 74011000250 HC RX REV CODE- 250: Performed by: OBSTETRICS & GYNECOLOGY

## 2020-02-20 PROCEDURE — 77030018836 HC SOL IRR NACL ICUM -A: Performed by: OBSTETRICS & GYNECOLOGY

## 2020-02-20 PROCEDURE — 76010000879 HC OR TIME 3.5 TO 4HR INTENSV - TIER 2: Performed by: OBSTETRICS & GYNECOLOGY

## 2020-02-20 PROCEDURE — 88342 IMHCHEM/IMCYTCHM 1ST ANTB: CPT

## 2020-02-20 PROCEDURE — 88309 TISSUE EXAM BY PATHOLOGIST: CPT

## 2020-02-20 PROCEDURE — 74011250636 HC RX REV CODE- 250/636: Performed by: ANESTHESIOLOGY

## 2020-02-20 PROCEDURE — 36415 COLL VENOUS BLD VENIPUNCTURE: CPT

## 2020-02-20 PROCEDURE — 77030003666 HC NDL SPINAL BD -A: Performed by: OBSTETRICS & GYNECOLOGY

## 2020-02-20 PROCEDURE — 77030026438 HC STYL ET INTUB CARD -A: Performed by: NURSE ANESTHETIST, CERTIFIED REGISTERED

## 2020-02-20 PROCEDURE — 77030002933 HC SUT MCRYL J&J -A: Performed by: OBSTETRICS & GYNECOLOGY

## 2020-02-20 PROCEDURE — 77030020263 HC SOL INJ SOD CL0.9% LFCR 1000ML: Performed by: OBSTETRICS & GYNECOLOGY

## 2020-02-20 PROCEDURE — 74011250637 HC RX REV CODE- 250/637: Performed by: PHYSICIAN ASSISTANT

## 2020-02-20 PROCEDURE — 77030020703 HC SEAL CANN DISP INTU -B: Performed by: OBSTETRICS & GYNECOLOGY

## 2020-02-20 PROCEDURE — 74011250636 HC RX REV CODE- 250/636: Performed by: NURSE ANESTHETIST, CERTIFIED REGISTERED

## 2020-02-20 PROCEDURE — 77030013079 HC BLNKT BAIR HGGR 3M -A: Performed by: NURSE ANESTHETIST, CERTIFIED REGISTERED

## 2020-02-20 PROCEDURE — 76060000038 HC ANESTHESIA 3.5 TO 4 HR: Performed by: OBSTETRICS & GYNECOLOGY

## 2020-02-20 PROCEDURE — 77030040361 HC SLV COMPR DVT MDII -B: Performed by: OBSTETRICS & GYNECOLOGY

## 2020-02-20 PROCEDURE — 77030016151 HC PROTCTR LNS DFOG COVD -B: Performed by: OBSTETRICS & GYNECOLOGY

## 2020-02-20 PROCEDURE — 77030008684 HC TU ET CUF COVD -B: Performed by: NURSE ANESTHETIST, CERTIFIED REGISTERED

## 2020-02-20 PROCEDURE — 77030022704 HC SUT VLOC COVD -B: Performed by: OBSTETRICS & GYNECOLOGY

## 2020-02-20 PROCEDURE — 77030010517 HC APPL SEAL FLOSEL BAXT -B: Performed by: OBSTETRICS & GYNECOLOGY

## 2020-02-20 PROCEDURE — 77030035277 HC OBTRTR BLDELSS DISP INTU -B: Performed by: OBSTETRICS & GYNECOLOGY

## 2020-02-20 PROCEDURE — 77030018846 HC SOL IRR STRL H20 ICUM -A

## 2020-02-20 PROCEDURE — 74011000250 HC RX REV CODE- 250: Performed by: NURSE ANESTHETIST, CERTIFIED REGISTERED

## 2020-02-20 PROCEDURE — 77030014650 HC SEAL MTRX FLOSEL BAXT -C: Performed by: OBSTETRICS & GYNECOLOGY

## 2020-02-20 PROCEDURE — 88307 TISSUE EXAM BY PATHOLOGIST: CPT

## 2020-02-20 PROCEDURE — 77030010507 HC ADH SKN DERMBND J&J -B: Performed by: OBSTETRICS & GYNECOLOGY

## 2020-02-20 PROCEDURE — 77030040922 HC BLNKT HYPOTHRM STRY -A

## 2020-02-20 RX ORDER — MIDAZOLAM HYDROCHLORIDE 1 MG/ML
INJECTION, SOLUTION INTRAMUSCULAR; INTRAVENOUS AS NEEDED
Status: DISCONTINUED | OUTPATIENT
Start: 2020-02-20 | End: 2020-02-20 | Stop reason: HOSPADM

## 2020-02-20 RX ORDER — HYDROMORPHONE HYDROCHLORIDE 1 MG/ML
0.5 INJECTION, SOLUTION INTRAMUSCULAR; INTRAVENOUS; SUBCUTANEOUS
Status: DISCONTINUED | OUTPATIENT
Start: 2020-02-20 | End: 2020-02-20 | Stop reason: HOSPADM

## 2020-02-20 RX ORDER — LIDOCAINE HYDROCHLORIDE 10 MG/ML
0.1 INJECTION, SOLUTION EPIDURAL; INFILTRATION; INTRACAUDAL; PERINEURAL AS NEEDED
Status: DISCONTINUED | OUTPATIENT
Start: 2020-02-20 | End: 2020-02-20 | Stop reason: HOSPADM

## 2020-02-20 RX ORDER — ROCURONIUM BROMIDE 10 MG/ML
INJECTION, SOLUTION INTRAVENOUS AS NEEDED
Status: DISCONTINUED | OUTPATIENT
Start: 2020-02-20 | End: 2020-02-20 | Stop reason: HOSPADM

## 2020-02-20 RX ORDER — RISPERIDONE 1 MG/1
2 TABLET, FILM COATED ORAL
Status: DISCONTINUED | OUTPATIENT
Start: 2020-02-20 | End: 2020-02-21 | Stop reason: HOSPADM

## 2020-02-20 RX ORDER — MORPHINE SULFATE 10 MG/ML
2 INJECTION, SOLUTION INTRAMUSCULAR; INTRAVENOUS
Status: DISCONTINUED | OUTPATIENT
Start: 2020-02-20 | End: 2020-02-20 | Stop reason: HOSPADM

## 2020-02-20 RX ORDER — ONDANSETRON 2 MG/ML
INJECTION INTRAMUSCULAR; INTRAVENOUS AS NEEDED
Status: DISCONTINUED | OUTPATIENT
Start: 2020-02-20 | End: 2020-02-20 | Stop reason: HOSPADM

## 2020-02-20 RX ORDER — ATENOLOL 50 MG/1
50 TABLET ORAL
Status: DISCONTINUED | OUTPATIENT
Start: 2020-02-21 | End: 2020-02-21 | Stop reason: HOSPADM

## 2020-02-20 RX ORDER — DIPHENHYDRAMINE HYDROCHLORIDE 50 MG/ML
12.5 INJECTION, SOLUTION INTRAMUSCULAR; INTRAVENOUS
Status: DISCONTINUED | OUTPATIENT
Start: 2020-02-20 | End: 2020-02-21 | Stop reason: HOSPADM

## 2020-02-20 RX ORDER — ACETAMINOPHEN 325 MG/1
650 TABLET ORAL
Qty: 60 TAB | Refills: 0 | Status: SHIPPED | OUTPATIENT
Start: 2020-02-20 | End: 2020-02-24

## 2020-02-20 RX ORDER — SODIUM CHLORIDE, SODIUM LACTATE, POTASSIUM CHLORIDE, CALCIUM CHLORIDE 600; 310; 30; 20 MG/100ML; MG/100ML; MG/100ML; MG/100ML
100 INJECTION, SOLUTION INTRAVENOUS CONTINUOUS
Status: DISCONTINUED | OUTPATIENT
Start: 2020-02-20 | End: 2020-02-20 | Stop reason: HOSPADM

## 2020-02-20 RX ORDER — HYDROMORPHONE HYDROCHLORIDE 2 MG/ML
INJECTION, SOLUTION INTRAMUSCULAR; INTRAVENOUS; SUBCUTANEOUS AS NEEDED
Status: DISCONTINUED | OUTPATIENT
Start: 2020-02-20 | End: 2020-02-20 | Stop reason: HOSPADM

## 2020-02-20 RX ORDER — SODIUM CHLORIDE, SODIUM LACTATE, POTASSIUM CHLORIDE, CALCIUM CHLORIDE 600; 310; 30; 20 MG/100ML; MG/100ML; MG/100ML; MG/100ML
50 INJECTION, SOLUTION INTRAVENOUS CONTINUOUS
Status: DISCONTINUED | OUTPATIENT
Start: 2020-02-20 | End: 2020-02-21 | Stop reason: HOSPADM

## 2020-02-20 RX ORDER — PANTOPRAZOLE SODIUM 20 MG/1
20 TABLET, DELAYED RELEASE ORAL
Status: DISCONTINUED | OUTPATIENT
Start: 2020-02-20 | End: 2020-02-21 | Stop reason: HOSPADM

## 2020-02-20 RX ORDER — DIPHENHYDRAMINE HYDROCHLORIDE 50 MG/ML
12.5 INJECTION, SOLUTION INTRAMUSCULAR; INTRAVENOUS AS NEEDED
Status: DISCONTINUED | OUTPATIENT
Start: 2020-02-20 | End: 2020-02-20 | Stop reason: HOSPADM

## 2020-02-20 RX ORDER — DOCUSATE SODIUM 100 MG/1
200 CAPSULE, LIQUID FILLED ORAL DAILY
Status: DISCONTINUED | OUTPATIENT
Start: 2020-02-21 | End: 2020-02-21 | Stop reason: HOSPADM

## 2020-02-20 RX ORDER — FENTANYL CITRATE 50 UG/ML
50 INJECTION, SOLUTION INTRAMUSCULAR; INTRAVENOUS AS NEEDED
Status: DISCONTINUED | OUTPATIENT
Start: 2020-02-20 | End: 2020-02-20 | Stop reason: HOSPADM

## 2020-02-20 RX ORDER — FENTANYL CITRATE 50 UG/ML
25 INJECTION, SOLUTION INTRAMUSCULAR; INTRAVENOUS
Status: COMPLETED | OUTPATIENT
Start: 2020-02-20 | End: 2020-02-20

## 2020-02-20 RX ORDER — SODIUM CHLORIDE 0.9 % (FLUSH) 0.9 %
5-40 SYRINGE (ML) INJECTION AS NEEDED
Status: DISCONTINUED | OUTPATIENT
Start: 2020-02-20 | End: 2020-02-20 | Stop reason: HOSPADM

## 2020-02-20 RX ORDER — FENTANYL CITRATE 50 UG/ML
INJECTION, SOLUTION INTRAMUSCULAR; INTRAVENOUS AS NEEDED
Status: DISCONTINUED | OUTPATIENT
Start: 2020-02-20 | End: 2020-02-20 | Stop reason: HOSPADM

## 2020-02-20 RX ORDER — ACETAMINOPHEN 325 MG/1
650 TABLET ORAL ONCE
Status: COMPLETED | OUTPATIENT
Start: 2020-02-20 | End: 2020-02-20

## 2020-02-20 RX ORDER — SODIUM CHLORIDE 0.9 % (FLUSH) 0.9 %
5-40 SYRINGE (ML) INJECTION EVERY 8 HOURS
Status: DISCONTINUED | OUTPATIENT
Start: 2020-02-20 | End: 2020-02-21 | Stop reason: HOSPADM

## 2020-02-20 RX ORDER — NALOXONE HYDROCHLORIDE 0.4 MG/ML
0.4 INJECTION, SOLUTION INTRAMUSCULAR; INTRAVENOUS; SUBCUTANEOUS AS NEEDED
Status: DISCONTINUED | OUTPATIENT
Start: 2020-02-20 | End: 2020-02-21 | Stop reason: HOSPADM

## 2020-02-20 RX ORDER — BUPROPION HYDROCHLORIDE 150 MG/1
150 TABLET, EXTENDED RELEASE ORAL 2 TIMES DAILY
Status: DISCONTINUED | OUTPATIENT
Start: 2020-02-21 | End: 2020-02-21 | Stop reason: HOSPADM

## 2020-02-20 RX ORDER — SODIUM CHLORIDE 9 MG/ML
25 INJECTION, SOLUTION INTRAVENOUS CONTINUOUS
Status: DISCONTINUED | OUTPATIENT
Start: 2020-02-20 | End: 2020-02-20 | Stop reason: HOSPADM

## 2020-02-20 RX ORDER — ONDANSETRON 2 MG/ML
4 INJECTION INTRAMUSCULAR; INTRAVENOUS
Status: DISCONTINUED | OUTPATIENT
Start: 2020-02-20 | End: 2020-02-21 | Stop reason: HOSPADM

## 2020-02-20 RX ORDER — PROPOFOL 10 MG/ML
INJECTION, EMULSION INTRAVENOUS AS NEEDED
Status: DISCONTINUED | OUTPATIENT
Start: 2020-02-20 | End: 2020-02-20 | Stop reason: HOSPADM

## 2020-02-20 RX ORDER — INDOCYANINE GREEN AND WATER 25 MG
KIT INJECTION AS NEEDED
Status: DISCONTINUED | OUTPATIENT
Start: 2020-02-20 | End: 2020-02-20 | Stop reason: HOSPADM

## 2020-02-20 RX ORDER — KETOROLAC TROMETHAMINE 30 MG/ML
15 INJECTION, SOLUTION INTRAMUSCULAR; INTRAVENOUS EVERY 6 HOURS
Status: DISCONTINUED | OUTPATIENT
Start: 2020-02-20 | End: 2020-02-21 | Stop reason: HOSPADM

## 2020-02-20 RX ORDER — SUCCINYLCHOLINE CHLORIDE 20 MG/ML
INJECTION INTRAMUSCULAR; INTRAVENOUS AS NEEDED
Status: DISCONTINUED | OUTPATIENT
Start: 2020-02-20 | End: 2020-02-20 | Stop reason: HOSPADM

## 2020-02-20 RX ORDER — SODIUM CHLORIDE 9 MG/ML
250 INJECTION, SOLUTION INTRAVENOUS AS NEEDED
Status: DISCONTINUED | OUTPATIENT
Start: 2020-02-20 | End: 2020-02-21 | Stop reason: HOSPADM

## 2020-02-20 RX ORDER — SODIUM CHLORIDE, SODIUM LACTATE, POTASSIUM CHLORIDE, CALCIUM CHLORIDE 600; 310; 30; 20 MG/100ML; MG/100ML; MG/100ML; MG/100ML
INJECTION, SOLUTION INTRAVENOUS
Status: DISCONTINUED | OUTPATIENT
Start: 2020-02-20 | End: 2020-02-20 | Stop reason: HOSPADM

## 2020-02-20 RX ORDER — SODIUM CHLORIDE 0.9 % (FLUSH) 0.9 %
5-40 SYRINGE (ML) INJECTION EVERY 8 HOURS
Status: DISCONTINUED | OUTPATIENT
Start: 2020-02-20 | End: 2020-02-20 | Stop reason: HOSPADM

## 2020-02-20 RX ORDER — BUPIVACAINE HYDROCHLORIDE 5 MG/ML
INJECTION, SOLUTION EPIDURAL; INTRACAUDAL AS NEEDED
Status: DISCONTINUED | OUTPATIENT
Start: 2020-02-20 | End: 2020-02-20 | Stop reason: HOSPADM

## 2020-02-20 RX ORDER — DEXAMETHASONE SODIUM PHOSPHATE 4 MG/ML
INJECTION, SOLUTION INTRA-ARTICULAR; INTRALESIONAL; INTRAMUSCULAR; INTRAVENOUS; SOFT TISSUE AS NEEDED
Status: DISCONTINUED | OUTPATIENT
Start: 2020-02-20 | End: 2020-02-20 | Stop reason: HOSPADM

## 2020-02-20 RX ORDER — ROPIVACAINE HYDROCHLORIDE 5 MG/ML
30 INJECTION, SOLUTION EPIDURAL; INFILTRATION; PERINEURAL AS NEEDED
Status: DISCONTINUED | OUTPATIENT
Start: 2020-02-20 | End: 2020-02-20 | Stop reason: HOSPADM

## 2020-02-20 RX ORDER — LIDOCAINE HYDROCHLORIDE 20 MG/ML
INJECTION, SOLUTION EPIDURAL; INFILTRATION; INTRACAUDAL; PERINEURAL AS NEEDED
Status: DISCONTINUED | OUTPATIENT
Start: 2020-02-20 | End: 2020-02-20 | Stop reason: HOSPADM

## 2020-02-20 RX ORDER — IBUPROFEN 200 MG
400 TABLET ORAL
Qty: 40 TAB | Refills: 0 | Status: SHIPPED | OUTPATIENT
Start: 2020-02-20 | End: 2020-03-25

## 2020-02-20 RX ORDER — TRAMADOL HYDROCHLORIDE 50 MG/1
50-100 TABLET ORAL
Qty: 20 TAB | Refills: 0 | Status: SHIPPED | OUTPATIENT
Start: 2020-02-20 | End: 2020-02-23

## 2020-02-20 RX ORDER — EPHEDRINE SULFATE/0.9% NACL/PF 50 MG/5 ML
SYRINGE (ML) INTRAVENOUS AS NEEDED
Status: DISCONTINUED | OUTPATIENT
Start: 2020-02-20 | End: 2020-02-20 | Stop reason: HOSPADM

## 2020-02-20 RX ORDER — ACETAMINOPHEN 325 MG/1
975 TABLET ORAL EVERY 6 HOURS
Status: DISCONTINUED | OUTPATIENT
Start: 2020-02-20 | End: 2020-02-21 | Stop reason: HOSPADM

## 2020-02-20 RX ORDER — MIDAZOLAM HYDROCHLORIDE 1 MG/ML
1 INJECTION, SOLUTION INTRAMUSCULAR; INTRAVENOUS AS NEEDED
Status: DISCONTINUED | OUTPATIENT
Start: 2020-02-20 | End: 2020-02-20 | Stop reason: HOSPADM

## 2020-02-20 RX ORDER — MIDAZOLAM HYDROCHLORIDE 1 MG/ML
0.5 INJECTION, SOLUTION INTRAMUSCULAR; INTRAVENOUS
Status: DISCONTINUED | OUTPATIENT
Start: 2020-02-20 | End: 2020-02-20 | Stop reason: HOSPADM

## 2020-02-20 RX ORDER — TRAMADOL HYDROCHLORIDE 50 MG/1
50-100 TABLET ORAL
Status: DISCONTINUED | OUTPATIENT
Start: 2020-02-20 | End: 2020-02-21 | Stop reason: HOSPADM

## 2020-02-20 RX ORDER — GLYCOPYRROLATE 0.2 MG/ML
INJECTION INTRAMUSCULAR; INTRAVENOUS AS NEEDED
Status: DISCONTINUED | OUTPATIENT
Start: 2020-02-20 | End: 2020-02-20 | Stop reason: HOSPADM

## 2020-02-20 RX ORDER — HYDROMORPHONE HYDROCHLORIDE 1 MG/ML
0.5 INJECTION, SOLUTION INTRAMUSCULAR; INTRAVENOUS; SUBCUTANEOUS
Status: DISCONTINUED | OUTPATIENT
Start: 2020-02-20 | End: 2020-02-21 | Stop reason: HOSPADM

## 2020-02-20 RX ORDER — DOCUSATE SODIUM 100 MG/1
100 CAPSULE, LIQUID FILLED ORAL 2 TIMES DAILY
Qty: 40 CAP | Refills: 1 | Status: SHIPPED | OUTPATIENT
Start: 2020-02-20 | End: 2020-03-25

## 2020-02-20 RX ORDER — SODIUM CHLORIDE 0.9 % (FLUSH) 0.9 %
5-40 SYRINGE (ML) INJECTION AS NEEDED
Status: DISCONTINUED | OUTPATIENT
Start: 2020-02-20 | End: 2020-02-21 | Stop reason: HOSPADM

## 2020-02-20 RX ORDER — NEOSTIGMINE METHYLSULFATE 1 MG/ML
INJECTION, SOLUTION INTRAVENOUS AS NEEDED
Status: DISCONTINUED | OUTPATIENT
Start: 2020-02-20 | End: 2020-02-20 | Stop reason: HOSPADM

## 2020-02-20 RX ORDER — ONDANSETRON 2 MG/ML
4 INJECTION INTRAMUSCULAR; INTRAVENOUS AS NEEDED
Status: DISCONTINUED | OUTPATIENT
Start: 2020-02-20 | End: 2020-02-20 | Stop reason: HOSPADM

## 2020-02-20 RX ORDER — SILVER NITRATE 38.21; 12.74 MG/1; MG/1
STICK TOPICAL AS NEEDED
Status: DISCONTINUED | OUTPATIENT
Start: 2020-02-20 | End: 2020-02-20 | Stop reason: HOSPADM

## 2020-02-20 RX ADMIN — ROCURONIUM BROMIDE 10 MG: 10 SOLUTION INTRAVENOUS at 09:15

## 2020-02-20 RX ADMIN — FENTANYL CITRATE 25 MCG: 50 INJECTION, SOLUTION INTRAMUSCULAR; INTRAVENOUS at 12:25

## 2020-02-20 RX ADMIN — ACETAMINOPHEN 650 MG: 325 TABLET ORAL at 07:52

## 2020-02-20 RX ADMIN — PHENYLEPHRINE HYDROCHLORIDE 40 MCG/MIN: 10 INJECTION INTRAVENOUS at 08:21

## 2020-02-20 RX ADMIN — PHENYLEPHRINE HYDROCHLORIDE 80 MCG: 10 INJECTION INTRAVENOUS at 08:25

## 2020-02-20 RX ADMIN — ROCURONIUM BROMIDE 10 MG: 10 SOLUTION INTRAVENOUS at 08:44

## 2020-02-20 RX ADMIN — MIDAZOLAM 2 MG: 1 INJECTION INTRAMUSCULAR; INTRAVENOUS at 07:53

## 2020-02-20 RX ADMIN — FENTANYL CITRATE 100 MCG: 50 INJECTION, SOLUTION INTRAMUSCULAR; INTRAVENOUS at 08:04

## 2020-02-20 RX ADMIN — Medication 10 MG: at 08:21

## 2020-02-20 RX ADMIN — PHENYLEPHRINE HYDROCHLORIDE 40 MCG: 10 INJECTION INTRAVENOUS at 08:21

## 2020-02-20 RX ADMIN — HYDROMORPHONE HYDROCHLORIDE 0.2 MG: 2 INJECTION, SOLUTION INTRAMUSCULAR; INTRAVENOUS; SUBCUTANEOUS at 09:20

## 2020-02-20 RX ADMIN — ONDANSETRON HYDROCHLORIDE 4 MG: 2 INJECTION, SOLUTION INTRAMUSCULAR; INTRAVENOUS at 08:15

## 2020-02-20 RX ADMIN — Medication 10 MG: at 08:25

## 2020-02-20 RX ADMIN — ROCURONIUM BROMIDE 25 MG: 10 SOLUTION INTRAVENOUS at 08:35

## 2020-02-20 RX ADMIN — SODIUM CHLORIDE, POTASSIUM CHLORIDE, SODIUM LACTATE AND CALCIUM CHLORIDE: 600; 310; 30; 20 INJECTION, SOLUTION INTRAVENOUS at 11:10

## 2020-02-20 RX ADMIN — FENTANYL CITRATE 25 MCG: 50 INJECTION, SOLUTION INTRAMUSCULAR; INTRAVENOUS at 12:40

## 2020-02-20 RX ADMIN — KETOROLAC TROMETHAMINE 15 MG: 30 INJECTION, SOLUTION INTRAMUSCULAR at 22:06

## 2020-02-20 RX ADMIN — FENTANYL CITRATE 25 MCG: 50 INJECTION, SOLUTION INTRAMUSCULAR; INTRAVENOUS at 12:15

## 2020-02-20 RX ADMIN — DEXAMETHASONE SODIUM PHOSPHATE 4 MG: 4 INJECTION, SOLUTION INTRAMUSCULAR; INTRAVENOUS at 08:15

## 2020-02-20 RX ADMIN — KETOROLAC TROMETHAMINE 15 MG: 30 INJECTION, SOLUTION INTRAMUSCULAR at 15:52

## 2020-02-20 RX ADMIN — FENTANYL CITRATE 25 MCG: 50 INJECTION, SOLUTION INTRAMUSCULAR; INTRAVENOUS at 12:00

## 2020-02-20 RX ADMIN — Medication 10 MG: at 08:18

## 2020-02-20 RX ADMIN — RISPERIDONE 2 MG: 1 TABLET ORAL at 22:06

## 2020-02-20 RX ADMIN — SODIUM CHLORIDE, SODIUM LACTATE, POTASSIUM CHLORIDE, AND CALCIUM CHLORIDE 50 ML/HR: 600; 310; 30; 20 INJECTION, SOLUTION INTRAVENOUS at 13:00

## 2020-02-20 RX ADMIN — Medication 10 ML: at 22:14

## 2020-02-20 RX ADMIN — SUCCINYLCHOLINE CHLORIDE 160 MG: 20 INJECTION, SOLUTION INTRAMUSCULAR; INTRAVENOUS at 08:07

## 2020-02-20 RX ADMIN — PROPOFOL 150 MG: 10 INJECTION, EMULSION INTRAVENOUS at 08:05

## 2020-02-20 RX ADMIN — HYDROMORPHONE HYDROCHLORIDE 0.5 MG: 2 INJECTION, SOLUTION INTRAMUSCULAR; INTRAVENOUS; SUBCUTANEOUS at 11:35

## 2020-02-20 RX ADMIN — SODIUM CHLORIDE, POTASSIUM CHLORIDE, SODIUM LACTATE AND CALCIUM CHLORIDE: 600; 310; 30; 20 INJECTION, SOLUTION INTRAVENOUS at 07:52

## 2020-02-20 RX ADMIN — ACETAMINOPHEN 975 MG: 325 TABLET ORAL at 18:30

## 2020-02-20 RX ADMIN — Medication 2 MG: at 10:53

## 2020-02-20 RX ADMIN — LIDOCAINE HYDROCHLORIDE 100 MG: 20 INJECTION, SOLUTION EPIDURAL; INFILTRATION; INTRACAUDAL; PERINEURAL at 08:04

## 2020-02-20 RX ADMIN — Medication 3 MG: at 10:43

## 2020-02-20 RX ADMIN — ROCURONIUM BROMIDE 10 MG: 10 SOLUTION INTRAVENOUS at 09:32

## 2020-02-20 RX ADMIN — GLYCOPYRROLATE 0.4 MG: 0.2 INJECTION, SOLUTION INTRAMUSCULAR; INTRAVENOUS at 10:53

## 2020-02-20 RX ADMIN — GLYCOPYRROLATE 0.6 MG: 0.2 INJECTION, SOLUTION INTRAMUSCULAR; INTRAVENOUS at 10:43

## 2020-02-20 RX ADMIN — SUGAMMADEX 200 MG: 100 INJECTION, SOLUTION INTRAVENOUS at 11:11

## 2020-02-20 RX ADMIN — GLYCOPYRROLATE 0.4 MG: 0.2 INJECTION, SOLUTION INTRAMUSCULAR; INTRAVENOUS at 08:26

## 2020-02-20 RX ADMIN — CEFOTETAN DISODIUM 2 G: 2 INJECTION, POWDER, FOR SOLUTION INTRAMUSCULAR; INTRAVENOUS at 08:03

## 2020-02-20 RX ADMIN — ROCURONIUM BROMIDE 5 MG: 10 SOLUTION INTRAVENOUS at 08:04

## 2020-02-20 NOTE — ROUTINE PROCESS
Patient: Olga Petty MRN: [id number]  SSN: [id number] YOB: 1955  Age: 77 y.o. Sex: female Patient is status post Procedure(s): 
ROBOTIC ASSISTED TOTAL LAPAROSCOPIC HYSTERECTOMY, BILATERAL SALPINGO OOPHERECTOMY, SENTINEL LYMPH NODE MAPPING AND BIOPSY. Surgeon(s) and Role: Courtney Chacon MD - Primary Local/Dose/Irrigation:  30ML MARCAINE 0.5% Peripheral IV 02/20/20 Left Forearm (Active) Site Assessment Clean, dry, & intact 2/20/2020  7:15 AM  
Phlebitis Assessment 0 2/20/2020  7:15 AM  
Infiltration Assessment 0 2/20/2020  7:15 AM  
Dressing Status Clean, dry, & intact 2/20/2020  7:15 AM  
Dressing Type Transparent 2/20/2020  7:15 AM  
Hub Color/Line Status Blue 2/20/2020  7:15 AM  
   
Peripheral IV 02/20/20 Left Hand (Active) Orogastric Tube 02/20/20 (Active) Airway - Endotracheal Tube 02/20/20 Oral (Active) Dressing/Packing:  Wound Abdomen 5 TROCAR SITES-Dressing Type: Topical skin adhesive/glue (02/20/20 1047) Wound Vagina-Dressing Type: Dagmar-pad (02/20/20 1047) Splint/Cast:  ] Other:  NA

## 2020-02-20 NOTE — PROGRESS NOTES
5440 TRANSFER - IN REPORT:    Verbal report received from Jennifer Howard, [facility] (name) on Kingston Patterson  being received from PACU (unit) for routine post - op      Report consisted of patients Situation, Background, Assessment and   Recommendations(SBAR). Information from the following report(s) SBAR was reviewed with the receiving nurse. Opportunity for questions and clarification was provided. Assessment completed upon patients arrival to unit and care assumed.

## 2020-02-20 NOTE — ANESTHESIA POSTPROCEDURE EVALUATION
Post-Anesthesia Evaluation and Assessment    Patient: Olga Petty MRN: [id number]  SSN: [id number]    YOB: 1955  Age: 77 y.o. Sex: female      I have evaluated the patient and they are stable and ready for discharge from the PACU. Cardiovascular Function/Vital Signs  Visit Vitals  /65   Pulse 63   Temp 35.9 °C (96.7 °F)   Resp 11   Wt 109.8 kg (242 lb)   SpO2 99%   BMI 37.07 kg/m²       Patient is status post General anesthesia for Procedure(s):  ROBOTIC ASSISTED TOTAL LAPAROSCOPIC HYSTERECTOMY, BILATERAL SALPINGO OOPHERECTOMY, SENTINEL LYMPH NODE MAPPING AND BIOPSY. Nausea/Vomiting: None    Postoperative hydration reviewed and adequate. Pain:  Pain Scale 1: FLACC (02/20/20 1245)  Pain Intensity 1: 4 (02/20/20 1240)   Managed    Neurological Status:   Neuro (WDL): Exceptions to WDL (02/20/20 1225)  Neuro  Neurologic State: Drowsy (02/20/20 1225)  LUE Motor Response: Purposeful (02/20/20 1225)  LLE Motor Response: Purposeful (02/20/20 1225)  RUE Motor Response: Purposeful (02/20/20 1225)  RLE Motor Response: Purposeful (02/20/20 1225)   At baseline    Mental Status, Level of Consciousness: Alert and  oriented to person, place, and time    Pulmonary Status:   O2 Device: Nasal cannula (02/20/20 1240)   Adequate oxygenation and airway patent    Complications related to anesthesia: None    Post-anesthesia assessment completed. No concerns    Signed By: Cristin Barajas MD     February 20, 2020              Procedure(s):  ROBOTIC ASSISTED TOTAL LAPAROSCOPIC HYSTERECTOMY, BILATERAL SALPINGO OOPHERECTOMY, SENTINEL LYMPH NODE MAPPING AND BIOPSY. general    Anesthesia Post Evaluation        Patient location during evaluation: PACU  Note status: Adequate.   Level of consciousness: responsive to verbal stimuli and sleepy but conscious  Pain management: satisfactory to patient  Airway patency: patent  Anesthetic complications: no  Cardiovascular status: acceptable  Respiratory status: acceptable  Hydration status: acceptable  Comments: +Post-Anesthesia Evaluation and Assessment    Patient: Kingston Patterson MRN: [id number]  SSN: [id number]   YOB: 1955  Age: 77 y.o. Sex: female          Cardiovascular Function/Vital Signs    /65 (BP 1 Location: Right arm, BP Patient Position: At rest)   Pulse 75   Temp 36.8 °C (98.2 °F)   Resp 18   Wt 109.8 kg (242 lb)   SpO2 95%   BMI 37.07 kg/m²     Patient is status post Procedure(s):  ROBOTIC ASSISTED TOTAL LAPAROSCOPIC HYSTERECTOMY, BILATERAL SALPINGO OOPHERECTOMY, SENTINEL LYMPH NODE MAPPING AND BIOPSY. Nausea/Vomiting: Controlled. Postoperative hydration reviewed and adequate. Pain:  Pain Scale 1: Numeric (0 - 10) (02/21/20 0693)  Pain Intensity 1: 0 (02/21/20 4310)   Managed. Neurological Status:   Neuro (WDL): Exceptions to WDL (02/20/20 1225)   At baseline. Mental Status and Level of Consciousness: Arousable. Pulmonary Status:   O2 Device: Room air (02/20/20 2013)   Adequate oxygenation and airway patent. Complications related to anesthesia: None    Post-anesthesia assessment completed. No concerns. I have evaluated the patient and the patient is stable and ready to be discharged from PACU . Signed By: Norma Perez MD    2/21/2020        Vitals Value Taken Time   /65 2/20/2020  1:00 PM   Temp 35.9 °C (96.7 °F) 2/20/2020  1:00 PM   Pulse 60 2/20/2020  1:10 PM   Resp 11 2/20/2020  1:10 PM   SpO2 92 % 2/20/2020  1:10 PM   Vitals shown include unvalidated device data.

## 2020-02-20 NOTE — PERIOP NOTES
TRANSFER - OUT REPORT:    Verbal report given to Sharmaine(name) on Kari Olivas  being transferred to 319(unit) for routine post - op       Report consisted of patients Situation, Background, Assessment and   Recommendations(SBAR). Time Pre op antibiotic given:0803  Anesthesia Stop time: 3813  Infante Present on Transfer to floor:yes  Order for Infante on Chart:yes  Discharge Prescriptions with Chart:no    Information from the following report(s) SBAR and MAR was reviewed with the receiving nurse. Opportunity for questions and clarification was provided. Is the patient on 02? YES       L/Min 2       Other     Is the patient on a monitor? NO    Is the nurse transporting with the patient? NO    Surgical Waiting Area notified of patient's transfer from PACU? YES      The following personal items collected during your admission accompanied patient upon transfer:   Dental Appliance: Dental Appliances: None  Vision:    Hearing Aid:    Jewelry: Jewelry: None  Clothing: Clothing: (bag of clothes returned to pt. bedside)  Other Valuables:  Other Valuables: None  Valuables sent to safe:

## 2020-02-20 NOTE — INTERVAL H&P NOTE
H&P Update: Kari Olivas was seen and examined. History and physical has been reviewed. The patient has been examined. There have been no significant clinical changes since the completion of the originally dated History and Physical. Cleared by Cardiology Cruz Rule) as normal risk.   
 
Vikki Holder MD

## 2020-02-20 NOTE — OP NOTES
Gynecologic Oncology Operative Report    Rick Schmitt  2020    Pre-operative dx:  1) FIGO Grade 1 Endometrial cancer     Post-operative dx:  1) FIGO Grade 1 Endometrial cancer      Procedure:    Robotic-assisted total laparoscopic hysterectomy,   Bilateral salpingo-oophorectomy,   Bilateral pelvic sentinel lymph node mapping and biopsy     Surgeon:  Margret Tejada MD     Assistant:  Roberto Carmona surgical assistance was required throughout the case due to the complexity of the procedure. He assisted with dissection, development of the retroperitoneal spaces, and identification of pertinent anatomy during the procedure. Anesthesia:  GETA     EBL:  <25cc    Antibiotics: Cefotetan 2 grams IV    VTE Prophylaxis: SCDs     Complications:  None    Implants: None     Specimens:  uterus, cervix, bilateral fallopian tubes and ovaries, pelvic washings, bilateral sentinel pelvic lymph nodes      Operative indications:  77 y.o. female with FIGO Grade 1 endometrial cancer    Operative findings: On laparoscopic survey, the uterus and bilateral tubes are normal appearing. The uterus was significantly adhered to the lower uterine segment and cervix secondary to her history of a . Normal appearing liver with some adhesions on the right side along the anterior abdominal wall. Normal appearing rectosigmoid colon with physiologic adhesions to the left pelvic side-wall. Normal appearing omentum and small bowel. Right and left sentinel lymph nodes identified along their respective external iliac vessels. Operative report: After the risks, benefits, indications, and alternatives of the procedure were discussed with the patient and informed consent was obtained, the patient was taken to the operating room. She was positively identified, administered general anesthesia, and then placed in the dorsal lithotomy position in [address] [place] [address]. An exam under anesthesia was performed.  She was then prepped and draped in the usual fashion. A grimes catheter was inserted. An open-sided speculum was used to visualize the cervix. The cervix was grasped with a single-tooth tenaculum and then progressively dilated using cervical dilators. Indocyanine green was then injected at 3 and 9 o'clock on each side of the cervix in preparation for sentinel lymph node mapping. I then placed a V-Care uterine manipulator without difficulty. Attention was then turned to laparoscopy. A horizontal 8-mm skin incision was made at Hoskins's point in the left upper quadrant. Using an Optiview technique, an 8-mm AirSeal port was placed using a 5-mm laparoscopic scope. Insufflation was applied and intraperitoneal placement was confirmed via direct visualization with the laparoscopic. The abdomen was then insufflated to a pressure of 15 mm Hg. Attention was then turned to placement of the robotic trocars. Under direct visualization, 8-mm DaVinci trocars were placed: one above the umbilicus, one each in both the right and left lateral quadrants, and one in the right upper quadrant. These were placed approximately 8 cm apart. Positioning of the trocars in the abdominal wall were then adjusted to locate the point of articulation at the level of the fascia. The patient was then placed in steep Trendelenberg position and the bowel was displaced into the upper abdomen. The camera port was then docked and the camera was inserted into the #2 port and advanced. The targeting mechanism was then used to allow for better localization of the other three arms. These trocars were then docked with the [employer]. A fenestrated bipolar grasper was placed in arm #1, monopolar scissors placed in arm #3, and a Prograsp placed in arm #4. Pelvic washings were obtained. Attention was then turned to the robotic console and proceeding with hysterectomy and pelvic lymph node mapping.  Bilateral round ligaments were cauterized and divided with the monopolar scissors and the bilateral pelvic side-wall retroperitoneum entered and developed. Bilateral ureters were identified and preserved. There was significant adiposity within the abdomen. Bilateral iliac vessels, superior vesicle artery, and obturator nerve were identified and preserved. [place] lymph node mapping identified the above sentinel lymph nodes. Bilateral sentinel lymph nodes were skeletonized and removed and sent for permanent pathology. Bilateral infundibulopelvic ligaments were skeletonized, then sealed with bipolar electrocautery and divided with monopolar scissors. Dissection was continued inferiorly along the broad ligament and the bladder dissected off the lower uterine segment and cervix. There were significant adhesions along this area that required significant dissection, thus a 22 modifier will be added as to the increased difficulty. Bilateral uterine arteries were skeletonized, then cauterized with bipolar electrocautery and divided using monopolar scissors. A circumferential colpotomy was then carried along the V-care. The specimen of uterus, cervix, and bilateral tubes/ovaries was then removed via the vagina and sent for permanent pathology. The vaginal colpotomy was closed with 0-Vicryl V-lock suture using a ezio-suture cut in arm #3. The bladder was also backfilled to ensure that there was no defects along the bladder muscle wall. The bladder had no injuries present after filling with 120cc. The pelvis was irrigated and made hemostatic. The intra-abdominal pressure was then decreased and all planes of dissection were confirmed hemostatic. FloSeal was placed along all dissection planes. The insufflation was released prior to removal of all port sites. All skin incisions were closed with 4-0 monocryl subcuticular stitch. Skin glue was applied to all skin incisions. The vagina was then inspected and the vaginal cuff was intact.  There was a small 1st degree laceration at 9 o'clock at the level of the vaginal introitus. This area was re-approximated with 2-0 Vicryl in a figure-of-8 fashion. Silver nitrate was also applied to a small laceration at 3 o'clock. The patient was taken out of stirrups, awakened from anesthesia, and taken to the recovery room in stable condition. The patient tolerated the procedure well. All instrument, sponge, and needle counts were correct.         Deepika Emanuel MD  2/20/2020  11:07 AM

## 2020-02-20 NOTE — BRIEF OP NOTE
BRIEF OPERATIVE NOTE    Date of Procedure: 2020   Preoperative Diagnosis: ENDOMETRIAL CANCER  Postoperative Diagnosis: ENDOMETRIAL CANCER    Procedure(s):  ROBOTIC ASSISTED TOTAL LAPAROSCOPIC HYSTERECTOMY, BILATERAL SALPINGO OOPHERECTOMY, SENTINEL LYMPH NODE MAPPING AND BIOPSY  Surgeon(s) and Role:     * Krista Carroll MD - Primary         Surgical Assistant: Talib Nina PA-C      Surgical Staff:  Circ-1: Gagan Gonzalez RN  Circ-Relief: Omid Chopra RN  Physician Assistant: Ty Villegas PA-C  Scrub Tech-1: Jesus Bell  Scrub RN-1: Shasta Nicolas RN  Event Time In Time Out   Incision Start 9233    Incision Close 1056      Anesthesia: General   Estimated Blood Loss: <25cc  Specimens:   ID Type Source Tests Collected by Time Destination   1 : RIGHT PELVIC SENTINEL LYMPH NODES Fresh Lymph Node  Krista Carroll MD 2020 6411 Pathology   2 : LEFT PELVIC SENTINEL LYMPH NODES Fresh Lymph Node  Krista Carroll MD 2020 5816 Pathology   3 : UTERUS, CERVIX, BILATERAL FALLOPIAN TUBES AND OVARIES Fresh Uterus with Bilateral Fallopian tubes and Ovaries  Krista Carroll MD 2020 1008 Pathology   1 : PELVIC WASHINGS Fresh Pelvis  Krista Carroll MD 2020 7811 Cytology      Findings: On laparoscopic survey, the uterus and bilateral tubes are normal appearing. The uterus was significantly adhered to the lower uterine segment and cervix secondary to her history of a . Normal appearing liver with some adhesions on the right side along the anterior abdominal wall. Normal appearing rectosigmoid colon with physiologic adhesions to the left pelvic side-wall. Normal appearing omentum and small bowel.    Complications: none  Implants: * No implants in log *    Edgar Saldiavr MD

## 2020-02-20 NOTE — ANESTHESIA PREPROCEDURE EVALUATION
Relevant Problems   No relevant active problems       Anesthetic History   No history of anesthetic complications            Review of Systems / Medical History  Patient summary reviewed, nursing notes reviewed and pertinent labs reviewed    Pulmonary  Within defined limits      Sleep apnea: CPAP           Neuro/Psych   Within defined limits           Cardiovascular  Within defined limits  Hypertension        Dysrhythmias       Exercise tolerance: >4 METS     GI/Hepatic/Renal  Within defined limits   GERD           Endo/Other  Within defined limits      Morbid obesity, arthritis and cancer     Other Findings              Physical Exam    Airway  Mallampati: II  TM Distance: > 6 cm  Neck ROM: normal range of motion   Mouth opening: Normal     Cardiovascular  Regular rate and rhythm,  S1 and S2 normal,  no murmur, click, rub, or gallop             Dental  No notable dental hx       Pulmonary  Breath sounds clear to auscultation               Abdominal  GI exam deferred       Other Findings            Anesthetic Plan    ASA: 3  Anesthesia type: general          Induction: Intravenous  Anesthetic plan and risks discussed with: Patient

## 2020-02-20 NOTE — DISCHARGE INSTRUCTIONS
[facility] [address], [address]  P [phone]  F [phone]     Duglas Robertson      Dear Ms. Kari FERRIS Jasperruth,      Please review your instructions with your nurse and ask any questions so you have all the information you need to recover well at home. If you do not feel you have everything you need to succeed and be safe after you leave the hospital, please discuss these concerns with your nurse. As always, call for any questions at home. Your doctor: Dr. Maximino Merino  Diagnosis: ENDOMETRIAL CANCER  Procedure: Procedure(s):  ROBOTIC ASSISTED TOTAL LAPAROSCOPIC HYSTERECTOMY, BILATERAL SALPINGO OOPHERECTOMY, SENTINEL LYMPH NODE MAPPING AND BIOPSY  Date of Discharge: 2/21/20      Take Home Medications     See Discharge Medication Review provided to you by your nurse. If you did not receive one, request this prior to your discharge. · It is important that you take your medications as they are prescribed. · Keep your medications in the bottles provided by the pharmacist and keep a list of the medication names, dosages, times to be taken and what they are for in your wallet. · Do not take other medications without consulting your doctor. · If you are prescribed enoxaparin (Lovenox) and are taking a baby aspirin daily, please hold this medication until your course of enoxaparin is completed. · If you no longer need your prescribed pain medication, you may take Tylenol or Aleve alone. · You should take a daily gentle stool softener such as a colace pill or dulcolax suppository for constipation as this is not uncommon after surgery and/or while on pain medication. If not prescribed, this can be found over the counter. If constipation persists for >24 hours you should take a dose of Milk of Magnesia. Call if your constipation continues. Diet    · Stay hydrated and drink fluids such as gatorade and water.  This will also help prevent constipation and dehydration. Limit somewhat any usual caffeine intake of beverages such as soda, tea and coffee as this may serve to dehydrate you. · For the first 2-3 days keep a low fiber diet avoiding raw vegetables or fruits with skin. A diet consisting of soup, cereal, yogurt, eggs, fish, Boost/Ensure. Avoid fatty/greasy foods. · If nauseated, keep your diet limited to liquids and call if this persists. Activity    · If possible, have someone with you at all times until you feel stable. · Gradually increase your activity each day. There are generally no restrictions on walking, climbing stairs or riding in a car. Try to walk at least 4 times per day. · Showers are okay. If you have an incision, no tub bathing/swimming for two weeks. · No driving for 2 week and/or while on pain medication. · There are no lifting restrictions if you did not have surgery. · If you had surgery, the following restrictions are in place:  1. If you had a laparoscopic procedure, no lifting greater than 25 lbs for 3 weeks. 2. If you had an open procedure, no heavy lifting greater than 15 lbs for 8 weeks. 3. If you had a hysterectomy, nothing per vagina for 6-8 weeks. 4. If you had any type of vaginal procedure, you may experience vaginal spotting. Should the bleeding require more that 4 pads a day please call our office. Incision    · You should expect some discomfort in the area of your incision, particularly as you increase your activity. If you notice an area of increasing redness or new drainage, please call your doctor. · Staples are generally removed in 10-14 days. · Many incisions will have buried absorbable sutures, which do not need to be removed and are covered by protective glue. This will come off over time. Causes For Concern    If any of the following occur, please call our office and speak with the Nurse/aid who will help you with your problem or ask the doctor to call you.      Problems with the incision, including increasing pain, swelling, redness or drainage.  Inability to pass urine    Increasing abdominal pain despite medication   Persisting nausea or vomiting.  Fever or chills and a temperature >101F   Constipation (no bowel movement for three days).  Diarrhea (more than three watery stools within 24 hours).  Excessive vaginal or wound bleeding.  If after hours and you cannot reach an on-call physician, call 911. Follow-Up    Call [phone] to schedule an appointment with Dr. Jesus Alberto Lambert in 6 weeks. Information obtained by :  I understand that if any problems occur once I am at home I am to contact my physician. I understand and acknowledge receipt of the instructions indicated above.                                                                                                                                            Physician's or R.N.'s Signature                                                                  Date/Time                                                                                                                                              Patient or Representative Signature                                                          Date/Time

## 2020-02-21 VITALS
OXYGEN SATURATION: 96 % | DIASTOLIC BLOOD PRESSURE: 70 MMHG | BODY MASS INDEX: 37.07 KG/M2 | RESPIRATION RATE: 18 BRPM | WEIGHT: 242 LBS | HEART RATE: 74 BPM | SYSTOLIC BLOOD PRESSURE: 124 MMHG | TEMPERATURE: 98.5 F

## 2020-02-21 LAB
ANION GAP SERPL CALC-SCNC: 2 MMOL/L (ref 5–15)
BUN SERPL-MCNC: 23 MG/DL (ref 6–20)
BUN/CREAT SERPL: 19 (ref 12–20)
CALCIUM SERPL-MCNC: 10.1 MG/DL (ref 8.5–10.1)
CHLORIDE SERPL-SCNC: 106 MMOL/L (ref 97–108)
CO2 SERPL-SCNC: 28 MMOL/L (ref 21–32)
CREAT SERPL-MCNC: 1.24 MG/DL (ref 0.55–1.02)
ERYTHROCYTE [DISTWIDTH] IN BLOOD BY AUTOMATED COUNT: 13.2 % (ref 11.5–14.5)
GLUCOSE SERPL-MCNC: 100 MG/DL (ref 65–100)
HCT VFR BLD AUTO: 31.3 % (ref 35–47)
HGB BLD-MCNC: 10.1 G/DL (ref 11.5–16)
MCH RBC QN AUTO: 30.1 PG (ref 26–34)
MCHC RBC AUTO-ENTMCNC: 32.3 G/DL (ref 30–36.5)
MCV RBC AUTO: 93.2 FL (ref 80–99)
NRBC # BLD: 0 K/UL (ref 0–0.01)
NRBC BLD-RTO: 0 PER 100 WBC
PLATELET # BLD AUTO: 201 K/UL (ref 150–400)
PMV BLD AUTO: 10 FL (ref 8.9–12.9)
POTASSIUM SERPL-SCNC: 4 MMOL/L (ref 3.5–5.1)
RBC # BLD AUTO: 3.36 M/UL (ref 3.8–5.2)
SODIUM SERPL-SCNC: 136 MMOL/L (ref 136–145)
WBC # BLD AUTO: 12.4 K/UL (ref 3.6–11)

## 2020-02-21 PROCEDURE — 74011250636 HC RX REV CODE- 250/636: Performed by: PHYSICIAN ASSISTANT

## 2020-02-21 PROCEDURE — 85027 COMPLETE CBC AUTOMATED: CPT

## 2020-02-21 PROCEDURE — 99218 HC RM OBSERVATION: CPT

## 2020-02-21 PROCEDURE — 36415 COLL VENOUS BLD VENIPUNCTURE: CPT

## 2020-02-21 PROCEDURE — 74011250637 HC RX REV CODE- 250/637: Performed by: PHYSICIAN ASSISTANT

## 2020-02-21 PROCEDURE — 80048 BASIC METABOLIC PNL TOTAL CA: CPT

## 2020-02-21 RX ADMIN — ACETAMINOPHEN 975 MG: 325 TABLET ORAL at 00:04

## 2020-02-21 RX ADMIN — Medication 10 ML: at 05:53

## 2020-02-21 RX ADMIN — TRAMADOL HYDROCHLORIDE 50 MG: 50 TABLET, FILM COATED ORAL at 10:08

## 2020-02-21 RX ADMIN — DOCUSATE SODIUM 200 MG: 100 CAPSULE, LIQUID FILLED ORAL at 09:58

## 2020-02-21 RX ADMIN — ATENOLOL 50 MG: 50 TABLET ORAL at 09:58

## 2020-02-21 RX ADMIN — BUPROPION HYDROCHLORIDE 150 MG: 150 TABLET, EXTENDED RELEASE ORAL at 09:58

## 2020-02-21 RX ADMIN — ACETAMINOPHEN 975 MG: 325 TABLET ORAL at 12:44

## 2020-02-21 RX ADMIN — SODIUM CHLORIDE, SODIUM LACTATE, POTASSIUM CHLORIDE, AND CALCIUM CHLORIDE 50 ML/HR: 600; 310; 30; 20 INJECTION, SOLUTION INTRAVENOUS at 11:06

## 2020-02-21 RX ADMIN — SODIUM CHLORIDE, SODIUM LACTATE, POTASSIUM CHLORIDE, AND CALCIUM CHLORIDE 50 ML/HR: 600; 310; 30; 20 INJECTION, SOLUTION INTRAVENOUS at 05:44

## 2020-02-21 RX ADMIN — KETOROLAC TROMETHAMINE 15 MG: 30 INJECTION, SOLUTION INTRAMUSCULAR at 05:53

## 2020-02-21 NOTE — PROGRESS NOTES
Bedside and Verbal shift change report given to SHWETA Dave RN (oncoming nurse) by Jann Colbert RN (offgoing nurse). Report included the following information SBAR, Kardex, Intake/Output, MAR and Accordion. 1400  Discharged instructions given and reviewed with patient. All questions answered. Rxs given.  and daughter here to take patient home.

## 2020-02-21 NOTE — PROGRESS NOTES
Bedside and Verbal shift change report given to BAKARI Mccann RN (oncoming nurse) by Esperanza Anderson RN (offgoing nurse). Report included the following information SBAR, Kardex, OR Summary, Procedure Summary, Intake/Output, MAR, Accordion and Recent Results.

## 2020-02-24 ENCOUNTER — PATIENT OUTREACH (OUTPATIENT)
Dept: INTERNAL MEDICINE CLINIC | Age: 65
End: 2020-02-24

## 2020-02-24 NOTE — PROGRESS NOTES
Hospital Discharge Follow-Up      Date/Time:  2020 9:39 AM  Jeannette Mack RN  Care Transitions Nurse  [address] [place] [address] Ambulatory Care Coordination Team  [phone]      Patient was admitted to [facility] on 2020 and discharged on 2020 for scheduled laparoscopic hysterectomy . The physician discharge summary was available at the time of outreach. Patient was contacted within 1 business days of discharge. Top Challenges reviewed with the provider     -creatinine 1.24    Advance Care Planning:   Does patient have an Advance Directive:  reviewed and current        Method of communication with provider :chart routing    Was this a readmission? no   Patient stated reason for the readmission: n/a    Care Transition Nurse (CTN) contacted the patient by telephone to perform post hospital discharge assessment. Verified name and  with patient as identifiers. Provided introduction to self, and explanation of the CTN role. Patient received hospital discharge instructions. CTN reviewed discharge instructions and red flags with patient who verbalized understanding. Patient given an opportunity to ask questions and does not have any further questions or concerns at this time. The patient agrees to contact the PCP office for questions related to their healthcare. CTN provided contact information for future reference. Disease Specific:   N/A    Patients top risk factors for readmission:  none noted at this time.     Home Health orders at discharge: [address]: n/a  Date of initial visit: [facility] ordered at discharge: none  [address] received: n/a    Medication(s):   New Medications at Discharge:   START taking:  acetaminophen 325 mg tablet (TYLENOL)  docusate sodium 100 mg capsule (Colace)  ibuprofen 200 mg tablet (MOTRIN)  traMADol 50 mg tablet (ULTRAM)  Changed Medications at Discharge: none  Discontinued Medications at Discharge: none    Medication reconciliation was performed with patient, who verbalizes understanding of administration of home medications. There were no barriers to obtaining medications identified at this time. Referral to Pharm D needed: no     Current Outpatient Medications   Medication Sig    acetaminophen (TYLENOL) 325 mg tablet Take 2 Tabs by mouth every four (4) hours as needed for Pain for up to 4 days. Indications: pain    docusate sodium (COLACE) 100 mg capsule Take 1 Cap by mouth two (2) times a day. Hold for loose stools.  ibuprofen (MOTRIN) 200 mg tablet Take 2 Tabs by mouth every eight (8) hours as needed for Pain.  ascorbic acid, vitamin C, (VITAMIN C) 500 mg tablet Take 500 mg by mouth daily (after breakfast).  omeprazole (PRILOSEC) 20 mg capsule Take 1 Cap by mouth daily. (Patient taking differently: Take 20 mg by mouth nightly.)    risperiDONE (RISPERDAL) 2 mg tablet TAKE 1 TABLET NIGHTLY AS NEEDED    losartan (COZAAR) 100 mg tablet Take 1 Tab by mouth daily. (Patient taking differently: Take 100 mg by mouth daily (after breakfast). )    atenolol (TENORMIN) 50 mg tablet TAKE 1 TABLET BY MOUTH EVERY DAY (Patient taking differently: daily (after breakfast). TAKE 1 TABLET BY MOUTH EVERY DAY)    atorvastatin (LIPITOR) 20 mg tablet TAKE 1 TABLET BY MOUTH EVERY DAY (Patient taking differently: daily (after breakfast). )    buPROPion XL (WELLBUTRIN XL) 300 mg XL tablet TAKE 1 TABLET BY MOUTH EVERY MORNING    Cholecalciferol, Vitamin D3, (VITAMIN D3) 2,000 unit cap capsule Take  by mouth daily (after breakfast).  multivitamin, stress formula (STRESS TAB) tablet Take 1 Tab by mouth daily.  vitamin e (E GEMS) 100 unit capsule Take 400 Units by mouth daily.  aspirin delayed-release 81 mg tablet Take 81 mg by mouth daily. No current facility-administered medications for this visit. There are no discontinued medications.     [facility] follow up appointment(s):   Future Appointments   Date Time Provider Mady Tejada   2/28/2020 11:30 AM Monica Souza MD [address]   3/26/2020 10:30 AM Monica Souza MD [address]   3/31/2020 10:30 AM Jorge Diana MD [address]   5/11/2020 11:00  [facility], [address] [facility]   5/19/2020 10:40 AM Vesna Posadas  [address]   12/15/2020 10:40 AM Nancy Hall  [address]      Non-[facility] follow up appointment(s): none  [facility]:  n/a       Goals      Attends follow-up appointments as directed. 2/24/2020  -will call GYN to schedule follow up.  -will follow up with PCP for ZACK on 2/28/2020  -CTN to follow up in 1 week  Estelle MARION       Prevent complications post hospitalization. 2/24/2020  -reports no BM in 4 days.  On senna and colace  -patient advised to take Miralax 1 packet daily, increase mobility and increase fluid intake.  -will call in 2 days to check results  Srinivasa MARION

## 2020-02-28 ENCOUNTER — OFFICE VISIT (OUTPATIENT)
Dept: INTERNAL MEDICINE CLINIC | Age: 65
End: 2020-02-28

## 2020-02-28 ENCOUNTER — HOSPITAL ENCOUNTER (OUTPATIENT)
Dept: LAB | Age: 65
Discharge: HOME | End: 2020-02-28
Payer: MEDICARE

## 2020-02-28 VITALS
RESPIRATION RATE: 16 BRPM | BODY MASS INDEX: 34.98 KG/M2 | HEART RATE: 62 BPM | HEIGHT: 69 IN | WEIGHT: 236.2 LBS | OXYGEN SATURATION: 97 % | TEMPERATURE: 97.9 F | SYSTOLIC BLOOD PRESSURE: 115 MMHG | DIASTOLIC BLOOD PRESSURE: 68 MMHG

## 2020-02-28 DIAGNOSIS — C54.1 ENDOMETRIAL CANCER (HCC): Primary | ICD-10-CM

## 2020-02-28 DIAGNOSIS — D64.9 ANEMIA, UNSPECIFIED TYPE: ICD-10-CM

## 2020-02-28 DIAGNOSIS — N17.9 AKI (ACUTE KIDNEY INJURY) (HCC): ICD-10-CM

## 2020-02-28 PROCEDURE — 80048 BASIC METABOLIC PNL TOTAL CA: CPT

## 2020-02-28 PROCEDURE — 36415 COLL VENOUS BLD VENIPUNCTURE: CPT

## 2020-02-28 PROCEDURE — 85025 COMPLETE CBC W/AUTO DIFF WBC: CPT

## 2020-02-28 NOTE — PATIENT INSTRUCTIONS
Office Policies    Phone calls/patient messages:            Please allow up to 24 hours for someone in the office to contact you about your call or message. Be mindful your provider may be out of the office or your message may require further review. We encourage you to use [email or web address] for your messages as this is a faster, more efficient way to communicate with our office                         Medication Refills:            Prescription medications require 48-72 business hours to process. We encourage you to use [email or web address] for your refills. For controlled medications: Please allow 72 business hours to process. Certain medications may require you to  a written prescription at our office. NO narcotic/controlled medications will be prescribed after 4pm Monday through Friday or on weekends              Form/Paperwork Completion:            Please note a $25 fee may incur for all paperwork for completed by our providers. We ask that you allow 7-10 business days. Pre-payment is due prior to picking up/faxing the completed form. You may also download your forms to [email or web address] to have your doctor print off.      1. Have you been to the ER, urgent care clinic since your last visit? Hospitalized since your last visit? [facility]'s    2. Have you seen or consulted any other health care providers outside of the [address] [place] [address] since your last visit? Include any pap smears or colon screening.  no

## 2020-02-28 NOTE — PROGRESS NOTES
SUBJECTIVE  Ms. Adry Lozano presents today acutely for     Chief Complaint   Patient presents with    Surgical Follow-up     pt here today f.u fr hysterectomy      She was hospitalized - for surgery for endometrial cancer:     HISTORY OF PRESENT ILLNESS:  Ms. Adry Lozano is a 77 y.o.  postmenopausal female who presents in consultation from Dr. Ronny Cooney for FIGO Grade 1 endometrial cancer.     The patient first reports vaginal spotting/bleeding in 2019. She was ultimately referred to Dr. Ronny Cooney by her PCP. On 2020 underwent hysteroscopy/D&C with final pathology consistent with FIGO Grade 1 endometrial cancer. Reports some spotting since her surgery. History of urinary incontinence for which she wears a pad. Denies hematuria or hematochezia. Denies change in appetite or bowel habits, nausea, vomiting, diarrhea, CP, SOB, fevers, or chills. Reports long history of constipation for which she uses senna S.      Pertinent PMH/PSH: h/o , obesity, HTN, INDER on CPAP      Active, no restrictions.      Pathology Review:   2020:  Endometrium and polyp, curetting:  Well differentiated endometrioid adenocarcinoma, FIGO grade 1 in a background of extensive atypical complex hyperplasia. OP note:   Robotic-assisted total laparoscopic hysterectomy,   Bilateral salpingo-oophorectomy,   Bilateral pelvic sentinel lymph node mapping and biopsy    From last progress note:   POD #1 s/p RA-TLH/BSO/SLND. Doing well this morning. Low urine output overnight, only 200cc over 10 hours. Pain controlled. Denies n/v. Nifante still in. Now, she is doing well. No SOB. Pain controlled. For a few days she was getting headaches, relieved with tylenol. She had to take some mag citrate for no BM in 5 days, and \"the next day I had diarrhea all day. \" Now on probiotics. She has just bought miralax. She has appt with Dr. Shasta Reddy on 3/30.  She was told that she doesn't have sutures than need to come out (closed with adhesives. Past Medical History:   Diagnosis Date    Arthritis     OSTEO    Cancer [facility]) 2005    skin cancer removed from nose    Depression 1/9/2014    Endometrial cancer ([address] 2020    GERD (gastroesophageal reflux disease)     Hyperlipidemia     Hypertension     Left bundle branch block 2013    Neuropathy, peripheral 4/9/2014    INDER on CPAP 4/8/2014    compliant     Current Outpatient Medications on File Prior to Visit   Medication Sig Dispense Refill    ascorbic acid, vitamin C, (VITAMIN C) 500 mg tablet Take 500 mg by mouth daily (after breakfast).  omeprazole (PRILOSEC) 20 mg capsule Take 1 Cap by mouth daily. (Patient taking differently: Take 20 mg by mouth nightly.) 90 Cap 1    risperiDONE (RISPERDAL) 2 mg tablet TAKE 1 TABLET NIGHTLY AS NEEDED 90 Tab 0    losartan (COZAAR) 100 mg tablet Take 1 Tab by mouth daily. (Patient taking differently: Take 100 mg by mouth daily (after breakfast). ) 30 Tab 11    atenolol (TENORMIN) 50 mg tablet TAKE 1 TABLET BY MOUTH EVERY DAY (Patient taking differently: daily (after breakfast). TAKE 1 TABLET BY MOUTH EVERY DAY) 90 Tab 2    atorvastatin (LIPITOR) 20 mg tablet TAKE 1 TABLET BY MOUTH EVERY DAY (Patient taking differently: daily (after breakfast). ) 90 Tab 2    buPROPion XL (WELLBUTRIN XL) 300 mg XL tablet TAKE 1 TABLET BY MOUTH EVERY MORNING 90 Tab 2    Cholecalciferol, Vitamin D3, (VITAMIN D3) 2,000 unit cap capsule Take  by mouth daily (after breakfast).  multivitamin, stress formula (STRESS TAB) tablet Take 1 Tab by mouth daily.  vitamin e (E GEMS) 100 unit capsule Take 400 Units by mouth daily.  aspirin delayed-release 81 mg tablet Take 81 mg by mouth daily.  docusate sodium (COLACE) 100 mg capsule Take 1 Cap by mouth two (2) times a day. Hold for loose stools. 40 Cap 1    ibuprofen (MOTRIN) 200 mg tablet Take 2 Tabs by mouth every eight (8) hours as needed for Pain.  40 Tab 0     No current facility-administered medications on file prior to visit. Complete review of systems was performed and is otherwise unremarkable except as noted elsewhere. OBJECTIVE  Visit Vitals  /68 (BP 1 Location: Left arm, BP Patient Position: Sitting)   Pulse 62   Temp 97.9 °F (36.6 °C) (Oral)   Resp 16   Ht 5' 8.5\" (1.74 m)   Wt 236 lb 3.2 oz (107.1 kg)   SpO2 97%   BMI 35.39 kg/m²     Gen: Pleasant 77 y.o.  female in NAD.   HEENT: PERRLA. EOMI. OP moist and pink.  Neck: Supple.  No LAD.  HEART: RRR, No M/G/R.   LUNGS: CTAB No W/R.   ABDOMEN: S, NT, ND, BS+.   EXTREMITIES: Warm. No C/C/E. MUSCULOSKELETAL: Normal ROM, muscle strength 5/5 all groups. NEURO: Alert and oriented x 3.  Cranial nerves grossly intact.  No focal sensory or motor deficits noted. SKIN: She has lap surgery sits healing well. Appear to have been closed with adhesive. ASSESSMENT / PLAN    1. Endometrial cancer ([facility]): S/P SHAHIDA / BSO with sentinal LN biopsy. Per Dr. Restrepo Case. 2. Anemia, unspecified type: Mild. -     METABOLIC PANEL, BASIC  -     CBC WITH AUTOMATED DIFF    3. MANN (acute kidney injury) ([facility] [address]):   -     METABOLIC PANEL, BASIC  -     CBC WITH AUTOMATED DIFF    Follow up otherwise as planned. I have reviewed with the patient details of the assessment and plan and all questions were answered. Relevant patient education was performed.

## 2020-02-29 LAB
BASOPHILS # BLD AUTO: 0.1 X10E3/UL (ref 0–0.2)
BASOPHILS NFR BLD AUTO: 1 %
BUN SERPL-MCNC: 13 MG/DL (ref 8–27)
BUN/CREAT SERPL: 12 (ref 12–28)
CALCIUM SERPL-MCNC: 11.4 MG/DL (ref 8.7–10.3)
CHLORIDE SERPL-SCNC: 105 MMOL/L (ref 96–106)
CO2 SERPL-SCNC: 27 MMOL/L (ref 20–29)
CREAT SERPL-MCNC: 1.13 MG/DL (ref 0.57–1)
EOSINOPHIL # BLD AUTO: 0.3 X10E3/UL (ref 0–0.4)
EOSINOPHIL NFR BLD AUTO: 4 %
ERYTHROCYTE [DISTWIDTH] IN BLOOD BY AUTOMATED COUNT: 12.1 % (ref 11.7–15.4)
GLUCOSE SERPL-MCNC: 85 MG/DL (ref 65–99)
HCT VFR BLD AUTO: 31.7 % (ref 34–46.6)
HGB BLD-MCNC: 10.8 G/DL (ref 11.1–15.9)
IMM GRANULOCYTES # BLD AUTO: 0 X10E3/UL (ref 0–0.1)
IMM GRANULOCYTES NFR BLD AUTO: 0 %
LYMPHOCYTES # BLD AUTO: 1 X10E3/UL (ref 0.7–3.1)
LYMPHOCYTES NFR BLD AUTO: 14 %
MCH RBC QN AUTO: 30.4 PG (ref 26.6–33)
MCHC RBC AUTO-ENTMCNC: 34.1 G/DL (ref 31.5–35.7)
MCV RBC AUTO: 89 FL (ref 79–97)
MONOCYTES # BLD AUTO: 0.9 X10E3/UL (ref 0.1–0.9)
MONOCYTES NFR BLD AUTO: 12 %
NEUTROPHILS # BLD AUTO: 4.9 X10E3/UL (ref 1.4–7)
NEUTROPHILS NFR BLD AUTO: 69 %
PLATELET # BLD AUTO: 347 X10E3/UL (ref 150–450)
POTASSIUM SERPL-SCNC: 4.7 MMOL/L (ref 3.5–5.2)
RBC # BLD AUTO: 3.55 X10E6/UL (ref 3.77–5.28)
SODIUM SERPL-SCNC: 143 MMOL/L (ref 134–144)
WBC # BLD AUTO: 7 X10E3/UL (ref 3.4–10.8)

## 2020-03-04 ENCOUNTER — TELEPHONE (OUTPATIENT)
Dept: GYNECOLOGY | Age: 65
End: 2020-03-04

## 2020-03-06 ENCOUNTER — TELEPHONE (OUTPATIENT)
Dept: GYNECOLOGY | Age: 65
End: 2020-03-06

## 2020-03-06 NOTE — TELEPHONE ENCOUNTER
I called Ms. Kari Olivas to discuss her pathology per below consistent with Stage Ib, FIGO Grade 1 endometrial cancer. She is healing well. I discussed adjuvant VBT. We will discuss this further at her postoperative visit and will place referrals at that time. She was thankful for our call. Agusto Mistry MD          * * *FINAL PATHOLOGIC DIAGNOSIS* * *     1. Lymph node, right pelvic, sentinel node excision:       No evidence for malignancy in one node (0/1). See comment. 2. Lymph node, left pelvic, sentinel node excision:       No evidence for malignancy in one node (0/1). See comment. 3. Uterus, cervix, fallopian tubes, ovaries, hysterectomy,  salpingo-oophorectomy:       Cervix: No evidence for dysplasia or malignancy.      Endometrium: Endometrioid adenocarcinoma, FIGO grade 1.  See synoptic  report       Myometrium: Invasive adenocarcinoma.         Leiomyoma.       Adenomyosis.      Fallopian tubes: No histopathologic abnormality.       Ovaries: Benign physiologic changes.     Synoptic report - endometrium       SPECIMEN            Procedure: Hysterectomy and salpingo-oophorectomy.              Lymph Node Sampling: Bilateral pelvic lymph nodes.            Specimen Integrity: Intact.         TUMOR            Histologic Type: Endometrioid Adenocarcinoma              Histologic Grade: Grade 1            Tumor Extent:                 Myometrial Invasion: Present.                      Depth of invasion: 8 mm                      Myometrial thickness: 15 mm                 Cervical stromal involvement: Not Identified.                 Extent of Involvement of Other Organs: Not identified.            Accessory Tumor Findings                 Lymph-Vascular Invasion: Not identified       LYMPH NODES            Number of Pelvic Nodes with Macrometastasis (>2 mm): 0            Number of Pelvic Nodes with Micrometastasis (>0.2 mm <= 2 mm): 0            Number of Pelvic Nodes with Isolated Tumor Cells (0.2 mm or  less): 0                 Total Number of Pelvic Nodes Examined (sentinel and  non-sentinel): 2            Number of Pelvic [place] Nodes Examined: 2    Pathologic Stage       Primary Tumor (pT): pT1b         Regional Lymph Nodes (pN): pN0         * * *Comment* * *    Immunohistochemistry stains with antibodies directed against pan keratin  are negative in the sentinel lymph nodes from parts #1 and #2.

## 2020-03-10 ENCOUNTER — PATIENT OUTREACH (OUTPATIENT)
Dept: INTERNAL MEDICINE CLINIC | Age: 65
End: 2020-03-10

## 2020-03-10 NOTE — PROGRESS NOTES
Goals      Attends follow-up appointments as directed. 3/10/2020  -attended ZACK with PCP. Will follow up again 3/26/2020  -will attend surgical follow up 3/31/2020    2/24/2020  -will call GYN to schedule follow up.  -will follow up with PCP for ZACK on 2/28/2020  -CTN to follow up in 1 week  Estelle MARION       Prevent complications post hospitalization. 3/10/2020  -taking miralax. LBM 3/9/2020  2/24/2020  -reports no BM in 4 days.  On senna and colace  -patient advised to take Miralax 1 packet daily, increase mobility and increase fluid intake.  -will call in 2 days to check results  Tony MARION

## 2020-03-23 ENCOUNTER — TELEPHONE (OUTPATIENT)
Dept: INTERNAL MEDICINE CLINIC | Age: 65
End: 2020-03-23

## 2020-03-25 ENCOUNTER — VIRTUAL VISIT (OUTPATIENT)
Dept: INTERNAL MEDICINE CLINIC | Age: 65
End: 2020-03-25

## 2020-03-25 DIAGNOSIS — D64.9 ANEMIA, UNSPECIFIED TYPE: ICD-10-CM

## 2020-03-25 DIAGNOSIS — N18.30 STAGE 3 CHRONIC KIDNEY DISEASE (HCC): ICD-10-CM

## 2020-03-25 DIAGNOSIS — C54.1 ENDOMETRIAL CANCER (HCC): Primary | ICD-10-CM

## 2020-03-25 DIAGNOSIS — G62.9 PERIPHERAL POLYNEUROPATHY: ICD-10-CM

## 2020-03-25 NOTE — PROGRESS NOTES
Timi Headley is a 77 y.o. female evaluated via telephone on 3/25/2020. Consent:  She and/or health care decision maker is aware that that she may receive a bill for this telephone service, depending on her insurance coverage, and has provided verbal consent to proceed: Yes      Documentation:  I communicated with the patient and/or health care decision maker about routine health issues. Details of this discussion including any medical advice provided:     SUBJECTIVE:   Ms. Timi Headley is a 77 y.o. female, visited by phone. Her  is my patient [place] Olivas. Chief Complaint   Patient presents with    Hypertension     pt here today for a televisit with Dr Zan Burns for routine f.u and discuss lab work       Hysterectomy Feb 20, doing well. \"I just have to get used to not lifting over 10 lb. \"     Otherwise doing okay. Sees gynecologist yearly. Sees Dr. Octavio Lara currently (previously Dr. Ashlee Dodd). Has had mammogram.  Had dexa. L ankle pain \"still bothers me at times. \"  It is recalled that she saw orthopedist for L ankle pain. \"She wanted me to wear a device\", that was expensive. The pain got better with new shoes. Hurts if sits at desk too long. Neuropathy: Unchanged numbness and tingling in feet. It is recalled that she has had high blood sugars, as well as an A1C that was up. This is doing fine. Last A1C 5.8 in Feb.     Palpitations not discussed today. Saw Dr. Louis Canela in 2013. We noted in 2014: She was having palpitations and saw Dr. Margie Dockery in January. She had EKG, echo and stress test--\"I think everything is okay. \"  The palpitations went away since getting treated for INDER. She is on CPAP for INDER. Mood okay, stress better. \"I have to admit I'm back in a bit of a funk. \" As noted before: \"I have been depressed since I was a teenager. \"  \"I tried to go off the meds, but I got down in the dumps. \"  Back on meds. No SI at this time.      At this time, she is otherwise doing well and has brought no other complaints to my attention today. For a list of the medical issues addressed today, see the assessment and plan below. PMH:   Past Medical History:   Diagnosis Date    Arthritis     OSTEO    Cancer [facility]) 2005    skin cancer removed from nose    Depression 2014    Endometrial cancer ([address]     GERD (gastroesophageal reflux disease)     Hyperlipidemia     Hypertension     Left bundle branch block     Neuropathy, peripheral 2014    INDER on CPAP 2014    compliant       Past Surgical History:   Procedure Laterality Date    COLONOSCOPY  2010     Dr. Cleary Angie        HX DILATION AND CURETTAGE  2020    HYSTROSCOPY    HX GI      COLONOSCOPY    HX HEENT      BCCA REMOVED FROM NOSE     HX HYSTERECTOMY  2020    HX OTHER SURGICAL      hysteroscopy surg    HX TONSILLECTOMY         All: She is allergic to erythromycin. (rash). Current Outpatient Medications   Medication Sig    ascorbic acid, vitamin C, (VITAMIN C) 500 mg tablet Take 500 mg by mouth daily (after breakfast).  omeprazole (PRILOSEC) 20 mg capsule Take 1 Cap by mouth daily. (Patient taking differently: Take 20 mg by mouth nightly.)    risperiDONE (RISPERDAL) 2 mg tablet TAKE 1 TABLET NIGHTLY AS NEEDED    losartan (COZAAR) 100 mg tablet Take 1 Tab by mouth daily. (Patient taking differently: Take 100 mg by mouth daily (after breakfast). )    atenolol (TENORMIN) 50 mg tablet TAKE 1 TABLET BY MOUTH EVERY DAY (Patient taking differently: daily (after breakfast). TAKE 1 TABLET BY MOUTH EVERY DAY)    atorvastatin (LIPITOR) 20 mg tablet TAKE 1 TABLET BY MOUTH EVERY DAY (Patient taking differently: daily (after breakfast). )    buPROPion XL (WELLBUTRIN XL) 300 mg XL tablet TAKE 1 TABLET BY MOUTH EVERY MORNING    Cholecalciferol, Vitamin D3, (VITAMIN D3) 2,000 unit cap capsule Take  by mouth daily (after breakfast).     multivitamin, stress formula (STRESS TAB) tablet Take 1 Tab by mouth daily.  vitamin e (E GEMS) 100 unit capsule Take 400 Units by mouth daily.  aspirin delayed-release 81 mg tablet Take 81 mg by mouth daily. No current facility-administered medications for this visit. FH: Mother  breast cancer. Father  of CHF. SH: Retired insurance . She reports that she has never smoked. She has never used smokeless tobacco. She reports that she does not drink alcohol or use drugs. ROS: See above; Complete ROS otherwise negative. OBJECTIVE:   No vitals or exam. Patient was heard on telephone. Lab Results   Component Value Date/Time    Hemoglobin A1c 5.8 (H) 2020 11:59 AM     Lab Results   Component Value Date/Time    Sodium 143 2020 12:30 PM    Potassium 4.7 2020 12:30 PM    Chloride 105 2020 12:30 PM    CO2 27 2020 12:30 PM    Anion gap 2 (L) 2020 06:08 AM    Glucose 85 2020 12:30 PM    BUN 13 2020 12:30 PM    Creatinine 1.13 (H) 2020 12:30 PM    BUN/Creatinine ratio 12 2020 12:30 PM    GFR est AA 58 (L) 2020 12:30 PM    GFR est non-AA 50 (L) 2020 12:30 PM    Calcium 11.4 (H) 2020 12:30 PM         ASSESSMENT/ PLAN:     Anemia: Mild. ? Chronic disease. Keep an eye on this. CKD stage III: Discussed her labs with her. Hypertension: BP not assessed. Recent readings reviewed. BP Readings from Last 3 Encounters:   20 115/68   20 124/70   20 140/68     - atenolol (TENORMIN) 50 mg tablet; Take 1 Tab by mouth daily.  - METABOLIC PANEL, COMPREHENSIVE  - LIPID PANEL  - CBC WITH AUTOMATED DIFF    Hip pain: Stable, R side, mainly. Can return as desired to Dr. Casimiro Goodman. OTC analgesics. Constipation: Controlled with stool softener. Neuropathy, LE: Stable. Ongoing. Likely arising from her metabolic syndrome / prediabetes. Sometimes neuropathy predates the diagnosis of DM. Neg RPR, ESR, TSH, JUNE, in 2014    Depression: Stable. No SI.   - risperiDONE (RISPERDAL) 2 mg tablet; Take 1 Tab by mouth nightly as needed. - buPROPion XL (WELLBUTRIN XL) 300 mg XL tablet; Take 1 Tab by mouth every morning. Hyperglycemia: \"Prediabetes\" / \"borderline DM\": Q7C 5.8.   - METABOLIC PANEL, COMPREHENSIVE  - HEMOGLOBIN A1C    GERD (gastroesophageal reflux disease): Stable. - omeprazole (PRILOSEC) 20 mg capsule; Take 1 Cap by mouth daily. Hyperlipidemia: Reviewed her labs from October 2019. We'll check again in 6 months. - atorvastatin (LIPITOR) 20 mg tablet; Take 1 Tab by mouth daily.  - LIPID PANEL    INDER on CPAP:    Obesity: stable--work on diet and exercise. Wt Readings from Last 3 Encounters:   02/28/20 236 lb 3.2 oz (107.1 kg)   02/20/20 242 lb (109.8 kg)   02/18/20 240 lb (108.9 kg)       Follow-up and Dispositions    · Return in about 6 months (around 9/25/2020) for HTN. I affirm this is a Patient Initiated Episode with an Established Patient who has not had a related appointment within my department in the past 7 days or scheduled within the next 24 hours.     Total Time: minutes: 11-20 minutes    Note: not billable if this call serves to triage the patient into an appointment for the relevant concern      Fadumo Montano MD

## 2020-04-01 ENCOUNTER — PATIENT OUTREACH (OUTPATIENT)
Dept: INTERNAL MEDICINE CLINIC | Age: 65
End: 2020-04-01

## 2020-04-01 ENCOUNTER — OFFICE VISIT (OUTPATIENT)
Dept: GYNECOLOGY | Age: 65
End: 2020-04-01

## 2020-04-01 VITALS
HEART RATE: 68 BPM | HEIGHT: 69 IN | WEIGHT: 234 LBS | BODY MASS INDEX: 34.66 KG/M2 | DIASTOLIC BLOOD PRESSURE: 83 MMHG | SYSTOLIC BLOOD PRESSURE: 125 MMHG

## 2020-04-01 DIAGNOSIS — E66.01 SEVERE OBESITY (HCC): ICD-10-CM

## 2020-04-01 DIAGNOSIS — C54.1 ENDOMETRIAL CANCER (HCC): Primary | ICD-10-CM

## 2020-04-01 NOTE — PROGRESS NOTES
[address] [facility] [address] [facility]  P [phone]  F [phone]    Office Note  Patient ID:  Name:  Stevan Orlando  MRN:  [id number]  :   y.o. Date:  2020      HISTORY OF PRESENT ILLNESS:  Ms. Stevan Orlando is a 77 y.o. female who on  underwent Robotic-assisted total laparoscopic hysterectomy, Bilateral salpingo-oophorectomy, Bilateral pelvic sentinel lymph node mapping and biopsy. Final pathology consistent with Stage Ib, FIGO Grade 1 endometrial cancer. 8mm out of 15mm invasion. Negative washings. Negative SLNDs. LVSI negative. Presents today for postoperative visit. She is doing well without complaints. Initial History:  Ms. Stevan Orlando is a 77 y.o.  postmenopausal female who presents in consultation from Dr. Saadia Davenport for FIGO Grade 1 endometrial cancer. The patient first reports vaginal spotting/bleeding in 2019. She was ultimately referred to Dr. Saadia Davenport by her PCP. On 2020 underwent hysteroscopy/D&C with final pathology consistent with FIGO Grade 1 endometrial cancer. Reports some spotting since her surgery. History of urinary incontinence for which she wears a pad. Denies hematuria or hematochezia. Denies change in appetite or bowel habits, nausea, vomiting, diarrhea, CP, SOB, fevers, or chills. Reports long history of constipation for which she uses senna S. Pertinent PMH/PSH: h/o , obesity, HTN, NIDER on CPAP      Active, no restrictions. Pathology Review:   :  FINAL PATHOLOGIC DIAGNOSIS   1. Lymph node, right pelvic, sentinel node excision:   No evidence for malignancy in one node (0/1). See comment. 2. Lymph node, left pelvic, sentinel node excision:   No evidence for malignancy in one node (0/1). See comment. 3. Uterus, cervix, fallopian tubes, ovaries, hysterectomy, salpingo-oophorectomy:   Cervix: No evidence for dysplasia or malignancy.    Endometrium: Endometrioid adenocarcinoma, FIGO grade 1. See synoptic report   Myometrium: Invasive adenocarcinoma. Leiomyoma. Adenomyosis. Fallopian tubes: No histopathologic abnormality. Ovaries: Benign physiologic changes. Synoptic report - endometrium   SPECIMEN   Procedure: Hysterectomy and salpingo-oophorectomy. Lymph Node Sampling: Bilateral pelvic lymph nodes. Specimen Integrity: Intact. TUMOR   Histologic Type: Endometrioid Adenocarcinoma   Histologic Grade: Grade 1   Tumor Extent:   Myometrial Invasion: Present. Depth of invasion: 8 mm   Myometrial thickness: 15 mm   Cervical stromal involvement: Not Identified. Extent of Involvement of Other Organs: Not identified. Accessory Tumor Findings   Lymph-Vascular Invasion: Not identified   LYMPH NODES   Number of Pelvic Nodes with Macrometastasis (>2 mm): 0   Number of Pelvic Nodes with Micrometastasis (>0.2 mm <= 2 mm): 0   Number of Pelvic Nodes with Isolated Tumor Cells (0.2 mm or less): 0   Total Number of Pelvic Nodes Examined (sentinel and non-sentinel): 2   Number of Pelvic [place] Nodes Examined: 2   Pathologic Stage   Primary Tumor (pT): pT1b   Regional Lymph Nodes (pN): pN0   Comment   Immunohistochemistry stains       2/5/2020:  Endometrium and polyp, curetting:  Well differentiated endometrioid adenocarcinoma, FIGO grade 1 in a background of extensive atypical complex hyperplasia. ROS:  A comprehensive review of systems was negative except for that written in the History of Present Illness.  , 10 point ROS    OB/GYN ROS:  Per HPI    ECOG ndGndrndanddndend:nd nd2nd Problem List:  Patient Active Problem List    Diagnosis Date Noted    Left bundle branch block 02/18/2020    [address]omet[address] [address] 02/12/2020    Severe obesity ([address] 01/11/2019    Nuclear cataract 02/05/2016    Posterior subcapsular polar senile cataract 02/05/2016    Neuropathy, peripheral 04/09/2014    Hypertension 04/08/2014    Hyperglycemia 04/08/2014    GERD (gastroesophageal reflux disease) 2014    Hyperlipidemia 2014    INDER on CPAP 2014    Depression 2014     PMH:  Past Medical History:   Diagnosis Date    Arthritis     OSTEO    Cancer [facility])     skin cancer removed from nose    Depression 2014    Endometrial cancer ([address]     GERD (gastroesophageal reflux disease)     Hyperlipidemia     Hypertension     Left bundle branch block     Neuropathy, peripheral 2014    INDER on CPAP 2014    compliant      PSH:  Past Surgical History:   Procedure Laterality Date    COLONOSCOPY  2010     Dr. Taisha Quiñones        HX DILATION AND CURETTAGE  2020    HYSTROSCOPY    HX GI      COLONOSCOPY    HX HEENT      BCCA REMOVED FROM NOSE     HX HYSTERECTOMY  2020    HX OTHER SURGICAL      hysteroscopy surg    HX TONSILLECTOMY        Social History:  Social History     Tobacco Use    Smoking status: Never Smoker    Smokeless tobacco: Never Used   Substance Use Topics    Alcohol use: No     Alcohol/week: 0.0 standard drinks      Family History:  Family History   Problem Relation Age of Onset    Heart Disease Mother     Breast Cancer Mother     Heart Disease Father     Kidney Disease Paternal Aunt         renal cancer    Colon Cancer Maternal Aunt     Cancer Paternal Uncle         leukemia    Anesth Problems Neg Hx       Medications: (reviewed)  Current Outpatient Medications   Medication Sig    docusate sodium (COLACE PO) Take  by mouth.  ascorbic acid, vitamin C, (VITAMIN C) 500 mg tablet Take 500 mg by mouth daily (after breakfast).  omeprazole (PRILOSEC) 20 mg capsule Take 1 Cap by mouth daily. (Patient taking differently: Take 20 mg by mouth nightly.)    risperiDONE (RISPERDAL) 2 mg tablet TAKE 1 TABLET NIGHTLY AS NEEDED    losartan (COZAAR) 100 mg tablet Take 1 Tab by mouth daily. (Patient taking differently: Take 100 mg by mouth daily (after breakfast). )    atenolol (TENORMIN) 50 mg tablet TAKE 1 TABLET BY MOUTH EVERY DAY (Patient taking differently: daily (after breakfast). TAKE 1 TABLET BY MOUTH EVERY DAY)    atorvastatin (LIPITOR) 20 mg tablet TAKE 1 TABLET BY MOUTH EVERY DAY (Patient taking differently: daily (after breakfast). )    buPROPion XL (WELLBUTRIN XL) 300 mg XL tablet TAKE 1 TABLET BY MOUTH EVERY MORNING    Cholecalciferol, Vitamin D3, (VITAMIN D3) 2,000 unit cap capsule Take  by mouth daily (after breakfast).  multivitamin, stress formula (STRESS TAB) tablet Take 1 Tab by mouth daily.  vitamin e (E GEMS) 100 unit capsule Take 400 Units by mouth daily.  aspirin delayed-release 81 mg tablet Take 81 mg by mouth daily. No current facility-administered medications for this visit. Allergies: (reviewed)  Allergies   Allergen Reactions    Erythromycin Rash          OBJECTIVE:  Physical Exam:  Visit Vitals  /83 (BP 1 Location: Left arm, BP Patient Position: Sitting)   Pulse 68   Ht 5' 8.5\" (1.74 m)   Wt 234 lb (106.1 kg)   BMI 35.06 kg/m²      General: Alert and oriented. No acute distress. Well-nourished  Gastrointestinal: soft, non-tender, non-distended, no masses or organomegaly. Well-healed port-site incisions. Musculoskeletal: normal gait. No joint tenderness, deformity or swelling. No muscular tenderness. Extremities: extremities normal, atraumatic, no cyanosis or edema. Pelvic: exam chaperoned by nurse. Normal appearing external genitalia. On speculum exam, the vaginal cuff is healing well and intact. On bimanual, the vaginal cuff is intact without defect. No evidence of masses or nodularity on bimanual exam. Deferred rectovaginal exam.   Neuro: Grossly intact. Normal gait and movement. No acute deficit  Skin: No evidence of rashes or skin changes.          IMPRESSION/PLAN:    Ms. Annalisa Bonilla is a 77 y.o. female who on 2/20/202 underwent Robotic-assisted total laparoscopic hysterectomy, Bilateral salpingo-oophorectomy, Bilateral pelvic sentinel lymph node mapping and biopsy. Final pathology consistent with Stage Ib, FIGO Grade 1 endometrial cancer. 8mm out of 15mm invasion. Negative washings. Negative SLNDs. LVSI negative. Problems:     Patient Active Problem List    Diagnosis Date Noted    Left bundle branch block 02/18/2020    Endometrial cancer ([facility] [address] 02/12/2020    Severe obesity ([facility] [address] 01/11/2019    Nuclear cataract 02/05/2016    Posterior subcapsular polar senile cataract 02/05/2016    Neuropathy, peripheral 04/09/2014    Hypertension 04/08/2014    Hyperglycemia 04/08/2014    GERD (gastroesophageal reflux disease) 04/08/2014    Hyperlipidemia 04/08/2014    INDER on CPAP 04/08/2014    Depression 01/09/2014       Reviewed patient's course to date, including her surgical pathology consistent with Stage Ib, FIGO Grade 1 endometrial cancer. Given the patient is >59yo with >50% myometrial invasion, and based on PORTEC and GOG 99 data, I have recommended consideration of VBT. Strictly based on GOG 99 she does not meet criteria for high-intermediate risk; however, based on PorTEC-1 she would be considered high-intermediate risk. Her risk of recurrence without radiation is likely 10%, while VBT may reduce her local recurrence risk to 2-3%. In light of COVID-19, I believe this radiation therapy can be delayed upfront. We will plan for a video-visit at the end of May and will place a referral to Radiation Oncology at that time. All questions and concerns were addressed with the patient and she is comfortable with the plan.      Chelle Hernandez MD

## 2020-04-01 NOTE — PROGRESS NOTES
Post op Visit, surgery was on 2/20/2020    1. Have you been to the ER, urgent care clinic since your last visit? Hospitalized since your last visit?  no    2. Have you seen or consulted any other health care providers outside of the [address] [place] [address] since your last visit? Include any pap smears or colon screening.    no

## 2020-05-19 ENCOUNTER — VIRTUAL VISIT (OUTPATIENT)
Dept: CARDIOLOGY CLINIC | Age: 65
End: 2020-05-19

## 2020-05-19 VITALS — WEIGHT: 230 LBS | HEIGHT: 68 IN | BODY MASS INDEX: 34.86 KG/M2

## 2020-05-19 DIAGNOSIS — C54.1 ENDOMETRIAL CANCER (HCC): ICD-10-CM

## 2020-05-19 DIAGNOSIS — R07.9 CHEST PAIN, UNSPECIFIED TYPE: Primary | ICD-10-CM

## 2020-05-19 DIAGNOSIS — I42.9 CARDIOMYOPATHY, UNSPECIFIED TYPE (HCC): ICD-10-CM

## 2020-05-19 DIAGNOSIS — I44.7 LEFT BUNDLE BRANCH BLOCK: ICD-10-CM

## 2020-05-19 DIAGNOSIS — I10 ESSENTIAL HYPERTENSION: ICD-10-CM

## 2020-05-19 RX ORDER — CARVEDILOL 6.25 MG/1
6.25 TABLET ORAL 2 TIMES DAILY WITH MEALS
Qty: 60 TAB | Refills: 5 | Status: SHIPPED | OUTPATIENT
Start: 2020-05-19 | End: 2020-06-18

## 2020-05-19 NOTE — PROGRESS NOTES
[place]: Marina Beaulieu  [phone]      VIRTUAL VISIT DOCUMENTATION     Pursuant to the emergency declaration under the [address] [place] [address], [facility] waiver authority and the [employer] and [employer] Act, this Virtual  Visit was conducted, with patient's consent, to reduce the patient's risk of exposure to COVID-19 and provide continuity of care for an established patient. Services were provided through a video synchronous discussion virtually to substitute for in-person clinic visit. Consent: Kari Olivas  who was seen by synchronous (real-time) audio-video technology, and/or her healthcare decision maker, is aware that this patient-initiated, Telehealth encounter on 5/19/2020  is a billable service, with coverage as determined by her insurance carrier. female  is aware that she may receive a bill and has provided verbal consent to proceed: Yes. Cardiology Consult/Progress Note      Requesting/referring provider: , Dr. Garrett Carballo  Reason for Consult:cardiac clearance    HPI: Billy rPice, a 77y.o. year-old who presents for evaluation for perioperative risk stratification. She was recently diagnosed with endometrial cancer and is planned to undergo hyterectomy. She was identified to have LBBB on her ECG and she was referred for cardiac clearance. She underwent this procedure 2 months ago without any concerns. Incidentally she reports that she was identified to have a left bundle branch block and possible mitral valve prolapse many years ago. Due to longstanding history of left bundle branch block an echocardiogram was performed last week which showed reduced ejection fraction predominantly secondary to conduction abnormality    ECG February 2020: Sinus rhythm, left bundle branch block  Echocardiogram 2014: EF 50 to 54% no significant valvular abnormalities.   Echocardiogram May 2020: EF 40 to 45%, abnormal septal motion secondary to IVCD. Mild mitral regurgitation    Assessment/Plan:  1. Essential hypertension  2. Hyperlipidemia  3. History of possible mitral valve prolapse without significant mitral regurgitation  4. History of longstanding left bundle branch block  5. Morbid obesity  6. Recently diagnosed endometrial cancer  7. Obstructive sleep apnea  8. Peripheral neuropathy of uncertain etiology  9. Cardiomyopathy likely secondary to LBBB  10. Dyspnea and chest pain of uncertain etiology      Mrs. Hairston was seen at the request of Dr. Cristy Weiss for preoperative risk stratification prior to upcoming radical hysterectomy for endometrial cancer. She underwent this procedure successfully few months ago and is recovering well. She is planned to initiate radiation to her pelvis in the next month or 2. As discussed on her prior visit given her longstanding history of left bundle branch block and occasional tiredness we performed an echocardiogram which demonstrated reduced ejection fraction. Given occasional atypical chest discomfort, dyspnea, left bundle branch block and EF reduction I have proposed that Ms. brooks undergoes cardiac catheterization. She will discuss with Dr. Cristy Weiss to ensure that there is no plan for a second pelvic surgery. Cardiac catheterization can be performed electively in the course of next few months. Due to reduced EF she should continue losartan. I have also decided to change her atenolol to carvedilol 6.25 mg twice a day. She does not want any additional medication changes at this time. In the absence of any clinical congestion it may be reasonable not to initiate her on any diuretics at this time.       []    High complexity decision making was performed  []    Patient is at high-risk of decompensation with multiple organ involvement  She  has a past medical history of Arthritis, Cancer ([facility] [address] (2005), Depression (1/9/2014), Endometrial cancer ([facility] [address] (2020), GERD (gastroesophageal reflux disease), Hyperlipidemia, Hypertension, Left bundle branch block (2013), Neuropathy, peripheral (4/9/2014), and INDER on CPAP (4/8/2014). Review of system:Patient reports no dyspnea/PND/Orthpnea/CP. She reports no cough/fever/focal ne urological deficits/abdominal pain. All other systems negative except as above. Family History   Problem Relation Age of Onset    Heart Disease Mother     Breast Cancer Mother     Heart Disease Father     Kidney Disease Paternal Aunt         renal cancer    Colon Cancer Maternal Aunt     Cancer Paternal Uncle         leukemia    Anesth Problems Neg Hx       Social History     Socioeconomic History    Marital status:      Spouse name: Not on file    Number of children: Not on file    Years of education: Not on file    Highest education level: Not on file   Tobacco Use    Smoking status: Never Smoker    Smokeless tobacco: Never Used   Substance and Sexual Activity    Alcohol use: No     Alcohol/week: 0.0 standard drinks    Drug use: No    Sexual activity: Not Currently      PE  Vitals:    05/19/20 1127   Weight: 230 lb (104.3 kg)   Height: 5' 8\" (1.727 m)    Body mass index is 34.97 kg/m². PHYSICAL EXAMINATION      Due to this being a TeleHealth evaluation, many elements of the physical examination are unable to be assessed. General: Well developed, in no acute distress, cooperative and alert  HEENT: Pupils equal/round. No marked JVD visible on video. Respiratory: No audible wheezing, no signs of respiratory distress, lips non cyanotic  Extremities:  No edema  Neuro: A&Ox3, speech clear, no facial droop, answering questions appropriately  Skin: Skin color is normal. No rashes or lesions.  Non diaphoretic on visible skin during exam      Recent Labs:  Lab Results   Component Value Date/Time    Cholesterol, total 127 10/02/2019 08:50 AM    HDL Cholesterol 46 10/02/2019 08:50 AM    LDL, calculated 65 10/02/2019 08:50 AM Triglyceride 82 10/02/2019 08:50 AM     Lab Results   Component Value Date/Time    Creatinine 1.13 (H) 02/28/2020 12:30 PM     Lab Results   Component Value Date/Time    BUN 13 02/28/2020 12:30 PM     Lab Results   Component Value Date/Time    Potassium 4.7 02/28/2020 12:30 PM     Lab Results   Component Value Date/Time    Hemoglobin A1c 5.8 (H) 02/14/2020 11:59 AM     Lab Results   Component Value Date/Time    HGB 10.8 (L) 02/28/2020 12:30 PM     Lab Results   Component Value Date/Time    PLATELET 897 58/18/7026 12:30 PM       Reviewed:  Past Medical History:   Diagnosis Date    Arthritis     OSTEO    Cancer ([facility] [address] 2005    skin cancer removed from nose    Depression 1/9/2014    Endometrial cancer ([facility] [address] 2020    GERD (gastroesophageal reflux disease)     Hyperlipidemia     Hypertension     Left bundle branch block 2013    Neuropathy, peripheral 4/9/2014    INDER on CPAP 4/8/2014    compliant     Social History     Tobacco Use   Smoking Status Never Smoker   Smokeless Tobacco Never Used     Social History     Substance and Sexual Activity   Alcohol Use No    Alcohol/week: 0.0 standard drinks     Allergies   Allergen Reactions    Erythromycin Rash     Family History   Problem Relation Age of Onset    Heart Disease Mother     Breast Cancer Mother     Heart Disease Father     Kidney Disease Paternal Aunt         renal cancer    Colon Cancer Maternal Aunt     Cancer Paternal Uncle         leukemia    Anesth Problems Neg Hx         Current Outpatient Medications   Medication Sig    docusate sodium (COLACE PO) Take 1 Tab by mouth as needed.  ascorbic acid, vitamin C, (VITAMIN C) 500 mg tablet Take 500 mg by mouth daily (after breakfast).  omeprazole (PRILOSEC) 20 mg capsule Take 1 Cap by mouth daily.  (Patient taking differently: Take 20 mg by mouth nightly.)    risperiDONE (RISPERDAL) 2 mg tablet TAKE 1 TABLET NIGHTLY AS NEEDED (Patient taking differently: 2 mg.)    losartan (COZAAR) 100 mg tablet Take 1 Tab by mouth daily. (Patient taking differently: Take 100 mg by mouth daily (after breakfast). )    atenolol (TENORMIN) 50 mg tablet TAKE 1 TABLET BY MOUTH EVERY DAY (Patient taking differently: daily (after breakfast). TAKE 1 TABLET BY MOUTH EVERY DAY)    atorvastatin (LIPITOR) 20 mg tablet TAKE 1 TABLET BY MOUTH EVERY DAY (Patient taking differently: daily (after breakfast). )    buPROPion XL (WELLBUTRIN XL) 300 mg XL tablet TAKE 1 TABLET BY MOUTH EVERY MORNING    Cholecalciferol, Vitamin D3, (VITAMIN D3) 2,000 unit cap capsule Take  by mouth daily (after breakfast).  multivitamin, stress formula (STRESS TAB) tablet Take 1 Tab by mouth daily.  vitamin e (E GEMS) 100 unit capsule Take 400 Units by mouth daily.  aspirin delayed-release 81 mg tablet Take 81 mg by mouth daily. No current facility-administered medications for this visit. This telehealth visit  lasted approximately 25 minutes. This is not inclusive of note preparation time, dx testing review, medication orders or coordination of care. Ruthann Almonte MD05/19/20 There are other unrelated non-urgent complaints, but due to the busy schedule and the amount of time I've already spent with her, time does not permit me to address these routine issues at today's visit. I've requested another appointment to review these additional issues. ATTENTION:   This medical record was transcribed using an electronic medical records/speech recognition system. Although proofread, it may and can contain electronic, spelling and other errors. Corrections may be executed at a later time. Please feel free to contact us for any clarifications as needed.     [facility] [place]  [address], [address], [address] [place] [address]

## 2020-05-26 ENCOUNTER — VIRTUAL VISIT (OUTPATIENT)
Dept: GYNECOLOGY | Age: 65
End: 2020-05-26

## 2020-05-26 DIAGNOSIS — C54.1 ENDOMETRIAL CANCER (HCC): Primary | ICD-10-CM

## 2020-05-26 NOTE — PROGRESS NOTES
Osmin Altman is a 77 y.o. female who was seen by synchronous (real-time) audio-video technology on 2020    [id number] [facility] [facility] [address], [address]  P [phone]  F [phone]    Office Note  Patient ID:  Name:  Osmin Altman  MRN:  [id number]  :  1955/ y.o. Date:  2020      HISTORY OF PRESENT ILLNESS:  Ms. Osmin Altman is a 77 y.o. female who on  underwent Robotic-assisted total laparoscopic hysterectomy, Bilateral salpingo-oophorectomy, Bilateral pelvic sentinel lymph node mapping and biopsy. Final pathology consistent with Stage Ib, FIGO Grade 1 endometrial cancer. 8mm out of 15mm invasion. Negative washings. Negative SLNDs. LVSI negative. Presents today to discuss adjuvant VBT. Doing well without complaints. Initial History:  Ms. Osmin Altman is a 77 y.o.  postmenopausal female who presents in consultation from Dr. Paulino Shukla for FIGO Grade 1 endometrial cancer. The patient first reports vaginal spotting/bleeding in 2019. She was ultimately referred to Dr. Paulino Shukla by her PCP. On 2020 underwent hysteroscopy/D&C with final pathology consistent with FIGO Grade 1 endometrial cancer. Reports some spotting since her surgery. History of urinary incontinence for which she wears a pad. Denies hematuria or hematochezia. Denies change in appetite or bowel habits, nausea, vomiting, diarrhea, CP, SOB, fevers, or chills. Reports long history of constipation for which she uses senna S. Pertinent PMH/PSH: h/o , obesity, HTN, INDER on CPAP      Active, no restrictions. Pathology Review:   :  FINAL PATHOLOGIC DIAGNOSIS   1. Lymph node, right pelvic, sentinel node excision:   No evidence for malignancy in one node (0/1). See comment. 2. Lymph node, left pelvic, sentinel node excision:   No evidence for malignancy in one node (0/1). See comment.    3. Uterus, cervix, fallopian tubes, ovaries, hysterectomy, salpingo-oophorectomy:   Cervix: No evidence for dysplasia or malignancy. Endometrium: Endometrioid adenocarcinoma, FIGO grade 1. See synoptic report   Myometrium: Invasive adenocarcinoma. Leiomyoma. Adenomyosis. Fallopian tubes: No histopathologic abnormality. Ovaries: Benign physiologic changes. Synoptic report - endometrium   SPECIMEN   Procedure: Hysterectomy and salpingo-oophorectomy. Lymph Node Sampling: Bilateral pelvic lymph nodes. Specimen Integrity: Intact. TUMOR   Histologic Type: Endometrioid Adenocarcinoma   Histologic Grade: Grade 1   Tumor Extent:   Myometrial Invasion: Present. Depth of invasion: 8 mm   Myometrial thickness: 15 mm   Cervical stromal involvement: Not Identified. Extent of Involvement of Other Organs: Not identified. Accessory Tumor Findings   Lymph-Vascular Invasion: Not identified   LYMPH NODES   Number of Pelvic Nodes with Macrometastasis (>2 mm): 0   Number of Pelvic Nodes with Micrometastasis (>0.2 mm <= 2 mm): 0   Number of Pelvic Nodes with Isolated Tumor Cells (0.2 mm or less): 0   Total Number of Pelvic Nodes Examined (sentinel and non-sentinel): 2   Number of Pelvic [place] Nodes Examined: 2   Pathologic Stage   Primary Tumor (pT): pT1b   Regional Lymph Nodes (pN): pN0   Comment   Immunohistochemistry stains       2/5/2020:  Endometrium and polyp, curetting:  Well differentiated endometrioid adenocarcinoma, FIGO grade 1 in a background of extensive atypical complex hyperplasia. ROS:  A comprehensive review of systems was negative except for that written in the History of Present Illness.  , 10 point ROS    OB/GYN ROS:  Per HPI    ECOG ndGndrndanddndend:nd nd2nd Problem List:  Patient Active Problem List    Diagnosis Date Noted    Left bundle branch block 02/18/2020    Endometrial cancer ([facility] [address] 02/12/2020    Severe obesity ([address] 01/11/2019    Nuclear cataract 02/05/2016    Posterior subcapsular polar senile cataract 02/05/2016    Neuropathy, peripheral 2014    Hypertension 2014    Hyperglycemia 2014    GERD (gastroesophageal reflux disease) 2014    Hyperlipidemia 2014    INDER on CPAP 2014    Depression 2014     PMH:  Past Medical History:   Diagnosis Date    Arthritis     OSTEO    Cancer ([facility] [address]     skin cancer removed from nose    Depression 2014    Endometrial cancer ([facility] [address]     GERD (gastroesophageal reflux disease)     Hyperlipidemia     Hypertension     Left bundle branch block     Neuropathy, peripheral 2014    INDER on CPAP 2014    compliant      PSH:  Past Surgical History:   Procedure Laterality Date    COLONOSCOPY  2010     Dr. Srini Antonio        HX DILATION AND CURETTAGE  2020    HYSTROSCOPY    HX GI      COLONOSCOPY    HX HEENT      BCCA REMOVED FROM NOSE     HX HYSTERECTOMY  2020    HX OTHER SURGICAL      hysteroscopy surg    HX TONSILLECTOMY        Social History:  Social History     Tobacco Use    Smoking status: Never Smoker    Smokeless tobacco: Never Used   Substance Use Topics    Alcohol use: No     Alcohol/week: 0.0 standard drinks      Family History:  Family History   Problem Relation Age of Onset    Heart Disease Mother     Breast Cancer Mother     Heart Disease Father     Kidney Disease Paternal Aunt         renal cancer    Colon Cancer Maternal Aunt     Cancer Paternal Uncle         leukemia    Anesth Problems Neg Hx       Medications: (reviewed)  Current Outpatient Medications   Medication Sig    carvediloL (COREG) 6.25 mg tablet Take 1 Tab by mouth two (2) times daily (with meals) for 30 days.  ascorbic acid, vitamin C, (VITAMIN C) 500 mg tablet Take 500 mg by mouth daily (after breakfast).  omeprazole (PRILOSEC) 20 mg capsule Take 1 Cap by mouth daily.  (Patient taking differently: Take 20 mg by mouth nightly.)    risperiDONE (RISPERDAL) 2 mg tablet TAKE 1 TABLET NIGHTLY AS NEEDED (Patient taking differently: 2 mg.)    losartan (COZAAR) 100 mg tablet Take 1 Tab by mouth daily. (Patient taking differently: Take 100 mg by mouth daily (after breakfast). )    atorvastatin (LIPITOR) 20 mg tablet TAKE 1 TABLET BY MOUTH EVERY DAY (Patient taking differently: daily (after breakfast). )    buPROPion XL (WELLBUTRIN XL) 300 mg XL tablet TAKE 1 TABLET BY MOUTH EVERY MORNING    Cholecalciferol, Vitamin D3, (VITAMIN D3) 2,000 unit cap capsule Take  by mouth daily (after breakfast).  multivitamin, stress formula (STRESS TAB) tablet Take 1 Tab by mouth daily.  vitamin e (E GEMS) 100 unit capsule Take 400 Units by mouth daily.  aspirin delayed-release 81 mg tablet Take 81 mg by mouth daily.  docusate sodium (COLACE PO) Take 1 Tab by mouth as needed. No current facility-administered medications for this visit. Allergies: (reviewed)  Allergies   Allergen Reactions    Erythromycin Rash          OBJECTIVE:  *deferred today given video-conference visit for ongoing COVID-19 pandemic*    Physical Exam:  There were no vitals taken for this visit. General: Alert and oriented. No acute distress. Well-nourished  Gastrointestinal: soft, non-tender, non-distended, no masses or organomegaly. Well-healed port-site incisions. Musculoskeletal: normal gait. No joint tenderness, deformity or swelling. No muscular tenderness. Extremities: extremities normal, atraumatic, no cyanosis or edema. Pelvic: exam chaperoned by nurse. Normal appearing external genitalia. On speculum exam, the vaginal cuff is healing well and intact. On bimanual, the vaginal cuff is intact without defect. No evidence of masses or nodularity on bimanual exam. Deferred rectovaginal exam.   Neuro: Grossly intact. Normal gait and movement. No acute deficit  Skin: No evidence of rashes or skin changes.          IMPRESSION/PLAN:    Ms. Jamal Encarnacion is a 77 y.o. female who on 2/20/202 underwent Robotic-assisted total laparoscopic hysterectomy, Bilateral salpingo-oophorectomy, Bilateral pelvic sentinel lymph node mapping and biopsy. Final pathology consistent with Stage Ib, FIGO Grade 1 endometrial cancer. 8mm out of 15mm invasion. Negative washings. Negative SLNDs. LVSI negative. Problems:     Patient Active Problem List    Diagnosis Date Noted    Left bundle branch block 02/18/2020    Endometrial cancer ([facility] [address] 02/12/2020    Severe obesity ([address] 01/11/2019    Nuclear cataract 02/05/2016    Posterior subcapsular polar senile cataract 02/05/2016    Neuropathy, peripheral 04/09/2014    Hypertension 04/08/2014    Hyperglycemia 04/08/2014    GERD (gastroesophageal reflux disease) 04/08/2014    Hyperlipidemia 04/08/2014    INDER on CPAP 04/08/2014    Depression 01/09/2014       Reviewed patient's course to date. I went over her surgical pathology again, consistent with Stage Ib, FIGO Grade 1 endometrial cancer. Given the patient is >59yo with >50% myometrial invasion, and based on PORTEC and GOG 99 data, I have recommended consideration of VBT. Reviewed this in more detail today and placed a referral to Radiation Oncology. Strictly based on GOG 99 she does not meet criteria for high-intermediate risk; however, based on PorTEC-1 she would be considered high-intermediate risk. Her risk of recurrence without radiation is likely 10%, while VBT may reduce her local recurrence risk to 2-3%. She will follow-up with Radiation Oncology and we will see her back in 3 months for continued surveillance. Reviewed precautionary symptoms to return sooner. All questions and concerns were addressed with the patient and she is comfortable with the plan.      Lucy Box MD      Pursuant to the emergency declaration unde the [address] [facility] [address] waiver authority and the [employer] Act, this Virtual Visit was conducted, with patient's consent, to reduce the patient's risk of exposure to COVID-19 and provide continuity of care for an established patient. Services were provided through a video synchronous discussion virtually to substitute for in-person clinic visit.      I spent at least 25 minutes with this established patient, and >50% of the time was spent counseling and/or coordinating care regarding endometrial cancer    Augusta Quiñonez MD

## 2020-05-26 NOTE — PROGRESS NOTES
Virtual Visit, video call to discuss radiation, no vitals taken due to video call, pt is seeing a cardiologist, Dr. Raquel Banks, she states she needs to have a cardiac cath done and needs to know if you plan on doing any additional surgeries    1. Have you been to the ER, urgent care clinic since your last visit? Hospitalized since your last visit?  no    2. Have you seen or consulted any other health care providers outside of the [address] [place] [address] since your last visit? Include any pap smears or colon screening.    no

## 2020-05-29 ENCOUNTER — HOSPITAL ENCOUNTER (OUTPATIENT)
Dept: RADIATION THERAPY | Age: 65
Discharge: HOME | End: 2020-05-29

## 2020-06-01 RX ORDER — ATENOLOL 50 MG/1
50 TABLET ORAL
Qty: 90 TAB | Refills: 3 | Status: SHIPPED | OUTPATIENT
Start: 2020-06-01 | End: 2020-06-22

## 2020-06-01 RX ORDER — ATORVASTATIN CALCIUM 20 MG/1
TABLET, FILM COATED ORAL
Qty: 90 TAB | Refills: 3 | Status: SHIPPED | OUTPATIENT
Start: 2020-06-01 | End: 2021-03-26

## 2020-06-18 ENCOUNTER — TELEPHONE (OUTPATIENT)
Dept: INTERNAL MEDICINE CLINIC | Age: 65
End: 2020-06-18

## 2020-06-18 DIAGNOSIS — R07.81 PAIN IN RIB: Primary | ICD-10-CM

## 2020-06-18 NOTE — TELEPHONE ENCOUNTER
Identified patient 2 identifiers verified. Pain under breast at rib cage. Sore when moving. No SOB,  No injuries, no moving furniture. Area right where Bra is. Patient wants to know if she can get a chest x-ray while she is at [facility] on Friday.

## 2020-06-19 ENCOUNTER — APPOINTMENT (OUTPATIENT)
Dept: GENERAL RADIOLOGY | Age: 65
End: 2020-06-19
Attending: EMERGENCY MEDICINE
Payer: MEDICARE

## 2020-06-19 ENCOUNTER — APPOINTMENT (OUTPATIENT)
Dept: CT IMAGING | Age: 65
End: 2020-06-19
Attending: EMERGENCY MEDICINE
Payer: MEDICARE

## 2020-06-19 ENCOUNTER — HOSPITAL ENCOUNTER (EMERGENCY)
Age: 65
Discharge: HOME | End: 2020-06-19
Attending: EMERGENCY MEDICINE
Payer: MEDICARE

## 2020-06-19 VITALS
SYSTOLIC BLOOD PRESSURE: 154 MMHG | OXYGEN SATURATION: 97 % | DIASTOLIC BLOOD PRESSURE: 80 MMHG | WEIGHT: 230 LBS | TEMPERATURE: 98.8 F | RESPIRATION RATE: 25 BRPM | BODY MASS INDEX: 34.86 KG/M2 | HEART RATE: 96 BPM | HEIGHT: 68 IN

## 2020-06-19 DIAGNOSIS — R07.89 ATYPICAL CHEST PAIN: Primary | ICD-10-CM

## 2020-06-19 LAB
ALBUMIN SERPL-MCNC: 3.5 G/DL (ref 3.5–5)
ALBUMIN/GLOB SERPL: 1 {RATIO} (ref 1.1–2.2)
ALP SERPL-CCNC: 107 U/L (ref 45–117)
ALT SERPL-CCNC: 34 U/L (ref 12–78)
ANION GAP SERPL CALC-SCNC: 4 MMOL/L (ref 5–15)
AST SERPL-CCNC: 19 U/L (ref 15–37)
ATRIAL RATE: 93 BPM
BASOPHILS # BLD: 0 K/UL (ref 0–0.1)
BASOPHILS NFR BLD: 0 % (ref 0–1)
BILIRUB SERPL-MCNC: 0.3 MG/DL (ref 0.2–1)
BNP SERPL-MCNC: 484 PG/ML
BUN SERPL-MCNC: 15 MG/DL (ref 6–20)
BUN/CREAT SERPL: 13 (ref 12–20)
CALCIUM SERPL-MCNC: 10.8 MG/DL (ref 8.5–10.1)
CALCULATED P AXIS, ECG09: 51 DEGREES
CALCULATED R AXIS, ECG10: 63 DEGREES
CALCULATED T AXIS, ECG11: 14 DEGREES
CHLORIDE SERPL-SCNC: 108 MMOL/L (ref 97–108)
CK SERPL-CCNC: 65 U/L (ref 26–192)
CO2 SERPL-SCNC: 28 MMOL/L (ref 21–32)
CREAT SERPL-MCNC: 1.14 MG/DL (ref 0.55–1.02)
D DIMER PPP FEU-MCNC: 0.68 MG/L FEU (ref 0–0.65)
DIAGNOSIS, 93000: NORMAL
DIFFERENTIAL METHOD BLD: ABNORMAL
EOSINOPHIL # BLD: 0 K/UL (ref 0–0.4)
EOSINOPHIL NFR BLD: 1 % (ref 0–7)
ERYTHROCYTE [DISTWIDTH] IN BLOOD BY AUTOMATED COUNT: 13.7 % (ref 11.5–14.5)
GLOBULIN SER CALC-MCNC: 3.4 G/DL (ref 2–4)
GLUCOSE SERPL-MCNC: 117 MG/DL (ref 65–100)
HCT VFR BLD AUTO: 35.3 % (ref 35–47)
HGB BLD-MCNC: 11.5 G/DL (ref 11.5–16)
IMM GRANULOCYTES # BLD AUTO: 0 K/UL (ref 0–0.04)
IMM GRANULOCYTES NFR BLD AUTO: 0 % (ref 0–0.5)
INR PPP: 1 (ref 0.9–1.1)
LYMPHOCYTES # BLD: 1 K/UL (ref 0.8–3.5)
LYMPHOCYTES NFR BLD: 12 % (ref 12–49)
MCH RBC QN AUTO: 29.9 PG (ref 26–34)
MCHC RBC AUTO-ENTMCNC: 32.6 G/DL (ref 30–36.5)
MCV RBC AUTO: 91.7 FL (ref 80–99)
MONOCYTES # BLD: 1.3 K/UL (ref 0–1)
MONOCYTES NFR BLD: 16 % (ref 5–13)
NEUTS SEG # BLD: 5.7 K/UL (ref 1.8–8)
NEUTS SEG NFR BLD: 71 % (ref 32–75)
NRBC # BLD: 0 K/UL (ref 0–0.01)
NRBC BLD-RTO: 0 PER 100 WBC
P-R INTERVAL, ECG05: 162 MS
PLATELET # BLD AUTO: 226 K/UL (ref 150–400)
PMV BLD AUTO: 10.3 FL (ref 8.9–12.9)
POTASSIUM SERPL-SCNC: 4 MMOL/L (ref 3.5–5.1)
PROT SERPL-MCNC: 6.9 G/DL (ref 6.4–8.2)
PROTHROMBIN TIME: 10.7 SEC (ref 9–11.1)
Q-T INTERVAL, ECG07: 392 MS
QRS DURATION, ECG06: 154 MS
QTC CALCULATION (BEZET), ECG08: 487 MS
RBC # BLD AUTO: 3.85 M/UL (ref 3.8–5.2)
SODIUM SERPL-SCNC: 140 MMOL/L (ref 136–145)
TROPONIN I SERPL-MCNC: <0.05 NG/ML
VENTRICULAR RATE, ECG03: 93 BPM
WBC # BLD AUTO: 8.1 K/UL (ref 3.6–11)

## 2020-06-19 PROCEDURE — 99285 EMERGENCY DEPT VISIT HI MDM: CPT

## 2020-06-19 PROCEDURE — 85379 FIBRIN DEGRADATION QUANT: CPT

## 2020-06-19 PROCEDURE — 84484 ASSAY OF TROPONIN QUANT: CPT

## 2020-06-19 PROCEDURE — 74011250636 HC RX REV CODE- 250/636: Performed by: EMERGENCY MEDICINE

## 2020-06-19 PROCEDURE — 74011636320 HC RX REV CODE- 636/320: Performed by: EMERGENCY MEDICINE

## 2020-06-19 PROCEDURE — 85610 PROTHROMBIN TIME: CPT

## 2020-06-19 PROCEDURE — 85025 COMPLETE CBC W/AUTO DIFF WBC: CPT

## 2020-06-19 PROCEDURE — 80053 COMPREHEN METABOLIC PANEL: CPT

## 2020-06-19 PROCEDURE — 74011000250 HC RX REV CODE- 250: Performed by: EMERGENCY MEDICINE

## 2020-06-19 PROCEDURE — 93005 ELECTROCARDIOGRAM TRACING: CPT

## 2020-06-19 PROCEDURE — 96374 THER/PROPH/DIAG INJ IV PUSH: CPT

## 2020-06-19 PROCEDURE — 71275 CT ANGIOGRAPHY CHEST: CPT

## 2020-06-19 PROCEDURE — 36415 COLL VENOUS BLD VENIPUNCTURE: CPT

## 2020-06-19 PROCEDURE — 83880 ASSAY OF NATRIURETIC PEPTIDE: CPT

## 2020-06-19 PROCEDURE — 71045 X-RAY EXAM CHEST 1 VIEW: CPT

## 2020-06-19 PROCEDURE — 82550 ASSAY OF CK (CPK): CPT

## 2020-06-19 RX ORDER — KETOROLAC TROMETHAMINE 30 MG/ML
15 INJECTION, SOLUTION INTRAMUSCULAR; INTRAVENOUS
Status: COMPLETED | OUTPATIENT
Start: 2020-06-19 | End: 2020-06-19

## 2020-06-19 RX ORDER — SODIUM CHLORIDE 0.9 % (FLUSH) 0.9 %
10 SYRINGE (ML) INJECTION
Status: COMPLETED | OUTPATIENT
Start: 2020-06-19 | End: 2020-06-19

## 2020-06-19 RX ORDER — CYCLOBENZAPRINE HCL 5 MG
5 TABLET ORAL
Qty: 10 TAB | Refills: 0 | Status: SHIPPED | OUTPATIENT
Start: 2020-06-19 | End: 2020-06-22

## 2020-06-19 RX ORDER — LIDOCAINE 4 G/100G
1 PATCH TOPICAL
Status: DISCONTINUED | OUTPATIENT
Start: 2020-06-19 | End: 2020-06-19 | Stop reason: HOSPADM

## 2020-06-19 RX ORDER — NAPROXEN 500 MG/1
500 TABLET ORAL
Qty: 20 TAB | Refills: 0 | Status: SHIPPED | OUTPATIENT
Start: 2020-06-19 | End: 2020-09-28

## 2020-06-19 RX ADMIN — Medication 10 ML: at 02:33

## 2020-06-19 RX ADMIN — IOPAMIDOL 80 ML: 755 INJECTION, SOLUTION INTRAVENOUS at 02:33

## 2020-06-19 RX ADMIN — KETOROLAC TROMETHAMINE 15 MG: 30 INJECTION, SOLUTION INTRAMUSCULAR at 03:44

## 2020-06-19 NOTE — ED NOTES
Bedside shift change report given to SHWETA Juarez RN (oncoming nurse) by Mary Yousif (offgoing nurse). Report included the following information SBAR, Kardex, ED Summary, [facility] and Recent Results.

## 2020-06-19 NOTE — ED NOTES
0050: Assumed patient care. Patient presents to the ED via [facility] complaining of right sided chest pain. EMS reports the patient was found complaining of 6/10 chest pain and self administered 325 of Asa. Patient reports seeing a Cardiologist in the past for a medical clearance but never followed up. Patient placed on the monitor x3 and provided with her call bell.

## 2020-06-19 NOTE — DISCHARGE INSTRUCTIONS

## 2020-06-19 NOTE — TELEPHONE ENCOUNTER
Identified patient 2 identifiers verified. Order for chest -x-ray per verbal read back for increased pain in rib.

## 2020-06-19 NOTE — ED PROVIDER NOTES
EMERGENCY DEPARTMENT HISTORY AND PHYSICAL EXAM     ----------------------------------------------------------------------------  Please note that this dictation was completed with [email or web address], the computer voice recognition software. Quite often unanticipated grammatical, syntax, homophones, and other interpretive errors are inadvertently transcribed by the computer software. Please disregard these errors. Please excuse any errors that have escaped final proofreading  ----------------------------------------------------------------------------      Date: 6/19/2020  Patient Name: Mary Kate Olivas    History of Presenting Illness     Chief Complaint   Patient presents with    Chest Pain       History Provided By:  Patient    HPI: Jamal Encarnacion is a 77 y.o. female, with significant pmhx of endometrial cancer (undergoing radiation tx), hypertension, reflux, left bundle branch block who presents via private vehicle to the ED with c/o right-sided chest pain that began 2 days ago around 8 PM.  Patient denies recent injury, fall or any activity that was occurring at time of onset of symptoms. Patient reports having waxing and waning in severity of symptoms but has aching sensation right under her right breast.  This that is more painful with movement and occasionally with deep inspiration. Patient also specifically denies any associated fevers, chills, nausea, vomiting, diarrhea, abd pain, changes in BM, urinary sxs, or headache. Social Hx: denies tobacco  deniesEtOH , denies Illicit Drugs    There are no other complaints, changes, or physical findings at this time.      PCP: Obed Ramirez MD    Allergies   Allergen Reactions    Erythromycin Rash       Current Facility-Administered Medications   Medication Dose Route Frequency Provider Last Rate Last Dose    ketorolac (TORADOL) injection 15 mg  15 mg IntraVENous NOW Liset Oliva MD         Current Outpatient Medications   Medication Sig Dispense Refill    naproxen (NAPROSYN) 500 mg tablet Take 1 Tab by mouth every twelve (12) hours as needed for Pain. 20 Tab 0    atenoloL (TENORMIN) 50 mg tablet Take 1 Tab by mouth daily (after breakfast). TAKE 1 TABLET BY MOUTH EVERY DAY 90 Tab 3    atorvastatin (LIPITOR) 20 mg tablet TAKE 1 TABLET BY MOUTH EVERY DAY 90 Tab 3    docusate sodium (COLACE PO) Take 1 Tab by mouth as needed.  ascorbic acid, vitamin C, (VITAMIN C) 500 mg tablet Take 500 mg by mouth daily (after breakfast).  omeprazole (PRILOSEC) 20 mg capsule Take 1 Cap by mouth daily. (Patient taking differently: Take 20 mg by mouth nightly.) 90 Cap 1    risperiDONE (RISPERDAL) 2 mg tablet TAKE 1 TABLET NIGHTLY AS NEEDED (Patient taking differently: 2 mg.) 90 Tab 0    losartan (COZAAR) 100 mg tablet Take 1 Tab by mouth daily. (Patient taking differently: Take 100 mg by mouth daily (after breakfast). ) 30 Tab 11    buPROPion XL (WELLBUTRIN XL) 300 mg XL tablet TAKE 1 TABLET BY MOUTH EVERY MORNING 90 Tab 2    Cholecalciferol, Vitamin D3, (VITAMIN D3) 2,000 unit cap capsule Take  by mouth daily (after breakfast).  multivitamin, stress formula (STRESS TAB) tablet Take 1 Tab by mouth daily.  vitamin e (E GEMS) 100 unit capsule Take 400 Units by mouth daily.  aspirin delayed-release 81 mg tablet Take 81 mg by mouth daily.          Past History     Past Medical History:  Past Medical History:   Diagnosis Date    Arthritis     OSTEO    Cancer ([facility] [address]     skin cancer removed from nose    Depression 2014    Endometrial cancer ([facility] [address]     GERD (gastroesophageal reflux disease)     Hyperlipidemia     Hypertension     Left bundle branch block     Neuropathy, peripheral 2014    INDER on CPAP 2014    compliant       Past Surgical History:  Past Surgical History:   Procedure Laterality Date    COLONOSCOPY  2010     Dr. Noelle Chavez        HX DILATION AND CURETTAGE  2020    HYSTROSCOPY    HX GI      COLONOSCOPY    HX HEENT  2005    BCCA REMOVED FROM NOSE     HX HYSTERECTOMY  02/20/2020    HX OTHER SURGICAL      hysteroscopy surg    HX TONSILLECTOMY  1957       Family History:  Family History   Problem Relation Age of Onset    Heart Disease Mother     Breast Cancer Mother     Heart Disease Father     Kidney Disease Paternal Aunt         renal cancer    Colon Cancer Maternal Aunt     Cancer Paternal Uncle         leukemia    Anesth Problems Neg Hx        Social History:  Social History     Tobacco Use    Smoking status: Never Smoker    Smokeless tobacco: Never Used   Substance Use Topics    Alcohol use: No     Alcohol/week: 0.0 standard drinks    Drug use: No       Allergies: Allergies   Allergen Reactions    Erythromycin Rash         Review of Systems   Review of Systems   Constitutional: Negative. Negative for fever. Eyes: Negative. Respiratory: Positive for chest tightness. Negative for shortness of breath. Cardiovascular: Positive for chest pain. Gastrointestinal: Negative for abdominal pain, nausea and vomiting. Endocrine: Negative. Genitourinary: Negative. Negative for difficulty urinating, dysuria and hematuria. Musculoskeletal: Negative. Skin: Negative. Neurological: Negative. Psychiatric/Behavioral: Negative for suicidal ideas. All other systems reviewed and are negative. Physical Exam   Physical Exam  Vitals signs and nursing note reviewed. Constitutional:       General: She is not in acute distress. Appearance: She is well-developed. She is not diaphoretic. HENT:      Head: Normocephalic and atraumatic. Nose: Nose normal.   Eyes:      General: No scleral icterus. Conjunctiva/sclera: Conjunctivae normal.   Neck:      Musculoskeletal: Normal range of motion. Trachea: No tracheal deviation. Cardiovascular:      Rate and Rhythm: Normal rate and regular rhythm. Heart sounds: Normal heart sounds. No murmur.  No friction rub.   Pulmonary:      Effort: Pulmonary effort is normal. No respiratory distress. Breath sounds: Normal breath sounds. No stridor. No wheezing or rales. Comments: Able speak in full, unlabored sentences  Chest:      Chest wall: Tenderness present. No crepitus. Abdominal:      General: Bowel sounds are normal. There is no distension. Palpations: Abdomen is soft. Tenderness: There is no abdominal tenderness. There is no rebound. Musculoskeletal: Normal range of motion. General: No tenderness. Skin:     General: Skin is warm and dry. Findings: No rash. Neurological:      Mental Status: She is alert and oriented to person, place, and time. Cranial Nerves: No cranial nerve deficit. Psychiatric:         Speech: Speech normal.         Behavior: Behavior normal.         Thought Content: Thought content normal.         Judgment: Judgment normal.           Diagnostic Study Results     Labs -     Recent Results (from the past 12 hour(s))   EKG, 12 LEAD, INITIAL    Collection Time: 06/19/20  1:00 AM   Result Value Ref Range    Ventricular Rate 93 BPM    Atrial Rate 93 BPM    P-R Interval 162 ms    QRS Duration 154 ms    Q-T Interval 392 ms    QTC Calculation (Bezet) 487 ms    Calculated P Axis 51 degrees    Calculated R Axis 63 degrees    Calculated T Axis 14 degrees    Diagnosis       Normal sinus rhythm  Left bundle branch block  When compared with ECG of 14-FEB-2020 12:25,  Vent.  rate has increased BY  33 BPM     CBC WITH AUTOMATED DIFF    Collection Time: 06/19/20  1:15 AM   Result Value Ref Range    WBC 8.1 3.6 - 11.0 K/uL    RBC 3.85 3.80 - 5.20 M/uL    HGB 11.5 11.5 - 16.0 g/dL    HCT 35.3 35.0 - 47.0 %    MCV 91.7 80.0 - 99.0 FL    MCH 29.9 26.0 - 34.0 PG    MCHC 32.6 30.0 - 36.5 g/dL    RDW 13.7 11.5 - 14.5 %    PLATELET 478 516 - 096 K/uL    MPV 10.3 8.9 - 12.9 FL    NRBC 0.0 0  WBC    ABSOLUTE NRBC 0.00 0.00 - 0.01 K/uL    NEUTROPHILS 71 32 - 75 % LYMPHOCYTES 12 12 - 49 %    MONOCYTES 16 (H) 5 - 13 %    EOSINOPHILS 1 0 - 7 %    BASOPHILS 0 0 - 1 %    IMMATURE GRANULOCYTES 0 0.0 - 0.5 %    ABS. NEUTROPHILS 5.7 1.8 - 8.0 K/UL    ABS. LYMPHOCYTES 1.0 0.8 - 3.5 K/UL    ABS. MONOCYTES 1.3 (H) 0.0 - 1.0 K/UL    ABS. EOSINOPHILS 0.0 0.0 - 0.4 K/UL    ABS. BASOPHILS 0.0 0.0 - 0.1 K/UL    ABS. IMM. GRANS. 0.0 0.00 - 0.04 K/UL    DF AUTOMATED     METABOLIC PANEL, COMPREHENSIVE    Collection Time: 06/19/20  1:15 AM   Result Value Ref Range    Sodium 140 136 - 145 mmol/L    Potassium 4.0 3.5 - 5.1 mmol/L    Chloride 108 97 - 108 mmol/L    CO2 28 21 - 32 mmol/L    Anion gap 4 (L) 5 - 15 mmol/L    Glucose 117 (H) 65 - 100 mg/dL    BUN 15 6 - 20 MG/DL    Creatinine 1.14 (H) 0.55 - 1.02 MG/DL    BUN/Creatinine ratio 13 12 - 20      GFR est AA 58 (L) >60 ml/min/1.73m2    GFR est non-AA 48 (L) >60 ml/min/1.73m2    Calcium 10.8 (H) 8.5 - 10.1 MG/DL    Bilirubin, total 0.3 0.2 - 1.0 MG/DL    ALT (SGPT) 34 12 - 78 U/L    AST (SGOT) 19 15 - 37 U/L    Alk. phosphatase 107 45 - 117 U/L    Protein, total 6.9 6.4 - 8.2 g/dL    Albumin 3.5 3.5 - 5.0 g/dL    Globulin 3.4 2.0 - 4.0 g/dL    A-G Ratio 1.0 (L) 1.1 - 2.2     CK W/ REFLX CKMB    Collection Time: 06/19/20  1:15 AM   Result Value Ref Range    CK 65 26 - 192 U/L   TROPONIN I    Collection Time: 06/19/20  1:15 AM   Result Value Ref Range    Troponin-I, Qt. <0.05 <0.05 ng/mL   NT-PRO BNP    Collection Time: 06/19/20  1:15 AM   Result Value Ref Range    NT pro- (H) <125 PG/ML   D DIMER    Collection Time: 06/19/20  1:16 AM   Result Value Ref Range    D-dimer 0.68 (H) 0.00 - 0.65 mg/L FEU   PROTHROMBIN TIME + INR    Collection Time: 06/19/20  1:16 AM   Result Value Ref Range    INR 1.0 0.9 - 1.1      Prothrombin time 10.7 9.0 - 11.1 sec       Radiologic Studies -   CTA CHEST W OR W WO CONT   Final Result   IMPRESSION: No evidence pulmonary embolism.            XR CHEST PORT   Final Result   IMPRESSION: No acute cardiopulmonary disease. CT Results  (Last 48 hours)               06/19/20 0232  CTA CHEST W OR W WO CONT Final result    Impression:  IMPRESSION: No evidence pulmonary embolism. Narrative:  INDICATION: Right-sided chest pain, evaluate for pulmonary embolism. History of   endometrial cancer. CT pulmonary angiogram is performed with 2.5 mm collimation. 80 mL of nonionic   IV Isovue-370 was administered for exam. 3D coronal and sagittal postprocessed   images were performed for this examination. CT dose reduction was achieved through use of a standardized protocol tailored   for this examination and automatic exposure control for dose modulation. Chest:        CTPA: The pulmonary arteries are well opacified and there is no evidence of   pulmonary embolism. Thoracic aorta is normal in course and caliber and there is   no aortic dissection. Lymph nodes: There is no axillary, mediastinal or hilar lymphadenopathy. Heart: The heart is normal in the size and there is no pericardial effusion. Lungs: Within the lateral right lower lobe, there is a small region of patchy   airspace disease. There is otherwise mild bibasilar linear atelectasis or   scarring. There is a calcified granuloma within the right lower lobe. Pleura: There is no pleural fluid. Bones: No lytic or sclerotic osseous lesion is visualized. There is no acute   fracture or subluxation. Osseous structures are diffusely demineralized. Upper abdomen: The visualized upper abdominal structures are normal.               CXR Results  (Last 48 hours)               06/19/20 0126  XR CHEST PORT Final result    Impression:  IMPRESSION: No acute cardiopulmonary disease. Narrative:  INDICATION: Chest pain. Portable AP semiupright view of the chest.       Direct comparison made to prior chest x-ray dated February 2020. Cardiomediastinal silhouette is stable. Lungs are clear bilaterally. Pleural   spaces are normal. Osseous structures are intact. Medical Decision Making   I am the first provider for this patient. I reviewed the vital signs, available nursing notes, past medical history, past surgical history, family history and social history. Vital Signs-Reviewed the patient's vital signs. Patient Vitals for the past 12 hrs:   Temp Pulse Resp BP SpO2   06/19/20 0053 98.8 °F (37.1 °C) 100 24 172/69 97 %       Pulse Oximetry Analysis - 97% on RA    Cardiac Monitor:   Rate: 100 bpm  Rhythm: nsr      Provider Notes (Medical Decision Making):     DDX:  Pleural effusion, pulmonary edema, PE, musculoskeletal pain/spasm    Plan:  EKG, labs, d-dimer, chest x-ray, analgesic    Impression:  acute    ED Course:   Initial assessment performed. The patients presenting problems have been discussed, and they are in agreement with the care plan formulated and outlined with them. I have encouraged them to ask questions as they arise throughout their visit. I reviewed our electronic medical record system for any past medical records that were available that may contribute to the patients current condition, the nursing notes and and vital signs from today's visit    Nursing notes will be reviewed as they become available in realtime while the pt has been in the ED. Adalgisa John MD    EKG interpretation 0100: NSR with left bundle branch block (chronic), nl Axis, rate 93; , , QTc 487; no acute ischemia; interpreted by Adalgisa John MD    I personally reviewed/interpreted pt's imaging. Agree with official read by radiology as noted above. Adalgisa John MD    3:15 AM  Progress note:  Pt noted to be feeling better, ready for discharge. Discussed lab and imaging findings with pt, specifically noting no pe or elevated trop. Pt will follow up with pcp/rad oncologist as instructed. All questions have been answered, pt voiced understanding and agreement with plan.     Specific return precautions provided in addition to instructions for pt to return to the ED immediately should sx worsen at any time. Yovana Dodd MD           Critical Care Time:     none      Diagnosis     Clinical Impression:   1. Atypical chest pain        PLAN:  1. Current Discharge Medication List      START taking these medications    Details   naproxen (NAPROSYN) 500 mg tablet Take 1 Tab by mouth every twelve (12) hours as needed for Pain. Qty: 20 Tab, Refills: 0           2. Follow-up Information     Follow up With Specialties Details Why Contact Info    Aly Yang MD Internal Medicine Schedule an appointment as soon as possible for a visit in 2 days  [address] [place] [address]  [phone]      Your radiation oncologist            Return to ED if worse     Disposition:  3:15 AM  The patient's results have been reviewed with family and/or caregiver. They verbally convey their understanding and agreement of the patient's signs, symptoms, diagnosis, treatment and prognosis and additionally agree to follow up as recommended in the discharge instructions or to return to the Emergency Room should the patient's condition change prior to their follow-up appointment. The family and/or caregiver verbally agrees with the care-plan and all of their questions have been answered. The discharge instructions have also been provided to the them with educational information regarding the patient's diagnosis as well a list of reasons why the patient would want to return to the ER prior to their follow-up appointment should their condition change. Yovana Dodd MD            This note will not be viewable in [address]

## 2020-06-22 ENCOUNTER — VIRTUAL VISIT (OUTPATIENT)
Dept: INTERNAL MEDICINE CLINIC | Age: 65
End: 2020-06-22

## 2020-06-22 ENCOUNTER — PATIENT OUTREACH (OUTPATIENT)
Dept: INTERNAL MEDICINE CLINIC | Age: 65
End: 2020-06-22

## 2020-06-22 DIAGNOSIS — J18.9 PNEUMONIA OF RIGHT LOWER LOBE DUE TO INFECTIOUS ORGANISM: Primary | ICD-10-CM

## 2020-06-22 DIAGNOSIS — F32.A DEPRESSION, UNSPECIFIED DEPRESSION TYPE: ICD-10-CM

## 2020-06-22 RX ORDER — BUPROPION HYDROCHLORIDE 300 MG/1
TABLET ORAL
Qty: 90 TAB | Refills: 3 | Status: SHIPPED | OUTPATIENT
Start: 2020-06-22 | End: 2021-06-26 | Stop reason: SDUPTHER

## 2020-06-22 RX ORDER — AMOXICILLIN AND CLAVULANATE POTASSIUM 875; 125 MG/1; MG/1
1 TABLET, FILM COATED ORAL EVERY 12 HOURS
Qty: 14 TAB | Refills: 0 | Status: SHIPPED | OUTPATIENT
Start: 2020-06-22 | End: 2020-06-29

## 2020-06-22 RX ORDER — CARVEDILOL 6.25 MG/1
TABLET ORAL 2 TIMES DAILY WITH MEALS
COMMUNITY
End: 2020-09-20

## 2020-06-22 NOTE — PATIENT INSTRUCTIONS
Office Policies Phone calls/patient messages: Please allow up to 24 hours for someone in the office to contact you about your call or message. Be mindful your provider may be out of the office or your message may require further review. We encourage you to use [employer] for your messages as this is a faster, more efficient way to communicate with our office Medication Refills: 
         
Prescription medications require 48-72 business hours to process. We encourage you to use [employer] for your refills. For controlled medications: Please allow 72 business hours to process. Certain medications may require you to  a written prescription at our office. NO narcotic/controlled medications will be prescribed after 4pm Monday through Friday or on weekends Form/Paperwork Completion: 
         
Please note a $25 fee may incur for all paperwork for completed by our providers. We ask that you allow 7-10 business days. Pre-payment is due prior to picking up/faxing the completed form. You may also download your forms to [employer] to have your doctor print off. 
 
 
1. Have you been to the ER, urgent care clinic since your last visit? Hospitalized since your last visit?no 2. Have you seen or consulted any other health care providers outside of the [address] [place] [address] since your last visit? Include any pap smears or colon screening.  Cardiologist

## 2020-06-22 NOTE — PROGRESS NOTES
Elisa Grant is a 77 y.o. female who was seen by synchronous (real-time) audio-video technology on 6/22/2020. Consent: Kari Olivas, who was seen by synchronous (real-time) audio-video technology, and/or her healthcare decision maker, is aware that this patient-initiated, Telehealth encounter on 6/22/2020 is a billable service, with coverage as determined by her insurance carrier. She is aware that she may receive a bill and has provided verbal consent to proceed: Yes. Assessment & Plan:   Diagnoses and all orders for this visit:    1. Pneumonia of right lower lobe due to infectious organism  -     amoxicillin-clavulanate (AUGMENTIN) 875-125 mg per tablet; Take 1 Tab by mouth every twelve (12) hours for 7 days. 2. Depression, unspecified depression type  -     buPROPion XL (WELLBUTRIN XL) 300 mg XL tablet; TAKE 1 TABLET BY MOUTH EVERY MORNING    3. Angina / Systolic HF: Compensated. She is to follow up with cardiologist for cath. Follow-up and Dispositions    · Return if symptoms worsen or fail to improve. Subjective:    Stalin Olivas is a 77 y.o. female who was seen for Hospital Follow Up ([facility] for chest pain ; pt had ultrasound on her heart that was ordered by her cardiologist- pt states that she has to get a heart cath; pt also states that she has been getting radiation treatment )    Chief Complaint   Patient presents with   [facility] for chest pain ; pt had ultrasound on her heart that was ordered by her cardiologist- pt states that she has to get a heart cath; pt also states that she has been getting radiation treatment      SUBJECTIVE  Ms. Elisa Grant presents today acutely for     Chief Complaint   Patient presents with   [facility] for chest pain ; pt had ultrasound on her heart that was ordered by her cardiologist- pt states that she has to get a heart cath; pt also states that she has been getting radiation treatment      She has had R chest wall pain, worse with deep breath. She went to ER on 6/19 at 12:30 AM, by ambulance:     Chief Complaint   Patient presents with    Chest Pain      History Provided By:  Patient     HPI: Vernice Libman is a 77 y.o. female, with significant pmhx of endometrial cancer (undergoing radiation tx), hypertension, reflux, left bundle branch block who presents via private vehicle to the ED with c/o right-sided chest pain that began 2 days ago around 8 PM.  Patient denies recent injury, fall or any activity that was occurring at time of onset of symptoms. Patient reports having waxing and waning in severity of symptoms but has aching sensation right under her right breast.  This that is more painful with movement and occasionally with deep inspiration. Patient also specifically denies any associated fevers, chills, nausea, vomiting, diarrhea, abd pain, changes in BM, urinary sxs, or headache    She had CXR and CT, which were normal. Though, on close review the full report of CT shows: Conner Crump said it's probably a muscle spasm. \"     Incidentally, she does have cardiologist who has recently checked an echocardiogram, which showed some \"weakness of the heart\", and he is planning heart catheterization. Prior to Admission medications    Medication Sig Start Date End Date Taking? Authorizing Provider   carvediloL (COREG) 6.25 mg tablet Take  by mouth two (2) times daily (with meals). Yes Provider, Historical   naproxen (NAPROSYN) 500 mg tablet Take 1 Tab by mouth every twelve (12) hours as needed for Pain. 6/19/20  Yes Edgar Tena MD   cyclobenzaprine (FLEXERIL) 5 mg tablet Take 1 Tab by mouth three (3) times daily as needed for Muscle Spasm(s) for up to 3 days. 6/19/20 6/22/20 Yes Edgar Tena MD   atorvastatin (LIPITOR) 20 mg tablet TAKE 1 TABLET BY MOUTH EVERY DAY 6/1/20  Yes Sarah Back MD   docusate sodium (COLACE PO) Take 1 Tab by mouth as needed. Yes Provider, Historical   ascorbic acid, vitamin C, (VITAMIN C) 500 mg tablet Take 500 mg by mouth daily (after breakfast). Yes Provider, Historical   omeprazole (PRILOSEC) 20 mg capsule Take 1 Cap by mouth daily. Patient taking differently: Take 20 mg by mouth nightly. 1/14/20  Yes Kay Rodrigues MD   risperiDONE (RISPERDAL) 2 mg tablet TAKE 1 TABLET NIGHTLY AS NEEDED  Patient taking differently: 2 mg. 12/9/19  Yes Kay Rodrigues MD   losartan (COZAAR) 100 mg tablet Take 1 Tab by mouth daily. Patient taking differently: Take 100 mg by mouth daily (after breakfast). 10/24/19  Yes Kay Rodrigues MD   buPROPion XL (WELLBUTRIN XL) 300 mg XL tablet TAKE 1 TABLET BY MOUTH EVERY MORNING 9/11/19  Yes Kay Rodrigues MD   Cholecalciferol, Vitamin D3, (VITAMIN D3) 2,000 unit cap capsule Take  by mouth daily (after breakfast). Yes Provider, Historical   multivitamin, stress formula (STRESS TAB) tablet Take 1 Tab by mouth daily. Yes Provider, Historical   vitamin e (E GEMS) 100 unit capsule Take 400 Units by mouth daily. Yes Provider, Historical   aspirin delayed-release 81 mg tablet Take 81 mg by mouth daily. Yes Provider, Historical   atenoloL (TENORMIN) 50 mg tablet Take 1 Tab by mouth daily (after breakfast). TAKE 1 TABLET BY MOUTH EVERY DAY 6/1/20   Kay Rodrigues MD     Allergies   Allergen Reactions    Erythromycin Rash     Complete review of systems was performed and is otherwise unremarkable except as noted elsewhere. Objective:   Vital Signs: (As obtained by patient/caregiver at home)  There were no vitals taken for this visit.      [INSTRUCTIONS:  \"[x]\" Indicates a positive item  \"[]\" Indicates a negative item  -- DELETE ALL ITEMS NOT EXAMINED]    Constitutional: [x] Appears well-developed and well-nourished [x] No apparent distress      [] Abnormal -     Mental status: [x] Alert and awake  [x] Oriented to person/place/time [x] Able to follow commands    [] Abnormal -     Eyes:   EOM [x]  Normal    [] Abnormal -   Sclera  [x]  Normal    [] Abnormal -          Discharge [x]  None visible   [] Abnormal -     HENT: [x] Normocephalic, atraumatic  [] Abnormal -   [x] Mouth/Throat: Mucous membranes are moist    External Ears [x] Normal  [] Abnormal -    Neck: [x] No visualized mass [] Abnormal -     Pulmonary/Chest: [x] Respiratory effort normal   [x] No visualized signs of difficulty breathing or respiratory distress        [] Abnormal -      Musculoskeletal:   [x] Normal gait with no signs of ataxia         [x] Normal range of motion of neck        [] Abnormal -     Neurological:        [x] No Facial Asymmetry (Cranial nerve 7 motor function) (limited exam due to video visit)          [x] No gaze palsy        [] Abnormal -          Skin:        [x] No significant exanthematous lesions or discoloration noted on facial skin         [] Abnormal -            Psychiatric:       [x] Normal Affect [] Abnormal -        [x] No Hallucinations    Other pertinent observable physical exam findings:-        We discussed the expected course, resolution and complications of the diagnosis(es) in detail. Medication risks, benefits, costs, interactions, and alternatives were discussed as indicated. I advised her to contact the office if her condition worsens, changes or fails to improve as anticipated. She expressed understanding with the diagnosis(es) and plan. Diane Thurman is a 77 y.o. female who was evaluated by a video visit encounter for concerns as above. Patient identification was verified prior to start of the visit. A caregiver was present when appropriate. Due to this being a TeleHealth encounter (During UFDOA-33 public health emergency), evaluation of the following organ systems was limited: Vitals/Constitutional/EENT/Resp/CV/GI//MS/Neuro/Skin/Heme-Lymph-Imm.   Pursuant to the emergency declaration under the [facility] waiver authority and the Coronavirus Preparedness and Response Supplemental Appropriations Act, this Virtual  Visit was conducted, with patient's (and/or legal guardian's) consent, to reduce the patient's risk of exposure to COVID-19 and provide necessary medical care. Services were provided through a video synchronous discussion virtually to substitute for in-person clinic visit. Patient and provider were located at their individual homes.       Rosio Dejesus MD

## 2020-06-22 NOTE — PROGRESS NOTES
Patient contacted regarding COVID-19 risk. Discussed COVID-19 related testing which was not done at this time. Care Transition Nurse/ Ambulatory Care Manager contacted the pt by telephone to perform post discharge assessment. Verified name and  with pt as identifiers. Provided introduction to self, and explanation of the CTN/ACM role, and reason for call due to risk factors for infection and/or exposure to COVID-19. Symptoms reviewed with pt who verbalized the following symptoms: no symptoms. Due to no symptoms encounter was not routed to provider for escalation. Discussed follow-up appointments. If no appointment was previously scheduled, appointment scheduling offered: [facility] follow up appointment(s):   Future Appointments   Date Time Provider Mady Tejada   2020 10:30 AM Meryl Griffith MD [address]   2020 10:45 AM Osmin Berry MD [place] [id number]   12/15/2020 10:40 AM Margaret Burton  [facility] [address]     Non-[facility] follow up appointment(s):none   Patient has following risk factors of:none . CTN/ACM reviewed discharge instructions, medical action plan and red flags such as increased shortness of breath, increasing fever and signs of decompensation with pt who verbalized understanding. Discussed exposure protocols and quarantine with CDC Guidelines What to do if you are sick with coronavirus disease .  pt was given an opportunity for questions and concerns. The pt agrees to contact the [facility] exposure line [phone], local health department  and PCP office for questions related to their healthcare. CTN/ACM provided contact information for future needs. Reviewed and educated pt on any new and changed medications related to discharge diagnosis. Patient/family/caregiver given information for [employer] and agrees to enroll no    14 day call based on severity of symptoms and risk factors.

## 2020-06-24 DIAGNOSIS — K21.9 GASTROESOPHAGEAL REFLUX DISEASE WITHOUT ESOPHAGITIS: ICD-10-CM

## 2020-06-24 RX ORDER — OMEPRAZOLE 20 MG/1
20 CAPSULE, DELAYED RELEASE ORAL
Qty: 90 CAP | Refills: 3 | Status: SHIPPED | OUTPATIENT
Start: 2020-06-24 | End: 2021-02-02 | Stop reason: ALTCHOICE

## 2020-06-25 ENCOUNTER — HOSPITAL ENCOUNTER (OUTPATIENT)
Dept: RADIATION THERAPY | Age: 65
Discharge: HOME | End: 2020-06-25

## 2020-07-07 ENCOUNTER — PATIENT OUTREACH (OUTPATIENT)
Dept: CASE MANAGEMENT | Age: 65
End: 2020-07-07

## 2020-07-28 ENCOUNTER — HOSPITAL ENCOUNTER (OUTPATIENT)
Dept: RADIATION THERAPY | Age: 65
Discharge: HOME | End: 2020-07-28

## 2020-08-14 RX ORDER — RISPERIDONE 2 MG/1
2 TABLET, FILM COATED ORAL
Qty: 90 TAB | Refills: 0 | Status: SHIPPED | OUTPATIENT
Start: 2020-08-14 | End: 2020-09-21

## 2020-08-26 ENCOUNTER — OFFICE VISIT (OUTPATIENT)
Dept: GYNECOLOGY | Age: 65
End: 2020-08-26
Payer: MEDICARE

## 2020-08-26 VITALS
DIASTOLIC BLOOD PRESSURE: 66 MMHG | HEIGHT: 68 IN | SYSTOLIC BLOOD PRESSURE: 112 MMHG | HEART RATE: 84 BPM | BODY MASS INDEX: 34.92 KG/M2 | WEIGHT: 230.4 LBS

## 2020-08-26 DIAGNOSIS — Z99.89 OSA ON CPAP: ICD-10-CM

## 2020-08-26 DIAGNOSIS — G47.33 OSA ON CPAP: ICD-10-CM

## 2020-08-26 DIAGNOSIS — E66.01 SEVERE OBESITY (HCC): ICD-10-CM

## 2020-08-26 DIAGNOSIS — C54.1 ENDOMETRIAL CANCER (HCC): Primary | ICD-10-CM

## 2020-08-26 PROCEDURE — 99214 OFFICE O/P EST MOD 30 MIN: CPT | Performed by: OBSTETRICS & GYNECOLOGY

## 2020-08-26 PROCEDURE — G0463 HOSPITAL OUTPT CLINIC VISIT: HCPCS | Performed by: OBSTETRICS & GYNECOLOGY

## 2020-08-26 NOTE — PROGRESS NOTES
3 month follow up for endometrial cancer, post radiation follow up, she thinks she finished her radiation the end of july 1. Have you been to the ER, urgent care clinic since your last visit? Hospitalized since your last visit? Yes, to ER on 6/19/2020 for chest pain    2. Have you seen or consulted any other health care providers outside of the [address] [place] [address] since your last visit? Include any pap smears or colon screening.    no

## 2020-08-26 NOTE — PROGRESS NOTES
Mary Kelly is a 77 y.o. female who was seen by synchronous (real-time) audio-video technology on 2020    [address], [address], [address]  P [phone]  F [phone]    Office Note  Patient ID:  Name:  Mary Kelly  MRN:  [id number]  :   y.o. Date:  2020      HISTORY OF PRESENT ILLNESS:  Ms. Mary Kelly is a 77 y.o. female who on  underwent Robotic-assisted total laparoscopic hysterectomy, Bilateral salpingo-oophorectomy, Bilateral pelvic sentinel lymph node mapping and biopsy. Final pathology consistent with Stage Ib, FIGO Grade 1 endometrial cancer. 8mm out of 15mm invasion. Negative washings. Negative SLNDs. LVSI negative. Completed VBT 2020 (30Gy in 3 fractions). Presents today for continued surveillance. Doing well without complaints. Denies vaginal bleeding/discharge, abdominal/pelvic pain, nausea, vomiting, constipation, diarrhea, CP, SOB, hematuria, hematochezia, change in appetite or bowel movements, bloating, fevers, chills, or urinary symptoms. Initial History:  Ms. Mary Kelly is a 77 y.o.  postmenopausal female who presents in consultation from Dr. Hemant Oscar for FIGO Grade 1 endometrial cancer. The patient first reports vaginal spotting/bleeding in 2019. She was ultimately referred to Dr. Hemant Oscar by her PCP. On 2020 underwent hysteroscopy/D&C with final pathology consistent with FIGO Grade 1 endometrial cancer. Reports some spotting since her surgery. History of urinary incontinence for which she wears a pad. Denies hematuria or hematochezia. Denies change in appetite or bowel habits, nausea, vomiting, diarrhea, CP, SOB, fevers, or chills. Reports long history of constipation for which she uses senna S. Pertinent PMH/PSH: h/o , obesity, HTN, INDER on CPAP      Active, no restrictions.      Pathology Review:   :  FINAL PATHOLOGIC DIAGNOSIS   1. Lymph node, right pelvic, sentinel node excision:   No evidence for malignancy in one node (0/1). See comment. 2. Lymph node, left pelvic, sentinel node excision:   No evidence for malignancy in one node (0/1). See comment. 3. Uterus, cervix, fallopian tubes, ovaries, hysterectomy, salpingo-oophorectomy:   Cervix: No evidence for dysplasia or malignancy. Endometrium: Endometrioid adenocarcinoma, FIGO grade 1. See synoptic report   Myometrium: Invasive adenocarcinoma. Leiomyoma. Adenomyosis. Fallopian tubes: No histopathologic abnormality. Ovaries: Benign physiologic changes. Synoptic report - endometrium   SPECIMEN   Procedure: Hysterectomy and salpingo-oophorectomy. Lymph Node Sampling: Bilateral pelvic lymph nodes. Specimen Integrity: Intact. TUMOR   Histologic Type: Endometrioid Adenocarcinoma   Histologic Grade: Grade 1   Tumor Extent:   Myometrial Invasion: Present. Depth of invasion: 8 mm   Myometrial thickness: 15 mm   Cervical stromal involvement: Not Identified. Extent of Involvement of Other Organs: Not identified. Accessory Tumor Findings   Lymph-Vascular Invasion: Not identified   LYMPH NODES   Number of Pelvic Nodes with Macrometastasis (>2 mm): 0   Number of Pelvic Nodes with Micrometastasis (>0.2 mm <= 2 mm): 0   Number of Pelvic Nodes with Isolated Tumor Cells (0.2 mm or less): 0   Total Number of Pelvic Nodes Examined (sentinel and non-sentinel): 2   Number of Pelvic Canvas Nodes Examined: 2   Pathologic Stage   Primary Tumor (pT): pT1b   Regional Lymph Nodes (pN): pN0   Comment   Immunohistochemistry stains       2/5/2020:  Endometrium and polyp, curetting:  Well differentiated endometrioid adenocarcinoma, FIGO grade 1 in a background of extensive atypical complex hyperplasia. ROS:  A comprehensive review of systems was negative except for that written in the History of Present Illness.  , 10 point ROS    OB/GYN ROS:  Per HPI    ECOG ndGndrndanddndend:nd nd2nd Problem List:  Patient Active Problem List    Diagnosis Date Noted    Left bundle branch block 2020    [facility] cancer ([facility] 2020    Severe obesity ([facility] 2019    Nuclear cataract 2016    Posterior subcapsular polar senile cataract 2016    Neuropathy, peripheral 2014    Hypertension 2014    Hyperglycemia 2014    GERD (gastroesophageal reflux disease) 2014    Hyperlipidemia 2014    INDER on CPAP 2014    Depression 2014     PMH:  Past Medical History:   Diagnosis Date    Arthritis     OSTEO    [facility]     skin cancer removed from nose    Depression 2014    Endometrial cancer ([facility]     GERD (gastroesophageal reflux disease)     Hyperlipidemia     Hypertension     Left bundle branch block     Neuropathy, peripheral 2014    INDER on CPAP 2014    compliant      PSH:  Past Surgical History:   Procedure Laterality Date    COLONOSCOPY  2010     Dr. Caroline Lucero        HX DILATION AND CURETTAGE  2020    HYSTROSCOPY    HX GI      COLONOSCOPY    HX HEENT      BCCA REMOVED FROM NOSE     HX HYSTERECTOMY  2020    HX OTHER SURGICAL      hysteroscopy surg    HX TONSILLECTOMY        Social History:  Social History     Tobacco Use    Smoking status: Never Smoker    Smokeless tobacco: Never Used   Substance Use Topics    Alcohol use: No     Alcohol/week: 0.0 standard drinks      Family History:  Family History   Problem Relation Age of Onset    Heart Disease Mother     Breast Cancer Mother     Heart Disease Father     Kidney Disease Paternal Aunt         renal cancer    Colon Cancer Maternal Aunt     Cancer Paternal Uncle         leukemia    Anesth Problems Neg Hx       Medications: (reviewed)  Current Outpatient Medications   Medication Sig    risperiDONE (RisperDAL) 2 mg tablet Take 1 Tab by mouth nightly.     omeprazole (PRILOSEC) 20 mg capsule Take 1 Cap by mouth nightly.  carvediloL (COREG) 6.25 mg tablet Take  by mouth two (2) times daily (with meals).  buPROPion XL (WELLBUTRIN XL) 300 mg XL tablet TAKE 1 TABLET BY MOUTH EVERY MORNING    atorvastatin (LIPITOR) 20 mg tablet TAKE 1 TABLET BY MOUTH EVERY DAY    ascorbic acid, vitamin C, (VITAMIN C) 500 mg tablet Take 500 mg by mouth daily (after breakfast).  losartan (COZAAR) 100 mg tablet Take 1 Tab by mouth daily. (Patient taking differently: Take 100 mg by mouth daily (after breakfast). )    Cholecalciferol, Vitamin D3, (VITAMIN D3) 2,000 unit cap capsule Take  by mouth daily (after breakfast).  multivitamin, stress formula (STRESS TAB) tablet Take 1 Tab by mouth daily.  vitamin e (E GEMS) 100 unit capsule Take 400 Units by mouth daily.  aspirin delayed-release 81 mg tablet Take 81 mg by mouth daily.  naproxen (NAPROSYN) 500 mg tablet Take 1 Tab by mouth every twelve (12) hours as needed for Pain.  docusate sodium (COLACE PO) Take 1 Tab by mouth as needed. No current facility-administered medications for this visit. Allergies: (reviewed)  Allergies   Allergen Reactions    Erythromycin Rash          OBJECTIVE:  *deferred today given video-conference visit for ongoing COVID-19 pandemic*    Physical Exam:  Visit Vitals  /66 (BP 1 Location: Left arm, BP Patient Position: Sitting)   Pulse 84   Ht 5' 8\" (1.727 m)   Wt 230 lb 6.4 oz (104.5 kg)   BMI 35.03 kg/m²      General: Alert and oriented. No acute distress. Well-nourished  Gastrointestinal: soft, non-tender, non-distended, no masses or organomegaly. Well-healed port-site incisions. Musculoskeletal: normal gait. No joint tenderness, deformity or swelling. No muscular tenderness. Extremities: extremities normal, atraumatic, no cyanosis or edema. Pelvic: exam chaperoned by nurse. Normal appearing external genitalia. On speculum exam, the vagina is atrophic. The uterus and cervix are surgically absent.  No evidence of masses or nodularity on bimanual exam. Deferred rectovaginal exam.    Neuro: Grossly intact. Normal gait and movement. No acute deficit  Skin: No evidence of rashes or skin changes. IMPRESSION/PLAN:    Ms. Artur Trinh is a 77 y.o. female who on 2/20/202 underwent Robotic-assisted total laparoscopic hysterectomy, Bilateral salpingo-oophorectomy, Bilateral pelvic sentinel lymph node mapping and biopsy. Final pathology consistent with Stage Ib, FIGO Grade 1 endometrial cancer. 8mm out of 15mm invasion. Negative washings. Negative SLNDs. LVSI negative. Completed VBT 6/26/2020 (30Gy in 3 fractions). Problems:     Patient Active Problem List    Diagnosis Date Noted    Left bundle branch block 02/18/2020    [facility]ometrial [facility] ([facility] 02/12/2020    Severe obesity ([facility] 01/11/2019    Nuclear cataract 02/05/2016    Posterior subcapsular polar senile cataract 02/05/2016    Neuropathy, peripheral 04/09/2014    Hypertension 04/08/2014    Hyperglycemia 04/08/2014    GERD (gastroesophageal reflux disease) 04/08/2014    Hyperlipidemia 04/08/2014    INDER on CPAP 04/08/2014    Depression 01/09/2014       Reviewed patient's course to date. CONY on exam today. Reassured patient. Small dilator provided to patient with instruction today by my nurse Belen Dao at the patient's request. She could not afford the dilators on her own. RTC in 3 months for continued surveillance. Reviewed precautionary symptoms to return sooner. All questions and concerns were addressed with the patient and she is comfortable with the plan.      Dakotah Jimenez MD

## 2020-09-01 RX ORDER — LOSARTAN POTASSIUM 100 MG/1
100 TABLET ORAL DAILY
Qty: 30 TAB | Refills: 11 | Status: SHIPPED | OUTPATIENT
Start: 2020-09-01 | End: 2021-03-26 | Stop reason: ALTCHOICE

## 2020-09-20 RX ORDER — CARVEDILOL 6.25 MG/1
TABLET ORAL
Qty: 180 TAB | Refills: 1 | Status: SHIPPED | OUTPATIENT
Start: 2020-09-20 | End: 2021-04-02 | Stop reason: SDUPTHER

## 2020-09-21 RX ORDER — RISPERIDONE 2 MG/1
TABLET, FILM COATED ORAL
Qty: 90 TAB | Refills: 0 | Status: SHIPPED | OUTPATIENT
Start: 2020-09-21 | End: 2020-12-18

## 2020-09-24 ENCOUNTER — OFFICE VISIT (OUTPATIENT)
Dept: INTERNAL MEDICINE CLINIC | Age: 65
End: 2020-09-24
Payer: MEDICARE

## 2020-09-24 ENCOUNTER — TELEPHONE (OUTPATIENT)
Dept: INTERNAL MEDICINE CLINIC | Age: 65
End: 2020-09-24

## 2020-09-24 VITALS
WEIGHT: 231.4 LBS | RESPIRATION RATE: 97 BRPM | HEART RATE: 70 BPM | TEMPERATURE: 98.3 F | BODY MASS INDEX: 35.07 KG/M2 | HEIGHT: 68 IN | OXYGEN SATURATION: 97 % | DIASTOLIC BLOOD PRESSURE: 67 MMHG | SYSTOLIC BLOOD PRESSURE: 123 MMHG

## 2020-09-24 DIAGNOSIS — R73.9 HYPERGLYCEMIA: ICD-10-CM

## 2020-09-24 DIAGNOSIS — Z23 NEEDS FLU SHOT: ICD-10-CM

## 2020-09-24 DIAGNOSIS — D64.9 ANEMIA, UNSPECIFIED TYPE: ICD-10-CM

## 2020-09-24 DIAGNOSIS — C54.1 ENDOMETRIAL CANCER (HCC): ICD-10-CM

## 2020-09-24 DIAGNOSIS — G62.9 PERIPHERAL POLYNEUROPATHY: ICD-10-CM

## 2020-09-24 DIAGNOSIS — I50.22 CHRONIC SYSTOLIC CONGESTIVE HEART FAILURE (HCC): ICD-10-CM

## 2020-09-24 DIAGNOSIS — Z00.00 MEDICARE ANNUAL WELLNESS VISIT, SUBSEQUENT: Primary | ICD-10-CM

## 2020-09-24 DIAGNOSIS — I10 ESSENTIAL HYPERTENSION: ICD-10-CM

## 2020-09-24 DIAGNOSIS — N18.30 STAGE 3 CHRONIC KIDNEY DISEASE (HCC): ICD-10-CM

## 2020-09-24 PROCEDURE — 1101F PT FALLS ASSESS-DOCD LE1/YR: CPT | Performed by: INTERNAL MEDICINE

## 2020-09-24 PROCEDURE — G8427 DOCREV CUR MEDS BY ELIG CLIN: HCPCS | Performed by: INTERNAL MEDICINE

## 2020-09-24 PROCEDURE — G8752 SYS BP LESS 140: HCPCS | Performed by: INTERNAL MEDICINE

## 2020-09-24 PROCEDURE — G0439 PPPS, SUBSEQ VISIT: HCPCS | Performed by: INTERNAL MEDICINE

## 2020-09-24 PROCEDURE — G8536 NO DOC ELDER MAL SCRN: HCPCS | Performed by: INTERNAL MEDICINE

## 2020-09-24 PROCEDURE — 90694 VACC AIIV4 NO PRSRV 0.5ML IM: CPT

## 2020-09-24 PROCEDURE — 3017F COLORECTAL CA SCREEN DOC REV: CPT | Performed by: INTERNAL MEDICINE

## 2020-09-24 PROCEDURE — 1090F PRES/ABSN URINE INCON ASSESS: CPT | Performed by: INTERNAL MEDICINE

## 2020-09-24 PROCEDURE — G8417 CALC BMI ABV UP PARAM F/U: HCPCS | Performed by: INTERNAL MEDICINE

## 2020-09-24 PROCEDURE — 99214 OFFICE O/P EST MOD 30 MIN: CPT | Performed by: INTERNAL MEDICINE

## 2020-09-24 PROCEDURE — G8399 PT W/DXA RESULTS DOCUMENT: HCPCS | Performed by: INTERNAL MEDICINE

## 2020-09-24 PROCEDURE — G0463 HOSPITAL OUTPT CLINIC VISIT: HCPCS | Performed by: INTERNAL MEDICINE

## 2020-09-24 PROCEDURE — G9717 DOC PT DX DEP/BP F/U NT REQ: HCPCS | Performed by: INTERNAL MEDICINE

## 2020-09-24 PROCEDURE — G8754 DIAS BP LESS 90: HCPCS | Performed by: INTERNAL MEDICINE

## 2020-09-24 NOTE — PROGRESS NOTES
This is the Subsequent Medicare Annual Wellness Exam, performed 12 months or more after the Initial AWV or the last Subsequent AWV    I have reviewed the patient's medical history in detail and updated the computerized patient record. History     Patient Active Problem List   Diagnosis Code    Depression F32.9    Hypertension I10    Hyperglycemia R73.9    GERD (gastroesophageal reflux disease) K21.9    Hyperlipidemia E78.5    INDER on CPAP G47.33, Z99.89    Neuropathy, peripheral G62.9    Nuclear cataract [id number]    Posterior subcapsular polar senile cataract H25.049    Severe obesity ([address] E66.01    Endometrial cancer ([facility] C54.1    Left bundle branch block I44.7     Past Medical History:   Diagnosis Date    Arthritis     OSTEO    Cancer ([facility]     skin cancer removed from nose    Depression 2014    Endometrial cancer ([facility]     GERD (gastroesophageal reflux disease)     Hyperlipidemia     Hypertension     Left bundle branch block     Neuropathy, peripheral 2014    INDER on CPAP 2014    compliant      Past Surgical History:   Procedure Laterality Date    COLONOSCOPY  2010     Dr. Robert Burnham        HX DILATION AND CURETTAGE  2020    HYSTROSCOPY    HX GI      COLONOSCOPY    HX HEENT      BCCA REMOVED FROM NOSE     HX HYSTERECTOMY  2020    HX OTHER SURGICAL      hysteroscopy surg    HX TONSILLECTOMY       Current Outpatient Medications   Medication Sig Dispense Refill    risperiDONE (RisperDAL) 2 mg tablet TAKE 1 TABLET BY MOUTH EVERY DAY AT NIGHT 90 Tab 0    carvediloL (COREG) 6.25 mg tablet TAKE 1 TABLET BY MOUTH TWICE A DAY WITH MEALS 180 Tab 1    losartan (COZAAR) 100 mg tablet Take 1 Tab by mouth daily. 30 Tab 11    omeprazole (PRILOSEC) 20 mg capsule Take 1 Cap by mouth nightly.  90 Cap 3    buPROPion XL (WELLBUTRIN XL) 300 mg XL tablet TAKE 1 TABLET BY MOUTH EVERY MORNING 90 Tab 3    naproxen (NAPROSYN) 500 mg tablet Take 1 Tab by mouth every twelve (12) hours as needed for Pain. 20 Tab 0    atorvastatin (LIPITOR) 20 mg tablet TAKE 1 TABLET BY MOUTH EVERY DAY 90 Tab 3    docusate sodium (COLACE PO) Take 1 Tab by mouth as needed.  ascorbic acid, vitamin C, (VITAMIN C) 500 mg tablet Take 500 mg by mouth daily (after breakfast).  Cholecalciferol, Vitamin D3, (VITAMIN D3) 2,000 unit cap capsule Take  by mouth daily (after breakfast).  multivitamin, stress formula (STRESS TAB) tablet Take 1 Tab by mouth daily.  vitamin e (E GEMS) 100 unit capsule Take 400 Units by mouth daily.  aspirin delayed-release 81 mg tablet Take 81 mg by mouth daily. Allergies   Allergen Reactions    Erythromycin Rash       Family History   Problem Relation Age of Onset    Heart Disease Mother     Breast Cancer Mother     Heart Disease Father     Kidney Disease Paternal Aunt         renal cancer    Colon Cancer Maternal Aunt     Cancer Paternal Uncle         leukemia    Anesth Problems Neg Hx      Social History     Tobacco Use    Smoking status: Never Smoker    Smokeless tobacco: Never Used   Substance Use Topics    Alcohol use: No     Alcohol/week: 0.0 standard drinks       Depression Risk Factor Screening:     3 most recent PHQ Screens 2/12/2020   Little interest or pleasure in doing things Not at all   Feeling down, depressed, irritable, or hopeless Not at all   Total Score PHQ 2 0     \"Slightly down, because of the virus. \" \"I'm sitting around watching TV and that's not what I usually do. \"     Alcohol Risk Screen   Do you average more than 1 drink per night or more than 7 drinks a week:  No    On any one occasion in the past three months have you have had more than 3 drinks containing alcohol:  No        Functional Ability and Level of Safety:   Hearing: Hearing is good. Lives in 1 story home, with . Activities of Daily Living:   The home contains: handrails and grab bars  Patient does total self care     Ambulation: with no difficulty     Fall Risk:  Fall Risk Assessment, last 12 mths 9/24/2020   Able to walk? Yes   Fall in past 12 months?  No     Abuse Screen:  Patient is not abused       Cognitive Screening   Has your family/caregiver stated any concerns about your memory: no     Cognitive Screening: Normal    Patient Care Team   Patient Care Team:  Darius Bueno MD as PCP - General (Internal Medicine)  Darius Bueno MD as PCP [facility]  Darius Bueno MD (Internal Medicine)  Cami Morrissey MD as Physician (Sleep Medicine)  Kirti Beth MD (Cardiology)    Assessment/Plan   Education and counseling provided:  Are appropriate based on today's review and evaluation        Health Maintenance Due   Topic Date Due    Breast Cancer Screen Mammogram  10/18/2017    Medicare Yearly Exam  06/27/2019    GLAUCOMA SCREENING Q2Y  01/01/2020    Flu Vaccine (1) 09/01/2020    Lipid Screen  10/02/2020

## 2020-09-24 NOTE — PROGRESS NOTES
SUBJECTIVE:   Ms. Aure Hamm is a 77 y.o. female, seen in office for routine follow up and AWV. Her  is my patient Glen Olivas. Chief Complaint   Patient presents with    Annual Exam       Endometrial cancer: She is s/p Robotic-assisted total laparoscopic hysterectomy, Bilateral salpingo-oophorectomy, Bilateral pelvic sentinel lymph node mapping and biopsy Feb 2020. Follows with Dr. Arlet Montez. She was seen by Dr. Brett Rouse, cardiologist, for preop evaluation. Found to have LBBB on EKG. Echo was performed and showed EF 30-35%. \"He wants to do Cath\"--she is interested in following up with her usual cardiologist, Dr. Chris Henry. Sees gynecologist yearly. Sees Dr. Davian Asencio currently (previously Dr. Nancy Vinson). Has had mammogram.  Had dexa. L ankle pain \"still bothers me at times. \"  It is recalled that she saw orthopedist for L ankle pain. \"She wanted me to wear a device\", that was expensive. The pain got better with new shoes. Hurts if sits at desk too long. Neuropathy: Unchanged numbness and tingling in feet. It is recalled that she has had high blood sugars, as well as an A1C that was up. This is doing fine. Last A1C 5.8 in Feb.     Palpitations not discussed today. Saw Dr. Chris Henry in 2013. We noted in 2014: She was having palpitations and saw Dr. Dakota Vazquez in January. She had EKG, echo and stress test--\"I think everything is okay. \"  The palpitations went away since getting treated for INDER. She is on CPAP for INDER. Mood okay, stress better. \"I have to admit I'm back in a bit of a funk. \" As noted before: \"I have been depressed since I was a teenager. \"  \"I tried to go off the meds, but I got down in the dumps. \"  Back on meds. No SI at this time. At this time, she is otherwise doing well and has brought no other complaints to my attention today. For a list of the medical issues addressed today, see the assessment and plan below.     PMH:   Past Medical History: Diagnosis Date    Arthritis     OSTEO    Cancer [facility])     skin cancer removed from nose    Depression 2014    [facility]ometrial cancer ([facility]     GERD (gastroesophageal reflux disease)     Hyperlipidemia     Hypertension     Left bundle branch block     Neuropathy, peripheral 2014    INDER on CPAP 2014    compliant       Past Surgical History:   Procedure Laterality Date    COLONOSCOPY  2010     Dr. Stephens Cutting        HX DILATION AND CURETTAGE  2020    HYSTROSCOPY    HX GI      COLONOSCOPY    HX HEENT      BCCA REMOVED FROM NOSE     HX HYSTERECTOMY  2020    HX OTHER SURGICAL      hysteroscopy surg    HX TONSILLECTOMY         All: She is allergic to erythromycin. (rash). Current Outpatient Medications   Medication Sig    risperiDONE (RisperDAL) 2 mg tablet TAKE 1 TABLET BY MOUTH EVERY DAY AT NIGHT    carvediloL (COREG) 6.25 mg tablet TAKE 1 TABLET BY MOUTH TWICE A DAY WITH MEALS    losartan (COZAAR) 100 mg tablet Take 1 Tab by mouth daily.  omeprazole (PRILOSEC) 20 mg capsule Take 1 Cap by mouth nightly.  buPROPion XL (WELLBUTRIN XL) 300 mg XL tablet TAKE 1 TABLET BY MOUTH EVERY MORNING    naproxen (NAPROSYN) 500 mg tablet Take 1 Tab by mouth every twelve (12) hours as needed for Pain.  atorvastatin (LIPITOR) 20 mg tablet TAKE 1 TABLET BY MOUTH EVERY DAY    docusate sodium (COLACE PO) Take 1 Tab by mouth as needed.  ascorbic acid, vitamin C, (VITAMIN C) 500 mg tablet Take 500 mg by mouth daily (after breakfast).  Cholecalciferol, Vitamin D3, (VITAMIN D3) 2,000 unit cap capsule Take  by mouth daily (after breakfast).  multivitamin, stress formula (STRESS TAB) tablet Take 1 Tab by mouth daily.  vitamin e (E GEMS) 100 unit capsule Take 400 Units by mouth daily.  aspirin delayed-release 81 mg tablet Take 81 mg by mouth daily. No current facility-administered medications for this visit. FH: Mother  breast cancer. Father  of CHF. SH: Retired insurance . She reports that she has never smoked. She has never used smokeless tobacco. She reports that she does not drink alcohol or use drugs. ROS: See above; Complete ROS otherwise negative. OBJECTIVE:   Visit Vitals  /67 (BP 1 Location: Left arm, BP Patient Position: Sitting)   Pulse 70   Temp 98.3 °F (36.8 °C) (Temporal)   Resp (!) 97   Ht 5' 8\" (1.727 m)   Wt 231 lb 6.4 oz (105 kg)   SpO2 97%   BMI 35.18 kg/m²     Gen: Pleasant 77 y.o.  female in NAD.   HEENT: PERRLA. EOMI. OP moist and pink.  Neck: Supple.  No LAD.  HEART: RRR, No M/G/R.   LUNGS: CTAB No W/R.   ABDOMEN: S, NT, ND, BS+.   EXTREMITIES: Warm. No C/C/E. MUSCULOSKELETAL: Normal ROM, muscle strength 5/5 all groups. NEURO: Alert and oriented x 3.  Cranial nerves grossly intact.  No focal sensory or motor deficits noted. SKIN: She has lap surgery sits healing well. Appear to have been closed with adhesive. Lab Results   Component Value Date/Time    Hemoglobin A1c 5.8 (H) 2020 11:59 AM     Lab Results   Component Value Date/Time    Sodium 140 2020 01:15 AM    Potassium 4.0 2020 01:15 AM    Chloride 108 2020 01:15 AM    CO2 28 2020 01:15 AM    Anion gap 4 (L) 2020 01:15 AM    Glucose 117 (H) 2020 01:15 AM    BUN 15 2020 01:15 AM    Creatinine 1.14 (H) 2020 01:15 AM    BUN/Creatinine ratio 13 2020 01:15 AM    GFR est AA 58 (L) 2020 01:15 AM    GFR est non-AA 48 (L) 2020 01:15 AM    Calcium 10.8 (H) 2020 01:15 AM         ASSESSMENT/ PLAN:     Endometrial cancer: Per Dr. Valerie Reese. Anemia: Mild. ? Chronic disease. Keep an eye on this. CKD stage III: Watch labs. Hypertension: BP not assessed. Recent readings reviewed. BP Readings from Last 3 Encounters:   20 123/67   20 112/66   20 154/80     - atenolol (TENORMIN) 50 mg tablet;  Take 1 Tab by mouth daily.  - METABOLIC PANEL, COMPREHENSIVE  - LIPID PANEL  - CBC WITH AUTOMATED DIFF    Hip pain: Stable, R side, mainly. Can return as desired to Dr. Scott Tom. OTC analgesics. Constipation: Controlled with stool softener. Neuropathy, LE: Stable. Ongoing. Likely arising from her metabolic syndrome / prediabetes. Sometimes neuropathy predates the diagnosis of DM. Neg RPR, ESR, TSH, JUNE, in 2014    Depression: Stable. No SI.   - risperiDONE (RISPERDAL) 2 mg tablet; Take 1 Tab by mouth nightly as needed. - buPROPion XL (WELLBUTRIN XL) 300 mg XL tablet; Take 1 Tab by mouth every morning. Hyperglycemia: \"Prediabetes\" / \"borderline DM\": J1A 5.8.   - METABOLIC PANEL, COMPREHENSIVE  - HEMOGLOBIN A1C    GERD (gastroesophageal reflux disease): Stable. - omeprazole (PRILOSEC) 20 mg capsule; Take 1 Cap by mouth daily. Hyperlipidemia: Reviewed her labs from October 2019. We'll check again in 6 months. - atorvastatin (LIPITOR) 20 mg tablet; Take 1 Tab by mouth daily.  - LIPID PANEL    INDER on CPAP:    Obesity: stable--work on diet and exercise.    Wt Readings from Last 3 Encounters:   09/24/20 231 lb 6.4 oz (105 kg)   08/26/20 230 lb 6.4 oz (104.5 kg)   06/19/20 230 lb (104.3 kg)     RTC 6 months, HTN, etc

## 2020-09-24 NOTE — PATIENT INSTRUCTIONS
Office Policies Phone calls/patient messages: Please allow up to 24 hours for someone in the office to contact you about your call or message. Be mindful your provider may be out of the office or your message may require further review. We encourage you to use [employer] for your messages as this is a faster, more efficient way to communicate with our office Medication Refills: 
         
Prescription medications require 48-72 business hours to process. We encourage you to use [employer] for your refills. For controlled medications: Please allow 72 business hours to process. Certain medications may require you to  a written prescription at our office. NO narcotic/controlled medications will be prescribed after 4pm Monday through Friday or on weekends Form/Paperwork Completion: 
         
Please note a $25 fee may incur for all paperwork for completed by our providers. We ask that you allow 7-10 business days. Pre-payment is due prior to picking up/faxing the completed form. You may also download your forms to [employer] to have your doctor print off. 
 
 
1. Have you been to the ER, urgent care clinic since your last visit? Hospitalized since your last visit?no 2. Have you seen or consulted any other health care providers outside of the [address] [place] [address] since your last visit? Include any pap smears or colon screening. no 
 
Medicare Wellness Visit, Female The best way to live healthy is to have a lifestyle where you eat a well-balanced diet, exercise regularly, limit alcohol use, and quit all forms of tobacco/nicotine, if applicable. Regular preventive services are another way to keep healthy. Preventive services (vaccines, screening tests, monitoring & exams) can help personalize your care plan, which helps you manage your own care.  Screening tests can find health problems at the earliest stages, when they are easiest to treat. [address] [facility] follows the current, evidence-based guidelines published by the [facility] Gerry Carrillo ([facility]) when recommending preventive services for our patients. Because we follow these guidelines, sometimes recommendations change over time as research supports it. (For example, mammograms used to be recommended annually. Even though Medicare will still pay for an annual mammogram, the newer guidelines recommend a mammogram every two years for women of average risk.) Of course, you and your doctor may decide to screen more often for some diseases, based on your risk and your health status. Preventive services for you include: - Medicare offers their members a free annual wellness visit, which is time for you and your primary care provider to discuss and plan for your preventive service needs. Take advantage of this benefit every year! 
-All adults over the age of 72 should receive the recommended pneumonia vaccines. Current USPSTF guidelines recommend a series of two vaccines for the best pneumonia protection.  
-All adults should have a flu vaccine yearly and a tetanus vaccine every 10 years. All adults age 48 and older should receive a shingles vaccine once in their lifetime.   
-A bone mass density test is recommended when a woman turns 65 to screen for osteoporosis. This test is only recommended one time, as a screening. Some providers will use this same test as a disease monitoring tool if you already have osteoporosis.  
-All adults age 38-68 who are overweight should have a diabetes screening test once every three years.  
-Other screening tests and preventive services for persons with diabetes include: an eye exam to screen for diabetic retinopathy, a kidney function test, a foot exam, and stricter control over your cholesterol.  
-Cardiovascular screening for adults with routine risk involves an electrocardiogram (ECG) at intervals determined by your doctor.  
-Colorectal cancer screenings should be done for adults age 54-65 with no increased risk factors for colorectal cancer. There are a number of acceptable methods of screening for this type of cancer. Each test has its own benefits and drawbacks. Discuss with your doctor what is most appropriate for you during your annual wellness visit. The different tests include: colonoscopy (considered the best screening method), a fecal occult blood test, a fecal DNA test, and sigmoidoscopy. -Breast cancer screenings are recommended every other year for women of normal risk, age 54-69. 
-Cervical cancer screenings for women over age 72 are only recommended with certain risk factors.  
-All adults born between [facility] should be screened once for Hepatitis C. Here is a list of your current Health Maintenance items (your personalized list of preventive services) with a due date: 
Health Maintenance Due Topic Date Due  Mammogram  10/18/2017 [facility] Annual Well Visit  06/27/2019  Glaucoma Screening   01/01/2020  Yearly Flu Vaccine (1) 09/01/2020  Cholesterol Test   10/02/2020 Medicare Wellness Visit, Female The best way to live healthy is to have a lifestyle where you eat a well-balanced diet, exercise regularly, limit alcohol use, and quit all forms of tobacco/nicotine, if applicable. Regular preventive services are another way to keep healthy. Preventive services (vaccines, screening tests, monitoring & exams) can help personalize your care plan, which helps you manage your own care. Screening tests can find health problems at the earliest stages, when they are easiest to treat. [facility] follows the current, evidence-based guidelines published by the [place] Gerry [place] (USPSTF) when recommending preventive services for our patients.  Because we follow these guidelines, sometimes recommendations change over time as research supports it. (For example, mammograms used to be recommended annually. Even though Medicare will still pay for an annual mammogram, the newer guidelines recommend a mammogram every two years for women of average risk). Of course, you and your doctor may decide to screen more often for some diseases, based on your risk and your co-morbidities (chronic disease you are already diagnosed with). Preventive services for you include: - Medicare offers their members a free annual wellness visit, which is time for you and your primary care provider to discuss and plan for your preventive service needs. Take advantage of this benefit every year! 
-All adults over the age of 72 should receive the recommended pneumonia vaccines. Current USPSTF guidelines recommend a series of two vaccines for the best pneumonia protection.  
-All adults should have a flu vaccine yearly and a tetanus vaccine every 10 years.  
-All adults age 48 and older should receive the shingles vaccines (series of two vaccines). -All adults age 38-68 who are overweight should have a diabetes screening test once every three years.  
-All adults born between [birth year] and 1967 should be screened once for Hepatitis C. 
-Other screening tests and preventive services for persons with diabetes include: an eye exam to screen for diabetic retinopathy, a kidney function test, a foot exam, and stricter control over your cholesterol.  
-Cardiovascular screening for adults with routine risk involves an electrocardiogram (ECG) at intervals determined by your doctor.  
-Colorectal cancer screenings should be done for adults age 54-65 with no increased risk factors for colorectal cancer. There are a number of acceptable methods of screening for this type of cancer. Each test has its own benefits and drawbacks.  Discuss with your doctor what is most appropriate for you during your annual wellness visit. The different tests include: colonoscopy (considered the best screening method), a fecal occult blood test, a fecal DNA test, and sigmoidoscopy. 
 
-A bone mass density test is recommended when a woman turns 65 to screen for osteoporosis. This test is only recommended one time, as a screening. Some providers will use this same test as a disease monitoring tool if you already have osteoporosis. -Breast cancer screenings are recommended every other year for women of normal risk, age 54-69. 
-Cervical cancer screenings for women over age 72 are only recommended with certain risk factors. Here is a list of your current Health Maintenance items (your personalized list of preventive services) with a due date: 
Health Maintenance Due Topic Date Due  Mammogram  10/18/2017 [address] [place] [address] Annual Well Visit  06/27/2019  Glaucoma Screening   01/01/2020  Yearly Flu Vaccine (1) 09/01/2020  Cholesterol Test   10/02/2020

## 2020-09-24 NOTE — TELEPHONE ENCOUNTER
[address] [place] [address] Office Pool               Caller's first and last name: self   Reason for call: Pt is requesting a return call from Dr. Tiffani Villanueva, she has a couple questions related today's visit with provider. Callback required yes/no and why: Yes to discuss questions.    Best contact number(s): [phone]; [phone]   Details to clarify the request: n/a

## 2020-09-24 NOTE — TELEPHONE ENCOUNTER
Returned call to pt, 2 pt identifiers verified, pt wants to speck specifically to Dr Sonya Ramos, not the nurse.

## 2020-09-25 ENCOUNTER — TELEPHONE (OUTPATIENT)
Dept: INTERNAL MEDICINE CLINIC | Age: 65
End: 2020-09-25

## 2020-09-25 NOTE — TELEPHONE ENCOUNTER
[facility]               Caller's first and last name: Pt   Reason for call: Would like for PCP to call not the nurse.    Callback required yes/no and why: yes   Best contact number(s): [phone]   Details to clarify the request: N/A     Copy/Paste  ENVERA

## 2020-09-28 NOTE — TELEPHONE ENCOUNTER
Discussed CKD diagnosis, including clinical course over years. For now, she need not worry excessively about things like dialysis. No dietary advise for now other than what is already recommended: Eat healthy foods, avoid salt, exercise. Avoid nephrotoxins (such as naproxen and contrast dye). Regarding cardiac cath, if it needs to be done, then this would outweigh the risks to the kidneys.

## 2020-09-29 ENCOUNTER — HOSPITAL ENCOUNTER (OUTPATIENT)
Dept: LAB | Age: 65
Discharge: HOME | End: 2020-09-29
Payer: MEDICARE

## 2020-09-29 PROCEDURE — 80053 COMPREHEN METABOLIC PANEL: CPT

## 2020-09-29 PROCEDURE — 36415 COLL VENOUS BLD VENIPUNCTURE: CPT

## 2020-09-29 PROCEDURE — 80061 LIPID PANEL: CPT

## 2020-09-29 PROCEDURE — 85025 COMPLETE CBC W/AUTO DIFF WBC: CPT

## 2020-09-29 PROCEDURE — 83036 HEMOGLOBIN GLYCOSYLATED A1C: CPT

## 2020-09-30 LAB
ALBUMIN SERPL-MCNC: 4.2 G/DL (ref 3.8–4.8)
ALBUMIN/GLOB SERPL: 2.1 {RATIO} (ref 1.2–2.2)
ALP SERPL-CCNC: 105 IU/L (ref 39–117)
ALT SERPL-CCNC: 21 IU/L (ref 0–32)
AST SERPL-CCNC: 15 IU/L (ref 0–40)
BASOPHILS # BLD AUTO: 0 X10E3/UL (ref 0–0.2)
BASOPHILS NFR BLD AUTO: 1 %
BILIRUB SERPL-MCNC: 0.4 MG/DL (ref 0–1.2)
BUN SERPL-MCNC: 20 MG/DL (ref 8–27)
BUN/CREAT SERPL: 18 (ref 12–28)
CALCIUM SERPL-MCNC: 11.8 MG/DL (ref 8.7–10.3)
CHLORIDE SERPL-SCNC: 107 MMOL/L (ref 96–106)
CHOLEST SERPL-MCNC: 134 MG/DL (ref 100–199)
CO2 SERPL-SCNC: 23 MMOL/L (ref 20–29)
CREAT SERPL-MCNC: 1.12 MG/DL (ref 0.57–1)
EOSINOPHIL # BLD AUTO: 0.1 X10E3/UL (ref 0–0.4)
EOSINOPHIL NFR BLD AUTO: 4 %
ERYTHROCYTE [DISTWIDTH] IN BLOOD BY AUTOMATED COUNT: 12.9 % (ref 11.7–15.4)
EST. AVERAGE GLUCOSE BLD GHB EST-MCNC: 117 MG/DL
GLOBULIN SER CALC-MCNC: 2 G/DL (ref 1.5–4.5)
GLUCOSE SERPL-MCNC: 108 MG/DL (ref 65–99)
HBA1C MFR BLD: 5.7 % (ref 4.8–5.6)
HCT VFR BLD AUTO: 34.4 % (ref 34–46.6)
HDLC SERPL-MCNC: 53 MG/DL
HGB BLD-MCNC: 11.4 G/DL (ref 11.1–15.9)
IMM GRANULOCYTES # BLD AUTO: 0 X10E3/UL (ref 0–0.1)
IMM GRANULOCYTES NFR BLD AUTO: 0 %
LDLC SERPL CALC-MCNC: 68 MG/DL (ref 0–99)
LYMPHOCYTES # BLD AUTO: 0.9 X10E3/UL (ref 0.7–3.1)
LYMPHOCYTES NFR BLD AUTO: 24 %
MCH RBC QN AUTO: 30.6 PG (ref 26.6–33)
MCHC RBC AUTO-ENTMCNC: 33.1 G/DL (ref 31.5–35.7)
MCV RBC AUTO: 92 FL (ref 79–97)
MONOCYTES # BLD AUTO: 0.5 X10E3/UL (ref 0.1–0.9)
MONOCYTES NFR BLD AUTO: 14 %
NEUTROPHILS # BLD AUTO: 2.2 X10E3/UL (ref 1.4–7)
NEUTROPHILS NFR BLD AUTO: 57 %
PLATELET # BLD AUTO: 214 X10E3/UL (ref 150–450)
POTASSIUM SERPL-SCNC: 4.6 MMOL/L (ref 3.5–5.2)
PROT SERPL-MCNC: 6.2 G/DL (ref 6–8.5)
RBC # BLD AUTO: 3.73 X10E6/UL (ref 3.77–5.28)
SODIUM SERPL-SCNC: 141 MMOL/L (ref 134–144)
TRIGL SERPL-MCNC: 65 MG/DL (ref 0–149)
VLDLC SERPL CALC-MCNC: 13 MG/DL (ref 5–40)
WBC # BLD AUTO: 3.8 X10E3/UL (ref 3.4–10.8)

## 2020-10-01 ENCOUNTER — PATIENT MESSAGE (OUTPATIENT)
Dept: INTERNAL MEDICINE CLINIC | Age: 65
End: 2020-10-01

## 2020-10-01 ENCOUNTER — TELEPHONE (OUTPATIENT)
Dept: INTERNAL MEDICINE CLINIC | Age: 65
End: 2020-10-01

## 2020-10-01 NOTE — TELEPHONE ENCOUNTER
From: Chris Olivas  To: Merla Meigs, MD  Sent: 10/1/2020 12:17 PM EDT  Subject: Visit Follow-Up Question    After reviewing my Health Summary and Current Health Issues in My Chart, I see that Stage 3 Chronic Kidney Disease is no longer listed there. I think it should be so other doctors looking at my medical records will be aware of this when requesting other tests for me or prescribing medications. Please add this, or advise me why it should not be added. Also, I would like to see a Nephrologist to get more information on my condition. Please send me a referral in My Chart to do so. I think the group [facility] the hospital.   Thank you.   [facility] [place] [facility]

## 2020-10-01 NOTE — TELEPHONE ENCOUNTER
[address] [place] [address] Office Pool               Caller's first and last name: pt   Reason for call: Inquiring about the results of additional lab tests. Callback required yes/no and why: yes   Best contact number(s): [phone]   Details to clarify the request: One test result was posted on [employer]. Total 4 tests were orderd.

## 2020-10-02 ENCOUNTER — TELEPHONE (OUTPATIENT)
Dept: INTERNAL MEDICINE CLINIC | Age: 65
End: 2020-10-02

## 2020-10-02 DIAGNOSIS — N18.31 STAGE 3A CHRONIC KIDNEY DISEASE (HCC): Primary | ICD-10-CM

## 2020-10-02 NOTE — TELEPHONE ENCOUNTER
Labs reviewed. Sugar okay: \"Prediabetic\" levels. Lab Results   Component Value Date/Time    Glucose 108 (H) 09/29/2020 07:52 AM       Lab Results   Component Value Date/Time    Hemoglobin A1c 5.7 (H) 09/29/2020 07:52 AM     Other labs normal or stable.    [facility]

## 2020-10-08 DIAGNOSIS — N18.31 STAGE 3A CHRONIC KIDNEY DISEASE (HCC): ICD-10-CM

## 2020-10-20 ENCOUNTER — HOSPITAL ENCOUNTER (OUTPATIENT)
Dept: RADIATION THERAPY | Age: 65
Discharge: HOME | End: 2020-10-20

## 2020-11-09 NOTE — PROGRESS NOTES
Subjective/HPI:     Kris Hamilton is a 77 y.o. female is here for new patient consultation, last seen in our office 2014. The patient has medical hx significant for HTN, hyperlipidemia, LBBB, hyperglycemia, INDER on CPAP, and GERD. She is here today after having pre-op echo completed in May and the results showed a reduced EF 30-35% with abnormal left ventricular septal motion c/w IVCD. She was seen by Dr Jose Barr at that time and was recommended to have a cardiac cath which she declined. She reports she is having episodes of CP, not necessarily exertionally related. She is having MÑUOZ which has worsened. She is noticing worsening LE swelling. She denies orthopnea, PND, lightheadedness, or syncope. Previous cardiac evaluation includes Lexiscan that was normal in January 2014, echo 2014 EF 50-55%. holter with PACs/PVCs in 12/13. Family med hx significant for CAD.     Patient Active Problem List    Diagnosis Date Noted    Stage 3a chronic kidney disease 10/08/2020    Left bundle branch block 02/18/2020    Endometrial cancer ([address] 02/12/2020    Severe obesity ([address] 01/11/2019    Nuclear cataract 02/05/2016    Posterior subcapsular polar senile cataract 02/05/2016    Neuropathy, peripheral 04/09/2014    Hypertension 04/08/2014    Hyperglycemia 04/08/2014    GERD (gastroesophageal reflux disease) 04/08/2014    Hyperlipidemia 04/08/2014    INDER on CPAP 04/08/2014    Depression 01/09/2014      Eddie Gray MD  Past Medical History:   Diagnosis Date    Arthritis     OSTEO    Cancer [facility]) 2005    skin cancer removed from nose    Chronic kidney disease 09/24/2020    stage 3     Depression 1/9/2014    Endometrial cancer ([address] 2020    GERD (gastroesophageal reflux disease)     Hyperlipidemia     Hypertension     Left bundle branch block 2013    Neuropathy, peripheral 4/9/2014    INDER on CPAP 4/8/2014    compliant    Stage 3a chronic kidney disease 10/8/2020           Past Surgical History: Procedure Laterality Date    COLONOSCOPY  2010     Dr. Melita Carney        HX DILATION AND CURETTAGE  2020    HYSTROSCOPY    HX GI      COLONOSCOPY    HX HEENT      BCCA REMOVED FROM NOSE     HX HYSTERECTOMY  2020    HX OTHER SURGICAL      hysteroscopy surg    HX TONSILLECTOMY       Allergies   Allergen Reactions    Erythromycin Rash      Family History   Problem Relation Age of Onset    Heart Disease Mother     Breast Cancer Mother     Heart Disease Father     Kidney Disease Paternal Aunt         renal cancer    Colon Cancer Maternal Aunt     Cancer Paternal Uncle         leukemia    Anesth Problems Neg Hx       Current Outpatient Medications   Medication Sig    risperiDONE (RisperDAL) 2 mg tablet TAKE 1 TABLET BY MOUTH EVERY DAY AT NIGHT    carvediloL (COREG) 6.25 mg tablet TAKE 1 TABLET BY MOUTH TWICE A DAY WITH MEALS    losartan (COZAAR) 100 mg tablet Take 1 Tab by mouth daily.  buPROPion XL (WELLBUTRIN XL) 300 mg XL tablet TAKE 1 TABLET BY MOUTH EVERY MORNING    atorvastatin (LIPITOR) 20 mg tablet TAKE 1 TABLET BY MOUTH EVERY DAY    docusate sodium (COLACE PO) Take 1 Tab by mouth as needed.  ascorbic acid, vitamin C, (VITAMIN C) 500 mg tablet Take 500 mg by mouth daily (after breakfast).  Cholecalciferol, Vitamin D3, (VITAMIN D3) 2,000 unit cap capsule Take  by mouth daily (after breakfast).  multivitamin, stress formula (STRESS TAB) tablet Take 1 Tab by mouth daily.  vitamin e (E GEMS) 100 unit capsule Take 400 Units by mouth daily.  aspirin delayed-release 81 mg tablet Take 81 mg by mouth daily.  omeprazole (PRILOSEC) 20 mg capsule Take 1 Cap by mouth nightly. No current facility-administered medications for this visit.        Vitals:    20 1404 20 1405   BP: (!) 140/82 (!) 140/80   Pulse: 75    Resp: 18    SpO2: 98%    Weight: 232 lb (105.2 kg)    Height: 5' 8\" (1.727 m)        I have reviewed the nurses notes, vitals, problem list, allergy list, medical history, family, social history and medications. Review of Symptoms:    General: Pt denies excessive weight gain or loss. Pt is able to conduct ADL's  HEENT: Denies blurred vision, headaches, epistaxis and difficulty swallowing. Respiratory: Denies worsening shortness of breath,+ MUÑOZ, denies wheezing or stridor. Cardiovascular: + precordial pain, Denies palpitations, + edema denies PND  Gastrointestinal: Denies poor appetite, indigestion, abdominal pain or blood in stool  Musculoskeletal: Denies pain or swelling from muscles or joints  Neurologic: Denies tremor, paresthesias, or sensory motor disturbance  Skin: Denies rash, itching or texture change. Physical Exam:      General: Well developed, cooperative, alert in no acute distress, appears states age. HEENT: Supple, No carotid bruits, no JVD, trach is midline. PERRL, EOM intact  Heart:  Normal S1/S2 negative S3 or S4. Regular, no murmur, gallop or rub.   Respiratory: Clear bilaterally x 4, no wheezing or rales  Abdomen:   Soft, non-tender, no masses, bowel sounds are active.   Extremities:  1+marcell pedal edema, normal cap refill, no cyanosis, atraumatic. Neuro: A&Ox3, speech clear, gait stable. Skin: Skin color is normal. No rashes or lesions. Non diaphoretic  Vascular: 2+ pulses symmetric in all extremities    Cardiographics    ECG: SR, chronic LBBB    Results for orders placed or performed during the hospital encounter of 06/19/20   EKG, 12 LEAD, INITIAL   Result Value Ref Range    Ventricular Rate 93 BPM    Atrial Rate 93 BPM    P-R Interval 162 ms    QRS Duration 154 ms    Q-T Interval 392 ms    QTC Calculation (Bezet) 487 ms    Calculated P Axis 51 degrees    Calculated R Axis 63 degrees    Calculated T Axis 14 degrees    Diagnosis       Normal sinus rhythm  Left bundle branch block  When compared with ECG of 14-FEB-2020 12:25,  Vent.  rate has increased BY  33 BPM  Confirmed by Carlotta Darnell MD, Carry ud ([id number]) on 6/19/2020 3:41:41 PM           Cardiology Labs:  Lab Results   Component Value Date/Time    Cholesterol, total 134 09/29/2020 07:52 AM    HDL Cholesterol 53 09/29/2020 07:52 AM    LDL, calculated 65 10/02/2019 08:50 AM    LDL Chol Calc ([facility]) 68 09/29/2020 07:52 AM    Triglyceride 65 09/29/2020 07:52 AM       Lab Results   Component Value Date/Time    Sodium 141 09/29/2020 07:52 AM    Potassium 4.6 09/29/2020 07:52 AM    Chloride 107 (H) 09/29/2020 07:52 AM    CO2 23 09/29/2020 07:52 AM    Anion gap 4 (L) 06/19/2020 01:15 AM    Glucose 108 (H) 09/29/2020 07:52 AM    BUN 20 09/29/2020 07:52 AM    Creatinine 1.12 (H) 09/29/2020 07:52 AM    BUN/Creatinine ratio 18 09/29/2020 07:52 AM    GFR est AA 59 (L) 09/29/2020 07:52 AM    GFR est non-AA 51 (L) 09/29/2020 07:52 AM    Calcium 11.8 (H) 09/29/2020 07:52 AM    Alk. phosphatase 105 09/29/2020 07:52 AM    Protein, total 6.2 09/29/2020 07:52 AM    Albumin 4.2 09/29/2020 07:52 AM    Globulin 3.4 06/19/2020 01:15 AM    A-G Ratio 2.1 09/29/2020 07:52 AM    ALT (SGPT) 21 09/29/2020 07:52 AM           Assessment:     Assessment:      Diagnoses and all orders for this visit:    1. Cardiomyopathy, unspecified type ([facility] [address]  -     AMB POC EKG ROUTINE W/ 12 LEADS, INTER & REP  -     NUCLEAR CARDIAC STRESS TEST; Future    2. Left bundle branch block  -     NUCLEAR CARDIAC STRESS TEST; Future    3. Essential hypertension  -     NUCLEAR CARDIAC STRESS TEST; Future    4. Mixed hyperlipidemia  -     NUCLEAR CARDIAC STRESS TEST; Future    5. INDER on CPAP  -     NUCLEAR CARDIAC STRESS TEST; Future      Specialty Problems      ICD-9-CM       Cardiology Problems    Hyperlipidemia        Hypertension              ICD-10-CM ICD-9-CM    1. Cardiomyopathy, unspecified type (HCC)  I42.9 425.4 AMB POC EKG ROUTINE W/ 12 LEADS, INTER & REP      NUCLEAR CARDIAC STRESS TEST   2. Left bundle branch block  I44.7 426.3 NUCLEAR CARDIAC STRESS TEST   3.  Essential hypertension  I10 401.9 NUCLEAR CARDIAC STRESS TEST   4. Mixed hyperlipidemia  E78.2 272.2 NUCLEAR CARDIAC STRESS TEST   5. INDER on CPAP  G47.33 327.23 NUCLEAR CARDIAC STRESS TEST    Z99.89 V46.8      Orders Placed This Encounter    AMB POC EKG ROUTINE W/ 12 LEADS, INTER & REP     Order Specific Question:   Reason for Exam:     Answer:   routine       PLAN:  Cardiomyopathy: per echo 5/20 EF 30-35%, abnormal wall motion c/w IVCD. Previously EF 50-55% in 2014. ECG SR with chronic LBBB. Stress test in January 2014 neg for ischemia. She needs an ischemic evaluation. Only type indicated for LBBB is a marin but she refuses to have marin again because of side effects previously. Rec cardiac cath. [id number] and poss [id number]    HTN: BP normotensive     Hyperlipidemia: LDL 68 9/29/20.  Lipids and labs managed by PCP    INDER on CPAP: compliant with CPAP use    MD Marcelino Hogan MD

## 2020-11-09 NOTE — H&P (VIEW-ONLY)
Subjective/HPI:     Chelsi Padilla is a 77 y.o. female is here for new patient consultation, last seen in our office 2014. The patient has medical hx significant for HTN, hyperlipidemia, LBBB, hyperglycemia, INDER on CPAP, and GERD. She is here today after having pre-op echo completed in May and the results showed a reduced EF 30-35% with abnormal left ventricular septal motion c/w IVCD. She was seen by Dr Faustino Mchugh at that time and was recommended to have a cardiac cath which she declined. She reports she is having episodes of CP, not necessarily exertionally related. She is having MUÑOZ which has worsened. She is noticing worsening LE swelling. She denies orthopnea, PND, lightheadedness, or syncope. Previous cardiac evaluation includes Lexiscan that was normal in January 2014, echo 2014 EF 50-55%. holter with PACs/PVCs in 12/13. Family med hx significant for CAD.     Patient Active Problem List    Diagnosis Date Noted    Stage 3a chronic kidney disease 10/08/2020    Left bundle branch block 02/18/2020    Endometrial cancer ([address] 02/12/2020    Severe obesity ([address] 01/11/2019    Nuclear cataract 02/05/2016    Posterior subcapsular polar senile cataract 02/05/2016    Neuropathy, peripheral 04/09/2014    Hypertension 04/08/2014    Hyperglycemia 04/08/2014    GERD (gastroesophageal reflux disease) 04/08/2014    Hyperlipidemia 04/08/2014    INDER on CPAP 04/08/2014    Depression 01/09/2014      Nela Lance MD  Past Medical History:   Diagnosis Date    Arthritis     OSTEO    Cancer [facility]) 2005    skin cancer removed from nose    Chronic kidney disease 09/24/2020    stage 3     Depression 1/9/2014    Endometrial cancer ([address] 2020    GERD (gastroesophageal reflux disease)     Hyperlipidemia     Hypertension     Left bundle branch block 2013    Neuropathy, peripheral 4/9/2014    INDER on CPAP 4/8/2014    compliant    Stage 3a chronic kidney disease 10/8/2020           Past Surgical History: Procedure Laterality Date    COLONOSCOPY  2010     Dr. Rober Trujillo        HX DILATION AND CURETTAGE  2020    HYSTROSCOPY    HX GI      COLONOSCOPY    HX HEENT      BCCA REMOVED FROM NOSE     HX HYSTERECTOMY  2020    HX OTHER SURGICAL      hysteroscopy surg    HX TONSILLECTOMY       Allergies   Allergen Reactions    Erythromycin Rash      Family History   Problem Relation Age of Onset    Heart Disease Mother     Breast Cancer Mother     Heart Disease Father     Kidney Disease Paternal Aunt         renal cancer    Colon Cancer Maternal Aunt     Cancer Paternal Uncle         leukemia    Anesth Problems Neg Hx       Current Outpatient Medications   Medication Sig    risperiDONE (RisperDAL) 2 mg tablet TAKE 1 TABLET BY MOUTH EVERY DAY AT NIGHT    carvediloL (COREG) 6.25 mg tablet TAKE 1 TABLET BY MOUTH TWICE A DAY WITH MEALS    losartan (COZAAR) 100 mg tablet Take 1 Tab by mouth daily.  buPROPion XL (WELLBUTRIN XL) 300 mg XL tablet TAKE 1 TABLET BY MOUTH EVERY MORNING    atorvastatin (LIPITOR) 20 mg tablet TAKE 1 TABLET BY MOUTH EVERY DAY    docusate sodium (COLACE PO) Take 1 Tab by mouth as needed.  ascorbic acid, vitamin C, (VITAMIN C) 500 mg tablet Take 500 mg by mouth daily (after breakfast).  Cholecalciferol, Vitamin D3, (VITAMIN D3) 2,000 unit cap capsule Take  by mouth daily (after breakfast).  multivitamin, stress formula (STRESS TAB) tablet Take 1 Tab by mouth daily.  vitamin e (E GEMS) 100 unit capsule Take 400 Units by mouth daily.  aspirin delayed-release 81 mg tablet Take 81 mg by mouth daily.  omeprazole (PRILOSEC) 20 mg capsule Take 1 Cap by mouth nightly. No current facility-administered medications for this visit.        Vitals:    20 1404 20 1405   BP: (!) 140/82 (!) 140/80   Pulse: 75    Resp: 18    SpO2: 98%    Weight: 232 lb (105.2 kg)    Height: 5' 8\" (1.727 m)        I have reviewed the nurses notes, vitals, problem list, allergy list, medical history, family, social history and medications. Review of Symptoms:    General: Pt denies excessive weight gain or loss. Pt is able to conduct ADL's  HEENT: Denies blurred vision, headaches, epistaxis and difficulty swallowing. Respiratory: Denies worsening shortness of breath,+ MUÑOZ, denies wheezing or stridor. Cardiovascular: + precordial pain, Denies palpitations, + edema denies PND  Gastrointestinal: Denies poor appetite, indigestion, abdominal pain or blood in stool  Musculoskeletal: Denies pain or swelling from muscles or joints  Neurologic: Denies tremor, paresthesias, or sensory motor disturbance  Skin: Denies rash, itching or texture change. Physical Exam:      General: Well developed, cooperative, alert in no acute distress, appears states age. HEENT: Supple, No carotid bruits, no JVD, trach is midline. PERRL, EOM intact  Heart:  Normal S1/S2 negative S3 or S4. Regular, no murmur, gallop or rub.   Respiratory: Clear bilaterally x 4, no wheezing or rales  Abdomen:   Soft, non-tender, no masses, bowel sounds are active.   Extremities:  1+marcell pedal edema, normal cap refill, no cyanosis, atraumatic. Neuro: A&Ox3, speech clear, gait stable. Skin: Skin color is normal. No rashes or lesions. Non diaphoretic  Vascular: 2+ pulses symmetric in all extremities    Cardiographics    ECG: SR, chronic LBBB    Results for orders placed or performed during the hospital encounter of 06/19/20   EKG, 12 LEAD, INITIAL   Result Value Ref Range    Ventricular Rate 93 BPM    Atrial Rate 93 BPM    P-R Interval 162 ms    QRS Duration 154 ms    Q-T Interval 392 ms    QTC Calculation (Bezet) 487 ms    Calculated P Axis 51 degrees    Calculated R Axis 63 degrees    Calculated T Axis 14 degrees    Diagnosis       Normal sinus rhythm  Left bundle branch block  When compared with ECG of 14-FEB-2020 12:25,  Vent.  rate has increased BY  33 BPM  Confirmed by Homa Jasso MD, Wilber Mullins ([id number]) on 6/19/2020 3:41:41 PM           Cardiology Labs:  Lab Results   Component Value Date/Time    Cholesterol, total 134 09/29/2020 07:52 AM    HDL Cholesterol 53 09/29/2020 07:52 AM    LDL, calculated 65 10/02/2019 08:50 AM    LDL Chol Calc ([facility]) 68 09/29/2020 07:52 AM    Triglyceride 65 09/29/2020 07:52 AM       Lab Results   Component Value Date/Time    Sodium 141 09/29/2020 07:52 AM    Potassium 4.6 09/29/2020 07:52 AM    Chloride 107 (H) 09/29/2020 07:52 AM    CO2 23 09/29/2020 07:52 AM    Anion gap 4 (L) 06/19/2020 01:15 AM    Glucose 108 (H) 09/29/2020 07:52 AM    BUN 20 09/29/2020 07:52 AM    Creatinine 1.12 (H) 09/29/2020 07:52 AM    BUN/Creatinine ratio 18 09/29/2020 07:52 AM    GFR est AA 59 (L) 09/29/2020 07:52 AM    GFR est non-AA 51 (L) 09/29/2020 07:52 AM    Calcium 11.8 (H) 09/29/2020 07:52 AM    Alk. phosphatase 105 09/29/2020 07:52 AM    Protein, total 6.2 09/29/2020 07:52 AM    Albumin 4.2 09/29/2020 07:52 AM    Globulin 3.4 06/19/2020 01:15 AM    A-G Ratio 2.1 09/29/2020 07:52 AM    ALT (SGPT) 21 09/29/2020 07:52 AM           Assessment:     Assessment:      Diagnoses and all orders for this visit:    1. Cardiomyopathy, unspecified type ([facility] [address]  -     AMB POC EKG ROUTINE W/ 12 LEADS, INTER & REP  -     NUCLEAR CARDIAC STRESS TEST; Future    2. Left bundle branch block  -     NUCLEAR CARDIAC STRESS TEST; Future    3. Essential hypertension  -     NUCLEAR CARDIAC STRESS TEST; Future    4. Mixed hyperlipidemia  -     NUCLEAR CARDIAC STRESS TEST; Future    5. INDER on CPAP  -     NUCLEAR CARDIAC STRESS TEST; Future      Specialty Problems      ICD-9-CM       Cardiology Problems    Hyperlipidemia        Hypertension              ICD-10-CM ICD-9-CM    1. Cardiomyopathy, unspecified type (HCC)  I42.9 425.4 AMB POC EKG ROUTINE W/ 12 LEADS, INTER & REP      NUCLEAR CARDIAC STRESS TEST   2. Left bundle branch block  I44.7 426.3 NUCLEAR CARDIAC STRESS TEST   3.  Essential hypertension  I10 401.9 NUCLEAR CARDIAC STRESS TEST   4. Mixed hyperlipidemia  E78.2 272.2 NUCLEAR CARDIAC STRESS TEST   5. INDER on CPAP  G47.33 327.23 NUCLEAR CARDIAC STRESS TEST    Z99.89 V46.8      Orders Placed This Encounter    AMB POC EKG ROUTINE W/ 12 LEADS, INTER & REP     Order Specific Question:   Reason for Exam:     Answer:   routine       PLAN:  Cardiomyopathy: per echo 5/20 EF 30-35%, abnormal wall motion c/w IVCD. Previously EF 50-55% in 2014. ECG SR with chronic LBBB. Stress test in January 2014 neg for ischemia. She needs an ischemic evaluation. Only type indicated for LBBB is a marin but she refuses to have marin again because of side effects previously. Rec cardiac cath. 10048 and poss [id number]    HTN: BP normotensive     Hyperlipidemia: LDL 68 9/29/20.  Lipids and labs managed by PCP    INDER on CPAP: compliant with CPAP use    MD Jessie Campbell MD

## 2020-11-13 ENCOUNTER — OFFICE VISIT (OUTPATIENT)
Dept: CARDIOLOGY CLINIC | Age: 65
End: 2020-11-13
Payer: MEDICARE

## 2020-11-13 VITALS
OXYGEN SATURATION: 98 % | WEIGHT: 232 LBS | BODY MASS INDEX: 35.16 KG/M2 | RESPIRATION RATE: 18 BRPM | HEIGHT: 68 IN | DIASTOLIC BLOOD PRESSURE: 80 MMHG | HEART RATE: 75 BPM | SYSTOLIC BLOOD PRESSURE: 140 MMHG

## 2020-11-13 DIAGNOSIS — I42.9 CARDIOMYOPATHY, UNSPECIFIED TYPE (HCC): Primary | ICD-10-CM

## 2020-11-13 DIAGNOSIS — E78.2 MIXED HYPERLIPIDEMIA: ICD-10-CM

## 2020-11-13 DIAGNOSIS — I44.7 LEFT BUNDLE BRANCH BLOCK: ICD-10-CM

## 2020-11-13 DIAGNOSIS — Z99.89 OSA ON CPAP: ICD-10-CM

## 2020-11-13 DIAGNOSIS — G47.33 OSA ON CPAP: ICD-10-CM

## 2020-11-13 DIAGNOSIS — I10 ESSENTIAL HYPERTENSION: ICD-10-CM

## 2020-11-13 PROCEDURE — G8417 CALC BMI ABV UP PARAM F/U: HCPCS | Performed by: INTERNAL MEDICINE

## 2020-11-13 PROCEDURE — 93005 ELECTROCARDIOGRAM TRACING: CPT | Performed by: INTERNAL MEDICINE

## 2020-11-13 PROCEDURE — G8753 SYS BP > OR = 140: HCPCS | Performed by: INTERNAL MEDICINE

## 2020-11-13 PROCEDURE — 93010 ELECTROCARDIOGRAM REPORT: CPT | Performed by: INTERNAL MEDICINE

## 2020-11-13 PROCEDURE — 3017F COLORECTAL CA SCREEN DOC REV: CPT | Performed by: INTERNAL MEDICINE

## 2020-11-13 PROCEDURE — 1090F PRES/ABSN URINE INCON ASSESS: CPT | Performed by: INTERNAL MEDICINE

## 2020-11-13 PROCEDURE — G8754 DIAS BP LESS 90: HCPCS | Performed by: INTERNAL MEDICINE

## 2020-11-13 PROCEDURE — 99204 OFFICE O/P NEW MOD 45 MIN: CPT | Performed by: INTERNAL MEDICINE

## 2020-11-13 PROCEDURE — G9717 DOC PT DX DEP/BP F/U NT REQ: HCPCS | Performed by: INTERNAL MEDICINE

## 2020-11-13 PROCEDURE — 1101F PT FALLS ASSESS-DOCD LE1/YR: CPT | Performed by: INTERNAL MEDICINE

## 2020-11-13 PROCEDURE — G0463 HOSPITAL OUTPT CLINIC VISIT: HCPCS | Performed by: INTERNAL MEDICINE

## 2020-11-13 PROCEDURE — G8399 PT W/DXA RESULTS DOCUMENT: HCPCS | Performed by: INTERNAL MEDICINE

## 2020-11-13 PROCEDURE — G8536 NO DOC ELDER MAL SCRN: HCPCS | Performed by: INTERNAL MEDICINE

## 2020-11-13 PROCEDURE — G8427 DOCREV CUR MEDS BY ELIG CLIN: HCPCS | Performed by: INTERNAL MEDICINE

## 2020-11-13 NOTE — LETTER
11/13/20 Patient: Kari Olivas YOB: 1955 Date of Visit: 11/13/2020 Jsutyna Alfred MD 
[address] [address] VIA In Basket Dear Justyna Alfred MD, Thank you for referring Ms. Kari Olivas to [facility] for evaluation. My notes for this consultation are attached. If you have questions, please do not hesitate to call me. I look forward to following your patient along with you.  
 
 
Sincerely, 
 
Dottie Coon MD

## 2020-11-13 NOTE — PROGRESS NOTES
Chief Complaint   Patient presents with   [address]     Former patient here - last seen 12/12/2014- had abn echo-     Chest Pain     most of it has gone away however became anxious about this appt and had some cp today     Shortness of Breath     upon very little exertion     Ankle swelling     marcell' caroline in the evening - right bigger      1. Have you been to the ER, urgent care clinic since your last visit? Hospitalized since your last visit? No     2. Have you seen or consulted any other health care providers outside of the [address] [place] [address] since your last visit? Include any pap smears or colon screening.   No

## 2020-11-14 DIAGNOSIS — I10 ESSENTIAL HYPERTENSION: ICD-10-CM

## 2020-11-14 DIAGNOSIS — E78.2 MIXED HYPERLIPIDEMIA: ICD-10-CM

## 2020-11-14 DIAGNOSIS — G47.33 OSA ON CPAP: ICD-10-CM

## 2020-11-14 DIAGNOSIS — Z99.89 OSA ON CPAP: ICD-10-CM

## 2020-11-14 DIAGNOSIS — I42.9 CARDIOMYOPATHY, UNSPECIFIED TYPE (HCC): ICD-10-CM

## 2020-11-14 DIAGNOSIS — I44.7 LEFT BUNDLE BRANCH BLOCK: ICD-10-CM

## 2020-11-17 NOTE — PERIOP NOTES
Patient denied any COVID-19 symptoms or possible exposure that would require a pre-procedural COVID-19 test for the Cath Lab procedure scheduled on 12/3.

## 2020-11-18 ENCOUNTER — OFFICE VISIT (OUTPATIENT)
Dept: GYNECOLOGY | Age: 65
End: 2020-11-18
Payer: MEDICARE

## 2020-11-18 VITALS
BODY MASS INDEX: 35.16 KG/M2 | SYSTOLIC BLOOD PRESSURE: 126 MMHG | WEIGHT: 232 LBS | HEIGHT: 68 IN | DIASTOLIC BLOOD PRESSURE: 78 MMHG | HEART RATE: 73 BPM

## 2020-11-18 DIAGNOSIS — C54.1 ENDOMETRIAL CANCER (HCC): Primary | ICD-10-CM

## 2020-11-18 PROCEDURE — G0463 HOSPITAL OUTPT CLINIC VISIT: HCPCS | Performed by: OBSTETRICS & GYNECOLOGY

## 2020-11-18 PROCEDURE — 99214 OFFICE O/P EST MOD 30 MIN: CPT | Performed by: OBSTETRICS & GYNECOLOGY

## 2020-11-18 NOTE — PROGRESS NOTES
[facility] [address], [address] [facility]  P [phone]  F [phone]    Office Note  Patient ID:  Name:  Jose Guadalupe Rodriguez  MRN:  [id number]  :   y.o. Date:  2020      HISTORY OF PRESENT ILLNESS:  Ms. Jose Guadalupe Rodriguez is a 77 y.o. female who on  underwent Robotic-assisted total laparoscopic hysterectomy, Bilateral salpingo-oophorectomy, Bilateral pelvic sentinel lymph node mapping and biopsy. Final pathology consistent with Stage Ib, FIGO Grade 1 endometrial cancer. 8mm out of 15mm invasion. Negative washings. Negative SLNDs. LVSI negative. Completed VBT 2020 (30Gy in 3 fractions). Presents today for continued surveillance. Doing well overall. She is scheduled for cardiac catheterization on 12/3/2020. She reports that using vaginal dilators was painful. She was seen by Dr. Anand Gregorio last month to help break up some of the vaginal adhesions and she is using the smallest dilator. She is scheduled to see Dr. Anand Gregorio again on 2020 per patient report. The patient is not sexually active. Denies vaginal bleeding/discharge, abdominal/pelvic pain, nausea, vomiting, constipation, diarrhea, CP, SOB, hematuria, hematochezia, change in appetite or bowel movements, bloating, fevers, chills, or urinary symptoms. Initial History:  Ms. Jose Guadalupe Rodriguez is a 77 y.o.  postmenopausal female who presents in consultation from Dr. Michele Adams for FIGO Grade 1 endometrial cancer. The patient first reports vaginal spotting/bleeding in 2019. She was ultimately referred to Dr. Michele Adams by her PCP. On 2020 underwent hysteroscopy/D&C with final pathology consistent with FIGO Grade 1 endometrial cancer. Reports some spotting since her surgery. History of urinary incontinence for which she wears a pad. Denies hematuria or hematochezia. Denies change in appetite or bowel habits, nausea, vomiting, diarrhea, CP, SOB, fevers, or chills. Reports long history of constipation for which she uses senna S. Pertinent PMH/PSH: h/o , obesity, HTN, INDER on CPAP      Active, no restrictions. Pathology Review:   :  FINAL PATHOLOGIC DIAGNOSIS   1. Lymph node, right pelvic, sentinel node excision:   No evidence for malignancy in one node (0/1). See comment. 2. Lymph node, left pelvic, sentinel node excision:   No evidence for malignancy in one node (0/1). See comment. 3. Uterus, cervix, fallopian tubes, ovaries, hysterectomy, salpingo-oophorectomy:   Cervix: No evidence for dysplasia or malignancy. Endometrium: Endometrioid adenocarcinoma, FIGO grade 1. See synoptic report   Myometrium: Invasive adenocarcinoma. Leiomyoma. Adenomyosis. Fallopian tubes: No histopathologic abnormality. Ovaries: Benign physiologic changes. Synoptic report - endometrium   SPECIMEN   Procedure: Hysterectomy and salpingo-oophorectomy. Lymph Node Sampling: Bilateral pelvic lymph nodes. Specimen Integrity: Intact. TUMOR   Histologic Type: Endometrioid Adenocarcinoma   Histologic Grade: Grade 1   Tumor Extent:   Myometrial Invasion: Present. Depth of invasion: 8 mm   Myometrial thickness: 15 mm   Cervical stromal involvement: Not Identified. Extent of Involvement of Other Organs: Not identified.    Accessory Tumor Findings   Lymph-Vascular Invasion: Not identified   LYMPH NODES   Number of Pelvic Nodes with Macrometastasis (>2 mm): 0   Number of Pelvic Nodes with Micrometastasis (>0.2 mm <= 2 mm): 0   Number of Pelvic Nodes with Isolated Tumor Cells (0.2 mm or less): 0   Total Number of Pelvic Nodes Examined (sentinel and non-sentinel): 2   Number of Pelvic Roselle Nodes Examined: 2   Pathologic Stage   Primary Tumor (pT): pT1b   Regional Lymph Nodes (pN): pN0   Comment   Immunohistochemistry stains       2020:  Endometrium and polyp, curetting:  Well differentiated endometrioid adenocarcinoma, FIGO grade 1 in a background of extensive atypical complex hyperplasia. ROS:  A comprehensive review of systems was negative except for that written in the History of Present Illness.  , 10 point ROS    OB/GYN ROS:  Per HPI    ECOG ndGndrndanddndend:nd nd2nd Problem List:  Patient Active Problem List    Diagnosis Date Noted    Stage 3a chronic kidney disease 10/08/2020    Left bundle branch block 2020    Endometrial cancer [facility] [address] 2020    Severe obesity ([address] 2019    Nuclear cataract 2016    Posterior subcapsular polar senile cataract 2016    Neuropathy, peripheral 2014    Hypertension 2014    Hyperglycemia 2014    GERD (gastroesophageal reflux disease) 2014    Hyperlipidemia 2014    INDER on CPAP 2014    Depression 2014     PMH:  Past Medical History:   Diagnosis Date    Arthritis     OSTEO    Cancer ([facility] [address]     skin cancer removed from nose    Chronic kidney disease 2020    stage 3     Depression 2014    Endometrial cancer ([facility] [address]     GERD (gastroesophageal reflux disease)     Hyperlipidemia     Hypertension     Left bundle branch block     Neuropathy, peripheral 2014    INDER on CPAP 2014    compliant    Stage 3a chronic kidney disease 10/8/2020           PSH:  Past Surgical History:   Procedure Laterality Date    COLONOSCOPY  2010     Dr. Kylee Garces        HX DILATION AND CURETTAGE  2020    HYSTROSCOPY    HX GI      COLONOSCOPY    HX HEENT      BCCA REMOVED FROM NOSE     HX HYSTERECTOMY  2020    HX OTHER SURGICAL      hysteroscopy surg    HX TONSILLECTOMY        Social History:  Social History     Tobacco Use    Smoking status: Never Smoker    Smokeless tobacco: Never Used   Substance Use Topics    Alcohol use: No     Alcohol/week: 0.0 standard drinks      Family History:  Family History   Problem Relation Age of Onset    Heart Disease Mother     Breast Cancer Mother     Heart Disease Father     Kidney Disease Paternal Aunt         renal cancer    Colon Cancer Maternal Aunt     Cancer Paternal Uncle         leukemia    Anesth Problems Neg Hx       Medications: (reviewed)  Current Outpatient Medications   Medication Sig    risperiDONE (RisperDAL) 2 mg tablet TAKE 1 TABLET BY MOUTH EVERY DAY AT NIGHT    carvediloL (COREG) 6.25 mg tablet TAKE 1 TABLET BY MOUTH TWICE A DAY WITH MEALS    losartan (COZAAR) 100 mg tablet Take 1 Tab by mouth daily.  buPROPion XL (WELLBUTRIN XL) 300 mg XL tablet TAKE 1 TABLET BY MOUTH EVERY MORNING    atorvastatin (LIPITOR) 20 mg tablet TAKE 1 TABLET BY MOUTH EVERY DAY    ascorbic acid, vitamin C, (VITAMIN C) 500 mg tablet Take 500 mg by mouth daily (after breakfast).  Cholecalciferol, Vitamin D3, (VITAMIN D3) 2,000 unit cap capsule Take  by mouth daily (after breakfast).  multivitamin, stress formula (STRESS TAB) tablet Take 1 Tab by mouth daily.  vitamin e (E GEMS) 100 unit capsule Take 400 Units by mouth daily.  aspirin delayed-release 81 mg tablet Take 81 mg by mouth daily.  omeprazole (PRILOSEC) 20 mg capsule Take 1 Cap by mouth nightly.  docusate sodium (COLACE PO) Take 1 Tab by mouth as needed. No current facility-administered medications for this visit. Allergies: (reviewed)  Allergies   Allergen Reactions    Erythromycin Rash          OBJECTIVE:    Physical Exam:  Visit Vitals  /78 (BP 1 Location: Left arm, BP Patient Position: Sitting)   Pulse 73   Ht 5' 8\" (1.727 m)   Wt 232 lb (105.2 kg)   BMI 35.28 kg/m²      General: Alert and oriented. No acute distress. Well-nourished  HEENT: No thyroid enlargment. Neck supple without restrictions. Sclera normal. Normal occular motion. Moist mucous membranes. Lymphatics: No evidence of axillary, cervical, or subclavicular adenopathy. Respiratory: clear to auscultation and percussion to the bases. No CVAT. Cardiovascular: regular rate and rhythm.  No murmurs, rubs, or gallops. Gastrointestinal: soft, non-tender, non-distended, no masses or organomegaly. Well-healed incision. Musculoskeletal: normal gait. No joint tenderness, deformity or swelling. No muscular tenderness. Extremities: extremities normal, atraumatic, no cyanosis or edema. Pelvic: exam chaperoned by nurse. Normal appearing external genitalia. On speculum exam, the vagina is atrophic. Mild radiation changes. The uterus and cervix are surgically absent. No evidence of masses or nodularity on bimanual exam. Deferred rectovaginal exam.   Neuro: Grossly intact. Normal gait and movement. No acute deficit  Skin: No evidence of rashes or skin changes. IMPRESSION/PLAN:    Ms. Lacy Emerson is a 77 y.o. female who on 2/20/202 underwent Robotic-assisted total laparoscopic hysterectomy, Bilateral salpingo-oophorectomy, Bilateral pelvic sentinel lymph node mapping and biopsy. Final pathology consistent with Stage Ib, FIGO Grade 1 endometrial cancer. 8mm out of 15mm invasion. Negative washings. Negative SLNDs. LVSI negative. Completed VBT 6/26/2020 (30Gy in 3 fractions). Problems:     Patient Active Problem List    Diagnosis Date Noted    Stage 3a chronic kidney disease 10/08/2020    Left bundle branch block 02/18/2020    Endometrial cancer ([facility] [address] 02/12/2020    Severe obesity ([address] 01/11/2019    Nuclear cataract 02/05/2016    Posterior subcapsular polar senile cataract 02/05/2016    Neuropathy, peripheral 04/09/2014    Hypertension 04/08/2014    Hyperglycemia 04/08/2014    GERD (gastroesophageal reflux disease) 04/08/2014    Hyperlipidemia 04/08/2014    INDER on CPAP 04/08/2014    Depression 01/09/2014       Reviewed patient's course to date. CONY on exam today. Reassured patient. Patient to follow-up with Dr. Pattie Toledo in December. Plan to see her back in 4 months. Reviewed precautionary symptoms to return sooner.  All questions and concerns were addressed with the patient and she is comfortable with the Susanna Franklin MD

## 2020-11-18 NOTE — PROGRESS NOTES
3 month follow up for endometrial cancer ,  pt reports no abnormal spotting or bleeding, pt states she is having a cardiac cath on 12/3/2020    1. Have you been to the ER, urgent care clinic since your last visit? Hospitalized since your last visit? 2. Have you seen or consulted any other health care providers outside of the [address] [place] [address] since your last visit? Include any pap smears or colon screening.

## 2020-11-25 ENCOUNTER — HOSPITAL ENCOUNTER (OUTPATIENT)
Dept: LAB | Age: 65
Discharge: HOME | End: 2020-11-25
Payer: MEDICARE

## 2020-11-25 PROCEDURE — 85025 COMPLETE CBC W/AUTO DIFF WBC: CPT

## 2020-11-25 PROCEDURE — 36415 COLL VENOUS BLD VENIPUNCTURE: CPT

## 2020-11-25 PROCEDURE — 85610 PROTHROMBIN TIME: CPT

## 2020-11-25 PROCEDURE — 80053 COMPREHEN METABOLIC PANEL: CPT

## 2020-11-26 LAB
ALBUMIN SERPL-MCNC: 4.1 G/DL (ref 3.8–4.8)
ALBUMIN/GLOB SERPL: 1.9 {RATIO} (ref 1.2–2.2)
ALP SERPL-CCNC: 122 IU/L (ref 39–117)
ALT SERPL-CCNC: 23 IU/L (ref 0–32)
AST SERPL-CCNC: 16 IU/L (ref 0–40)
BASOPHILS # BLD AUTO: 0 X10E3/UL (ref 0–0.2)
BASOPHILS NFR BLD AUTO: 1 %
BILIRUB SERPL-MCNC: 0.3 MG/DL (ref 0–1.2)
BUN SERPL-MCNC: 16 MG/DL (ref 8–27)
BUN/CREAT SERPL: 16 (ref 12–28)
CALCIUM SERPL-MCNC: 11.9 MG/DL (ref 8.7–10.3)
CHLORIDE SERPL-SCNC: 109 MMOL/L (ref 96–106)
CO2 SERPL-SCNC: 24 MMOL/L (ref 20–29)
CREAT SERPL-MCNC: 1.03 MG/DL (ref 0.57–1)
EOSINOPHIL # BLD AUTO: 0.2 X10E3/UL (ref 0–0.4)
EOSINOPHIL NFR BLD AUTO: 4 %
ERYTHROCYTE [DISTWIDTH] IN BLOOD BY AUTOMATED COUNT: 12.2 % (ref 11.7–15.4)
GLOBULIN SER CALC-MCNC: 2.2 G/DL (ref 1.5–4.5)
GLUCOSE SERPL-MCNC: 103 MG/DL (ref 65–99)
HCT VFR BLD AUTO: 34.7 % (ref 34–46.6)
HGB BLD-MCNC: 11.6 G/DL (ref 11.1–15.9)
IMM GRANULOCYTES # BLD AUTO: 0 X10E3/UL (ref 0–0.1)
IMM GRANULOCYTES NFR BLD AUTO: 1 %
INR PPP: 1 (ref 0.9–1.2)
INTERPRETATION: NORMAL
LYMPHOCYTES # BLD AUTO: 0.9 X10E3/UL (ref 0.7–3.1)
LYMPHOCYTES NFR BLD AUTO: 21 %
MCH RBC QN AUTO: 31 PG (ref 26.6–33)
MCHC RBC AUTO-ENTMCNC: 33.4 G/DL (ref 31.5–35.7)
MCV RBC AUTO: 93 FL (ref 79–97)
MONOCYTES # BLD AUTO: 0.6 X10E3/UL (ref 0.1–0.9)
MONOCYTES NFR BLD AUTO: 15 %
NEUTROPHILS # BLD AUTO: 2.5 X10E3/UL (ref 1.4–7)
NEUTROPHILS NFR BLD AUTO: 58 %
PLATELET # BLD AUTO: 226 X10E3/UL (ref 150–450)
POTASSIUM SERPL-SCNC: 4.9 MMOL/L (ref 3.5–5.2)
PROT SERPL-MCNC: 6.3 G/DL (ref 6–8.5)
PROTHROMBIN TIME: 10.4 SEC (ref 9.1–12)
RBC # BLD AUTO: 3.74 X10E6/UL (ref 3.77–5.28)
SODIUM SERPL-SCNC: 144 MMOL/L (ref 134–144)
WBC # BLD AUTO: 4.2 X10E3/UL (ref 3.4–10.8)

## 2020-12-03 ENCOUNTER — HOSPITAL ENCOUNTER (OUTPATIENT)
Age: 65
Discharge: HOME | End: 2020-12-03
Attending: INTERNAL MEDICINE | Admitting: INTERNAL MEDICINE
Payer: MEDICARE

## 2020-12-03 VITALS
BODY MASS INDEX: 35.16 KG/M2 | OXYGEN SATURATION: 99 % | WEIGHT: 232 LBS | HEIGHT: 68 IN | DIASTOLIC BLOOD PRESSURE: 65 MMHG | RESPIRATION RATE: 15 BRPM | TEMPERATURE: 98.3 F | HEART RATE: 77 BPM | SYSTOLIC BLOOD PRESSURE: 153 MMHG

## 2020-12-03 DIAGNOSIS — R07.9 CHEST PAIN, UNSPECIFIED TYPE: ICD-10-CM

## 2020-12-03 PROBLEM — Z98.890 S/P CARDIAC CATH: Status: ACTIVE | Noted: 2020-12-03

## 2020-12-03 PROCEDURE — 77030004549 HC CATH ANGI DX PRF MRTM -A: Performed by: INTERNAL MEDICINE

## 2020-12-03 PROCEDURE — 99153 MOD SED SAME PHYS/QHP EA: CPT | Performed by: INTERNAL MEDICINE

## 2020-12-03 PROCEDURE — C1894 INTRO/SHEATH, NON-LASER: HCPCS | Performed by: INTERNAL MEDICINE

## 2020-12-03 PROCEDURE — C1769 GUIDE WIRE: HCPCS | Performed by: INTERNAL MEDICINE

## 2020-12-03 PROCEDURE — 77030015766: Performed by: INTERNAL MEDICINE

## 2020-12-03 PROCEDURE — 77030010221 HC SPLNT WR POS TELE -B: Performed by: INTERNAL MEDICINE

## 2020-12-03 PROCEDURE — 74011000636 HC RX REV CODE- 636: Performed by: INTERNAL MEDICINE

## 2020-12-03 PROCEDURE — 2709999900 HC NON-CHARGEABLE SUPPLY

## 2020-12-03 PROCEDURE — 74011250637 HC RX REV CODE- 250/637: Performed by: NURSE PRACTITIONER

## 2020-12-03 PROCEDURE — 74011250636 HC RX REV CODE- 250/636: Performed by: INTERNAL MEDICINE

## 2020-12-03 PROCEDURE — 93458 L HRT ARTERY/VENTRICLE ANGIO: CPT | Performed by: INTERNAL MEDICINE

## 2020-12-03 PROCEDURE — 77030019698 HC SYR ANGI MDLON MRTM -A: Performed by: INTERNAL MEDICINE

## 2020-12-03 PROCEDURE — 77030008543 HC TBNG MON PRSS MRTM -A: Performed by: INTERNAL MEDICINE

## 2020-12-03 PROCEDURE — 74011000250 HC RX REV CODE- 250: Performed by: INTERNAL MEDICINE

## 2020-12-03 PROCEDURE — 99152 MOD SED SAME PHYS/QHP 5/>YRS: CPT | Performed by: INTERNAL MEDICINE

## 2020-12-03 PROCEDURE — 76937 US GUIDE VASCULAR ACCESS: CPT | Performed by: INTERNAL MEDICINE

## 2020-12-03 PROCEDURE — 77030019569 HC BND COMPR RAD TERU -B: Performed by: INTERNAL MEDICINE

## 2020-12-03 RX ORDER — FENTANYL CITRATE 50 UG/ML
INJECTION, SOLUTION INTRAMUSCULAR; INTRAVENOUS AS NEEDED
Status: DISCONTINUED | OUTPATIENT
Start: 2020-12-03 | End: 2020-12-03 | Stop reason: HOSPADM

## 2020-12-03 RX ORDER — ACETAMINOPHEN 325 MG/1
650 TABLET ORAL
Status: DISCONTINUED | OUTPATIENT
Start: 2020-12-03 | End: 2020-12-03 | Stop reason: HOSPADM

## 2020-12-03 RX ORDER — SODIUM CHLORIDE 0.9 % (FLUSH) 0.9 %
5-40 SYRINGE (ML) INJECTION AS NEEDED
Status: DISCONTINUED | OUTPATIENT
Start: 2020-12-03 | End: 2020-12-03 | Stop reason: HOSPADM

## 2020-12-03 RX ORDER — SODIUM CHLORIDE 0.9 % (FLUSH) 0.9 %
5-40 SYRINGE (ML) INJECTION EVERY 8 HOURS
Status: DISCONTINUED | OUTPATIENT
Start: 2020-12-03 | End: 2020-12-03 | Stop reason: HOSPADM

## 2020-12-03 RX ORDER — HEPARIN SODIUM 200 [USP'U]/100ML
INJECTION, SOLUTION INTRAVENOUS
Status: COMPLETED | OUTPATIENT
Start: 2020-12-03 | End: 2020-12-03

## 2020-12-03 RX ORDER — LOSARTAN POTASSIUM 50 MG/1
100 TABLET ORAL DAILY
Status: DISCONTINUED | OUTPATIENT
Start: 2020-12-04 | End: 2020-12-03 | Stop reason: HOSPADM

## 2020-12-03 RX ORDER — CARVEDILOL 12.5 MG/1
12.5 TABLET ORAL 2 TIMES DAILY WITH MEALS
Status: DISCONTINUED | OUTPATIENT
Start: 2020-12-04 | End: 2020-12-03 | Stop reason: HOSPADM

## 2020-12-03 RX ORDER — HEPARIN SODIUM 1000 [USP'U]/ML
INJECTION, SOLUTION INTRAVENOUS; SUBCUTANEOUS AS NEEDED
Status: DISCONTINUED | OUTPATIENT
Start: 2020-12-03 | End: 2020-12-03 | Stop reason: HOSPADM

## 2020-12-03 RX ORDER — MIDAZOLAM HYDROCHLORIDE 1 MG/ML
INJECTION, SOLUTION INTRAMUSCULAR; INTRAVENOUS AS NEEDED
Status: DISCONTINUED | OUTPATIENT
Start: 2020-12-03 | End: 2020-12-03 | Stop reason: HOSPADM

## 2020-12-03 RX ORDER — NALOXONE HYDROCHLORIDE 0.4 MG/ML
0.4 INJECTION, SOLUTION INTRAMUSCULAR; INTRAVENOUS; SUBCUTANEOUS AS NEEDED
Status: DISCONTINUED | OUTPATIENT
Start: 2020-12-03 | End: 2020-12-03 | Stop reason: HOSPADM

## 2020-12-03 RX ORDER — LOSARTAN POTASSIUM 50 MG/1
100 TABLET ORAL ONCE
Status: DISCONTINUED | OUTPATIENT
Start: 2020-12-03 | End: 2020-12-03 | Stop reason: HOSPADM

## 2020-12-03 RX ORDER — CARVEDILOL 6.25 MG/1
6.25 TABLET ORAL ONCE
Status: DISCONTINUED | OUTPATIENT
Start: 2020-12-03 | End: 2020-12-03 | Stop reason: HOSPADM

## 2020-12-03 RX ORDER — CARVEDILOL 6.25 MG/1
6.25 TABLET ORAL 2 TIMES DAILY WITH MEALS
Status: DISCONTINUED | OUTPATIENT
Start: 2020-12-03 | End: 2020-12-03

## 2020-12-03 RX ORDER — LIDOCAINE HYDROCHLORIDE 10 MG/ML
INJECTION, SOLUTION EPIDURAL; INFILTRATION; INTRACAUDAL; PERINEURAL AS NEEDED
Status: DISCONTINUED | OUTPATIENT
Start: 2020-12-03 | End: 2020-12-03 | Stop reason: HOSPADM

## 2020-12-03 RX ORDER — VERAPAMIL HYDROCHLORIDE 2.5 MG/ML
INJECTION, SOLUTION INTRAVENOUS AS NEEDED
Status: DISCONTINUED | OUTPATIENT
Start: 2020-12-03 | End: 2020-12-03 | Stop reason: HOSPADM

## 2020-12-03 RX ADMIN — CARVEDILOL 6.25 MG: 6.25 TABLET, FILM COATED ORAL at 12:47

## 2020-12-03 NOTE — INTERVAL H&P NOTE
Update History & Physical    The Patient's History and Physical of November 13, 2020 was reviewed with the patient and I examined the patient. There was no change. The surgical site was confirmed by the patient and me. Plan:  The risk, benefits, expected outcome, and alternative to the recommended procedure have been discussed with the patient. Patient understands and wants to proceed with the procedure.     Electronically signed by Lenox Fleischer, MD on 12/3/2020 at 9:56 AM

## 2020-12-03 NOTE — PROGRESS NOTES
Kari Olivas is recovering post-diagnostic [facility]. Right radial site dressing is CDI without swelling or bleeding. VSS. Rhythm NSR LBBB      Aleksandr Janak Olivas denies complaints at this time. If recovery continues to progress without complication, discharge is planned for later today. Cath results discussed with patient. EF 30-35%. Non-significant CAD seen on cath. This is NICM. Scheduled outpatient appointment for BiV pacer evaluation with Dr. Shannon Salomon. Patient already on GDMT, no changes to medications.        Dl Valente, OREN  DNP, RN, AGACNP-BC

## 2020-12-03 NOTE — DISCHARGE INSTRUCTIONS
[address] [place] [address]  [phone]        Patient ID:  Malou Spencer  [id number]  77 y.o.  1955    Admit Date: 12/3/2020    Discharge Date: 12/3/2020     Admitting Physician: Sheree Yi MD     Discharge Physician: Sheree Yi MD    Admission Diagnoses:   Chest pain, unspecified type [R07.9]    Discharge Diagnoses: Active Problems:    * No active hospital problems. *      Discharge Condition: Good    Cardiology Procedures this Admission:  Diagnostic left heart catheterization    Disposition: home    Reference discharge instructions provided by nursing for diet and activity. Signed:  Susan Briscoe NP  12/3/2020  12:10 PM        Radial Cardiac Catheterization/Angiography Discharge Instructions    It is normal to feel tired the first couple days. Take it easy and follow the physicians instructions. CHECK THE CATHETER INSERTION SITE DAILY:    Remove the wrist dressing 24 hours after the procedure. You may shower 24 hours after the procedure. Wash with soap and water and pat dry. Gentle cleaning of the site with soap and water is sufficient, cover with a dry clean dressing or bandage. Do not apply creams or powders to the area. No soaking the wrist for 3 days. Leave the puncture site open to air after 24 hours post-procedure. CALL THE PHYSICIANS:     If the site becomes red, swollen or feels warm to the touch  If there is bleeding or drainage or if there is unusual pain at the radial site. If there is any minor oozing, you may apply a band-aid and remove after 12 hours. If the bleeding continues, hold pressure with the middle finger against the puncture site and the thumb against the back of the wrist,call 911 to be transported to the hospital.  DO NOT DRIVE YOURSELF, [place] [address].     ACTIVITY:   For the first 24 hours do not manipulate the wrist.  No lifting, pushing or pulling over 3-5 pounds with the affected wrist for 7 daysand no straining the insertion site. Do not life grocery bags or the garbage can, do not run the vacuum  or  for 7 days. Start with short walks as in the hospital and gradually increase as tolerated each day. It is recommended to walk 30 minutes 5-7 days per week. Follow your physicians instructions on activity. Avoid walking outside in extremes of heat or cold. Walk inside when it is cold and windy or hot and humid. Things to keep in mind:  No driving for at least 24 hours, or as designated by your physician. Limit the number of times you go up and down the stairs  Take rests and pace yourself with activity. Be careful and do not strain with bowel movements. MEDICATIONS:    Take all medications as prescribed  Call your physician if you have any questions  Keep an updated list of your medications with you at all times and give a list to your physician and pharmacist    SIGNS AND SYMPTOMS:   Be cautious of symptoms of angina or recurrent symptoms such as chest discomfort, unusual shortness of breath or fatigue. These could be symptoms of restenosis, a new blockage or a heart attack. If your symptoms are relieved with rest it is still recommended that you notify your physician of recurrent chest pain or discomfort. For CHEST PAIN or symptoms of angina not relieved with rest:  If the discomfort is not relieved with rest, and you have been prescribed Nitroglycerin, take as directed (taken under the tongue, one at a time 5 minutes apart for a total of 3 doses). If the discomfort is not relieved after the 3rd nitroglycerin, call 911. If you have not been prescribed Nitroglycerin  and your chest discomfort is not relieved with rest, call 911. AFTER CARE:   Follow up with your physician as instructed.   Follow a heart healthy diet with proper portion control, daily stress management, daily exercise, blood pressure and cholesterol control , and smoking cessation.

## 2020-12-03 NOTE — ROUTINE PROCESS
1056: Pt just arrived back to [facility], site CDI, no complaints. 1245: Pt slightly hypertensive. Spoke with cardiology NP. Per NP, will change home Coreg dose to 12.5 and will give additional 6.125 now. Will not give Losartan now, as pt already took it at home this morning. 1440: Pt has ambulated with RN, tolerated well. BP and all vitals stable. Discharge info reviewed with pt and , who both verbalized understanding. All questions answered. Site CDI. 1515: Pt discharged safely via wheelchair.

## 2020-12-03 NOTE — Clinical Note
TRANSFER - OUT REPORT:     Verbal report given to: Tad Sacks, RN. Report consisted of patient's Situation, Background, Assessment and   Recommendations(SBAR). Opportunity for questions and clarification was provided. Patient transported to: [facility].

## 2020-12-04 ENCOUNTER — TELEPHONE (OUTPATIENT)
Dept: INTERNAL MEDICINE CLINIC | Age: 65
End: 2020-12-04

## 2020-12-04 NOTE — TELEPHONE ENCOUNTER
Not sure if an appt is necessary, would like to make sure before we try and schedule something for her. Thank you!

## 2020-12-04 NOTE — TELEPHONE ENCOUNTER
----- Message from [place] sent at 12/4/2020  3:18 PM EST -----  Regarding: Dr. Feliciano Manriquez, telephone  Contact: [phone]  General Message/Vendor Calls    Caller's first and last name: n/a      Reason for call: Pt wanted to provide update re: cardiac cath. Callback required yes/no and why: Yes but only if appt is needed. Best contact number(s): [phone]      Details to clarify the request: Pt had cardiac cath performed yesterday 12/03 by Dr. Addie Evans. He told her he did not see any blockages. She is scheduled for a follow up appt with his colleague, cardiologist Dr. Severiano Bonilla, this upcoming Tuesday, 12/08, for pacemaker evaluation. The cardiac cath paperwork instructed her to follow up with her PCP. She's not sure an actual appt is necessary but did want to update Dr. Feliciano Manriquez. Please call her to schedule if an appt is necessary (nothing available in Epic until 2021).         Message from [facility]

## 2020-12-07 NOTE — TELEPHONE ENCOUNTER
Spoke w/ pt, she has no need to come in and see us, unless her cardiologist tells her she needs to when she goes on 12/8. Looking at her chart, there is no need for her to come in. (I checked w/ Evelia Smalls to make sure) Told pt to double check and if cardiologist wants us to f/up w/ pt, I told her to call back and we will schedule an appt for her.

## 2020-12-08 ENCOUNTER — HOSPITAL ENCOUNTER (OUTPATIENT)
Dept: LAB | Age: 65
Discharge: HOME | End: 2020-12-08
Payer: MEDICARE

## 2020-12-08 ENCOUNTER — HOSPITAL ENCOUNTER (OUTPATIENT)
Dept: GENERAL RADIOLOGY | Age: 65
Discharge: HOME | End: 2020-12-08
Payer: MEDICARE

## 2020-12-08 ENCOUNTER — OFFICE VISIT (OUTPATIENT)
Dept: CARDIOLOGY CLINIC | Age: 65
End: 2020-12-08
Payer: MEDICARE

## 2020-12-08 ENCOUNTER — TELEPHONE (OUTPATIENT)
Dept: CARDIOLOGY CLINIC | Age: 65
End: 2020-12-08

## 2020-12-08 VITALS
HEIGHT: 68 IN | DIASTOLIC BLOOD PRESSURE: 78 MMHG | SYSTOLIC BLOOD PRESSURE: 110 MMHG | OXYGEN SATURATION: 97 % | RESPIRATION RATE: 18 BRPM | BODY MASS INDEX: 35.25 KG/M2 | HEART RATE: 66 BPM | WEIGHT: 232.6 LBS

## 2020-12-08 DIAGNOSIS — E78.2 MIXED HYPERLIPIDEMIA: ICD-10-CM

## 2020-12-08 DIAGNOSIS — I44.7 LEFT BUNDLE BRANCH BLOCK: Primary | ICD-10-CM

## 2020-12-08 DIAGNOSIS — G47.33 OSA ON CPAP: ICD-10-CM

## 2020-12-08 DIAGNOSIS — I44.7 LEFT BUNDLE BRANCH BLOCK: ICD-10-CM

## 2020-12-08 DIAGNOSIS — I42.9 CARDIOMYOPATHY, UNSPECIFIED TYPE (HCC): ICD-10-CM

## 2020-12-08 DIAGNOSIS — I10 ESSENTIAL HYPERTENSION: ICD-10-CM

## 2020-12-08 DIAGNOSIS — Z99.89 OSA ON CPAP: ICD-10-CM

## 2020-12-08 DIAGNOSIS — I42.0 DILATED CARDIOMYOPATHY (HCC): ICD-10-CM

## 2020-12-08 DIAGNOSIS — I50.22 CHRONIC SYSTOLIC CONGESTIVE HEART FAILURE (HCC): ICD-10-CM

## 2020-12-08 PROCEDURE — 85610 PROTHROMBIN TIME: CPT

## 2020-12-08 PROCEDURE — 93010 ELECTROCARDIOGRAM REPORT: CPT | Performed by: INTERNAL MEDICINE

## 2020-12-08 PROCEDURE — G9717 DOC PT DX DEP/BP F/U NT REQ: HCPCS | Performed by: INTERNAL MEDICINE

## 2020-12-08 PROCEDURE — 85027 COMPLETE CBC AUTOMATED: CPT

## 2020-12-08 PROCEDURE — G8399 PT W/DXA RESULTS DOCUMENT: HCPCS | Performed by: INTERNAL MEDICINE

## 2020-12-08 PROCEDURE — G8536 NO DOC ELDER MAL SCRN: HCPCS | Performed by: INTERNAL MEDICINE

## 2020-12-08 PROCEDURE — 71046 X-RAY EXAM CHEST 2 VIEWS: CPT

## 2020-12-08 PROCEDURE — G9899 SCRN MAM PERF RSLTS DOC: HCPCS | Performed by: INTERNAL MEDICINE

## 2020-12-08 PROCEDURE — 99205 OFFICE O/P NEW HI 60 MIN: CPT | Performed by: INTERNAL MEDICINE

## 2020-12-08 PROCEDURE — 36415 COLL VENOUS BLD VENIPUNCTURE: CPT

## 2020-12-08 PROCEDURE — G8752 SYS BP LESS 140: HCPCS | Performed by: INTERNAL MEDICINE

## 2020-12-08 PROCEDURE — 80053 COMPREHEN METABOLIC PANEL: CPT

## 2020-12-08 PROCEDURE — G0463 HOSPITAL OUTPT CLINIC VISIT: HCPCS | Performed by: INTERNAL MEDICINE

## 2020-12-08 PROCEDURE — G8427 DOCREV CUR MEDS BY ELIG CLIN: HCPCS | Performed by: INTERNAL MEDICINE

## 2020-12-08 PROCEDURE — 93005 ELECTROCARDIOGRAM TRACING: CPT | Performed by: INTERNAL MEDICINE

## 2020-12-08 PROCEDURE — G8754 DIAS BP LESS 90: HCPCS | Performed by: INTERNAL MEDICINE

## 2020-12-08 PROCEDURE — 1090F PRES/ABSN URINE INCON ASSESS: CPT | Performed by: INTERNAL MEDICINE

## 2020-12-08 PROCEDURE — 3017F COLORECTAL CA SCREEN DOC REV: CPT | Performed by: INTERNAL MEDICINE

## 2020-12-08 PROCEDURE — 1101F PT FALLS ASSESS-DOCD LE1/YR: CPT | Performed by: INTERNAL MEDICINE

## 2020-12-08 PROCEDURE — G8417 CALC BMI ABV UP PARAM F/U: HCPCS | Performed by: INTERNAL MEDICINE

## 2020-12-08 NOTE — PROGRESS NOTES
Chief Complaint   Patient presents with    New Patient     her for eval for biv PM     Palpitations     with chest pain and flutters - random may have one day and not the next     Ankle swelling     marcell      1. Have you been to the ER, urgent care clinic since your last visit? Hospitalized since your last visit? No     2. Have you seen or consulted any other health care providers outside of the [address] [place] [address] since your last visit? Include any pap smears or colon screening.   No

## 2020-12-08 NOTE — LETTER
12/8/20 Patient: Kari Olivas YOB: 1955 Date of Visit: 12/8/2020 Yusuf Herrera MD 
[address] [address] VIA In Basket Dear Yusuf Herrera MD, Thank you for referring Ms. Kari Olivas to [facility] for evaluation. My notes for this consultation are attached. If you have questions, please do not hesitate to call me. I look forward to following your patient along with you. Sincerely, Diane Simon MD

## 2020-12-08 NOTE — PROGRESS NOTES
ELECTROPHYSIOLOGY        Subjective: Calvin Mendez is a 77 y.o. female is here for EP consult NICM, with EF 30-35%  despite > 90 days GMDT. The patient reports limiting MUÑOZ with minimal exertion, some lower extremity edema. Her to discuss BIVICD.      Patient Active Problem List    Diagnosis Date Noted    S/P cardiac cath 2020    Stage 3a chronic kidney disease 10/08/2020    Left bundle branch block 2020    Endometrial cancer ([facility] [address] 2020    Severe obesity ([address] 2019    Nuclear cataract 2016    Posterior subcapsular polar senile cataract 2016    Neuropathy, peripheral 2014    Hypertension 2014    Hyperglycemia 2014    GERD (gastroesophageal reflux disease) 2014    Hyperlipidemia 2014    INDER on CPAP 2014    Depression 2014      Cami Gaines MD  Past Medical History:   Diagnosis Date    Arthritis     OSTEO    Cancer [facility])     skin cancer removed from nose    Chronic kidney disease 2020    stage 3     Depression 2014    Endometrial cancer ([address]     GERD (gastroesophageal reflux disease)     Hyperlipidemia     Hypertension     Left bundle branch block     Neuropathy, peripheral 2014    INDER on CPAP 2014    compliant    Stage 3a chronic kidney disease 10/8/2020           Past Surgical History:   Procedure Laterality Date    COLONOSCOPY  2010     Dr. Thompson Halsted        HX DILATION AND CURETTAGE  2020    HYSTROSCOPY    HX GI      COLONOSCOPY    HX HEART CATHETERIZATION Left 2020    HX HEENT      BCCA REMOVED FROM NOSE     HX HYSTERECTOMY  2020    HX OTHER SURGICAL      hysteroscopy surg    HX TONSILLECTOMY       Allergies   Allergen Reactions    Erythromycin Rash      Family History   Problem Relation Age of Onset    Heart Disease Mother     Breast Cancer Mother     Heart Disease Father     Kidney Disease Paternal Aunt         renal cancer    Colon Cancer Maternal Aunt     Cancer Paternal Uncle         leukemia    Anesth Problems Neg Hx     negative for cardiac disease  Social History     Socioeconomic History    Marital status:      Spouse name: Not on file    Number of children: Not on file    Years of education: Not on file    Highest education level: Not on file   Tobacco Use    Smoking status: Never Smoker    Smokeless tobacco: Never Used   Substance and Sexual Activity    Alcohol use: No     Alcohol/week: 0.0 standard drinks    Drug use: No    Sexual activity: Not Currently     Current Outpatient Medications   Medication Sig    risperiDONE (RisperDAL) 2 mg tablet TAKE 1 TABLET BY MOUTH EVERY DAY AT NIGHT    carvediloL (COREG) 6.25 mg tablet TAKE 1 TABLET BY MOUTH TWICE A DAY WITH MEALS    losartan (COZAAR) 100 mg tablet Take 1 Tab by mouth daily.  buPROPion XL (WELLBUTRIN XL) 300 mg XL tablet TAKE 1 TABLET BY MOUTH EVERY MORNING    atorvastatin (LIPITOR) 20 mg tablet TAKE 1 TABLET BY MOUTH EVERY DAY    docusate sodium (COLACE PO) Take 1 Tab by mouth as needed.  ascorbic acid, vitamin C, (VITAMIN C) 500 mg tablet Take 500 mg by mouth daily (after breakfast).  Cholecalciferol, Vitamin D3, (VITAMIN D3) 2,000 unit cap capsule Take  by mouth daily (after breakfast).  multivitamin, stress formula (STRESS TAB) tablet Take 1 Tab by mouth daily.  vitamin e (E GEMS) 100 unit capsule Take 400 Units by mouth daily.  aspirin delayed-release 81 mg tablet Take 81 mg by mouth daily.  omeprazole (PRILOSEC) 20 mg capsule Take 1 Cap by mouth nightly. No current facility-administered medications for this visit.        Vitals:    12/08/20 0906   BP: 110/78   Pulse: 66   Resp: 18   SpO2: 97%   Weight: 232 lb 9.6 oz (105.5 kg)   Height: 5' 8\" (1.727 m)       I have reviewed the nurses notes, vitals, problem list, allergy list, medical history, family, social history and medications. Review of Symptoms:    General: Pt denies excessive weight gain or loss. Pt is able to conduct ADL's  HEENT: Denies blurred vision, headaches, epistaxis and difficulty swallowing. Respiratory: Reports shortness of breath, MUÑOZ, Denies wheezing or stridor. Cardiovascular: Denies precordial pain, palpitations, edema or PND  Gastrointestinal: Denies poor appetite, indigestion, abdominal pain or blood in stool  Urinary: Denies dysuria, pyuria  Musculoskeletal: Denies pain or swelling from muscles or joints  Neurologic: Denies tremor, paresthesias, or sensory motor disturbance  Skin: Denies rash, itching or texture change. Psych: Denies depression      Physical Exam:      General: Well developed, in no acute distress. HEENT: Eyes - PERRL, no jvd  Heart:  Normal S1/S2 negative S3 or S4. Regular, no murmur, gallop or rub. Respiratory: Clear bilaterally x 4, no wheezing or rales  Extremities:  Non pitting edema, normal cap refill, no cyanosis. Musculoskeletal: No clubbing  Neuro: A&Ox3, speech clear, gait stable. Skin: Skin color is normal. No rashes or lesions. Non diaphoretic. no ulcers  Vascular: 2+ pulses symmetric in all extremities  Psych - judgement intact and orientation is wnl       Cardiographics    Ekg: Sinus  Rhythm   -Left bundle branch block. Results for orders placed or performed during the hospital encounter of 06/19/20   EKG, 12 LEAD, INITIAL   Result Value Ref Range    Ventricular Rate 93 BPM    Atrial Rate 93 BPM    P-R Interval 162 ms    QRS Duration 154 ms    Q-T Interval 392 ms    QTC Calculation (Bezet) 487 ms    Calculated P Axis 51 degrees    Calculated R Axis 63 degrees    Calculated T Axis 14 degrees    Diagnosis       Normal sinus rhythm  Left bundle branch block  When compared with ECG of 14-FEB-2020 12:25,  Vent.  rate has increased BY  33 BPM  Confirmed by Gianna Arce MD, Jb Vieira ([id number]) on 6/19/2020 3:41:41 PM           Lab Results   Component Value Date/Time    WBC 4.2 11/25/2020 11:20 AM    HGB 11.6 11/25/2020 11:20 AM    HCT 34.7 11/25/2020 11:20 AM    PLATELET 306 68/58/8019 11:20 AM    MCV 93 11/25/2020 11:20 AM      Lab Results   Component Value Date/Time    Sodium 144 11/25/2020 11:20 AM    Potassium 4.9 11/25/2020 11:20 AM    Chloride 109 (H) 11/25/2020 11:20 AM    CO2 24 11/25/2020 11:20 AM    Anion gap 4 (L) 06/19/2020 01:15 AM    Glucose 103 (H) 11/25/2020 11:20 AM    BUN 16 11/25/2020 11:20 AM    Creatinine 1.03 (H) 11/25/2020 11:20 AM    BUN/Creatinine ratio 16 11/25/2020 11:20 AM    GFR est AA 65 11/25/2020 11:20 AM    GFR est non-AA 56 (L) 11/25/2020 11:20 AM    Calcium 11.9 (H) 11/25/2020 11:20 AM    Bilirubin, total 0.3 11/25/2020 11:20 AM    Alk. phosphatase 122 (H) 11/25/2020 11:20 AM    Protein, total 6.3 11/25/2020 11:20 AM    Albumin 4.1 11/25/2020 11:20 AM    Globulin 3.4 06/19/2020 01:15 AM    A-G Ratio 1.9 11/25/2020 11:20 AM    ALT (SGPT) 23 11/25/2020 11:20 AM      Lab Results   Component Value Date/Time    TSH 1.480 02/20/2018 09:32 AM           Assessment:           ICD-10-CM ICD-9-CM    1. Left bundle branch block  I44.7 426.3 AMB POC EKG ROUTINE W/ 12 LEADS, INTER & REP      METABOLIC PANEL, COMPREHENSIVE      CBC W/O DIFF      PROTHROMBIN TIME + INR      XR CHEST PA LAT   2. Cardiomyopathy, unspecified type ([facility]  Z53.3 427.1 METABOLIC PANEL, COMPREHENSIVE      CBC W/O DIFF      PROTHROMBIN TIME + INR      XR CHEST PA LAT   3. Essential hypertension  U90 991.5 METABOLIC PANEL, COMPREHENSIVE      CBC W/O DIFF      PROTHROMBIN TIME + INR      XR CHEST PA LAT   4. BMI 35.0-35.9,adult  J46.64 X87.84 METABOLIC PANEL, COMPREHENSIVE      CBC W/O DIFF      PROTHROMBIN TIME + INR      XR CHEST PA LAT   5. Mixed hyperlipidemia  E78.2 272.2    6. INDER on CPAP  G47.33 327.23     Z99.89 V46.8    7. Chronic systolic congestive heart failure (HCC)  I50.22 428.22      428.0    8.  Dilated cardiomyopathy ([facility] [address]  I42.0 425.4      Orders Placed This Encounter    XR CHEST PA LAT     Standing Status:   Future     Standing Expiration Date:   55/9/1225    METABOLIC PANEL, COMPREHENSIVE    CBC W/O DIFF    PROTHROMBIN TIME + INR    AMB POC EKG ROUTINE W/ 12 LEADS, INTER & REP     Order Specific Question:   Reason for Exam:     Answer:   routine        Plan:     Cardiomyopathy: per echo 5/20 EF 30-35%, abnormal wall motion c/w IVCD. Previously EF 50-55% in 2014. ECG SR with chronic LBBB. [facility] with no ashd however her EF remains low. HF [facility] II. Based on risk of sudden cardiac death, a formal shared decision has been made to proceed with implantation of a cardiac defibrillator for primary prevention of sudden cardiac death using the [address] [place] [address] patient decision aid tool. The patient verbalizes understanding of indication, risks and benefits of ICD procedure. I discussed the risks/benefits/alternatives of the procedure with the patient. Risks include (but are not limited to) bleeding, heart block, infection, cva/mi/tamponade/death. The patient understands and agrees to proceed. Thank you for this interesting consultation. Continue medical management for ICM, LBBB and BMI 35. Thank you for allowing me to participate in [facility] 's care. Cari Gillis NP    Patient seen and examined by me with nurse practitioner. I personally performed all components of the history, physical, and medical decision making and agree with the assessment and plan with minor modifications as noted. Pt with dilated cardiomyopathy greater than 3 months on med rx, class iii chf and lbbb greater than 150 msec. Candidate for biv icd.  Pt understands and agrees to proceed    Sherlyn Zelaya MD, [place]

## 2020-12-09 LAB
ALBUMIN SERPL-MCNC: 4.4 G/DL (ref 3.8–4.8)
ALBUMIN/GLOB SERPL: 2 {RATIO} (ref 1.2–2.2)
ALP SERPL-CCNC: 126 IU/L (ref 39–117)
ALT SERPL-CCNC: 22 IU/L (ref 0–32)
AST SERPL-CCNC: 18 IU/L (ref 0–40)
BILIRUB SERPL-MCNC: 0.3 MG/DL (ref 0–1.2)
BUN SERPL-MCNC: 18 MG/DL (ref 8–27)
BUN/CREAT SERPL: 18 (ref 12–28)
CALCIUM SERPL-MCNC: 12.2 MG/DL (ref 8.7–10.3)
CHLORIDE SERPL-SCNC: 106 MMOL/L (ref 96–106)
CO2 SERPL-SCNC: 23 MMOL/L (ref 20–29)
CREAT SERPL-MCNC: 1.02 MG/DL (ref 0.57–1)
ERYTHROCYTE [DISTWIDTH] IN BLOOD BY AUTOMATED COUNT: 12.1 % (ref 11.7–15.4)
GLOBULIN SER CALC-MCNC: 2.2 G/DL (ref 1.5–4.5)
GLUCOSE SERPL-MCNC: 92 MG/DL (ref 65–99)
HCT VFR BLD AUTO: 35.3 % (ref 34–46.6)
HGB BLD-MCNC: 11.7 G/DL (ref 11.1–15.9)
INR PPP: 1 (ref 0.9–1.2)
INTERPRETATION: NORMAL
MCH RBC QN AUTO: 30.2 PG (ref 26.6–33)
MCHC RBC AUTO-ENTMCNC: 33.1 G/DL (ref 31.5–35.7)
MCV RBC AUTO: 91 FL (ref 79–97)
PLATELET # BLD AUTO: 233 X10E3/UL (ref 150–450)
POTASSIUM SERPL-SCNC: 4.8 MMOL/L (ref 3.5–5.2)
PROT SERPL-MCNC: 6.6 G/DL (ref 6–8.5)
PROTHROMBIN TIME: 10.4 SEC (ref 9.1–12)
RBC # BLD AUTO: 3.87 X10E6/UL (ref 3.77–5.28)
SODIUM SERPL-SCNC: 141 MMOL/L (ref 134–144)
WBC # BLD AUTO: 4.5 X10E3/UL (ref 3.4–10.8)

## 2020-12-15 ENCOUNTER — NURSE TRIAGE (OUTPATIENT)
Dept: OTHER | Facility: CLINIC | Age: 65
End: 2020-12-15

## 2020-12-15 ENCOUNTER — TELEPHONE (OUTPATIENT)
Dept: INTERNAL MEDICINE CLINIC | Age: 65
End: 2020-12-15

## 2020-12-15 ENCOUNTER — OFFICE VISIT (OUTPATIENT)
Dept: URGENT CARE | Age: 65
End: 2020-12-15
Payer: MEDICARE

## 2020-12-15 VITALS
TEMPERATURE: 98.2 F | WEIGHT: 232.6 LBS | OXYGEN SATURATION: 99 % | RESPIRATION RATE: 15 BRPM | BODY MASS INDEX: 35.37 KG/M2 | HEART RATE: 73 BPM

## 2020-12-15 DIAGNOSIS — H66.91 ACUTE RIGHT OTITIS MEDIA: ICD-10-CM

## 2020-12-15 DIAGNOSIS — J02.9 SORE THROAT: Primary | ICD-10-CM

## 2020-12-15 LAB
S PYO AG THROAT QL: NEGATIVE
VALID INTERNAL CONTROL?: YES

## 2020-12-15 PROCEDURE — G8536 NO DOC ELDER MAL SCRN: HCPCS | Performed by: FAMILY MEDICINE

## 2020-12-15 PROCEDURE — G8756 NO BP MEASURE DOC: HCPCS | Performed by: FAMILY MEDICINE

## 2020-12-15 PROCEDURE — G8399 PT W/DXA RESULTS DOCUMENT: HCPCS | Performed by: FAMILY MEDICINE

## 2020-12-15 PROCEDURE — G9717 DOC PT DX DEP/BP F/U NT REQ: HCPCS | Performed by: FAMILY MEDICINE

## 2020-12-15 PROCEDURE — G8417 CALC BMI ABV UP PARAM F/U: HCPCS | Performed by: FAMILY MEDICINE

## 2020-12-15 PROCEDURE — 1090F PRES/ABSN URINE INCON ASSESS: CPT | Performed by: FAMILY MEDICINE

## 2020-12-15 PROCEDURE — 87880 STREP A ASSAY W/OPTIC: CPT | Performed by: FAMILY MEDICINE

## 2020-12-15 PROCEDURE — 3017F COLORECTAL CA SCREEN DOC REV: CPT | Performed by: FAMILY MEDICINE

## 2020-12-15 PROCEDURE — G8427 DOCREV CUR MEDS BY ELIG CLIN: HCPCS | Performed by: FAMILY MEDICINE

## 2020-12-15 PROCEDURE — 1101F PT FALLS ASSESS-DOCD LE1/YR: CPT | Performed by: FAMILY MEDICINE

## 2020-12-15 PROCEDURE — 99203 OFFICE O/P NEW LOW 30 MIN: CPT | Performed by: FAMILY MEDICINE

## 2020-12-15 PROCEDURE — G9899 SCRN MAM PERF RSLTS DOC: HCPCS | Performed by: FAMILY MEDICINE

## 2020-12-15 RX ORDER — AMOXICILLIN 500 MG/1
500 CAPSULE ORAL 2 TIMES DAILY
Qty: 20 CAP | Refills: 0 | Status: SHIPPED | OUTPATIENT
Start: 2020-12-15 | End: 2020-12-25

## 2020-12-15 NOTE — TELEPHONE ENCOUNTER
Identified patient 2 identifiers verified.   Patient made aware to seek medical attention at Urgent Care  For evaluation per Dr. Augustus Lennox

## 2020-12-15 NOTE — PROGRESS NOTES
This patient was seen at [address] [place] [address] Urgent Care while in their vehicle due to COVID-19 pandemic with PPE and focused examination in order to decrease community viral transmission. The patient/guardian gave verbal consent to treat. Kingston Mackey is a 77 y.o. female who presents with intermittent ST x 2 weeks. Now with right ear pain. No known COVID-19 contacts. Denies cough, fever, SOB. Declines COVID-19 testing. The history is provided by the patient.         Past Medical History:   Diagnosis Date    Arthritis     OSTEO    Cancer [facility])     skin cancer removed from nose    Chronic kidney disease 2020    stage 3     Depression 2014    [facility]ometrial [facility] [address]     GERD (gastroesophageal reflux disease)     Hyperlipidemia     Hypertension     Left bundle branch block     Neuropathy, peripheral 2014    INDER on CPAP 2014    compliant    Stage 3a chronic kidney disease 10/8/2020             Past Surgical History:   Procedure Laterality Date    COLONOSCOPY  2010     Dr. Ismael Rondon        HX DILATION AND CURETTAGE  2020    HYSTROSCOPY    HX GI      COLONOSCOPY    HX HEART CATHETERIZATION Left 2020    HX HEENT      BCCA REMOVED FROM NOSE     HX HYSTERECTOMY  2020    HX OTHER SURGICAL      hysteroscopy surg    HX TONSILLECTOMY           Family History   Problem Relation Age of Onset    Heart Disease Mother     Breast Cancer Mother     Heart Disease Father     Kidney Disease Paternal Aunt         renal cancer    Colon Cancer Maternal Aunt     Cancer Paternal Uncle         leukemia    Anesth Problems Neg Hx         Social History     Socioeconomic History    Marital status:      Spouse name: Not on file    Number of children: Not on file    Years of education: Not on file    Highest education level: Not on file   Occupational History    Not on file   Social Needs    Financial resource strain: Not on file    Food insecurity     Worry: Not on file     Inability: Not on file    Transportation needs     Medical: Not on file     Non-medical: Not on file   Tobacco Use    Smoking status: Never Smoker    Smokeless tobacco: Never Used   Substance and Sexual Activity    Alcohol use: No     Alcohol/week: 0.0 standard drinks    Drug use: No    Sexual activity: Not Currently   Lifestyle    Physical activity     Days per week: Not on file     Minutes per session: Not on file    Stress: Not on file   Relationships    Social connections     Talks on phone: Not on file     Gets together: Not on file     Attends Zoroastrian service: Not on file     Active member of club or organization: Not on file     Attends meetings of clubs or organizations: Not on file     Relationship status: Not on file    Intimate partner violence     Fear of current or ex partner: Not on file     Emotionally abused: Not on file     Physically abused: Not on file     Forced sexual activity: Not on file   Other Topics Concern    Not on file   Social History Narrative    Not on file                ALLERGIES: Erythromycin    Review of Systems   Constitutional: Negative for activity change, appetite change, chills and fever. HENT: Positive for ear pain and sore throat. Negative for congestion and rhinorrhea. Respiratory: Negative for cough, shortness of breath and wheezing. Cardiovascular: Negative for chest pain. Gastrointestinal: Negative for abdominal pain, diarrhea, nausea and vomiting. Musculoskeletal: Negative for myalgias. Neurological: Negative for headaches. Vitals:    12/15/20 1624   Pulse: 73   Resp: 15   Temp: 98.2 °F (36.8 °C)   SpO2: 99%       Physical Exam  Vitals signs and nursing note reviewed. Constitutional:       General: She is not in acute distress. Appearance: She is well-developed. She is not diaphoretic.    HENT:      Right Ear: Ear canal and external ear normal. A middle ear effusion is present. There is no impacted cerumen. Tympanic membrane is erythematous and bulging. Left Ear: Tympanic membrane, ear canal and external ear normal. There is no impacted cerumen. Mouth/Throat:      Mouth: Mucous membranes are moist.      Pharynx: Oropharynx is clear. No oropharyngeal exudate or posterior oropharyngeal erythema. Pulmonary:      Effort: Pulmonary effort is normal. No respiratory distress. Breath sounds: Normal breath sounds. No stridor. No wheezing, rhonchi or rales. Neurological:      Mental Status: She is alert. Psychiatric:         Behavior: Behavior normal.         Thought Content: Thought content normal.         Judgment: Judgment normal.         MDM    ICD-10-CM ICD-9-CM   1. Sore throat  J02.9 462   2. Acute right otitis media  H66.91 382.9       Orders Placed This Encounter    AMB POC RAPID STREP A    amoxicillin (AMOXIL) 500 mg capsule     Sig: Take 1 Cap by mouth two (2) times a day for 10 days. Dispense:  20 Cap     Refill:  0          If signs and symptoms become worse the pt is to go to the ER. Results for orders placed or performed in visit on 12/15/20   AMB POC RAPID STREP A   Result Value Ref Range    VALID INTERNAL CONTROL POC Yes     Group A Strep Ag Negative Negative     Estimated Creatinine Clearance: 68 mL/min (A) (by C-G formula based on SCr of 1.02 mg/dL (H)).      Procedures

## 2020-12-15 NOTE — TELEPHONE ENCOUNTER
[facility] Office [facility]               General Message/Vendor Calls     Caller's first and last name: [facility]  Deisy       Reason for call:schedule in office sick  visit       Callback required yes/no and why:yes       Best contact number(s):[phone]       Details to clarify the request:Pt requesting an office visit due to a sore throat and to check for strep pt is aware of VV and is requesting in office .        Ankit Martinez

## 2020-12-15 NOTE — TELEPHONE ENCOUNTER
Reason for Disposition   Patient requesting a strep throat test    Answer Assessment - Initial Assessment Questions  1. ONSET: \"When did the throat start hurting? \" (Hours or days ago)       Started 2 weeks ago- pt states it does not hurt all the time, goes away and comes back - throbbing pain     2. SEVERITY: \"How bad is the sore throat? \" (Scale 1-10; mild, moderate or severe)    - MILD (1-3):  doesn't interfere with eating or normal activities    - MODERATE (4-7): interferes with eating some solids and normal activities    - SEVERE (8-10):  excruciating pain, interferes with most normal activities    - SEVERE DYSPHAGIA: can't swallow liquids, drooling      Moderate 4/10     3. STREP EXPOSURE: \"Has there been any exposure to strep within the past week? \" If so, ask: \"What type of contact occurred? \"       Pt is unsure- wants to get a step test. Pt states she is getting surgery next month and wants to get assessment before surgery    4. VIRAL SYMPTOMS: [place] Cope there any symptoms of a cold, such as a runny nose, cough, hoarse voice or red eyes? \"      Denies    5. FEVER: \"Do you have a fever? \" If so, ask: \"What is your temperature, how was it measured, and when did it start? \"     Denies    6. PUS ON THE TONSILS: \"Is there pus on the tonsils in the back of your throat? \"     Denies    7. OTHER SYMPTOMS: \"Do you have any other symptoms? \" (e.g., difficulty breathing, headache, rash)     Denies    8. PREGNANCY: \"Is there any chance you are pregnant? \" \"When was your last menstrual period? \"     N/A    Pt states she had a cardiac cath done a few weeks ago-December 3, 2020   Pt also states she is on CPAP and her water chamber was growing mold and she cleaned it out today and is concerned she may have an infection from this    Protocols used: SORE THROAT-ADULT-OH  pt calling with c/o sore throat.  She is requesting a strep test. Care advice provided, pt verbalized understanding and will call back if symptoms worsen or if she has any further questions. Advised pt to follow up in [facility] or ED if she is unable to get an appointment today or tomorrow per Disposition.

## 2020-12-18 RX ORDER — RISPERIDONE 2 MG/1
TABLET, FILM COATED ORAL
Qty: 90 TAB | Refills: 0 | Status: SHIPPED | OUTPATIENT
Start: 2020-12-18 | End: 2021-04-16

## 2020-12-21 ENCOUNTER — HOSPITAL ENCOUNTER (OUTPATIENT)
Dept: RADIATION THERAPY | Age: 65
Discharge: HOME | End: 2020-12-21

## 2020-12-23 ENCOUNTER — OFFICE VISIT (OUTPATIENT)
Dept: CARDIOLOGY CLINIC | Age: 65
End: 2020-12-23
Payer: MEDICARE

## 2020-12-23 VITALS
BODY MASS INDEX: 35.45 KG/M2 | RESPIRATION RATE: 18 BRPM | SYSTOLIC BLOOD PRESSURE: 140 MMHG | HEART RATE: 66 BPM | OXYGEN SATURATION: 99 % | WEIGHT: 233.9 LBS | HEIGHT: 68 IN | DIASTOLIC BLOOD PRESSURE: 80 MMHG

## 2020-12-23 DIAGNOSIS — I42.9 CARDIOMYOPATHY, UNSPECIFIED TYPE (HCC): ICD-10-CM

## 2020-12-23 DIAGNOSIS — I42.0 DILATED CARDIOMYOPATHY (HCC): ICD-10-CM

## 2020-12-23 DIAGNOSIS — I10 ESSENTIAL HYPERTENSION: ICD-10-CM

## 2020-12-23 DIAGNOSIS — Z98.890 S/P CARDIAC CATH: Primary | ICD-10-CM

## 2020-12-23 DIAGNOSIS — G47.33 OSA ON CPAP: ICD-10-CM

## 2020-12-23 DIAGNOSIS — I44.7 LEFT BUNDLE BRANCH BLOCK: ICD-10-CM

## 2020-12-23 DIAGNOSIS — Z99.89 OSA ON CPAP: ICD-10-CM

## 2020-12-23 DIAGNOSIS — E78.2 MIXED HYPERLIPIDEMIA: ICD-10-CM

## 2020-12-23 PROCEDURE — G0463 HOSPITAL OUTPT CLINIC VISIT: HCPCS | Performed by: INTERNAL MEDICINE

## 2020-12-23 PROCEDURE — G8753 SYS BP > OR = 140: HCPCS | Performed by: INTERNAL MEDICINE

## 2020-12-23 PROCEDURE — G8399 PT W/DXA RESULTS DOCUMENT: HCPCS | Performed by: INTERNAL MEDICINE

## 2020-12-23 PROCEDURE — G9717 DOC PT DX DEP/BP F/U NT REQ: HCPCS | Performed by: INTERNAL MEDICINE

## 2020-12-23 PROCEDURE — 1090F PRES/ABSN URINE INCON ASSESS: CPT | Performed by: INTERNAL MEDICINE

## 2020-12-23 PROCEDURE — 1101F PT FALLS ASSESS-DOCD LE1/YR: CPT | Performed by: INTERNAL MEDICINE

## 2020-12-23 PROCEDURE — G8417 CALC BMI ABV UP PARAM F/U: HCPCS | Performed by: INTERNAL MEDICINE

## 2020-12-23 PROCEDURE — 3017F COLORECTAL CA SCREEN DOC REV: CPT | Performed by: INTERNAL MEDICINE

## 2020-12-23 PROCEDURE — G8536 NO DOC ELDER MAL SCRN: HCPCS | Performed by: INTERNAL MEDICINE

## 2020-12-23 PROCEDURE — G8427 DOCREV CUR MEDS BY ELIG CLIN: HCPCS | Performed by: INTERNAL MEDICINE

## 2020-12-23 PROCEDURE — 93005 ELECTROCARDIOGRAM TRACING: CPT | Performed by: INTERNAL MEDICINE

## 2020-12-23 PROCEDURE — 93010 ELECTROCARDIOGRAM REPORT: CPT | Performed by: INTERNAL MEDICINE

## 2020-12-23 PROCEDURE — G8754 DIAS BP LESS 90: HCPCS | Performed by: INTERNAL MEDICINE

## 2020-12-23 PROCEDURE — 99214 OFFICE O/P EST MOD 30 MIN: CPT | Performed by: INTERNAL MEDICINE

## 2020-12-23 PROCEDURE — G9899 SCRN MAM PERF RSLTS DOC: HCPCS | Performed by: INTERNAL MEDICINE

## 2020-12-23 NOTE — PROGRESS NOTES
[address] [place] [address]  [phone]     Subjective: Chelsi Padilla is a 77 y.o. female is here for follow up s/p cardiac catheterization with findings below. She was scheduled for BiV-ICD but has reservations. Reports some palpitation but does not want any monitor. Unchanged occasional hdz. The patient denies chest pain, orthopnea, PND, LE edema, syncope, or presyncope.        Cardiac catheterization 12/3/2020  · Normal coronaries  · Non-ischemic cardiomyopathy; will rec bi-V pacer    Patient Active Problem List    Diagnosis Date Noted    S/P cardiac cath 2020    Stage 3a chronic kidney disease 10/08/2020    Left bundle branch block 2020    Endometrial cancer ([facility] 2020    Severe obesity ([facility] 2019    Nuclear cataract 2016    Posterior subcapsular polar senile cataract 2016    Neuropathy, peripheral 2014    Hypertension 2014    Hyperglycemia 2014    GERD (gastroesophageal reflux disease) 2014    Hyperlipidemia 2014    INDER on CPAP 2014    Depression 2014      Nela Lance MD  Past Medical History:   Diagnosis Date    Arthritis     OSTEO    Cancer [facility])     skin cancer removed from nose    Chronic kidney disease 2020    stage 3     Depression 2014    Endometrial cancer ([facility]     GERD (gastroesophageal reflux disease)     Hyperlipidemia     Hypertension     Left bundle branch block 2013    Neuropathy, peripheral 2014    INDER on CPAP 2014    compliant    Stage 3a chronic kidney disease 10/8/2020           Past Surgical History:   Procedure Laterality Date    COLONOSCOPY  2010     Dr. Brynn Quintanilla        HX DILATION AND CURETTAGE  2020    HYSTROSCOPY    HX GI      COLONOSCOPY    HX HEART CATHETERIZATION Left 2020    HX HEENT      BCCA REMOVED FROM NOSE     HX HYSTERECTOMY 02/20/2020    HX OTHER SURGICAL      hysteroscopy surg    HX TONSILLECTOMY  1957     Allergies   Allergen Reactions    Erythromycin Rash      Family History   Problem Relation Age of Onset    Heart Disease Mother     Breast Cancer Mother     Heart Disease Father     Kidney Disease Paternal Aunt         renal cancer    Colon Cancer Maternal Aunt     Cancer Paternal Uncle         leukemia    Anesth Problems Neg Hx       Social History     Socioeconomic History    Marital status:      Spouse name: Not on file    Number of children: Not on file    Years of education: Not on file    Highest education level: Not on file   Occupational History    Not on file   Social Needs    Financial resource strain: Not on file    Food insecurity     Worry: Not on file     Inability: Not on file    Transportation needs     Medical: Not on file     Non-medical: Not on file   Tobacco Use    Smoking status: Never Smoker    Smokeless tobacco: Never Used   Substance and Sexual Activity    Alcohol use: No     Alcohol/week: 0.0 standard drinks    Drug use: No    Sexual activity: Not Currently   Lifestyle    Physical activity     Days per week: Not on file     Minutes per session: Not on file    Stress: Not on file   Relationships    Social connections     Talks on phone: Not on file     Gets together: Not on file     Attends Confucianist service: Not on file     Active member of club or organization: Not on file     Attends meetings of clubs or organizations: Not on file     Relationship status: Not on file    Intimate partner violence     Fear of current or ex partner: Not on file     Emotionally abused: Not on file     Physically abused: Not on file     Forced sexual activity: Not on file   Other Topics Concern    Not on file   Social History Narrative    Not on file      Current Outpatient Medications   Medication Sig    risperiDONE (RisperDAL) 2 mg tablet TAKE 1 TABLET BY MOUTH EVERY DAY AT NIGHT    carvediloL (COREG) 6.25 mg tablet TAKE 1 TABLET BY MOUTH TWICE A DAY WITH MEALS    losartan (COZAAR) 100 mg tablet Take 1 Tab by mouth daily.  omeprazole (PRILOSEC) 20 mg capsule Take 1 Cap by mouth nightly.  buPROPion XL (WELLBUTRIN XL) 300 mg XL tablet TAKE 1 TABLET BY MOUTH EVERY MORNING    atorvastatin (LIPITOR) 20 mg tablet TAKE 1 TABLET BY MOUTH EVERY DAY    docusate sodium (COLACE PO) Take 1 Tab by mouth as needed.  ascorbic acid, vitamin C, (VITAMIN C) 500 mg tablet Take 500 mg by mouth daily (after breakfast).  Cholecalciferol, Vitamin D3, (VITAMIN D3) 2,000 unit cap capsule Take  by mouth daily (after breakfast).  multivitamin, stress formula (STRESS TAB) tablet Take 1 Tab by mouth daily.  vitamin e (E GEMS) 100 unit capsule Take 400 Units by mouth daily.  aspirin delayed-release 81 mg tablet Take 81 mg by mouth daily.  amoxicillin (AMOXIL) 500 mg capsule Take 1 Cap by mouth two (2) times a day for 10 days. No current facility-administered medications for this visit. Review of Symptoms:  11 systems reviewed, negative other than as stated in the HPI    Physical ExamPhysical Exam:    Vitals:    12/23/20 1026 12/23/20 1034   BP: (!) 150/90 (!) 140/80   Pulse: 66    Resp: 18    SpO2: 99%    Weight: 233 lb 14.4 oz (106.1 kg)    Height: 5' 8\" (1.727 m)      Body mass index is 35.56 kg/m². General PE  Gen:  NAD  Mental Status - Alert. General Appearance - Not in acute distress. HEENT:  PERRL, no carotid bruits or JVD  Chest and Lung Exam   Inspection: Accessory muscles - No use of accessory muscles in breathing. Auscultation:   Breath sounds: - Normal.   Cardiovascular   Inspection: Jugular vein - Bilateral - Inspection Normal.   Palpation/Percussion:   Apical Impulse: - Normal.   Auscultation: Rhythm - Regular. Heart Sounds - S1 WNL and S2 WNL. No S3 or S4. Murmurs & Other Heart Sounds: Auscultation of the heart reveals - No Murmurs.    Peripheral Vascular   Upper Extremity: Inspection - Bilateral - No Cyanotic nailbeds or Digital clubbing. Lower Extremity:   Palpation: Edema - Bilateral - No edema. Abdomen:   Soft, non-tender, bowel sounds are active. Neuro: A&O times 3, CN and motor grossly WNL    Labs:   Lab Results   Component Value Date/Time    Cholesterol, total 134 09/29/2020 07:52 AM    Cholesterol, total 127 10/02/2019 08:50 AM    Cholesterol, total 140 03/29/2019 09:21 AM    Cholesterol, total 112 11/15/2018 09:06 AM    Cholesterol, total 137 02/20/2018 09:33 AM    HDL Cholesterol 53 09/29/2020 07:52 AM    HDL Cholesterol 46 10/02/2019 08:50 AM    HDL Cholesterol 53 03/29/2019 09:21 AM    HDL Cholesterol 45 11/15/2018 09:06 AM    HDL Cholesterol 45 02/20/2018 09:33 AM    LDL, calculated 68 09/29/2020 07:52 AM    LDL, calculated 65 10/02/2019 08:50 AM    LDL, calculated 72 03/29/2019 09:21 AM    LDL, calculated 53 11/15/2018 09:06 AM    LDL, calculated 71 02/20/2018 09:33 AM    LDL, calculated 73 06/14/2017 09:41 AM    Triglyceride 65 09/29/2020 07:52 AM    Triglyceride 82 10/02/2019 08:50 AM    Triglyceride 75 03/29/2019 09:21 AM    Triglyceride 70 11/15/2018 09:06 AM    Triglyceride 105 02/20/2018 09:33 AM     No results found for: CPK, CPKX, CPX  Lab Results   Component Value Date/Time    Sodium 141 12/08/2020 11:35 AM    Potassium 4.8 12/08/2020 11:35 AM    Chloride 106 12/08/2020 11:35 AM    CO2 23 12/08/2020 11:35 AM    Anion gap 4 (L) 06/19/2020 01:15 AM    Glucose 92 12/08/2020 11:35 AM    BUN 18 12/08/2020 11:35 AM    Creatinine 1.02 (H) 12/08/2020 11:35 AM    BUN/Creatinine ratio 18 12/08/2020 11:35 AM    GFR est AA 66 12/08/2020 11:35 AM    GFR est non-AA 57 (L) 12/08/2020 11:35 AM    Calcium 12.2 (H) 12/08/2020 11:35 AM    Bilirubin, total 0.3 12/08/2020 11:35 AM    Alk.  phosphatase 126 (H) 12/08/2020 11:35 AM    Protein, total 6.6 12/08/2020 11:35 AM    Albumin 4.4 12/08/2020 11:35 AM    Globulin 3.4 06/19/2020 01:15 AM    A-G Ratio 2.0 12/08/2020 11:35 AM    ALT (SGPT) 22 12/08/2020 11:35 AM       EKG:  SR     Assessment:     Assessment:      1. S/P cardiac cath    2. Essential hypertension    3. Cardiomyopathy, unspecified type ([facility]    4. Left bundle branch block    5. Dilated cardiomyopathy ([facility]    6. INDER on CPAP    7.  Mixed hyperlipidemia        Orders Placed This Encounter    AMB POC EKG ROUTINE W/ 12 LEADS, INTER & REP     Order Specific Question:   Reason for Exam:     Answer:   routine        Plan:     NICM  EF 30-35% per cath 12/2020 and echo 5/2020  Continue Carvedilol,   Stable kidney fxn 12/2020----Dc Losartan and start Entresto 24/26 mg BID---She wants to check cost at the beginning of the year and will  Call us  For BiV-ICD with Dr Rhina Salas at this time; she will seek out a 2nd opinion    LBBB  Normal coronaries per cardiac cath in 12/2020      Palpitation  Defers 24 hr holter    HTN  Controlled with current therapy     Hyperlipidemia:   9/2020 LDL 68 On statin Lipids and labs managed by PCP     INDER on CPAP:   compliant with CPAP use        Continue current care and f/u in 3 mos    Keegan Pate MD

## 2020-12-23 NOTE — PROGRESS NOTES
1. Have you been to the ER, urgent care clinic since your last visit? Hospitalized since your last visit? S/P cath dec 2020    2. Have you seen or consulted any other health care providers outside of the [address] [place] [address] since your last visit? Include any pap smears or colon screening. No    Chief Complaint   Patient presents with    Hypertension     s/p cath Dec 2020.   C/O palps at rest.

## 2021-02-02 ENCOUNTER — OFFICE VISIT (OUTPATIENT)
Dept: CARDIOLOGY CLINIC | Age: 66
End: 2021-02-02
Payer: MEDICARE

## 2021-02-02 VITALS
RESPIRATION RATE: 16 BRPM | WEIGHT: 236.4 LBS | HEIGHT: 68 IN | OXYGEN SATURATION: 99 % | BODY MASS INDEX: 35.83 KG/M2 | HEART RATE: 84 BPM | SYSTOLIC BLOOD PRESSURE: 140 MMHG | DIASTOLIC BLOOD PRESSURE: 80 MMHG

## 2021-02-02 DIAGNOSIS — I42.0 DILATED CARDIOMYOPATHY (HCC): ICD-10-CM

## 2021-02-02 DIAGNOSIS — I50.22 CHRONIC SYSTOLIC CONGESTIVE HEART FAILURE (HCC): ICD-10-CM

## 2021-02-02 DIAGNOSIS — E78.2 MIXED HYPERLIPIDEMIA: ICD-10-CM

## 2021-02-02 DIAGNOSIS — I10 ESSENTIAL HYPERTENSION: ICD-10-CM

## 2021-02-02 DIAGNOSIS — I44.7 LEFT BUNDLE BRANCH BLOCK: ICD-10-CM

## 2021-02-02 DIAGNOSIS — I10 ESSENTIAL HYPERTENSION: Primary | ICD-10-CM

## 2021-02-02 PROCEDURE — G9899 SCRN MAM PERF RSLTS DOC: HCPCS | Performed by: INTERNAL MEDICINE

## 2021-02-02 PROCEDURE — G8399 PT W/DXA RESULTS DOCUMENT: HCPCS | Performed by: INTERNAL MEDICINE

## 2021-02-02 PROCEDURE — 99215 OFFICE O/P EST HI 40 MIN: CPT | Performed by: INTERNAL MEDICINE

## 2021-02-02 PROCEDURE — 1101F PT FALLS ASSESS-DOCD LE1/YR: CPT | Performed by: INTERNAL MEDICINE

## 2021-02-02 PROCEDURE — G8417 CALC BMI ABV UP PARAM F/U: HCPCS | Performed by: INTERNAL MEDICINE

## 2021-02-02 PROCEDURE — G8753 SYS BP > OR = 140: HCPCS | Performed by: INTERNAL MEDICINE

## 2021-02-02 PROCEDURE — G8536 NO DOC ELDER MAL SCRN: HCPCS | Performed by: INTERNAL MEDICINE

## 2021-02-02 PROCEDURE — 3017F COLORECTAL CA SCREEN DOC REV: CPT | Performed by: INTERNAL MEDICINE

## 2021-02-02 PROCEDURE — G9717 DOC PT DX DEP/BP F/U NT REQ: HCPCS | Performed by: INTERNAL MEDICINE

## 2021-02-02 PROCEDURE — G0463 HOSPITAL OUTPT CLINIC VISIT: HCPCS | Performed by: INTERNAL MEDICINE

## 2021-02-02 PROCEDURE — G8427 DOCREV CUR MEDS BY ELIG CLIN: HCPCS | Performed by: INTERNAL MEDICINE

## 2021-02-02 PROCEDURE — 1090F PRES/ABSN URINE INCON ASSESS: CPT | Performed by: INTERNAL MEDICINE

## 2021-02-02 PROCEDURE — G8754 DIAS BP LESS 90: HCPCS | Performed by: INTERNAL MEDICINE

## 2021-02-02 NOTE — LETTER
2/2/2021 Patient: Kari Olivas YOB: 1955 Date of Visit: 2/2/2021 Akosua Aguilar MD 
[address] [address] [address] [employer] Dear Akosua Aguilar MD, Thank you for referring Ms. Kari Olivas to [facility] for evaluation. My notes for this consultation are attached. If you have questions, please do not hesitate to call me. I look forward to following your patient along with you. Sincerely, Amanda Little MD

## 2021-02-02 NOTE — H&P (VIEW-ONLY)
ELECTROPHYSIOLOGY Subjective: Billy Price is a 74 y.o. female is here for EP follow up. Here to rediscuss biv icd implant. Had questions about driving. Has sob Patient Active Problem List  
 Diagnosis Date Noted  S/P cardiac cath 2020  Stage 3a chronic kidney disease 10/08/2020  Left bundle branch block 2020  Endometrial cancer ([facility] 2020  Severe obesity ([facility] 2019  Nuclear cataract 2016  Posterior subcapsular polar senile cataract 2016  Neuropathy, peripheral 2014  Hypertension 2014  Hyperglycemia 2014  GERD (gastroesophageal reflux disease) 2014  Hyperlipidemia 2014  INDER on CPAP 2014  Depression 2014 Suzi Reddy MD 
Past Medical History:  
Diagnosis Date  Arthritis OSTEO  
 Cancer ([facility]   
 skin cancer removed from nose  Chronic kidney disease 2020  
 stage 3  Depression 2014  Endometrial cancer ([facility]   GERD (gastroesophageal reflux disease)  Hyperlipidemia  Hypertension  Left bundle branch block   Neuropathy, peripheral 2014  INDER on CPAP 2014  
 compliant  Stage 3a chronic kidney disease 10/8/2020 Past Surgical History:  
Procedure Laterality Date  COLONOSCOPY  2010 Dr. Criselda Cline [id number] [facility]  
   HX DILATION AND CURETTAGE  2020 HYSTROSCOPY  
 HX GI    
 COLONOSCOPY  
 HX HEART CATHETERIZATION Left 2020  HX HEENT   BCCA REMOVED FROM NOSE   
 HX HYSTERECTOMY  2020  HX OTHER SURGICAL    
 hysteroscopy surg [address] Allergies Allergen Reactions  Erythromycin Rash Family History Problem Relation Age of Onset  Heart Disease Mother  Breast Cancer Mother  Heart Disease Father  Kidney Disease Paternal Aunt   
     renal cancer  Colon Cancer Maternal Aunt  Cancer Paternal Uncle   
     leukemia  Anesth Problems Neg Hx   
 negative for cardiac disease Social History Socioeconomic History  Marital status:  Spouse name: Not on file  Number of children: Not on file  Years of education: Not on file  Highest education level: Not on file Tobacco Use  Smoking status: Never Smoker  Smokeless tobacco: Never Used Substance and Sexual Activity  Alcohol use: No  
  Alcohol/week: 0.0 standard drinks  Drug use: No  
 Sexual activity: Not Currently Current Outpatient Medications Medication Sig  
 risperiDONE (RisperDAL) 2 mg tablet TAKE 1 TABLET BY MOUTH EVERY DAY AT NIGHT  carvediloL (COREG) 6.25 mg tablet TAKE 1 TABLET BY MOUTH TWICE A DAY WITH MEALS  
 losartan (COZAAR) 100 mg tablet Take 1 Tab by mouth daily.  buPROPion XL (WELLBUTRIN XL) 300 mg XL tablet TAKE 1 TABLET BY MOUTH EVERY MORNING  
 atorvastatin (LIPITOR) 20 mg tablet TAKE 1 TABLET BY MOUTH EVERY DAY  docusate sodium (COLACE PO) Take 1 Tab by mouth as needed.  ascorbic acid, vitamin C, (VITAMIN C) 500 mg tablet Take 500 mg by mouth daily (after breakfast).  Cholecalciferol, Vitamin D3, (VITAMIN D3) 2,000 unit cap capsule Take  by mouth daily (after breakfast).  multivitamin, stress formula (STRESS TAB) tablet Take 1 Tab by mouth daily.  vitamin e (E GEMS) 100 unit capsule Take 400 Units by mouth daily.  aspirin delayed-release 81 mg tablet Take 81 mg by mouth daily. No current facility-administered medications for this visit. Vitals:  
 02/02/21 1426 BP: (!) 140/80 Pulse: 84 Resp: 16 SpO2: 99% Weight: 236 lb 6.4 oz (107.2 kg) Height: 5' 8\" (1.727 m) I have reviewed the nurses notes, vitals, problem list, allergy list, medical history, family, social history and medications. Review of Symptoms: 
 
General: Pt denies excessive weight gain or loss.  Pt is able to conduct ADL's 
 HEENT: Denies blurred vision, headaches, epistaxis and difficulty swallowing. Respiratory: +sob, hdz, Denies  wheezing or stridor. Cardiovascular: Denies precordial pain, palpitations, edema or PND Gastrointestinal: Denies poor appetite, indigestion, abdominal pain or blood in stool Urinary: Denies dysuria, pyuria Musculoskeletal: Denies pain or swelling from muscles or joints Neurologic: Denies tremor, paresthesias, or sensory motor disturbance Skin: Denies rash, itching or texture change. Psych: Denies depression Physical Exam:   
 
General: Well developed, in no acute distress. HEENT: Eyes - PERRL, no jvd Heart:  Normal S1/S2 negative S3 or S4. Regular, no murmur, gallop or rub. Respiratory: Clear bilaterally x 4, no wheezing or rales Extremities:  No edema, normal cap refill, no cyanosis. Musculoskeletal: No clubbing Neuro: A&Ox3, speech clear, gait stable. Skin: Skin color is normal. No rashes or lesions. Non diaphoretic. no ulcers Vascular: 2+ pulses symmetric in all extremities Psych - judgement intact and orientation is wnl Cardiographics Results for orders placed or performed during the hospital encounter of 06/19/20 EKG, 12 LEAD, INITIAL Result Value Ref Range Ventricular Rate 93 BPM  
 Atrial Rate 93 BPM  
 P-R Interval 162 ms QRS Duration 154 ms Q-T Interval 392 ms QTC Calculation (Bezet) 487 ms Calculated P Axis 51 degrees Calculated R Axis 63 degrees Calculated T Axis 14 degrees Diagnosis Normal sinus rhythm Left bundle branch block When compared with ECG of 14-FEB-2020 12:25, 
Vent. rate has increased BY  33 BPM 
Confirmed by Sarah Estrada MD, Jackie Gallagher ([id number]) on 6/19/2020 3:41:41 PM 
  
 
 
 
Lab Results Component Value Date/Time WBC 4.5 12/08/2020 11:35 AM  
 HGB 11.7 12/08/2020 11:35 AM  
 HCT 35.3 12/08/2020 11:35 AM  
 PLATELET 741 54/42/7530 11:35 AM  
 MCV 91 12/08/2020 11:35 AM  
  
Lab Results Component Value Date/Time Sodium 141 12/08/2020 11:35 AM  
 Potassium 4.8 12/08/2020 11:35 AM  
 Chloride 106 12/08/2020 11:35 AM  
 CO2 23 12/08/2020 11:35 AM  
 Anion gap 4 (L) 06/19/2020 01:15 AM  
 Glucose 92 12/08/2020 11:35 AM  
 BUN 18 12/08/2020 11:35 AM  
 Creatinine 1.02 (H) 12/08/2020 11:35 AM  
 BUN/Creatinine ratio 18 12/08/2020 11:35 AM  
 GFR est AA 66 12/08/2020 11:35 AM  
 GFR est non-AA 57 (L) 12/08/2020 11:35 AM  
 Calcium 12.2 (H) 12/08/2020 11:35 AM  
 Bilirubin, total 0.3 12/08/2020 11:35 AM  
 Alk. phosphatase 126 (H) 12/08/2020 11:35 AM  
 Protein, total 6.6 12/08/2020 11:35 AM  
 Albumin 4.4 12/08/2020 11:35 AM  
 Globulin 3.4 06/19/2020 01:15 AM  
 A-G Ratio 2.0 12/08/2020 11:35 AM  
 ALT (SGPT) 22 12/08/2020 11:35 AM  
  
Lab Results Component Value Date/Time TSH 1.480 02/20/2018 09:32 AM  
 
  
 
 Assessment: ICD-10-CM ICD-9-CM 1. Essential hypertension  I10 401.9 CBC W/O DIFF  
   METABOLIC PANEL, COMPREHENSIVE PROTHROMBIN TIME + INR  
   XR CHEST PA LAT  
   CANCELED: AMB POC EKG ROUTINE W/ 12 LEADS, INTER & REP 2. Mixed hyperlipidemia  E78.2 272.2 CBC W/O DIFF  
   METABOLIC PANEL, COMPREHENSIVE PROTHROMBIN TIME + INR  
   XR CHEST PA LAT 3. Left bundle branch block  I44.7 426.3 CBC W/O DIFF  
   METABOLIC PANEL, COMPREHENSIVE PROTHROMBIN TIME + INR  
   XR CHEST PA LAT 4. Dilated cardiomyopathy (HCC)  I42.0 425.4 CBC W/O DIFF  
   METABOLIC PANEL, COMPREHENSIVE PROTHROMBIN TIME + INR  
   XR CHEST PA LAT 5. Chronic systolic congestive heart failure (HCC)  I50.22 428.22 CBC W/O DIFF  
  424.9 METABOLIC PANEL, COMPREHENSIVE PROTHROMBIN TIME + INR  
   XR CHEST PA LAT Orders Placed This Encounter  XR CHEST PA LAT Standing Status:   Future Standing Expiration Date:   2/2/2022  CBC W/O DIFF  
 METABOLIC PANEL, COMPREHENSIVE  
 PROTHROMBIN TIME + INR  
[facility] 12/3/2020- 
· Normal coronaries · Non-ischemic cardiomyopathy; will rec bi-V pacer EF 30-35% Plan: Ms Dylan Kaminski has hx of NICM per echo 5/20 EF 30-35%, abnormal wall motion c/w IVCD. Previously EF 50-55% in 2014. ECG SR with chronic LBBB. [facility] with no ashd however her EF remains low - 30-35%. HF [facility] II. Based on risk of sudden cardiac death, a formal shared decision has been made to proceed with implantation of a cardiac defibrillator for primary prevention of sudden cardiac death using the [address] [place] [address] patient decision aid tool. The patient verbalizes understanding of indication, risks and benefits of ICD procedure. I discussed the risks/benefits/alternatives of the procedure with the patient. Risks include (but are not limited to) bleeding, heart block, infection, cva/mi/tamponade/death. The patient understands and agrees to proceed. Thank you for this interesting consultation. 
  
Continue medical management for ICM, LBBB and BMI 35. Thank you for allowing me to participate in Mariangel Delbert 's care.  
 
 
Brendon Horn MD, Ely Carrel

## 2021-02-02 NOTE — PROGRESS NOTES
Chief Complaint   Patient presents with   • Hospital Follow Up     S/P left heart cath   • Cardiomyopathy       1. Have you been to the ER, urgent care clinic since your last visit? No  Hospitalized since your last visit? Yes 12/20 S/P Left heart cath  2. Have you seen or consulted any other health care providers outside of the [facility] since your last visit? No Include any pap smears or colon screening.  No    Patient has questions and concerns about upcoming ICD implant.

## 2021-02-02 NOTE — PROGRESS NOTES
ELECTROPHYSIOLOGY        Subjective: Radha Henderson is a 74 y.o. female is here for EP follow up. Here to rediscuss biv icd implant. Had questions about driving.  Has sob     Patient Active Problem List    Diagnosis Date Noted    S/P cardiac cath 2020    Stage 3a chronic kidney disease 10/08/2020    Left bundle branch block 2020    Endometrial cancer ([facility] [address] 2020    Severe obesity ([address] 2019    Nuclear cataract 2016    Posterior subcapsular polar senile cataract 2016    Neuropathy, peripheral 2014    Hypertension 2014    Hyperglycemia 2014    GERD (gastroesophageal reflux disease) 2014    Hyperlipidemia 2014    INDER on CPAP 2014    Depression 2014      Collis Meckel, MD  Past Medical History:   Diagnosis Date    Arthritis     OSTEO    Cancer [facility])     skin cancer removed from nose    Chronic kidney disease 2020    stage 3     Depression 2014    Endometrial cancer ([facility] [address]     GERD (gastroesophageal reflux disease)     Hyperlipidemia     Hypertension     Left bundle branch block     Neuropathy, peripheral 2014    INDER on CPAP 2014    compliant    Stage 3a chronic kidney disease 10/8/2020           Past Surgical History:   Procedure Laterality Date    COLONOSCOPY  2010     Dr. Brown Garcia        HX DILATION AND CURETTAGE  2020    HYSTROSCOPY    HX GI      COLONOSCOPY    HX HEART CATHETERIZATION Left 2020    HX HEENT      BCCA REMOVED FROM NOSE     HX HYSTERECTOMY  2020    HX OTHER SURGICAL      hysteroscopy surg    HX TONSILLECTOMY       Allergies   Allergen Reactions    Erythromycin Rash      Family History   Problem Relation Age of Onset    Heart Disease Mother     Breast Cancer Mother     Heart Disease Father     Kidney Disease Paternal Aunt         renal cancer    Colon Cancer Maternal Aunt     Cancer Paternal Uncle         leukemia    Anesth Problems Neg Hx     negative for cardiac disease  Social History     Socioeconomic History    Marital status:      Spouse name: Not on file    Number of children: Not on file    Years of education: Not on file    Highest education level: Not on file   Tobacco Use    Smoking status: Never Smoker    Smokeless tobacco: Never Used   Substance and Sexual Activity    Alcohol use: No     Alcohol/week: 0.0 standard drinks    Drug use: No    Sexual activity: Not Currently     Current Outpatient Medications   Medication Sig    risperiDONE (RisperDAL) 2 mg tablet TAKE 1 TABLET BY MOUTH EVERY DAY AT NIGHT    carvediloL (COREG) 6.25 mg tablet TAKE 1 TABLET BY MOUTH TWICE A DAY WITH MEALS    losartan (COZAAR) 100 mg tablet Take 1 Tab by mouth daily.  buPROPion XL (WELLBUTRIN XL) 300 mg XL tablet TAKE 1 TABLET BY MOUTH EVERY MORNING    atorvastatin (LIPITOR) 20 mg tablet TAKE 1 TABLET BY MOUTH EVERY DAY    docusate sodium (COLACE PO) Take 1 Tab by mouth as needed.  ascorbic acid, vitamin C, (VITAMIN C) 500 mg tablet Take 500 mg by mouth daily (after breakfast).  Cholecalciferol, Vitamin D3, (VITAMIN D3) 2,000 unit cap capsule Take  by mouth daily (after breakfast).  multivitamin, stress formula (STRESS TAB) tablet Take 1 Tab by mouth daily.  vitamin e (E GEMS) 100 unit capsule Take 400 Units by mouth daily.  aspirin delayed-release 81 mg tablet Take 81 mg by mouth daily. No current facility-administered medications for this visit. Vitals:    02/02/21 1426   BP: (!) 140/80   Pulse: 84   Resp: 16   SpO2: 99%   Weight: 236 lb 6.4 oz (107.2 kg)   Height: 5' 8\" (1.727 m)       I have reviewed the nurses notes, vitals, problem list, allergy list, medical history, family, social history and medications. Review of Symptoms:    General: Pt denies excessive weight gain or loss.  Pt is able to conduct ADL's  HEENT: Denies blurred vision, headaches, epistaxis and difficulty swallowing. Respiratory: +sob, hdz, Denies  wheezing or stridor. Cardiovascular: Denies precordial pain, palpitations, edema or PND  Gastrointestinal: Denies poor appetite, indigestion, abdominal pain or blood in stool  Urinary: Denies dysuria, pyuria  Musculoskeletal: Denies pain or swelling from muscles or joints  Neurologic: Denies tremor, paresthesias, or sensory motor disturbance  Skin: Denies rash, itching or texture change. Psych: Denies depression      Physical Exam:      General: Well developed, in no acute distress. HEENT: Eyes - PERRL, no jvd  Heart:  Normal S1/S2 negative S3 or S4. Regular, no murmur, gallop or rub. Respiratory: Clear bilaterally x 4, no wheezing or rales  Extremities:  No edema, normal cap refill, no cyanosis. Musculoskeletal: No clubbing  Neuro: A&Ox3, speech clear, gait stable. Skin: Skin color is normal. No rashes or lesions. Non diaphoretic. no ulcers  Vascular: 2+ pulses symmetric in all extremities  Psych - judgement intact and orientation is wnl       Cardiographics      Results for orders placed or performed during the hospital encounter of 06/19/20   EKG, 12 LEAD, INITIAL   Result Value Ref Range    Ventricular Rate 93 BPM    Atrial Rate 93 BPM    P-R Interval 162 ms    QRS Duration 154 ms    Q-T Interval 392 ms    QTC Calculation (Bezet) 487 ms    Calculated P Axis 51 degrees    Calculated R Axis 63 degrees    Calculated T Axis 14 degrees    Diagnosis       Normal sinus rhythm  Left bundle branch block  When compared with ECG of 14-FEB-2020 12:25,  Vent.  rate has increased BY  33 BPM  Confirmed by Sonam Yusuf MD, [facility] ([id number]) on 6/19/2020 3:41:41 PM           Lab Results   Component Value Date/Time    WBC 4.5 12/08/2020 11:35 AM    HGB 11.7 12/08/2020 11:35 AM    HCT 35.3 12/08/2020 11:35 AM    PLATELET 022 24/58/2340 11:35 AM    MCV 91 12/08/2020 11:35 AM      Lab Results   Component Value Date/Time    Sodium 141 12/08/2020 11:35 AM Potassium 4.8 12/08/2020 11:35 AM    Chloride 106 12/08/2020 11:35 AM    CO2 23 12/08/2020 11:35 AM    Anion gap 4 (L) 06/19/2020 01:15 AM    Glucose 92 12/08/2020 11:35 AM    BUN 18 12/08/2020 11:35 AM    Creatinine 1.02 (H) 12/08/2020 11:35 AM    BUN/Creatinine ratio 18 12/08/2020 11:35 AM    GFR est AA 66 12/08/2020 11:35 AM    GFR est non-AA 57 (L) 12/08/2020 11:35 AM    Calcium 12.2 (H) 12/08/2020 11:35 AM    Bilirubin, total 0.3 12/08/2020 11:35 AM    Alk. phosphatase 126 (H) 12/08/2020 11:35 AM    Protein, total 6.6 12/08/2020 11:35 AM    Albumin 4.4 12/08/2020 11:35 AM    Globulin 3.4 06/19/2020 01:15 AM    A-G Ratio 2.0 12/08/2020 11:35 AM    ALT (SGPT) 22 12/08/2020 11:35 AM      Lab Results   Component Value Date/Time    TSH 1.480 02/20/2018 09:32 AM           Assessment:           ICD-10-CM ICD-9-CM    1. Essential hypertension  I10 401.9 CBC W/O DIFF      METABOLIC PANEL, COMPREHENSIVE      PROTHROMBIN TIME + INR      XR CHEST PA LAT      CANCELED: AMB POC EKG ROUTINE W/ 12 LEADS, INTER & REP   2. Mixed hyperlipidemia  E78.2 272.2 CBC W/O DIFF      METABOLIC PANEL, COMPREHENSIVE      PROTHROMBIN TIME + INR      XR CHEST PA LAT   3. Left bundle branch block  I44.7 426.3 CBC W/O DIFF      METABOLIC PANEL, COMPREHENSIVE      PROTHROMBIN TIME + INR      XR CHEST PA LAT   4. Dilated cardiomyopathy (HCC)  I42.0 425.4 CBC W/O DIFF      METABOLIC PANEL, COMPREHENSIVE      PROTHROMBIN TIME + INR      XR CHEST PA LAT   5.  Chronic systolic congestive heart failure (HCC)  I50.22 428.22 CBC W/O DIFF     060.8 METABOLIC PANEL, COMPREHENSIVE      PROTHROMBIN TIME + INR      XR CHEST PA LAT     Orders Placed This Encounter    XR CHEST PA LAT     Standing Status:   Future     Standing Expiration Date:   2/2/2022    CBC W/O DIFF    METABOLIC PANEL, COMPREHENSIVE    PROTHROMBIN TIME + INR   [facility] 12/3/2020-  · Normal coronaries  · Non-ischemic cardiomyopathy; will rec bi-V pacer     EF 30-35%     Plan:       Ms Deisy has hx of NICM per echo 5/20 EF 30-35%, abnormal wall motion c/w IVCD. Previously EF 50-55% in 2014. ECG SR with chronic LBBB. [facility] with no ashd however her EF remains low - 30-35%. HF [facility] II. Based on risk of sudden cardiac death, a formal shared decision has been made to proceed with implantation of a cardiac defibrillator for primary prevention of sudden cardiac death using the [address] [place] [address] patient decision aid tool. The patient verbalizes understanding of indication, risks and benefits of ICD procedure. I discussed the risks/benefits/alternatives of the procedure with the patient. Risks include (but are not limited to) bleeding, heart block, infection, cva/mi/tamponade/death. The patient understands and agrees to proceed. Thank you for this interesting consultation.     Continue medical management for ICM, LBBB and BMI 35. Thank you for allowing me to participate in Verónica Turner 's care.       Polly Simon MD, Peri Chowdhury

## 2021-02-03 ENCOUNTER — HOSPITAL ENCOUNTER (OUTPATIENT)
Dept: LAB | Age: 66
Discharge: HOME | End: 2021-02-03
Payer: MEDICARE

## 2021-02-03 ENCOUNTER — HOSPITAL ENCOUNTER (OUTPATIENT)
Dept: GENERAL RADIOLOGY | Age: 66
Discharge: HOME | End: 2021-02-03
Payer: MEDICARE

## 2021-02-03 PROCEDURE — 71046 X-RAY EXAM CHEST 2 VIEWS: CPT

## 2021-02-03 PROCEDURE — 85610 PROTHROMBIN TIME: CPT

## 2021-02-03 PROCEDURE — 85027 COMPLETE CBC AUTOMATED: CPT

## 2021-02-03 PROCEDURE — 80053 COMPREHEN METABOLIC PANEL: CPT

## 2021-02-03 PROCEDURE — 36415 COLL VENOUS BLD VENIPUNCTURE: CPT

## 2021-02-04 LAB
ALBUMIN SERPL-MCNC: 4.2 G/DL (ref 3.8–4.8)
ALBUMIN/GLOB SERPL: 2.1 {RATIO} (ref 1.2–2.2)
ALP SERPL-CCNC: 123 IU/L (ref 39–117)
ALT SERPL-CCNC: 22 IU/L (ref 0–32)
AST SERPL-CCNC: 15 IU/L (ref 0–40)
BILIRUB SERPL-MCNC: 0.3 MG/DL (ref 0–1.2)
BUN SERPL-MCNC: 16 MG/DL (ref 8–27)
BUN/CREAT SERPL: 15 (ref 12–28)
CALCIUM SERPL-MCNC: 12.5 MG/DL (ref 8.7–10.3)
CHLORIDE SERPL-SCNC: 108 MMOL/L (ref 96–106)
CO2 SERPL-SCNC: 27 MMOL/L (ref 20–29)
CREAT SERPL-MCNC: 1.04 MG/DL (ref 0.57–1)
ERYTHROCYTE [DISTWIDTH] IN BLOOD BY AUTOMATED COUNT: 12.2 % (ref 11.7–15.4)
GLOBULIN SER CALC-MCNC: 2 G/DL (ref 1.5–4.5)
GLUCOSE SERPL-MCNC: 99 MG/DL (ref 65–99)
HCT VFR BLD AUTO: 36.1 % (ref 34–46.6)
HGB BLD-MCNC: 12 G/DL (ref 11.1–15.9)
INR PPP: 1 (ref 0.9–1.2)
INTERPRETATION: NORMAL
MCH RBC QN AUTO: 30.7 PG (ref 26.6–33)
MCHC RBC AUTO-ENTMCNC: 33.2 G/DL (ref 31.5–35.7)
MCV RBC AUTO: 92 FL (ref 79–97)
PLATELET # BLD AUTO: 227 X10E3/UL (ref 150–450)
POTASSIUM SERPL-SCNC: 4.7 MMOL/L (ref 3.5–5.2)
PROT SERPL-MCNC: 6.2 G/DL (ref 6–8.5)
PROTHROMBIN TIME: 10.9 SEC (ref 9.1–12)
RBC # BLD AUTO: 3.91 X10E6/UL (ref 3.77–5.28)
SODIUM SERPL-SCNC: 142 MMOL/L (ref 134–144)
WBC # BLD AUTO: 4.7 X10E3/UL (ref 3.4–10.8)

## 2021-02-11 ENCOUNTER — HOSPITAL ENCOUNTER (OUTPATIENT)
Dept: PREADMISSION TESTING | Age: 66
Discharge: HOME | End: 2021-02-11
Payer: MEDICARE

## 2021-02-11 LAB — SARS-COV-2, COV2: NORMAL

## 2021-02-11 PROCEDURE — U0003 INFECTIOUS AGENT DETECTION BY NUCLEIC ACID (DNA OR RNA); SEVERE ACUTE RESPIRATORY SYNDROME CORONAVIRUS 2 (SARS-COV-2) (CORONAVIRUS DISEASE [COVID-19]), AMPLIFIED PROBE TECHNIQUE, MAKING USE OF HIGH THROUGHPUT TECHNOLOGIES AS DESCRIBED BY CMS-2020-01-R: HCPCS

## 2021-02-12 LAB — SARS-COV-2, COV2NT: NOT DETECTED

## 2021-02-15 ENCOUNTER — ANESTHESIA EVENT (OUTPATIENT)
Dept: CARDIAC CATH/INVASIVE PROCEDURES | Age: 66
End: 2021-02-15
Payer: MEDICARE

## 2021-02-15 ENCOUNTER — ANESTHESIA (OUTPATIENT)
Dept: CARDIAC CATH/INVASIVE PROCEDURES | Age: 66
End: 2021-02-15
Payer: MEDICARE

## 2021-02-15 ENCOUNTER — HOSPITAL ENCOUNTER (OUTPATIENT)
Age: 66
Discharge: HOME | End: 2021-02-15
Attending: INTERNAL MEDICINE | Admitting: INTERNAL MEDICINE
Payer: MEDICARE

## 2021-02-15 ENCOUNTER — APPOINTMENT (OUTPATIENT)
Dept: GENERAL RADIOLOGY | Age: 66
End: 2021-02-15
Attending: INTERNAL MEDICINE
Payer: MEDICARE

## 2021-02-15 VITALS
BODY MASS INDEX: 35.77 KG/M2 | HEIGHT: 68 IN | HEART RATE: 100 BPM | WEIGHT: 236 LBS | RESPIRATION RATE: 20 BRPM | TEMPERATURE: 97.5 F | SYSTOLIC BLOOD PRESSURE: 162 MMHG | OXYGEN SATURATION: 98 % | DIASTOLIC BLOOD PRESSURE: 71 MMHG

## 2021-02-15 DIAGNOSIS — I50.22 CHRONIC SYSTOLIC CONGESTIVE HEART FAILURE (HCC): Chronic | ICD-10-CM

## 2021-02-15 DIAGNOSIS — I42.0 DILATED CARDIOMYOPATHY (HCC): Chronic | ICD-10-CM

## 2021-02-15 DIAGNOSIS — I42.9 CARDIOMYOPATHY, UNSPECIFIED TYPE (HCC): ICD-10-CM

## 2021-02-15 DIAGNOSIS — N18.31 STAGE 3A CHRONIC KIDNEY DISEASE (HCC): Chronic | ICD-10-CM

## 2021-02-15 DIAGNOSIS — I44.7 LEFT BUNDLE BRANCH BLOCK: Chronic | ICD-10-CM

## 2021-02-15 PROCEDURE — 74011000258 HC RX REV CODE- 258: Performed by: NURSE ANESTHETIST, CERTIFIED REGISTERED

## 2021-02-15 PROCEDURE — 77030018729 HC ELECTRD DEFIB PAD CARD -B: Performed by: INTERNAL MEDICINE

## 2021-02-15 PROCEDURE — 33249 INSJ/RPLCMT DEFIB W/LEAD(S): CPT | Performed by: INTERNAL MEDICINE

## 2021-02-15 PROCEDURE — 93641 EP EVL 1/2CHMB PAC CVDFB TST: CPT | Performed by: INTERNAL MEDICINE

## 2021-02-15 PROCEDURE — C1751 CATH, INF, PER/CENT/MIDLINE: HCPCS | Performed by: INTERNAL MEDICINE

## 2021-02-15 PROCEDURE — 71045 X-RAY EXAM CHEST 1 VIEW: CPT

## 2021-02-15 PROCEDURE — C1769 GUIDE WIRE: HCPCS | Performed by: INTERNAL MEDICINE

## 2021-02-15 PROCEDURE — 74011000636 HC RX REV CODE- 636: Performed by: INTERNAL MEDICINE

## 2021-02-15 PROCEDURE — C1900 LEAD, CORONARY VENOUS: HCPCS | Performed by: INTERNAL MEDICINE

## 2021-02-15 PROCEDURE — C1893 INTRO/SHEATH, FIXED,NON-PEEL: HCPCS | Performed by: INTERNAL MEDICINE

## 2021-02-15 PROCEDURE — C1898 LEAD, PMKR, OTHER THAN TRANS: HCPCS | Performed by: INTERNAL MEDICINE

## 2021-02-15 PROCEDURE — C1894 INTRO/SHEATH, NON-LASER: HCPCS | Performed by: INTERNAL MEDICINE

## 2021-02-15 PROCEDURE — 76060000034 HC ANESTHESIA 1.5 TO 2 HR: Performed by: INTERNAL MEDICINE

## 2021-02-15 PROCEDURE — 77030012468 HC VLV BLEEDBK CNTRL ABBT -B: Performed by: INTERNAL MEDICINE

## 2021-02-15 PROCEDURE — C1882 AICD, OTHER THAN SING/DUAL: HCPCS | Performed by: INTERNAL MEDICINE

## 2021-02-15 PROCEDURE — 33225 L VENTRIC PACING LEAD ADD-ON: CPT

## 2021-02-15 PROCEDURE — 74011000272 HC RX REV CODE- 272: Performed by: INTERNAL MEDICINE

## 2021-02-15 PROCEDURE — 77030041325 HC SUT PDS STRATFX BARB J&J -A: Performed by: INTERNAL MEDICINE

## 2021-02-15 PROCEDURE — 77030022704 HC SUT VLOC COVD -B: Performed by: INTERNAL MEDICINE

## 2021-02-15 PROCEDURE — 74011250636 HC RX REV CODE- 250/636: Performed by: INTERNAL MEDICINE

## 2021-02-15 PROCEDURE — 74011250636 HC RX REV CODE- 250/636: Performed by: NURSE ANESTHETIST, CERTIFIED REGISTERED

## 2021-02-15 PROCEDURE — 74011250637 HC RX REV CODE- 250/637: Performed by: INTERNAL MEDICINE

## 2021-02-15 PROCEDURE — 77030039825 HC MSK NSL PAP SUPERNO2VA VYRM -B: Performed by: ANESTHESIOLOGY

## 2021-02-15 PROCEDURE — 77030031139 HC SUT VCRL2 J&J -A: Performed by: INTERNAL MEDICINE

## 2021-02-15 PROCEDURE — 77030033819 HC SELCT VEIN WORLEY MRTM -C: Performed by: INTERNAL MEDICINE

## 2021-02-15 PROCEDURE — 74011000250 HC RX REV CODE- 250: Performed by: INTERNAL MEDICINE

## 2021-02-15 PROCEDURE — 77030002996 HC SUT SLK J&J -A: Performed by: INTERNAL MEDICINE

## 2021-02-15 PROCEDURE — C1887 CATHETER, GUIDING: HCPCS | Performed by: INTERNAL MEDICINE

## 2021-02-15 PROCEDURE — C1777 LEAD, AICD, ENDO SINGLE COIL: HCPCS | Performed by: INTERNAL MEDICINE

## 2021-02-15 PROCEDURE — 77030037400 HC ADH TISS HI VISC EXOFIN CHMP -B: Performed by: INTERNAL MEDICINE

## 2021-02-15 PROCEDURE — 2709999900 HC NON-CHARGEABLE SUPPLY: Performed by: INTERNAL MEDICINE

## 2021-02-15 PROCEDURE — 33225 L VENTRIC PACING LEAD ADD-ON: CPT | Performed by: INTERNAL MEDICINE

## 2021-02-15 DEVICE — PACE/SENSE LEAD
Type: IMPLANTABLE DEVICE | Status: FUNCTIONAL
Brand: ACUITY™ X4 STRAIGHT

## 2021-02-15 DEVICE — CARDIAC RESYNCHRONIZATION THERAPY DEFIBRILLATOR
Type: IMPLANTABLE DEVICE | Status: FUNCTIONAL
Brand: VIGILANT™ X4 CRT-D

## 2021-02-15 DEVICE — ENDOCARDIAL STEROID-ELUTING MR CONDITIONAL STYLET DELIVERED PACE/SENSE LEAD
Type: IMPLANTABLE DEVICE | Status: FUNCTIONAL
Brand: INGEVITY™ MRI

## 2021-02-15 DEVICE — LEAD DEFIB 64CM MODEL 0273 -- ENDOTAK RELIANCE S: Type: IMPLANTABLE DEVICE | Status: FUNCTIONAL

## 2021-02-15 RX ORDER — SODIUM CHLORIDE 0.9 % (FLUSH) 0.9 %
5-40 SYRINGE (ML) INJECTION EVERY 8 HOURS
Status: DISCONTINUED | OUTPATIENT
Start: 2021-02-15 | End: 2021-02-15 | Stop reason: HOSPADM

## 2021-02-15 RX ORDER — PROPOFOL 10 MG/ML
INJECTION, EMULSION INTRAVENOUS
Status: DISCONTINUED | OUTPATIENT
Start: 2021-02-15 | End: 2021-02-15 | Stop reason: HOSPADM

## 2021-02-15 RX ORDER — ACETAMINOPHEN 325 MG/1
650 TABLET ORAL
Status: DISCONTINUED | OUTPATIENT
Start: 2021-02-15 | End: 2021-02-15 | Stop reason: HOSPADM

## 2021-02-15 RX ORDER — CEFAZOLIN SODIUM 1 G/3ML
INJECTION, POWDER, FOR SOLUTION INTRAMUSCULAR; INTRAVENOUS AS NEEDED
Status: DISCONTINUED | OUTPATIENT
Start: 2021-02-15 | End: 2021-02-15 | Stop reason: HOSPADM

## 2021-02-15 RX ORDER — FENTANYL CITRATE 50 UG/ML
INJECTION, SOLUTION INTRAMUSCULAR; INTRAVENOUS AS NEEDED
Status: DISCONTINUED | OUTPATIENT
Start: 2021-02-15 | End: 2021-02-15 | Stop reason: HOSPADM

## 2021-02-15 RX ORDER — NALOXONE HYDROCHLORIDE 0.4 MG/ML
0.4 INJECTION, SOLUTION INTRAMUSCULAR; INTRAVENOUS; SUBCUTANEOUS AS NEEDED
Status: DISCONTINUED | OUTPATIENT
Start: 2021-02-15 | End: 2021-02-15 | Stop reason: HOSPADM

## 2021-02-15 RX ORDER — HEPARIN SODIUM 200 [USP'U]/100ML
INJECTION, SOLUTION INTRAVENOUS
Status: COMPLETED | OUTPATIENT
Start: 2021-02-15 | End: 2021-02-15

## 2021-02-15 RX ORDER — LIDOCAINE HYDROCHLORIDE 10 MG/ML
INJECTION INFILTRATION; PERINEURAL AS NEEDED
Status: DISCONTINUED | OUTPATIENT
Start: 2021-02-15 | End: 2021-02-15 | Stop reason: HOSPADM

## 2021-02-15 RX ORDER — MIDAZOLAM HYDROCHLORIDE 1 MG/ML
INJECTION, SOLUTION INTRAMUSCULAR; INTRAVENOUS AS NEEDED
Status: DISCONTINUED | OUTPATIENT
Start: 2021-02-15 | End: 2021-02-15 | Stop reason: HOSPADM

## 2021-02-15 RX ORDER — SODIUM CHLORIDE 0.9 % (FLUSH) 0.9 %
5-40 SYRINGE (ML) INJECTION AS NEEDED
Status: DISCONTINUED | OUTPATIENT
Start: 2021-02-15 | End: 2021-02-15 | Stop reason: HOSPADM

## 2021-02-15 RX ORDER — SODIUM CHLORIDE 9 MG/ML
INJECTION, SOLUTION INTRAVENOUS
Status: DISCONTINUED | OUTPATIENT
Start: 2021-02-15 | End: 2021-02-15 | Stop reason: HOSPADM

## 2021-02-15 RX ADMIN — FENTANYL CITRATE 25 MCG: 50 INJECTION, SOLUTION INTRAMUSCULAR; INTRAVENOUS at 12:17

## 2021-02-15 RX ADMIN — ACETAMINOPHEN 650 MG: 325 TABLET ORAL at 15:34

## 2021-02-15 RX ADMIN — CEFAZOLIN 2 G: 1 INJECTION, POWDER, FOR SOLUTION INTRAMUSCULAR; INTRAVENOUS; PARENTERAL at 12:20

## 2021-02-15 RX ADMIN — SODIUM CHLORIDE: 9 INJECTION, SOLUTION INTRAVENOUS at 12:07

## 2021-02-15 RX ADMIN — FENTANYL CITRATE 25 MCG: 50 INJECTION, SOLUTION INTRAMUSCULAR; INTRAVENOUS at 12:22

## 2021-02-15 RX ADMIN — PROPOFOL 100 MCG/KG/MIN: 10 INJECTION, EMULSION INTRAVENOUS at 12:12

## 2021-02-15 RX ADMIN — MIDAZOLAM 1 MG: 1 INJECTION INTRAMUSCULAR; INTRAVENOUS at 12:10

## 2021-02-15 NOTE — DISCHARGE INSTRUCTIONS
[address] [place], [place] [phone]        ICD INSERTION DISCHARGE INSTRUCTIONS    Patient ID:  Jimenez Piña  [id number]  74 y.o.  1955    Admit Date: 2/15/2021    Discharge Date: 2/15/2021     Admitting Physician: Hussein Talavera MD     Discharge Physician: Hussein Talavera MD    Admission Diagnoses:   Cardiomyopathy, unspecified type [facility]) [I42.9]    Discharge Diagnoses: Active Problems:    Left bundle branch block (2/18/2020)      Stage 3a chronic kidney disease (10/8/2020)      Overview:             Dilated cardiomyopathy ([facility] [address] (2/15/2021)      Chronic systolic congestive heart failure ([facility] [address] (2/15/2021)        Discharge Condition: Good    Cardiology Procedures this Admission:  S/P ICD implantation. Disposition: home    Reference discharge instructions provided by nursing for diet and activity. Follow-up with ICD/PM clinic in 3 weeks. Call [phone] to make an appointment. Signed:  BLAYNE Engel  2/15/2021  1:27 PM      DISCHARGE INSTRUCTIONS FOR PATIENTS WITH ICD'S    1. Remember to call for an appointment in 3 weeks [phone]. 2. Medic Alert Bracelets are available from your pharmacist to wear at all times if you choose to wear one. 3. Carry your ID card for your ICD with you at all times. This card will be given to you in the hospital or mailed to you. 4. The ICD will bulge slightly under your skin. The bulge will decrease in size over the next few weeks. Please notify the doctor's office if you notice any of the following around your ICD site:   A.  A bruise that does not go away. B.  Soreness or yellow, green, or brown drainage from the site. C. Any swelling from the site. D. If you have a fever of 100 degrees or higher that lasts for a few days. INCISION CARE       1.  Leave the dressing over your site until your follow up visit. 2.  You may not shower until after follow up visit.    3.  For comfort, wear loose fitting clothing. 1. 4.  Ice pack to affected shoulder for first 24 hours, wear your sling for 2 days. 2. 5.  Report any signs of infection, fever, pain, swelling, redness, oozing, or heat at site especially if these symptoms increase after the first 3 to 4 days. ACTIVITY PRECAUTIONS     1. Avoid rough contact with the implant site. 2. No driving for 14 days. 3. Avoid lifting your arm over your head, carrying anything on the affected side, or lifting over 10 pounds for 90 days. For the first 2 days only bend your arm at the elbow. 4. Any extreme activity such as golf, weight lifting or exercise biking should be restricted for 60 days. 5. Do not carry objects by holding them against your implant site. 6.  No shooting rifles or any type of gun with the affected shoulder permanently. 7.  Welding and chainsaws are prohibited. SPECIAL PRECAUTIONS     1. You should avoid all strong magnetic fields, such as arc welding, large transformers, large motors. Some ICD devices will beep if it detects a strong magnet. If this occurs, move out of the area. 2.  You may or may not have an MRI which uses a strong magnet to take pictures. 3.  Treatments or surgery that requires diathermy or electrocautery should be discussed with your doctor before scheduled. 4.  Avoid radio frequency transmitters, including radar. 5.  Advise dentist or other medical personnel you see that you have an ICD. 6.  Cell phones and microwave oven use is okay. 7.  If you plan to move or take a trip to a new area, the doctor's office will give you a name of a doctor to contact for any problems. SPECIAL INSTRUCTIONS ON SHOCKS     1. Notify your doctor for any of the following:       A. Anytime a shock is received in a 24 hour period. An office visit is not usually required for a single shock. B.  Two or more shocks in a row. If you do not feel well, call the [facility], otherwise call your doctor.   This may require an office visit.       C. Two or more shocks spaced apart by several hours. This may require an office visit. 2.  Keep a record of events. Include date, time, symptoms and activity at that time. ANTIBIOTIC THERAPY    During the first 8 weeks after your ICD insertion, you may need antibiotics before any dental work or certain tests or operations. Let the dentist or doctor who is caring for you know that you have had an implanted device.

## 2021-02-15 NOTE — PROGRESS NOTES
Cardiac Cath Lab Recovery Arrival Note:      Jaxson Olivas arrived to Cardiac Cath Lab, Recovery Area. Staff introduced to patient. Patient identifiers verified with NAME and DATE OF BIRTH. Procedure verified with patient. Consent forms reviewed and signed by patient or authorized representative and verified. Allergies verified. Patient and family oriented to department. Patient and family informed of procedure and plan of care. Questions answered with review. Patient prepped for procedure, per orders from physician, prior to arrival.    Patient on cardiac monitor, non-invasive blood pressure, SPO2 monitor. On room air. Patient is A&Ox 3. Patient reports no c/o. Patient in stretcher, in low position, with side rails up, call bell within reach, patient instructed to call if assistance as needed. Patient prep in: [address], [place]. Patient family has pager # none  Family in: [address] [facility] waiting area.    Prep by: Del Pierre, RN

## 2021-02-15 NOTE — INTERVAL H&P NOTE
Update History & Physical 
 
The Patient's History and Physical of February 2, 2021 was reviewed with the patient and I examined the patient. There was no change. The surgical site was confirmed by the patient and me. Plan:  The risk, benefits, expected outcome, and alternative to the recommended procedure have been discussed with the patient. Patient understands and wants to proceed with the procedure.  
 
Electronically signed by Heather Mccall MD on 2/15/2021 at 11:17 AM

## 2021-02-15 NOTE — Clinical Note
TRANSFER - OUT REPORT:     Verbal report given to: recovery RN. Report consisted of patient's Situation, Background, Assessment and   Recommendations(SBAR). Opportunity for questions and clarification was provided. Patient transported with a Registered Nurse. Patient transported to: recovery.

## 2021-02-15 NOTE — ANESTHESIA POSTPROCEDURE EVALUATION
Procedure(s):  INSERT ICD BIV MULTI.    MAC, total IV anesthesia    Anesthesia Post Evaluation        Patient location during evaluation: PACU  Note status: Adequate. Level of consciousness: responsive to verbal stimuli and sleepy but conscious  Pain management: satisfactory to patient  Airway patency: patent  Anesthetic complications: no  Cardiovascular status: acceptable  Respiratory status: acceptable  Hydration status: acceptable  Comments: +Post-Anesthesia Evaluation and Assessment    Patient: Hilton Talavera MRN: [id number]  SSN: [id number]   YOB: 1955  Age: 74 y.o. Sex: female      Cardiovascular Function/Vital Signs    BP (!) 166/82 (BP 1 Location: Right upper arm, BP Patient Position: At rest)   Pulse 81   Temp 36.4 °C (97.5 °F)   Resp 19   Ht 5' 8\" (1.727 m)   Wt 107 kg (236 lb)   SpO2 95%   BMI 35.88 kg/m²     Patient is status post Procedure(s):  INSERT ICD BIV MULTI. Nausea/Vomiting: Controlled. Postoperative hydration reviewed and adequate. Pain:  Pain Scale 1: Numeric (0 - 10) (02/15/21 1400)  Pain Intensity 1: 0 (02/15/21 1400)   Managed. Neurological Status: At baseline. Mental Status and Level of Consciousness: Arousable. Pulmonary Status:   O2 Device: Room air (02/15/21 1400)   Adequate oxygenation and airway patent. Complications related to anesthesia: None    Post-anesthesia assessment completed. No concerns. Signed By: Jackie Meyer MD    2/15/2021  Post anesthesia nausea and vomiting:  controlled      INITIAL Post-op Vital signs:   Vitals Value Taken Time   /74 02/15/21 1415   Temp 36.4 °C (97.5 °F) 02/15/21 1351   Pulse 89 02/15/21 1416   Resp 21 02/15/21 1416   SpO2 96 % 02/15/21 1416   Vitals shown include unvalidated device data.

## 2021-02-15 NOTE — ANESTHESIA PREPROCEDURE EVALUATION
Relevant Problems   RESPIRATORY SYSTEM   (+) INDER on CPAP      NEUROLOGY   (+) Depression      CARDIOVASCULAR   (+) Hypertension   (+) Left bundle branch block      GASTROINTESTINAL   (+) GERD (gastroesophageal reflux disease)      RENAL FAILURE   (+) Stage 3a chronic kidney disease      ENDOCRINE   (+) Severe obesity (HCC)      PERSONAL HX & FAMILY HX OF CANCER   (+) Endometrial cancer (HCC)       Anesthetic History   No history of anesthetic complications            Review of Systems / Medical History  Patient summary reviewed, nursing notes reviewed and pertinent labs reviewed    Pulmonary  Within defined limits      Sleep apnea: CPAP           Neuro/Psych         Psychiatric history    Comments: Peripheral Neuropathy  Depression Cardiovascular    Hypertension  Valvular problems/murmurs: tricuspid insufficiency      Dysrhythmias   Hyperlipidemia    Exercise tolerance: >4 METS  Comments: Non-ischemic cardiomyopathy    ECG (12/23/20): Sinus  Rhythm   -Left bundle branch block. ABNORMAL     Cath (12/3/20):  ·Normal coronaries  ·Non-ischemic cardiomyopathy; will rec bi-V pacer    TTE (5/11/20):  ·Normal left ventricular cavity size. Estimated left ventricular ejection fraction is 30 - 35%. Abnormal left ventricular septal motion consistent with interventricular conduction delay (IVCD). Abnormal left ventricular strain. Mild (grade 1) left ventricular diastolic dysfunction. ·Mild mitral valve regurgitation is present. ·Mild tricuspid valve regurgitation is present.    GI/Hepatic/Renal     GERD    Renal disease: CRI      Comments: CRI, Stage III Endo/Other  Within defined limits      Morbid obesity, arthritis and cancer     Other Findings            Physical Exam    Airway  Mallampati: III  TM Distance: < 4 cm  Neck ROM: normal range of motion   Mouth opening: Normal     Cardiovascular  Regular rate and rhythm,  S1 and S2 normal,  no murmur, click, rub, or gallop             Dental    Dentition: Caps/crowns Pulmonary  Breath sounds clear to auscultation               Abdominal  GI exam deferred       Other Findings            Anesthetic Plan    ASA: 3  Anesthesia type: MAC and total IV anesthesia          Induction: Intravenous  Anesthetic plan and risks discussed with: Patient      Ariane

## 2021-02-15 NOTE — PROGRESS NOTES
TRANSFER - IN REPORT:    Verbal report received from [facility] and NICHOLESHENGCarmen Sanots on Vick Nair  being received from ep lab for routine progression of care. Report consisted of patients Situation, Background, Assessment and Recommendations(SBAR). Information from the following report(s) SBAR was reviewed with the receiving clinician. Opportunity for questions and clarification was provided. Assessment completed upon patients arrival to [address] [place] [address] and care assumed. Cardiac Cath Lab Recovery Arrival Note:    Osmel Olivas arrived to [facility] recovery area. Patient procedure= ICD implant. Patient on cardiac monitor, non-invasive blood pressure, SPO2 monitor. On RA . IV  of nacl on pump at [employer] ml/hr. Patient status doing well without problems. Patient is A&Ox 4. Patient reports no complaints. PROCEDURE SITE CHECK:    Procedure site:without any bleeding and no hematoma, no  pain/discomfort reported at procedure site. No change in patient status. Continue to monitor patient and status.

## 2021-02-15 NOTE — PROGRESS NOTES
Patient ambulated in hallway with steady gait. No complaints of discomfort, sob, dizziness. Left surgical site clean dry and intact. Discharge instructions reviewed with patient and patients , answered questions as needed. Transported patient in wheelchair to car.

## 2021-02-22 ENCOUNTER — TELEPHONE (OUTPATIENT)
Dept: CARDIOLOGY CLINIC | Age: 66
End: 2021-02-22

## 2021-02-22 NOTE — TELEPHONE ENCOUNTER
Patient sending transmission she is having some pain on the right side.     Could you check to see if everything ok    Thanks  Shahab Benitez

## 2021-02-22 NOTE — TELEPHONE ENCOUNTER
Spoke with patient. Confirmed that we received remote transmission and there are no anomalies seen. Patient reports no distress, but had some \"weird pains\" on right side and got worried.

## 2021-03-02 ENCOUNTER — CLINICAL SUPPORT (OUTPATIENT)
Dept: CARDIOLOGY CLINIC | Age: 66
End: 2021-03-02
Payer: MEDICARE

## 2021-03-02 ENCOUNTER — DOCUMENTATION ONLY (OUTPATIENT)
Dept: SLEEP MEDICINE | Age: 66
End: 2021-03-02

## 2021-03-02 ENCOUNTER — VIRTUAL VISIT (OUTPATIENT)
Dept: SLEEP MEDICINE | Age: 66
End: 2021-03-02
Payer: MEDICARE

## 2021-03-02 DIAGNOSIS — Z95.810 AICD (AUTOMATIC CARDIOVERTER/DEFIBRILLATOR) PRESENT: Primary | ICD-10-CM

## 2021-03-02 DIAGNOSIS — G47.33 OBSTRUCTIVE SLEEP APNEA (ADULT) (PEDIATRIC): Primary | ICD-10-CM

## 2021-03-02 DIAGNOSIS — I50.22 CHRONIC SYSTOLIC CONGESTIVE HEART FAILURE (HCC): ICD-10-CM

## 2021-03-02 DIAGNOSIS — I42.9 CARDIOMYOPATHY, UNSPECIFIED TYPE (HCC): ICD-10-CM

## 2021-03-02 PROCEDURE — G9899 SCRN MAM PERF RSLTS DOC: HCPCS | Performed by: INTERNAL MEDICINE

## 2021-03-02 PROCEDURE — G8399 PT W/DXA RESULTS DOCUMENT: HCPCS | Performed by: INTERNAL MEDICINE

## 2021-03-02 PROCEDURE — G8427 DOCREV CUR MEDS BY ELIG CLIN: HCPCS | Performed by: INTERNAL MEDICINE

## 2021-03-02 PROCEDURE — 99213 OFFICE O/P EST LOW 20 MIN: CPT | Performed by: INTERNAL MEDICINE

## 2021-03-02 PROCEDURE — 93284 PRGRMG EVAL IMPLANTABLE DFB: CPT | Performed by: INTERNAL MEDICINE

## 2021-03-02 PROCEDURE — 1090F PRES/ABSN URINE INCON ASSESS: CPT | Performed by: INTERNAL MEDICINE

## 2021-03-02 PROCEDURE — G9717 DOC PT DX DEP/BP F/U NT REQ: HCPCS | Performed by: INTERNAL MEDICINE

## 2021-03-02 PROCEDURE — G8756 NO BP MEASURE DOC: HCPCS | Performed by: INTERNAL MEDICINE

## 2021-03-02 PROCEDURE — 1101F PT FALLS ASSESS-DOCD LE1/YR: CPT | Performed by: INTERNAL MEDICINE

## 2021-03-02 PROCEDURE — 3017F COLORECTAL CA SCREEN DOC REV: CPT | Performed by: INTERNAL MEDICINE

## 2021-03-02 NOTE — PROGRESS NOTES
Patient feels well following ICD implantation. Subclavian ICD site approximated well, no discharge. No erythema or heat. Device interrogation WNL. Follow up as planned in 3 mo.      Aishwarya Martines RN

## 2021-03-02 NOTE — PROGRESS NOTES
[facility] [place]. [place], [address]  Tel.  [phone]  Fax. 100 [facility] 60  [place], [address] [facility]  Tel.  [phone]  Fax. [phone] 9250 [facility] Raad Grewal   Tel.  [phone]  Fax. [phone]       Telemedicine visit performed with verbal consent of the patient. Patient called and identity confirmed with 2 patient identifers    Patient was seen at home  Cleopatra Warner is a 74 y.o. female who was seen by synchronous (real-time) audio-video technology on 3/2/2021. Consent:  She and/or her healthcare decision maker is aware that this patient-initiated Telehealth encounter is the equivalent to a face to face encounter in the sleep disorder center and has provided verbal consent to proceed: Yes    I was at home while conducting this encounter. S>Kari Olivas is a 74 y.o. female seen at this telemedicine visit for a positive airway pressure follow-up. She reports no problems using the device. She is 97% compliant over the past 30 days. The following problems are identified:    Drowsiness no Problems exhaling no   Snoring no Forget to put on no   Mask Comfortable yes  Can't fall asleep no   Dry Mouth no Mask falls off no   Air Leaking no Frequent awakenings no       Download reviewed. She just had a defibrillator placed a couple of weeks ago-still sore but getting better. Seeing her cardiologist today  She admits that her sleep has improved on PAP therapy using nasal mask and regular tubing. Allergies   Allergen Reactions    Erythromycin Rash       She has a current medication list which includes the following prescription(s): risperidone, carvedilol, losartan, bupropion xl, atorvastatin, ascorbic acid (vitamin c), vitamin e, aspirin delayed-release, docusate sodium, cholecalciferol, and multivitamin, stress formula. .      She  has a past medical history of Arthritis, Cancer ([facility] [address] (2005), Chronic kidney disease (09/24/2020), Depression (1/9/2014), Endometrial cancer ([facility] [address] (2020), GERD (gastroesophageal reflux disease), Hyperlipidemia, Hypertension, Left bundle branch block (2013), Neuropathy, peripheral (4/9/2014), INDER on CPAP (4/8/2014), and Stage 3a chronic kidney disease (10/8/2020). [place] Sleepiness Score: 2   and     which reflect improved sleep quality over therapy time. O>      Weight 235 lb  Vital Signs: (As obtained by patient/caregiver at home)        Constitutional: [x] Appears well-developed and well-nourished [x] No apparent distress      [] Abnormal -     Mental status: [x] Alert and awake  [x] Oriented to person/place/time [x] Able to follow commands    [] Abnormal -     Eyes:   EOM    [x]  Normal    [] Abnormal -   Sclera  [x]  Normal    [] Abnormal -          Discharge [x]  None visible   [] Abnormal -     HENT: [x] Normocephalic, atraumatic  [] Abnormal -     External Ears [x] Normal  [] Abnormal -    Neck: [x] No visualized mass [] Abnormal -     Pulmonary/Chest: [x] Respiratory effort normal   [x] No visualized signs of difficulty breathing or respiratory distress        [] Abnormal -       Neurological:        [x] No Facial Asymmetry (Cranial nerve 7 motor function) (limited exam due to video visit)          [x] No gaze palsy        [] Abnormal -          Skin:        [x] No significant exanthematous lesions or discoloration noted on facial skin         [] Abnormal -            Psychiatric:       [x] Normal Affect [] Abnormal -        Other pertinent observable physical exam findings:-            A>    ICD-10-CM ICD-9-CM    1. Obstructive sleep apnea (adult) (pediatric)  G47.33 327.23 AMB SUPPLY ORDER   2. Chronic systolic congestive heart failure (HCC)  I50.22 428.22      428.0      AHI = 69 (2013). On CPAP :  9-11 cmH2O.     Compliant:      yes    Therapeutic Response:  Positive    P>    she is compliant with PAP therapy and PAP continues to benefit patient and remains necessary for control of her sleep apnea.   CPAP setting - she will continue on her current pressure settings. * We have recommended a dedicated weight loss through appropriate diet and an exercise regimen as significant weight reduction has been shown to reduce severity of obstructive sleep apnea. *   Follow-up and Dispositions    · Return in about 1 year (around 3/2/2022). I have ordered replacement supplies      * She was asked to contact our office for any problems regarding PAP therapy. * Counseling was provided regarding the importance of regular PAP use and on proper sleep hygiene and safe driving. * Re-enforced proper and regular cleaning for the device. 2. Congestive heart failure- she continues on her current regimen. I have reviewed the relationship between heart failure and sleep disordered breathing. All of her questions were addressed. Pursuant to the emergency declaration under the [facility], [facility] waiver authority and the [employer] Act, this Virtual  Visit was conducted, with patient's consent, to reduce the patient's risk of exposure to COVID-19 and provide continuity of care for an established patient. Services were provided through a video synchronous discussion virtually to substitute for in-person clinic visit.     Luberta Libman, MD    Electronically signed by    Andrez Turner MD  Diplomate in Sleep Medicine  [facility]

## 2021-03-02 NOTE — PATIENT INSTRUCTIONS
217 [facility]., [place]. [address], [address] Tel.  [phone] Fax. 100 [facility] [address] [place], [address] [facility] [address] Tel.  [phone] Fax. [phone] [address] Tel.  [phone] Fax. [phone] PROPER SLEEP HYGIENE What to avoid · Do not have drinks with caffeine, such as coffee or black tea, for 8 hours before bed. · Do not smoke or use other types of tobacco near bedtime. Nicotine is a stimulant and can keep you awake. · Avoid drinking alcohol late in the evening, because it can cause you to wake in the middle of the night. · Do not eat a big meal close to bedtime. If you are hungry, eat a light snack. · Do not drink a lot of water close to bedtime, because the need to urinate may wake you up during the night. · Do not read or watch TV in bed. Use the bed only for sleeping and sexual activity. What to try · Go to bed at the same time every night, and wake up at the same time every morning. Do not take naps during the day. · Keep your bedroom quiet, dark, and cool. · Get regular exercise, but not within 3 to 4 hours of your bedtime. [employer] · Sleep on a comfortable pillow and mattress. · If watching the clock makes you anxious, turn it facing away from you so you cannot see the time. · If you worry when you lie down, start a worry book. Well before bedtime, write down your worries, and then set the book and your concerns aside. · Try meditation or other relaxation techniques before you go to bed. · If you cannot fall asleep, get up and go to another room until you feel sleepy. Do something relaxing. Repeat your bedtime routine before you go to bed again. · Make your house quiet and calm about an hour before bedtime. Turn down the lights, turn off the TV, log off the computer, and turn down the volume on music. This can help you relax after a busy day. Drowsy Driving [employer] cites drowsiness as a causing factor in more than 859,487 police reported crashes annually, resulting in 76,000 injuries and 1,500 deaths. Other surveys suggest 55% of people polled have driven while drowsy in the past year, 23% had fallen asleep but not crashed, 3% crashed, and 2% had and accident due to drowsy driving. Who is at risk? Young Drivers: One study of drowsy driving accidents states that 55% of the drivers were under 25 years. Of those, 75% were male. Shift Workers and Travelers: People who work overnight or travel across time zones frequently are at higher risk of experiencing Circadian Rhythm Disorders. They are trying to work and function when their body is programed to sleep. Sleep Deprived: Lack of sleep has a serious impact on your ability to pay attention or focus on a task. Consistently getting less than the average of 8 hours your body needs creates partial or cumulative sleep deprivation. Untreated Sleep Disorders: Sleep Apnea, Narcolepsy, R.L.S., and other sleep disorders (untreated) prevent a person from getting enough restful sleep. This leads to excessive daytime sleepiness and increases the risk for drowsy driving accidents by up to 7 times. Medications / Alcohol: Even over the counter medications can cause drowsiness. Medications that impair a drivers attention should have a warning label. Alcohol naturally makes you sleepy and on its own can cause accidents. Combined with excessive drowsiness its effects are amplified. Signs of Drowsy Driving: * You don't remember driving the last few miles * You may drift out of your lady * You are unable to focus and your thoughts wander * You may yawn more often than normal 
 * You have difficulty keeping your eyes open / nodding off * Missing traffic signs, speeding, or tailgating Prevention-  
 Good sleep hygiene, lifestyle and behavioral choices have the most impact on drowsy driving. There is no substitute for sleep and the average person requires 8 hours nightly. If you find yourself driving drowsy, stop and sleep. Consider the sleep hygiene tips provided during your visit as well. Medication Refill Policy: Refills for all medications require 1 week advance notice. Please have your pharmacy fax a refill request. We are unable to fax, or call in \"controled substance\" medications and you will need to pick these prescriptions up from our office. [employer] Activation Thank you for requesting access to [employer]. Please follow the instructions below to securely access and download your online medical record. N[employer]TWORK allows you to send messages to your doctor, view your test results, renew your prescriptions, schedule appointments, and more. How Do I Sign Up? 1. In your internet browser, go to [email or web address]. 2. Click on the First Time User? Click Here link in the Sign In box. You will see the New Member Sign Up page. 3. Enter your N[employer]TWORK Access Code exactly as it appears below. You will not need to use this code after youve completed the sign-up process. If you do not sign up before the expiration date, you must request a new code. N[employer]TWORK Access Code: Activation code not generated Current N[employer]TWORK Status: Active (This is the date your N[employer]TWORK access code will ) 4. Enter the last four digits of your Social Security Number (xxxx) and Date of Birth (mm/dd/yyyy) as indicated and click Submit. You will be taken to the next sign-up page. 5. Create a Snapvinet ID. This will be your N3TWORK login ID and cannot be changed, so think of one that is secure and easy to remember. 6. Create a N[employer]TWORK password. You can change your password at any time. 7. Enter your Password Reset Question and Answer. This can be used at a later time if you forget your password. 8. Enter your e-mail address. You will receive e-mail notification when new information is available in [address] 9. Click Sign Up. You can now view and download portions of your medical record. 10. Click the Download Summary menu link to download a portable copy of your medical information. Additional Information If you have questions, please call [phone]. Remember, Lindsey Shell is NOT to be used for urgent needs. For medical emergencies, dial 911.

## 2021-03-15 ENCOUNTER — TELEPHONE (OUTPATIENT)
Dept: CARDIOLOGY CLINIC | Age: 66
End: 2021-03-15

## 2021-03-15 NOTE — TELEPHONE ENCOUNTER
Received fax from [employer],  # [phone], fax # [phone], case # [id number]. Verified patient with two patient identifiers.     Faxed tier reduction request for Entresto 24/26 BID

## 2021-03-15 NOTE — TELEPHONE ENCOUNTER
Spoke with patient.  Verified patient with two patient identifiers.    Received denial for [employer] for tier reduction for Entresto.    Mailed pt Entresto Pt. Assistance for to compete her form, attach all needed forms and get bact to me ASAP.  Patient verbalized understanding.

## 2021-03-17 ENCOUNTER — OFFICE VISIT (OUTPATIENT)
Dept: GYNECOLOGY | Age: 66
End: 2021-03-17
Payer: MEDICARE

## 2021-03-17 VITALS
HEART RATE: 71 BPM | SYSTOLIC BLOOD PRESSURE: 132 MMHG | HEIGHT: 68 IN | WEIGHT: 234 LBS | BODY MASS INDEX: 35.46 KG/M2 | TEMPERATURE: 95.6 F | DIASTOLIC BLOOD PRESSURE: 63 MMHG

## 2021-03-17 DIAGNOSIS — C54.1 ENDOMETRIAL CANCER (HCC): Primary | ICD-10-CM

## 2021-03-17 PROCEDURE — G8752 SYS BP LESS 140: HCPCS | Performed by: OBSTETRICS & GYNECOLOGY

## 2021-03-17 PROCEDURE — G8427 DOCREV CUR MEDS BY ELIG CLIN: HCPCS | Performed by: OBSTETRICS & GYNECOLOGY

## 2021-03-17 PROCEDURE — G8417 CALC BMI ABV UP PARAM F/U: HCPCS | Performed by: OBSTETRICS & GYNECOLOGY

## 2021-03-17 PROCEDURE — G9717 DOC PT DX DEP/BP F/U NT REQ: HCPCS | Performed by: OBSTETRICS & GYNECOLOGY

## 2021-03-17 PROCEDURE — 1101F PT FALLS ASSESS-DOCD LE1/YR: CPT | Performed by: OBSTETRICS & GYNECOLOGY

## 2021-03-17 PROCEDURE — G8536 NO DOC ELDER MAL SCRN: HCPCS | Performed by: OBSTETRICS & GYNECOLOGY

## 2021-03-17 PROCEDURE — 99214 OFFICE O/P EST MOD 30 MIN: CPT | Performed by: OBSTETRICS & GYNECOLOGY

## 2021-03-17 PROCEDURE — G0463 HOSPITAL OUTPT CLINIC VISIT: HCPCS | Performed by: OBSTETRICS & GYNECOLOGY

## 2021-03-17 PROCEDURE — G8754 DIAS BP LESS 90: HCPCS | Performed by: OBSTETRICS & GYNECOLOGY

## 2021-03-17 PROCEDURE — 1090F PRES/ABSN URINE INCON ASSESS: CPT | Performed by: OBSTETRICS & GYNECOLOGY

## 2021-03-17 PROCEDURE — G8399 PT W/DXA RESULTS DOCUMENT: HCPCS | Performed by: OBSTETRICS & GYNECOLOGY

## 2021-03-17 PROCEDURE — 3017F COLORECTAL CA SCREEN DOC REV: CPT | Performed by: OBSTETRICS & GYNECOLOGY

## 2021-03-17 PROCEDURE — G9899 SCRN MAM PERF RSLTS DOC: HCPCS | Performed by: OBSTETRICS & GYNECOLOGY

## 2021-03-17 NOTE — PROGRESS NOTES
27 [facility] [address], [address] [facility]  P [phone]  F [phone]    Office Note  Patient ID:  Name:  Vernice Libman  MRN:  [id number]  :  66 y.o. Date:  3/17/2021      HISTORY OF PRESENT ILLNESS:  Ms. Vernice Libman is a 74 y.o. female who on  underwent Robotic-assisted total laparoscopic hysterectomy, Bilateral salpingo-oophorectomy, Bilateral pelvic sentinel lymph node mapping and biopsy. Final pathology consistent with Stage Ib, FIGO Grade 1 endometrial cancer. 8mm out of 15mm invasion. Negative washings. Negative SLNDs. LVSI negative. Completed VBT 2020 (30Gy in 3 fractions). Presents today for continued surveillance. Doing well overall. She reports that using vaginal dilators was painful. She was seen by Dr. King Meeks last month to help break up some of the vaginal adhesions and she is using the smallest dilator. The patient is not sexually active. Denies vaginal bleeding/discharge, abdominal/pelvic pain, nausea, vomiting, constipation, diarrhea, CP, SOB, hematuria, hematochezia, change in appetite or bowel movements, bloating, fevers, chills, or urinary symptoms. Initial History:  Ms. Vernice Libman is a 74 y.o.  postmenopausal female who presents in consultation from Dr. Tushar Shaw for FIGO Grade 1 endometrial cancer. The patient first reports vaginal spotting/bleeding in 2019. She was ultimately referred to Dr. Tushar Shaw by her PCP. On 2020 underwent hysteroscopy/D&C with final pathology consistent with FIGO Grade 1 endometrial cancer. Reports some spotting since her surgery. History of urinary incontinence for which she wears a pad. Denies hematuria or hematochezia. Denies change in appetite or bowel habits, nausea, vomiting, diarrhea, CP, SOB, fevers, or chills. Reports long history of constipation for which she uses senna S.      Pertinent PMH/PSH: h/o , obesity, HTN, INDER on CPAP Active, no restrictions. Pathology Review:   2/20/202:  FINAL PATHOLOGIC DIAGNOSIS   1. Lymph node, right pelvic, sentinel node excision:   No evidence for malignancy in one node (0/1). See comment. 2. Lymph node, left pelvic, sentinel node excision:   No evidence for malignancy in one node (0/1). See comment. 3. Uterus, cervix, fallopian tubes, ovaries, hysterectomy, salpingo-oophorectomy:   Cervix: No evidence for dysplasia or malignancy. Endometrium: Endometrioid adenocarcinoma, FIGO grade 1. See synoptic report   Myometrium: Invasive adenocarcinoma. Leiomyoma. Adenomyosis. Fallopian tubes: No histopathologic abnormality. Ovaries: Benign physiologic changes. Synoptic report - endometrium   SPECIMEN   Procedure: Hysterectomy and salpingo-oophorectomy. Lymph Node Sampling: Bilateral pelvic lymph nodes. Specimen Integrity: Intact. TUMOR   Histologic Type: Endometrioid Adenocarcinoma   Histologic Grade: Grade 1   Tumor Extent:   Myometrial Invasion: Present. Depth of invasion: 8 mm   Myometrial thickness: 15 mm   Cervical stromal involvement: Not Identified. Extent of Involvement of Other Organs: Not identified. Accessory Tumor Findings   Lymph-Vascular Invasion: Not identified   LYMPH NODES   Number of Pelvic Nodes with Macrometastasis (>2 mm): 0   Number of Pelvic Nodes with Micrometastasis (>0.2 mm <= 2 mm): 0   Number of Pelvic Nodes with Isolated Tumor Cells (0.2 mm or less): 0   Total Number of Pelvic Nodes Examined (sentinel and non-sentinel): 2   Number of Pelvic [place] Nodes Examined: 2   Pathologic Stage   Primary Tumor (pT): pT1b   Regional Lymph Nodes (pN): pN0   Comment   Immunohistochemistry stains       2/5/2020:  Endometrium and polyp, curetting:  Well differentiated endometrioid adenocarcinoma, FIGO grade 1 in a background of extensive atypical complex hyperplasia.     ROS:  A comprehensive review of systems was negative except for that written in the History of Present Illness.  , 10 point ROS    OB/GYN ROS:  Per HPI    ECOG ndGndrndanddndend:nd nd2nd Problem List:  Patient Active Problem List    Diagnosis Date Noted    Dilated cardiomyopathy ([facility] [address] 02/15/2021    Chronic systolic congestive heart failure ([address] 02/15/2021    Cardiomyopathy ([address] 02/15/2021    S/P cardiac cath 2020    Stage 3a chronic kidney disease 10/08/2020    Left bundle branch block 2020    Endometrial cancer ([address] 2020    Severe obesity ([address] 2019    Nuclear cataract 2016    Posterior subcapsular polar senile cataract 2016    Neuropathy, peripheral 2014    Hypertension 2014    Hyperglycemia 2014    GERD (gastroesophageal reflux disease) 2014    Hyperlipidemia 2014    INDER on CPAP 2014    Depression 2014     PMH:  Past Medical History:   Diagnosis Date    Arthritis     OSTEO    Cancer ([facility] [address]     skin cancer removed from nose    Chronic kidney disease 2020    stage 3     Depression 2014    Endometrial cancer ([address]     GERD (gastroesophageal reflux disease)     Hyperlipidemia     Hypertension     Left bundle branch block 2013    Neuropathy, peripheral 2014    INDER on CPAP 2014    compliant    Stage 3a chronic kidney disease 10/8/2020           PSH:  Past Surgical History:   Procedure Laterality Date    COLONOSCOPY  2010     Dr. Dominga Monteiro        HX DILATION AND CURETTAGE  2020    HYSTROSCOPY    HX GI      COLONOSCOPY    HX HEART CATHETERIZATION Left 2020    HX HEENT      BCCA REMOVED FROM NOSE     HX HYSTERECTOMY  2020    HX OTHER SURGICAL      hysteroscopy surg    HX TONSILLECTOMY      MA INSJ/RPLCMT PERM DFB W/TRNSVNS LDS 1/DUAL CHMBR N/A 2/15/2021    INSERT ICD BIV MULTI performed by Colleen Ortiz MD at [facility] CARDIAC CATH LAB      Social History:  Social History     Tobacco Use    Smoking status: Never Smoker  Smokeless tobacco: Never Used   Substance Use Topics    Alcohol use: No     Alcohol/week: 0.0 standard drinks      Family History:  Family History   Problem Relation Age of Onset    Heart Disease Mother     Breast Cancer Mother     Heart Disease Father     Kidney Disease Paternal Aunt         renal cancer    Colon Cancer Maternal Aunt     Cancer Paternal Uncle         leukemia    Anesth Problems Neg Hx       Medications: (reviewed)  Current Outpatient Medications   Medication Sig    risperiDONE (RisperDAL) 2 mg tablet TAKE 1 TABLET BY MOUTH EVERY DAY AT NIGHT    carvediloL (COREG) 6.25 mg tablet TAKE 1 TABLET BY MOUTH TWICE A DAY WITH MEALS    losartan (COZAAR) 100 mg tablet Take 1 Tab by mouth daily.  buPROPion XL (WELLBUTRIN XL) 300 mg XL tablet TAKE 1 TABLET BY MOUTH EVERY MORNING    atorvastatin (LIPITOR) 20 mg tablet TAKE 1 TABLET BY MOUTH EVERY DAY    ascorbic acid, vitamin C, (VITAMIN C) 500 mg tablet Take 500 mg by mouth daily (after breakfast).  vitamin e (E GEMS) 100 unit capsule Take 400 Units by mouth daily.  aspirin delayed-release 81 mg tablet Take 81 mg by mouth daily.  docusate sodium (COLACE PO) Take 1 Tab by mouth as needed.  Cholecalciferol, Vitamin D3, (VITAMIN D3) 2,000 unit cap capsule Take  by mouth daily (after breakfast).  multivitamin, stress formula (STRESS TAB) tablet Take 1 Tab by mouth daily. No current facility-administered medications for this visit. Allergies: (reviewed)  Allergies   Allergen Reactions    Erythromycin Rash          OBJECTIVE:    Physical Exam:  Visit Vitals  /63 (BP 1 Location: Right arm, BP Patient Position: Sitting)   Pulse 71   Temp (!) 95.6 °F (35.3 °C) (Temporal)   Ht 5' 8\" (1.727 m)   Wt 234 lb (106.1 kg)   BMI 35.58 kg/m²      General: Alert and oriented. No acute distress. Well-nourished  HEENT: No thyroid enlargment. Neck supple without restrictions. Sclera normal. Normal occular motion.  Moist mucous membranes. Lymphatics: No evidence of axillary, cervical, or subclavicular adenopathy. Respiratory: clear to auscultation and percussion to the bases. No CVAT. Cardiovascular: regular rate and rhythm. No murmurs, rubs, or gallops. Gastrointestinal: soft, non-tender, non-distended, no masses or organomegaly. Well-healed incision. Musculoskeletal: normal gait. No joint tenderness, deformity or swelling. No muscular tenderness. Extremities: extremities normal, atraumatic, no cyanosis or edema. Pelvic: exam chaperoned by nurse. Normal appearing external genitalia. On speculum exam, the vagina is atrophic. Mild radiation changes. The uterus and cervix are surgically absent. No evidence of masses or nodularity on bimanual exam. Deferred rectovaginal exam.   Neuro: Grossly intact. Normal gait and movement. No acute deficit  Skin: No evidence of rashes or skin changes. IMPRESSION/PLAN:    Ms. Osmin Altman is a 74 y.o. female who on 2/20/202 underwent Robotic-assisted total laparoscopic hysterectomy, Bilateral salpingo-oophorectomy, Bilateral pelvic sentinel lymph node mapping and biopsy. Final pathology consistent with Stage Ib, FIGO Grade 1 endometrial cancer. 8mm out of 15mm invasion. Negative washings. Negative SLNDs. LVSI negative. Completed VBT 6/26/2020 (30Gy in 3 fractions).     Problems:     Patient Active Problem List    Diagnosis Date Noted    Dilated cardiomyopathy ([address] 02/15/2021    Chronic systolic congestive heart failure ([address] 02/15/2021    Cardiomyopathy ([address] 02/15/2021    S/P cardiac cath 12/03/2020    Stage 3a chronic kidney disease 10/08/2020    Left bundle branch block 02/18/2020    Endometrial cancer ([address] 02/12/2020    Severe obesity ([address] 01/11/2019    Nuclear cataract 02/05/2016    Posterior subcapsular polar senile cataract 02/05/2016    Neuropathy, peripheral 04/09/2014    Hypertension 04/08/2014    Hyperglycemia 04/08/2014    GERD (gastroesophageal reflux disease) 04/08/2014    Hyperlipidemia 04/08/2014    INDER on CPAP 04/08/2014    Depression 01/09/2014       Reviewed patient's course to date. CONY on exam today. Reassured patient. Patient to follow-up with Dr. Ced Hodge in June. We will alternate visits. I will see her in 6 months from now. Reviewed precautionary symptoms to return sooner. All questions and concerns were addressed with the patient and she is comfortable with the plan. An electronic signature was used to authenticate this note.      Ammy Beckford MD

## 2021-03-17 NOTE — PROGRESS NOTES
4 month follow up for endometrial cancer ,  pt reports no abnormal spotting or bleeding, pt states she has no questions or concerns for today's visit    1. Have you been to the ER, urgent care clinic since your last visit? Hospitalized since your last visit?  no    2. Have you seen or consulted any other health care providers outside of the [address] [place] [address] since your last visit? Include any pap smears or colon screening.      no

## 2021-03-18 ENCOUNTER — TELEPHONE (OUTPATIENT)
Dept: CARDIOLOGY CLINIC | Age: 66
End: 2021-03-18

## 2021-03-18 ENCOUNTER — TRANSCRIBE ORDER (OUTPATIENT)
Dept: SCHEDULING | Age: 66
End: 2021-03-18

## 2021-03-18 DIAGNOSIS — E83.52 HYPERCALCEMIA: Primary | ICD-10-CM

## 2021-03-18 NOTE — TELEPHONE ENCOUNTER
Verified patient with 2 identifiers   Spoke with patient regarding Celi Logan RN advice. Patient verified understanding.

## 2021-03-18 NOTE — TELEPHONE ENCOUNTER
KIRA Wills LPN   Caller: Unspecified (Today,  1:28 PM)             I think it is a CT scan so yes.  She would need to wait a couple more weeks post implant to have an MRI Scan. Gabi Masters sure she lets the doctor know that she has a Bi-V ICD implanted 2/15/21. Zeus Delgado generally ask them to wait 2 months before having any dental work or other procedures.

## 2021-03-18 NOTE — TELEPHONE ENCOUNTER
Please call patient she wants to know if its safe for her to receive a parathyroid scan. Patient stated its like an MRI.     [phone]    Thanks  Martin Amaya

## 2021-03-19 ENCOUNTER — TRANSCRIBE ORDER (OUTPATIENT)
Dept: SCHEDULING | Age: 66
End: 2021-03-19

## 2021-03-19 DIAGNOSIS — E83.52 HYPERCALCEMIA: Primary | ICD-10-CM

## 2021-03-24 ENCOUNTER — VIRTUAL VISIT (OUTPATIENT)
Dept: INTERNAL MEDICINE CLINIC | Age: 66
End: 2021-03-24
Payer: MEDICARE

## 2021-03-24 DIAGNOSIS — I42.0 DILATED CARDIOMYOPATHY (HCC): ICD-10-CM

## 2021-03-24 DIAGNOSIS — I10 ESSENTIAL HYPERTENSION: ICD-10-CM

## 2021-03-24 DIAGNOSIS — G62.9 PERIPHERAL POLYNEUROPATHY: Primary | ICD-10-CM

## 2021-03-24 DIAGNOSIS — C54.1 ENDOMETRIAL CANCER (HCC): ICD-10-CM

## 2021-03-24 DIAGNOSIS — F32.A DEPRESSION, UNSPECIFIED DEPRESSION TYPE: ICD-10-CM

## 2021-03-24 DIAGNOSIS — E78.5 HYPERLIPIDEMIA, UNSPECIFIED HYPERLIPIDEMIA TYPE: ICD-10-CM

## 2021-03-24 PROCEDURE — G8756 NO BP MEASURE DOC: HCPCS | Performed by: INTERNAL MEDICINE

## 2021-03-24 PROCEDURE — 1101F PT FALLS ASSESS-DOCD LE1/YR: CPT | Performed by: INTERNAL MEDICINE

## 2021-03-24 PROCEDURE — G8399 PT W/DXA RESULTS DOCUMENT: HCPCS | Performed by: INTERNAL MEDICINE

## 2021-03-24 PROCEDURE — G9899 SCRN MAM PERF RSLTS DOC: HCPCS | Performed by: INTERNAL MEDICINE

## 2021-03-24 PROCEDURE — 3017F COLORECTAL CA SCREEN DOC REV: CPT | Performed by: INTERNAL MEDICINE

## 2021-03-24 PROCEDURE — G8536 NO DOC ELDER MAL SCRN: HCPCS | Performed by: INTERNAL MEDICINE

## 2021-03-24 PROCEDURE — G0463 HOSPITAL OUTPT CLINIC VISIT: HCPCS | Performed by: INTERNAL MEDICINE

## 2021-03-24 PROCEDURE — G8417 CALC BMI ABV UP PARAM F/U: HCPCS | Performed by: INTERNAL MEDICINE

## 2021-03-24 PROCEDURE — 1090F PRES/ABSN URINE INCON ASSESS: CPT | Performed by: INTERNAL MEDICINE

## 2021-03-24 PROCEDURE — G9717 DOC PT DX DEP/BP F/U NT REQ: HCPCS | Performed by: INTERNAL MEDICINE

## 2021-03-24 PROCEDURE — 99214 OFFICE O/P EST MOD 30 MIN: CPT | Performed by: INTERNAL MEDICINE

## 2021-03-24 PROCEDURE — G8427 DOCREV CUR MEDS BY ELIG CLIN: HCPCS | Performed by: INTERNAL MEDICINE

## 2021-03-24 NOTE — PROGRESS NOTES
Kari Olivas is a 66 y.o. female who was seen by synchronous (real-time) audio-video technology on 3/24/2021 for Hypertension (6 month f.u)        Progress Note       SUBJECTIVE:   Ms. Kari Olivas is a 66 y.o. female, seen in office for routine follow up and AWV. Her  is my patient Glen Olivas.     Chief Complaint   Patient presents with   • Hypertension     6 month f.u       She is to have parathyroid scan ordered by Dr. Taveras. Calcium is high. Kidney function is better.     She had heart cath with Dr. Olivas and didn't have any blockages. \"They want me to go on Entresto, but have to clear it with my insurance.\"     She had ICD implantation on 2/15/2021.     \"I still feel tired and have a lot of shortness of breath, even with small exertions.\"  She would like to hold of PFT due to      Endometrial cancer: She is s/p Robotic-assisted total laparoscopic hysterectomy, Bilateral salpingo-oophorectomy, Bilateral pelvic sentinel lymph node mapping and biopsy Feb 2020. Follows with Dr. Vora.     She was seen by Dr. Gunnar Khalil, cardiologist, for preop evaluation. Found to have LBBB on EKG.  Echo was performed and showed EF 30-35%.  \"He wants to do Cath\"--she is interested in following up with her usual cardiologist, Dr. Olivas.     Sees gynecologist yearly. Sees Dr. Lynn currently (previously Dr. Mancini, prev Dr. Son). Has had mammogram.  Had dexa.     L ankle pain \"is doing okay.\"  It is recalled that she saw orthopedist for L ankle pain. \"She wanted me to wear a device\", that was expensive.  The pain got better with new shoes.   Hurts if sits at desk too long.      Neuropathy: Unchanged numbness and tingling in feet.      It is recalled that she has had high blood sugars, as well as an A1C that was up.  This is doing fine. Last A1C 5.7.     Palpitations not discussed today.   Saw Dr. Olivas in 2013. We noted in 2014: She was having palpitations and saw Dr. Swanson in January. She had EKG,  echo and stress test--\"I think everything is okay. \"  The palpitations went away since getting treated for INDER. She is on CPAP for INDER. Mood okay, stress better. \"I don't feel 100%. I have been worried about things with my health. \"  As noted before: \"I have been depressed since I was a teenager. \"  \"I tried to go off the meds, but I got down in the dumps. \"  Back on meds. No SI at this time. At this time, she is otherwise doing well and has brought no other complaints to my attention today. For a list of the medical issues addressed today, see the assessment and plan below. PMH:   Past Medical History:   Diagnosis Date    Arthritis     OSTEO    Cancer ([facility]     skin cancer removed from nose    Chronic kidney disease 2020    stage 3     Depression 2014    Endometrial cancer ([facility]     GERD (gastroesophageal reflux disease)     Hyperlipidemia     Hypertension     Left bundle branch block     Neuropathy, peripheral 2014    INDER on CPAP 2014    compliant    Stage 3a chronic kidney disease 10/8/2020            Past Surgical History:   Procedure Laterality Date    COLONOSCOPY  2010     Dr. Anai Zee        HX DILATION AND CURETTAGE  2020    HYSTROSCOPY    HX GI      COLONOSCOPY    HX HEART CATHETERIZATION Left 2020    HX HEART CATHETERIZATION      HX HEENT      BCCA REMOVED FROM NOSE     HX HYSTERECTOMY  2020    HX IMPLANTABLE CARDIOVERTER DEFIBRILLATOR      HX OTHER SURGICAL      hysteroscopy surg    HX TONSILLECTOMY      [place] INSJ/RPLCMT PERM DFB W/TRNSVNS LDS 1/DUAL CHMBR N/A 2/15/2021    INSERT ICD BIV MULTI performed by Bryan Moreno MD at [facility] CARDIAC CATH LAB       All: She is allergic to erythromycin. (rash).     Current Outpatient Medications   Medication Sig    risperiDONE (RisperDAL) 2 mg tablet TAKE 1 TABLET BY MOUTH EVERY DAY AT NIGHT    carvediloL (COREG) 6.25 mg tablet TAKE 1 TABLET BY MOUTH TWICE A DAY WITH MEALS    losartan (COZAAR) 100 mg tablet Take 1 Tab by mouth daily.  buPROPion XL (WELLBUTRIN XL) 300 mg XL tablet TAKE 1 TABLET BY MOUTH EVERY MORNING    atorvastatin (LIPITOR) 20 mg tablet TAKE 1 TABLET BY MOUTH EVERY DAY    docusate sodium (COLACE PO) Take 1 Tab by mouth as needed.  ascorbic acid, vitamin C, (VITAMIN C) 500 mg tablet Take 500 mg by mouth daily (after breakfast).  vitamin e (E GEMS) 100 unit capsule Take 400 Units by mouth daily.  aspirin delayed-release 81 mg tablet Take 81 mg by mouth daily.  Cholecalciferol, Vitamin D3, (VITAMIN D3) 2,000 unit cap capsule Take  by mouth daily (after breakfast).  multivitamin, stress formula (STRESS TAB) tablet Take 1 Tab by mouth daily. No current facility-administered medications for this visit. FH: Mother  breast cancer. Father  of CHF. SH: Retired insurance . She reports that she has never smoked. She has never used smokeless tobacco. She reports that she does not drink alcohol or use drugs. ROS: See above; Complete ROS otherwise negative. OBJECTIVE:   There were no vitals taken for this visit. 132/63 on , at Dr. Barbara Bob office. Gen: Pleasant 77 y.o.  female in NAD.        Lab Results   Component Value Date/Time    Hemoglobin A1c 5.7 (H) 2020 07:52 AM     Lab Results   Component Value Date/Time    Sodium 142 2021 10:32 AM    Potassium 4.7 2021 10:32 AM    Chloride 108 (H) 2021 10:32 AM    CO2 27 2021 10:32 AM    Anion gap 4 (L) 2020 01:15 AM    Glucose 99 2021 10:32 AM    BUN 16 2021 10:32 AM    Creatinine 1.04 (H) 2021 10:32 AM    BUN/Creatinine ratio 15 2021 10:32 AM    GFR est AA 64 2021 10:32 AM    GFR est non-AA 55 (L) 2021 10:32 AM    Calcium 12.5 (H) 2021 10:32 AM         ASSESSMENT/ PLAN:     Endometrial cancer: Per Dr. Sadie Chowdary. Anemia: Mild. ? Chronic disease. Keep an eye on this. CKD stage III: Watch labs. Seeing Dr. Mayra Hauser. Cardiomyopathy/CHF: EF 30-35%. She is s/p ICD implantation in Feb 2021. As per Cardiology. Dyspnea: She has had imaging (CTA), and echo, but no recent PFT--would like to hold off for now. Hypertension: BP not assessed. Recent readings reviewed. BP Readings from Last 3 Encounters:   03/17/21 132/63   02/15/21 (!) 162/71   02/02/21 (!) 140/80     - atenolol (TENORMIN) 50 mg tablet; Take 1 Tab by mouth daily.  - METABOLIC PANEL, COMPREHENSIVE  - LIPID PANEL  - CBC WITH AUTOMATED DIFF    Hip pain: Stable, R side, mainly. Can return as desired to Dr. Gloria Miller. OTC analgesics. Constipation: Controlled with stool softener. Neuropathy, LE: Stable. Ongoing. Likely arising from her metabolic syndrome / prediabetes. Sometimes neuropathy predates the diagnosis of DM. Neg RPR, ESR, TSH, JUNE, in 2014    Depression: Stable. No SI.   - risperiDONE (RISPERDAL) 2 mg tablet; Take 1 Tab by mouth nightly as needed. - buPROPion XL (WELLBUTRIN XL) 300 mg XL tablet; Take 1 Tab by mouth every morning. Hyperglycemia: \"Prediabetes\" / \"borderline DM\": X0F 5.7.   - METABOLIC PANEL, COMPREHENSIVE  - HEMOGLOBIN A1C    GERD (gastroesophageal reflux disease): Stable. - omeprazole (PRILOSEC) 20 mg capsule; Take 1 Cap by mouth daily. Hyperlipidemia: Reviewed her last labs--normal Sept 2020. We'll check again down the road. - atorvastatin (LIPITOR) 20 mg tablet; Take 1 Tab by mouth daily.  - LIPID PANEL    INDER on CPAP:    Obesity: stable--work on diet and exercise.    Wt Readings from Last 3 Encounters:   03/17/21 234 lb (106.1 kg)   02/15/21 236 lb (107 kg)   02/02/21 236 lb 6.4 oz (107.2 kg)     RTC 6 months, HTN, etc        Objective:     Patient-Reported Vitals 3/2/2021   Patient-Reported Weight 235lb   Patient-Reported Pulse 80   Patient-Reported Temperature 97.6   Patient-Reported SpO2 100   Patient-Reported Systolic  721 Patient-Reported Diastolic 80        [INSTRUCTIONS:  \"[x]\" Indicates a positive item  \"[]\" Indicates a negative item  -- DELETE ALL ITEMS NOT EXAMINED]    Constitutional: [x] Appears well-developed and well-nourished [x] No apparent distress      [] Abnormal -     Mental status: [x] Alert and awake  [x] Oriented to person/place/time [x] Able to follow commands    [] Abnormal -     Eyes:   EOM    [x]  Normal    [] Abnormal -   Sclera  [x]  Normal    [] Abnormal -          Discharge [x]  None visible   [] Abnormal -     HENT: [x] Normocephalic, atraumatic  [] Abnormal -   [x] Mouth/Throat: Mucous membranes are moist    External Ears [x] Normal  [] Abnormal -    Neck: [x] No visualized mass [] Abnormal -     Pulmonary/Chest: [x] Respiratory effort normal   [x] No visualized signs of difficulty breathing or respiratory distress        [] Abnormal -      Musculoskeletal:   [x] Normal gait with no signs of ataxia         [x] Normal range of motion of neck        [] Abnormal -     Neurological:        [x] No Facial Asymmetry (Cranial nerve 7 motor function) (limited exam due to video visit)          [x] No gaze palsy        [] Abnormal -          Skin:        [x] No significant exanthematous lesions or discoloration noted on facial skin         [] Abnormal -            Psychiatric:       [x] Normal Affect [] Abnormal -       Other pertinent observable physical exam findings:-        We discussed the expected course, resolution and complications of the diagnosis(es) in detail. Medication risks, benefits, costs, interactions, and alternatives were discussed as indicated. I advised her to contact the office if her condition worsens, changes or fails to improve as anticipated. She expressed understanding with the diagnosis(es) and plan.        Kari Olivas, who was evaluated through a patient-initiated, synchronous (real-time) audio-video encounter, and/or her healthcare decision maker, is aware that it is a billable service, with coverage as determined by her insurance carrier. She provided verbal consent to proceed: YES, and patient identification was verified. It was conducted pursuant to the emergency declaration under the [address] [place] [address] waiver authority and the [employer] Act. A caregiver was present when appropriate. Ability to conduct physical exam was limited. I was in office. The patient was at home or otherwise outside the office.       Dixie Barrett MD

## 2021-03-24 NOTE — PATIENT INSTRUCTIONS
Office Policies Phone calls/patient messages: Please allow up to 24 hours for someone in the office to contact you about your call or message. Be mindful your provider may be out of the office or your message may require further review. We encourage you to use [facility] for your messages as this is a faster, more efficient way to communicate with our office Medication Refills: 
         
Prescription medications require 48-72 business hours to process. We encourage you to use [facility] for your refills. For controlled medications: Please allow 72 business hours to process. Certain medications may require you to  a written prescription at our office. NO narcotic/controlled medications will be prescribed after 4pm Monday through Friday or on weekends Form/Paperwork Completion: 
         
Please note a $25 fee may incur for all paperwork for completed by our providers. We ask that you allow 7-10 business days. Pre-payment is due prior to picking up/faxing the completed form. You may also download your forms to [facility] to have your doctor print off. 
 
 
1. Have you been to the ER, urgent care clinic since your last visit? Hospitalized since your last visit?no 2. Have you seen or consulted any other health care providers outside of the [address] [place] [address] since your last visit? Include any pap smears or colon screening.  no

## 2021-03-25 ENCOUNTER — TELEPHONE (OUTPATIENT)
Dept: CARDIOLOGY CLINIC | Age: 66
End: 2021-03-25

## 2021-03-25 RX ORDER — SACUBITRIL AND VALSARTAN 24; 26 MG/1; MG/1
1 TABLET, FILM COATED ORAL 2 TIMES DAILY
COMMUNITY
End: 2021-03-26 | Stop reason: DRUGHIGH

## 2021-03-25 NOTE — TELEPHONE ENCOUNTER
Wants one month Entresto sent to [place], to try before getting 90 day supply. Leonardo Soto, ? Stop Losartan? ?

## 2021-03-25 NOTE — TELEPHONE ENCOUNTER
Spoke with patient. Verified patient with two patient identifiers. States she feels she can afford Entresto without Pt Assistance. Patient verbalized understanding.

## 2021-03-25 NOTE — TELEPHONE ENCOUNTER
Patient has questions regarding how to fill out the rx for for med discount, please call.      Thanks

## 2021-03-25 NOTE — TELEPHONE ENCOUNTER
Spoke with patient. Verified patient with two patient identifiers. States she called her ins and she can afford the [facility]. Wants 30 day supply to [facility].  Is she does okay on it, she will call us for a 90 day supply to [facility]. Patient verbalized understanding.

## 2021-03-26 RX ORDER — ATORVASTATIN CALCIUM 20 MG/1
TABLET, FILM COATED ORAL
Qty: 90 TAB | Refills: 3 | Status: SHIPPED | OUTPATIENT
Start: 2021-03-26 | End: 2022-01-13 | Stop reason: SDUPTHER

## 2021-03-26 RX ORDER — SACUBITRIL AND VALSARTAN 49; 51 MG/1; MG/1
1 TABLET, FILM COATED ORAL 2 TIMES DAILY
Qty: 60 TAB | Refills: 5 | Status: SHIPPED | OUTPATIENT
Start: 2021-03-26 | End: 2021-04-08 | Stop reason: SDUPTHER

## 2021-03-26 NOTE — TELEPHONE ENCOUNTER
Yes, hold Losartan for 36hrs then start the Entresto twice a day. She is on Losartan 100mg so I sent for the 49/51mg tab. We should have 30 days for free if she hasnt used that yet.

## 2021-03-26 NOTE — TELEPHONE ENCOUNTER
Spoke with patient. Verified patient with two patient identifiers. She decided not to try for Pt Assistance. Tried for tier reduction which was denied. States she spoke with her insurance and they will send something else for us to complete for \"another option\" to help with the Entresto. Advised her to call them and have them fax to me. Patient verbalized understanding.

## 2021-03-26 NOTE — TELEPHONE ENCOUNTER
Spoke with patient. Verified patient with two patient identifiers. Advised Rx sent for [facility]. Stop Losartan 36 hrs prior to taking Entresto. Gave card to try for 30 day free supply. Patient verbalized understanding.

## 2021-03-30 ENCOUNTER — HOSPITAL ENCOUNTER (OUTPATIENT)
Dept: ULTRASOUND IMAGING | Age: 66
Discharge: HOME | End: 2021-03-30
Attending: INTERNAL MEDICINE
Payer: MEDICARE

## 2021-03-30 ENCOUNTER — HOSPITAL ENCOUNTER (OUTPATIENT)
Dept: NUCLEAR MEDICINE | Age: 66
Discharge: HOME | End: 2021-03-30
Attending: INTERNAL MEDICINE
Payer: MEDICARE

## 2021-03-30 ENCOUNTER — TELEPHONE (OUTPATIENT)
Dept: CARDIOLOGY CLINIC | Age: 66
End: 2021-03-30

## 2021-03-30 DIAGNOSIS — E83.52 HYPERCALCEMIA: ICD-10-CM

## 2021-03-30 PROCEDURE — 78070 PARATHYROID PLANAR IMAGING: CPT

## 2021-03-30 PROCEDURE — 76536 US EXAM OF HEAD AND NECK: CPT

## 2021-03-30 NOTE — TELEPHONE ENCOUNTER
Patient returning your call and she also sent you a message in [facility].     Patient will be unavailable today 3/30/21.    [phone]    Thanks  Ashley Michel

## 2021-03-31 ENCOUNTER — TELEPHONE (OUTPATIENT)
Dept: INTERNAL MEDICINE CLINIC | Age: 66
End: 2021-03-31

## 2021-03-31 DIAGNOSIS — I10 ESSENTIAL HYPERTENSION: Primary | ICD-10-CM

## 2021-03-31 DIAGNOSIS — R73.9 HYPERGLYCEMIA: ICD-10-CM

## 2021-03-31 NOTE — TELEPHONE ENCOUNTER
Spoke with patient. Verified patient with two patient identifiers. Per Johnna Cisneros NP, advised stop Losartan. Continue Coreg and Entresto. Increased fluid intake for 2-3 days to flush kidneys. Patient verbalized understanding.

## 2021-03-31 NOTE — TELEPHONE ENCOUNTER
Patient states she would like to get Labs/Bloodwork orders mailed to her home address so she can get Labs done Prior to her upcoming appt on 10/7/2021. Please call if any questions.  Thank you

## 2021-03-31 NOTE — TELEPHONE ENCOUNTER
Patient did not stop Losartan but she stop  Carvedilol instead.       Patient realized mistake she had been taking losartan and entresto together    [phone]    Thanks  Ting Ruiz

## 2021-04-02 ENCOUNTER — OFFICE VISIT (OUTPATIENT)
Dept: CARDIOLOGY CLINIC | Age: 66
End: 2021-04-02
Payer: MEDICARE

## 2021-04-02 VITALS
RESPIRATION RATE: 16 BRPM | DIASTOLIC BLOOD PRESSURE: 70 MMHG | HEART RATE: 89 BPM | HEIGHT: 68 IN | SYSTOLIC BLOOD PRESSURE: 136 MMHG | WEIGHT: 235 LBS | OXYGEN SATURATION: 98 % | BODY MASS INDEX: 35.61 KG/M2

## 2021-04-02 DIAGNOSIS — I10 ESSENTIAL HYPERTENSION: ICD-10-CM

## 2021-04-02 DIAGNOSIS — Z99.89 OSA ON CPAP: ICD-10-CM

## 2021-04-02 DIAGNOSIS — I42.0 DILATED CARDIOMYOPATHY (HCC): Primary | ICD-10-CM

## 2021-04-02 DIAGNOSIS — I44.7 LEFT BUNDLE BRANCH BLOCK: ICD-10-CM

## 2021-04-02 DIAGNOSIS — G47.33 OSA ON CPAP: ICD-10-CM

## 2021-04-02 DIAGNOSIS — E78.2 MIXED HYPERLIPIDEMIA: ICD-10-CM

## 2021-04-02 PROCEDURE — 3017F COLORECTAL CA SCREEN DOC REV: CPT | Performed by: INTERNAL MEDICINE

## 2021-04-02 PROCEDURE — 1090F PRES/ABSN URINE INCON ASSESS: CPT | Performed by: INTERNAL MEDICINE

## 2021-04-02 PROCEDURE — G9717 DOC PT DX DEP/BP F/U NT REQ: HCPCS | Performed by: INTERNAL MEDICINE

## 2021-04-02 PROCEDURE — 1101F PT FALLS ASSESS-DOCD LE1/YR: CPT | Performed by: INTERNAL MEDICINE

## 2021-04-02 PROCEDURE — G8754 DIAS BP LESS 90: HCPCS | Performed by: INTERNAL MEDICINE

## 2021-04-02 PROCEDURE — G8399 PT W/DXA RESULTS DOCUMENT: HCPCS | Performed by: INTERNAL MEDICINE

## 2021-04-02 PROCEDURE — G0463 HOSPITAL OUTPT CLINIC VISIT: HCPCS | Performed by: INTERNAL MEDICINE

## 2021-04-02 PROCEDURE — G8417 CALC BMI ABV UP PARAM F/U: HCPCS | Performed by: INTERNAL MEDICINE

## 2021-04-02 PROCEDURE — G8536 NO DOC ELDER MAL SCRN: HCPCS | Performed by: INTERNAL MEDICINE

## 2021-04-02 PROCEDURE — 99214 OFFICE O/P EST MOD 30 MIN: CPT | Performed by: INTERNAL MEDICINE

## 2021-04-02 PROCEDURE — G8752 SYS BP LESS 140: HCPCS | Performed by: INTERNAL MEDICINE

## 2021-04-02 PROCEDURE — G9899 SCRN MAM PERF RSLTS DOC: HCPCS | Performed by: INTERNAL MEDICINE

## 2021-04-02 PROCEDURE — G8427 DOCREV CUR MEDS BY ELIG CLIN: HCPCS | Performed by: INTERNAL MEDICINE

## 2021-04-02 RX ORDER — CARVEDILOL 6.25 MG/1
6.25 TABLET ORAL 2 TIMES DAILY
Qty: 180 TAB | Refills: 1 | Status: SHIPPED | OUTPATIENT
Start: 2021-04-02 | End: 2021-10-06

## 2021-04-02 NOTE — PROGRESS NOTES
[address]  [phone]     Subjective: Damian Toro is a 74 y.o. female is here for follow up. She has pmhx NICM, HTN, HLD and INDER. Last seen by us in 12/2020. She is now s/p BiV-ICD implant performed by Dr Erick Elmore 2/15/2021. She reports unchanged sob, fatigue is worse. Her parathyroid scan ordered by Dr Marcel Corcoran showed a swollen gland near thyroid  And has since been referred to Dr Samira Elizabeth for possible surgery. The patient denies chest pain,  orthopnea, PND, LE edema, syncope, or presyncope.        Cardiac catheterization 12/3/2020  · Normal coronaries  · Non-ischemic cardiomyopathy; will rec bi-V pacer    Patient Active Problem List    Diagnosis Date Noted    Dilated cardiomyopathy ([address] 02/15/2021    Chronic systolic congestive heart failure ([address] 02/15/2021    Cardiomyopathy ([address] 02/15/2021    S/P cardiac cath 12/03/2020    Stage 3a chronic kidney disease ([address] 10/08/2020    Left bundle branch block 02/18/2020    Endometrial cancer ([address] 02/12/2020    Severe obesity ([address] 01/11/2019    Nuclear cataract 02/05/2016    Posterior subcapsular polar senile cataract 02/05/2016    Neuropathy, peripheral 04/09/2014    Hypertension 04/08/2014    Hyperglycemia 04/08/2014    GERD (gastroesophageal reflux disease) 04/08/2014    Hyperlipidemia 04/08/2014    INDER on CPAP 04/08/2014    Depression 01/09/2014      Celeste Zayas MD  Past Medical History:   Diagnosis Date    Arthritis     OSTEO    Cancer [facility]) 2005    skin cancer removed from nose    Chronic kidney disease 09/24/2020    stage 3     Depression 1/9/2014    Endometrial cancer ([address] 2020    GERD (gastroesophageal reflux disease)     Hyperlipidemia     Hypertension     Left bundle branch block 2013    Neuropathy, peripheral 4/9/2014    INDER on CPAP 4/8/2014    compliant    Stage 3a chronic kidney disease ([address] 10/8/2020           Past Surgical History:   Procedure Laterality Date    COLONOSCOPY  2010     Dr. Madiha Keys        HX DILATION AND CURETTAGE  2020    HYSTROSCOPY    HX GI      COLONOSCOPY    HX HEART CATHETERIZATION Left 2020    HX HEART CATHETERIZATION      HX HEENT      BCCA REMOVED FROM NOSE     HX HYSTERECTOMY  2020    HX IMPLANTABLE CARDIOVERTER DEFIBRILLATOR      HX OTHER SURGICAL      hysteroscopy surg    HX TONSILLECTOMY      [place] INSJ/RPLCMT PERM DFB W/TRNSVNS LDS 1/DUAL CHMBR N/A 2/15/2021    INSERT ICD BIV MULTI performed by Yassine Mitchell MD at [facility] CARDIAC CATH LAB     Allergies   Allergen Reactions    Erythromycin Rash      Family History   Problem Relation Age of Onset    Heart Disease Mother     Breast Cancer Mother     Heart Disease Father     Kidney Disease Paternal Aunt         renal cancer    Colon Cancer Maternal Aunt     Cancer Paternal Uncle         leukemia    Anesth Problems Neg Hx       Social History     Socioeconomic History    Marital status:      Spouse name: Not on file    Number of children: Not on file    Years of education: Not on file    Highest education level: Not on file   Occupational History    Not on file   Social Needs    Financial resource strain: Not on file    Food insecurity     Worry: Not on file     Inability: Not on file    Transportation needs     Medical: Not on file     Non-medical: Not on file   Tobacco Use    Smoking status: Never Smoker    Smokeless tobacco: Never Used   Substance and Sexual Activity    Alcohol use: No     Alcohol/week: 0.0 standard drinks    Drug use: No    Sexual activity: Not Currently   Lifestyle    Physical activity     Days per week: Not on file     Minutes per session: Not on file    Stress: Not on file   Relationships    Social connections     Talks on phone: Not on file     Gets together: Not on file     Attends [employer] service: Not on file     Active member of club or organization: Not on file     Attends meetings of clubs or organizations: Not on file     Relationship status: Not on file    Intimate partner violence     Fear of current or ex partner: Not on file     Emotionally abused: Not on file     Physically abused: Not on file     Forced sexual activity: Not on file   Other Topics Concern    Not on file   Social History Narrative    Not on file      Current Outpatient Medications   Medication Sig    carvediloL (COREG) 6.25 mg tablet Take 1 Tab by mouth two (2) times a day.  atorvastatin (LIPITOR) 20 mg tablet TAKE 1 TABLET BY MOUTH EVERY DAY    sacubitriL-valsartan (Entresto) 49-51 mg tab tablet Take 1 Tab by mouth two (2) times a day.  risperiDONE (RisperDAL) 2 mg tablet TAKE 1 TABLET BY MOUTH EVERY DAY AT NIGHT    buPROPion XL (WELLBUTRIN XL) 300 mg XL tablet TAKE 1 TABLET BY MOUTH EVERY MORNING    ascorbic acid, vitamin C, (VITAMIN C) 500 mg tablet Take 500 mg by mouth daily (after breakfast).  vitamin e (E GEMS) 100 unit capsule Take 400 Units by mouth daily.  aspirin delayed-release 81 mg tablet Take 81 mg by mouth daily. No current facility-administered medications for this visit. Review of Symptoms:  11 systems reviewed, negative other than as stated in the HPI    Physical ExamPhysical Exam:    Vitals:    04/02/21 1336   BP: 136/70   Pulse: 89   Resp: 16   SpO2: 98%   Weight: 235 lb (106.6 kg)   Height: 5' 8\" (1.727 m)     Body mass index is 35.73 kg/m². General PE  Gen:  NAD  Mental Status - Alert. General Appearance - Not in acute distress. HEENT:  PERRL, no carotid bruits or JVD  Chest and Lung Exam   Inspection: Accessory muscles - No use of accessory muscles in breathing. Auscultation:   Breath sounds: - Normal.   Cardiovascular   Inspection: Jugular vein - Bilateral - Inspection Normal.   Palpation/Percussion:   Apical Impulse: - Normal.   Auscultation: Rhythm - Regular. Heart Sounds - S1 WNL and S2 WNL. No S3 or S4. Murmurs & Other Heart Sounds: Auscultation of the heart reveals - No Murmurs. Peripheral Vascular   Upper Extremity: Inspection - Bilateral - No Cyanotic nailbeds or Digital clubbing. Lower Extremity:   Palpation: Edema - Bilateral - No edema. Abdomen:   Soft, non-tender, bowel sounds are active. Neuro: A&O times 3, CN and motor grossly WNL    Labs:   Lab Results   Component Value Date/Time    Cholesterol, total 134 09/29/2020 07:52 AM    Cholesterol, total 127 10/02/2019 08:50 AM    Cholesterol, total 140 03/29/2019 09:21 AM    Cholesterol, total 112 11/15/2018 09:06 AM    Cholesterol, total 137 02/20/2018 09:33 AM    HDL Cholesterol 53 09/29/2020 07:52 AM    HDL Cholesterol 46 10/02/2019 08:50 AM    HDL Cholesterol 53 03/29/2019 09:21 AM    HDL Cholesterol 45 11/15/2018 09:06 AM    HDL Cholesterol 45 02/20/2018 09:33 AM    LDL, calculated 68 09/29/2020 07:52 AM    LDL, calculated 65 10/02/2019 08:50 AM    LDL, calculated 72 03/29/2019 09:21 AM    LDL, calculated 53 11/15/2018 09:06 AM    LDL, calculated 71 02/20/2018 09:33 AM    LDL, calculated 73 06/14/2017 09:41 AM    Triglyceride 65 09/29/2020 07:52 AM    Triglyceride 82 10/02/2019 08:50 AM    Triglyceride 75 03/29/2019 09:21 AM    Triglyceride 70 11/15/2018 09:06 AM    Triglyceride 105 02/20/2018 09:33 AM     No results found for: CPK, CPKX, CPX  Lab Results   Component Value Date/Time    Sodium 142 02/03/2021 10:32 AM    Potassium 4.7 02/03/2021 10:32 AM    Chloride 108 (H) 02/03/2021 10:32 AM    CO2 27 02/03/2021 10:32 AM    Anion gap 4 (L) 06/19/2020 01:15 AM    Glucose 99 02/03/2021 10:32 AM    BUN 16 02/03/2021 10:32 AM    Creatinine 1.04 (H) 02/03/2021 10:32 AM    BUN/Creatinine ratio 15 02/03/2021 10:32 AM    GFR est AA 64 02/03/2021 10:32 AM    GFR est non-AA 55 (L) 02/03/2021 10:32 AM    Calcium 12.5 (H) 02/03/2021 10:32 AM    Bilirubin, total 0.3 02/03/2021 10:32 AM    Alk.  phosphatase 123 (H) 02/03/2021 10:32 AM    Protein, total 6.2 02/03/2021 10:32 AM    Albumin 4.2 02/03/2021 10:32 AM    Globulin 3.4 06/19/2020 01:15 AM    A-G Ratio 2.1 02/03/2021 10:32 AM    ALT (SGPT) 22 02/03/2021 10:32 AM       EKG:         Assessment:     Assessment:      1. Dilated cardiomyopathy ([address]    2. Left bundle branch block    3. Essential hypertension    4. Mixed hyperlipidemia    5. INDER on CPAP        Orders Placed This Encounter    carvediloL (COREG) 6.25 mg tablet     Sig: Take 1 Tab by mouth two (2) times a day. Dispense:  180 Tab     Refill:  1        Plan:     NICM s/p BiV-ICD in 2/2021  EF 30-35% per cath 12/2020 and echo 5/2020  WT stable at 235 lbs. Continue Carvedilol, started Entresto 49/51 mg BID on 3/27/2021  Stable kidney fxn 2/2021 Cr 1.04  Will repeat echo any day after 7/1/2021 and f/u with us same day    LBBB  Normal coronaries per cardiac cath in 12/2020    HTN  Controlled with current therapy: Carvedilol, Entresto      Hyperlipidemia:   9/2020 LDL 68 On atorva 20 mg  Lipids and labs managed by PCP     INDER on CPAP:   compliant with CPAP use    Hypercalcemia  Recent parathyroid scan showed swollen gland near thyroid and now referred to Dr Melissa Mak for surgery  Renal is following (Dr Samreen Bertrand)  She will let us know in future if needed cardiac clearance       Continue current care and f/u same day of echo    Patient seen and examined by me with nurse practitioner. Abhilash Youssef personally performed all components of the history, physical, and medical decision making and agree with the assessment and plan with minor modifications as noted. No symptomatic improvement as yet. S/P bi-v 5 weeks ago and now on entresto as well. F/U 3 mo with echo the same day.       Chilo Sena MD

## 2021-04-02 NOTE — PROGRESS NOTES
Identified pt with two pt identifiers(name and ). Reviewed record in preparation for visit and have obtained necessary documentation. Chief Complaint   Patient presents with    Hypertension        Health Maintenance Due   Topic    COVID-19 Vaccine (2 - Moderna 2-dose series)        Visit Vitals  /70 (BP 1 Location: Right arm, BP Patient Position: Sitting, BP Cuff Size: Large adult)   Pulse 89   Resp 16   Ht 5' 8\" (1.727 m)   Wt 235 lb (106.6 kg)   SpO2 98%   BMI 35.73 kg/m²     Pain Scale: 0 - No pain/10    Coordination of Care Questionnaire:  :   1. Have you been to the ER, urgent care clinic since your last visit? Hospitalized since your last visit? No    2. Have you seen or consulted any other health care providers outside of the [address] [place] [address] since your last visit? Include any pap smears or colon screening.  No

## 2021-04-02 NOTE — LETTER
4/2/2021 Patient: Kari Olivas YOB: 1955 Date of Visit: 4/2/2021 Chip Paredes MD 
[address] [facility] [address] [address] [address] [employer] Dear Chip Paredes MD, Thank you for referring Ms. Kari Olivas to [facility] for evaluation. My notes for this consultation are attached. If you have questions, please do not hesitate to call me. I look forward to following your patient along with you.  
 
 
Sincerely, 
 
Harmeet Kern MD

## 2021-04-03 ENCOUNTER — APPOINTMENT (OUTPATIENT)
Dept: ULTRASOUND IMAGING | Age: 66
End: 2021-04-03
Attending: EMERGENCY MEDICINE
Payer: MEDICARE

## 2021-04-03 ENCOUNTER — HOSPITAL ENCOUNTER (EMERGENCY)
Age: 66
Discharge: HOME | End: 2021-04-03
Attending: EMERGENCY MEDICINE
Payer: MEDICARE

## 2021-04-03 VITALS
SYSTOLIC BLOOD PRESSURE: 152 MMHG | HEART RATE: 83 BPM | BODY MASS INDEX: 35.99 KG/M2 | OXYGEN SATURATION: 99 % | TEMPERATURE: 97.8 F | HEIGHT: 68 IN | DIASTOLIC BLOOD PRESSURE: 72 MMHG | WEIGHT: 237.44 LBS | RESPIRATION RATE: 16 BRPM

## 2021-04-03 DIAGNOSIS — M79.662 PAIN OF LEFT CALF: Primary | ICD-10-CM

## 2021-04-03 PROCEDURE — 93971 EXTREMITY STUDY: CPT

## 2021-04-03 PROCEDURE — 99282 EMERGENCY DEPT VISIT SF MDM: CPT

## 2021-04-03 NOTE — DISCHARGE INSTRUCTIONS
Lower Extremity Venous Findings    Left Lower Venous    No evidence of deep vein thrombosis in the left lower extremity. No evidence of deep vein thrombosis in the common femoral, profunda femoral, femoral, popliteal, posterior tibial, and peroneal veins. The veins were imaged in the transverse and longitudinal planes. The vessels showed normal color filling and compressibility. Doppler interrogation showed phasic and spontaneous flow.

## 2021-04-04 NOTE — ED PROVIDER NOTES
EMERGENCY DEPARTMENT HISTORY AND PHYSICAL EXAM      Date: 4/3/2021  Patient Name: Osmar Olivas    History of Presenting Illness     Chief Complaint   Patient presents with    Leg Pain     Ambulatory into the ED with c/o non-traumatic pain to Lt calf x 2 days. Denies swelling. No distress noted. Pt wants to make sure she does not have a DVT - no hx       History Provided By: Patient    HPI: Ani Rico, 74 y.o. female with PMHx significant for chronic kidney disease, GERD, hyperlipidemia, hypertension, presents to the ED with cc of left calf pain for 2 days. The pain is worse with movement and is currently mild. There are no associated symptoms. She does not remember any specific injury. She is concerned that she may have a blood clot. She denies history of DVT. There are no other complaints, changes, or physical findings at this time. PCP: Keyona Dimas MD    No current facility-administered medications on file prior to encounter. Current Outpatient Medications on File Prior to Encounter   Medication Sig Dispense Refill    carvediloL (COREG) 6.25 mg tablet Take 1 Tab by mouth two (2) times a day. 180 Tab 1    atorvastatin (LIPITOR) 20 mg tablet TAKE 1 TABLET BY MOUTH EVERY DAY 90 Tab 3    sacubitriL-valsartan (Entresto) 49-51 mg tab tablet Take 1 Tab by mouth two (2) times a day. 60 Tab 5    risperiDONE (RisperDAL) 2 mg tablet TAKE 1 TABLET BY MOUTH EVERY DAY AT NIGHT 90 Tab 0    buPROPion XL (WELLBUTRIN XL) 300 mg XL tablet TAKE 1 TABLET BY MOUTH EVERY MORNING 90 Tab 3    ascorbic acid, vitamin C, (VITAMIN C) 500 mg tablet Take 500 mg by mouth daily (after breakfast).  vitamin e (E GEMS) 100 unit capsule Take 400 Units by mouth daily.  aspirin delayed-release 81 mg tablet Take 81 mg by mouth daily.          Past History     Past Medical History:  Past Medical History:   Diagnosis Date    AICD (automatic cardioverter/defibrillator) present    [facility])     skin cancer removed from nose    Chronic kidney disease 2020    stage 3     Depression 2014    [facility]omet[facility] [address]     GERD (gastroesophageal reflux disease)     Hyperlipidemia     Hypertension     Left bundle branch block 2013    Neuropathy, peripheral 2014    INDER on CPAP 2014    compliant    Stage 3a chronic kidney disease ([facility] [address] 10/8/2020            Past Surgical History:  Past Surgical History:   Procedure Laterality Date    COLONOSCOPY  2010     Dr. Osiris Hilario        HX DILATION AND CURETTAGE  2020    HYSTROSCOPY    HX GI      COLONOSCOPY    HX HEART CATHETERIZATION Left 2020    HX HEART CATHETERIZATION      HX HEENT      BCCA REMOVED FROM NOSE     HX HYSTERECTOMY  2020    HX IMPLANTABLE CARDIOVERTER DEFIBRILLATOR      HX OTHER SURGICAL      hysteroscopy surg    HX TONSILLECTOMY      [place] INSJ/RPLCMT PERM DFB W/TRNSVNS LDS 1/DUAL CHMBR N/A 2/15/2021    INSERT ICD BIV MULTI performed by Bertha Dasilva MD at [facility] CARDIAC CATH LAB       Family History:  Family History   Problem Relation Age of Onset    Heart Disease Mother     Breast Cancer Mother     Heart Disease Father     Kidney Disease Paternal Aunt         renal cancer    Colon Cancer Maternal Aunt     Cancer Paternal Uncle         leukemia    Anesth Problems Neg Hx        Social History:  Social History     Tobacco Use    Smoking status: Never Smoker    Smokeless tobacco: Never Used   Substance Use Topics    Alcohol use: No     Alcohol/week: 0.0 standard drinks    Drug use: No       Allergies: Allergies   Allergen Reactions    Erythromycin Rash         Review of Systems   Review of Systems   Constitutional: Negative for chills and fever. HENT: Negative for ear pain and sore throat. Eyes: Negative for redness and visual disturbance. Respiratory: Negative for cough and shortness of breath.     Cardiovascular: Negative for chest pain and palpitations. Gastrointestinal: Negative for abdominal pain, nausea and vomiting. Genitourinary: Negative for dysuria and hematuria. Musculoskeletal: Negative for back pain and gait problem. Positive for left calf pain   Skin: Negative for rash and wound. Neurological: Negative for dizziness and headaches. Psychiatric/Behavioral: Negative for behavioral problems and confusion. All other systems reviewed and are negative. Physical Exam   Physical Exam  Constitutional:       Appearance: She is not toxic-appearing. HENT:      Head: Normocephalic and atraumatic. Mouth/Throat:      Mouth: Mucous membranes are moist.   Eyes:      Extraocular Movements: Extraocular movements intact. Pupils: Pupils are equal, round, and reactive to light. Neck:      Musculoskeletal: Normal range of motion and neck supple. Cardiovascular:      Rate and Rhythm: Normal rate and regular rhythm. Pulses:           Dorsalis pedis pulses are 2+ on the left side. Pulmonary:      Effort: Pulmonary effort is normal. No respiratory distress. Musculoskeletal: Normal range of motion. General: No deformity. Comments: Normal inspection of the left lower leg. Mild tenderness to palpation over the proximal calf. Patient is able to dorsiflex and plantar flex the foot without difficulty. No overlying erythema or warmth. No edema. Skin:     General: Skin is warm and dry. Neurological:      General: No focal deficit present. Mental Status: She is alert and oriented to person, place, and time. Psychiatric:         Behavior: Behavior normal.           Diagnostic Study Results     Labs -   No results found for this or any previous visit (from the past 12 hour(s)).     Radiologic Studies -   No orders to display     CT Results  (Last 48 hours)    None        CXR Results  (Last 48 hours)    None            Medical Decision Making   I am the first provider for this patient. I reviewed the vital signs, available nursing notes, past medical history, past surgical history, family history and social history. Vital Signs-Reviewed the patient's vital signs. Patient Vitals for the past 12 hrs:   Temp Pulse Resp BP SpO2   04/03/21 1646 97.8 °F (36.6 °C) 83 16 (!) 152/72 99 %         Records Reviewed: Nursing Notes and Old Medical Records      Provider Notes (Medical Decision Making):   DDx: DVT, muscle strain, osteoarthritis    Duplex ultrasound is negative for DVT. Discussed symptomatic treatment, follow-up, and return precautions. ED Course:   Initial assessment performed. The patients presenting problems have been discussed, and they are in agreement with the care plan formulated and outlined with them. I have encouraged them to ask questions as they arise throughout their visit. Disposition:  7:05 PM  The patient has been re-evaluated and is ready for discharge. Reviewed available results with patient. Counseled patient on diagnosis and care plan. Patient has expressed understanding, and all questions have been answered. Patient agrees with plan and agrees to follow up as recommended, or to return to the ED if their symptoms worsen. Discharge instructions have been provided and explained to the patient, along with reasons to return to the ED. PLAN:  1. Discharge Medication List as of 4/3/2021  7:05 PM        2. Follow-up Information     Follow up With Specialties Details Why Contact Info    Ronal Bates MD Internal Medicine Schedule an appointment as soon as possible for a visit in 1 week for a recheck [address] [facility]  [phone]      [facility] EMERGENCY DEPT Emergency Medicine Go to  If symptoms worsen [address] [place] [address]  [phone]        Return to ED if worse     Diagnosis     Clinical Impression:   1. Pain of left calf            Donna Steinberg ASHKAN

## 2021-04-08 ENCOUNTER — PATIENT MESSAGE (OUTPATIENT)
Dept: CARDIOLOGY CLINIC | Age: 66
End: 2021-04-08

## 2021-04-08 RX ORDER — SACUBITRIL AND VALSARTAN 49; 51 MG/1; MG/1
1 TABLET, FILM COATED ORAL 2 TIMES DAILY
Qty: 180 TAB | Refills: 1 | Status: SHIPPED | OUTPATIENT
Start: 2021-04-08 | End: 2022-01-10 | Stop reason: SDUPTHER

## 2021-04-12 ENCOUNTER — OFFICE VISIT (OUTPATIENT)
Dept: SURGERY | Age: 66
End: 2021-04-12
Payer: MEDICARE

## 2021-04-12 VITALS
BODY MASS INDEX: 35.77 KG/M2 | TEMPERATURE: 97.5 F | OXYGEN SATURATION: 97 % | RESPIRATION RATE: 20 BRPM | WEIGHT: 236 LBS | HEART RATE: 78 BPM | DIASTOLIC BLOOD PRESSURE: 72 MMHG | HEIGHT: 68 IN | SYSTOLIC BLOOD PRESSURE: 138 MMHG

## 2021-04-12 DIAGNOSIS — D35.1 PARATHYROID ADENOMA: Primary | ICD-10-CM

## 2021-04-12 PROCEDURE — G8427 DOCREV CUR MEDS BY ELIG CLIN: HCPCS | Performed by: SURGERY

## 2021-04-12 PROCEDURE — 1101F PT FALLS ASSESS-DOCD LE1/YR: CPT | Performed by: SURGERY

## 2021-04-12 PROCEDURE — G8417 CALC BMI ABV UP PARAM F/U: HCPCS | Performed by: SURGERY

## 2021-04-12 PROCEDURE — 99204 OFFICE O/P NEW MOD 45 MIN: CPT | Performed by: SURGERY

## 2021-04-12 PROCEDURE — 1090F PRES/ABSN URINE INCON ASSESS: CPT | Performed by: SURGERY

## 2021-04-12 PROCEDURE — G9899 SCRN MAM PERF RSLTS DOC: HCPCS | Performed by: SURGERY

## 2021-04-12 PROCEDURE — G8536 NO DOC ELDER MAL SCRN: HCPCS | Performed by: SURGERY

## 2021-04-12 PROCEDURE — 3017F COLORECTAL CA SCREEN DOC REV: CPT | Performed by: SURGERY

## 2021-04-12 PROCEDURE — G9717 DOC PT DX DEP/BP F/U NT REQ: HCPCS | Performed by: SURGERY

## 2021-04-12 PROCEDURE — G8399 PT W/DXA RESULTS DOCUMENT: HCPCS | Performed by: SURGERY

## 2021-04-12 NOTE — Clinical Note
4/13/2021 Patient: Kari Olivas YOB: 1955 Date of Visit: 4/12/2021 Raman Guardado MD 
[address] [facility] [address] [address] [address] [employer] Tanya Patel MD 
[address] 
[address] [address] [address] [employer] Dear MD Tanya Olsen MD, Thank you for referring Ms. Kari Olivas to [facility] [address] for evaluation. My notes for this consultation are attached. If you have questions, please do not hesitate to call me. I look forward to following your patient along with you.  
 
 
Sincerely, 
 
Bridgette Jordan MD

## 2021-04-12 NOTE — PROGRESS NOTES
Identified pt with two pt identifiers(name and ). Reviewed record in preparation for visit and have obtained necessary documentation. All patient medications has been reviewed. Chief Complaint   Patient presents with    Thyroid Problem     seen at the request of Dr. Mose Sandifer eval hypercalcemia       Health Maintenance Due   Topic    COVID-19 Vaccine (2 - Moderna 2-dose series)       Vitals:    21 1042   Pulse: 78   Resp: 20   Temp: 97.5 °F (36.4 °C)   SpO2: 97%   Weight: 107 kg (236 lb)   Height: 5' 8\" (1.727 m)   PainSc:   0 - No pain       4. Have you been to the ER, urgent care clinic since your last visit? Hospitalized since your last visit? new patient    5. Have you seen or consulted any other health care providers outside of the [address] [place] [address] since your last visit? Include any pap smears or colon screening. new patient      Patient is accompanied by  I have received verbal consent from Soniya Olivas to discuss any/all medical information while they are present in the room.

## 2021-04-13 ENCOUNTER — TELEPHONE (OUTPATIENT)
Dept: CARDIOLOGY CLINIC | Age: 66
End: 2021-04-13

## 2021-04-13 NOTE — H&P (VIEW-ONLY)
To: Ilya Silvestre MD 
CC:  Magnolia Waite MD, Fracisco Steen MD 
 
From: Bridgette Jordan MD 
 
Thank you for sending Claudine Gonsales to see us. I enjoyed meeting her. Please note that this dictation was completed with [employer], the computer voice recognition software. Quite often unanticipated grammatical, syntax, homophones, and other interpretive errors are inadvertently transcribed by the software. Please disregard these errors. Please excuse any errors that have escaped final proofreading. Encounter Date: 4/12/2021 History and Physical 
 
Assessment:  
Likely lower left parathyroid adenoma. Hypercalcemia. Candidate 1.4cm gland noted on US. Body mass index is 35.88 kg/m². Comorbid arrhythmia and h/o CHF s/p AICD February this year, depression, HTN, INDER, mild anemia, arthritis. Plan/Recommendations/Medical Decision Making:  
Parathyroidectomy. Discussed transient hypocalcemia, injury to the recurrent laryngeal nerve. We also discussed the risks of bleeding, infection, and the risks of general anesthesia. After a lengthy discussion, she would like to proceed. I will have my office contact Dr. Juan Diego Rivera office to be sure there is nothing you need to do perioperatively with regard to her heart disease. HPI:  
Patient is a 74 y.o. female who is seen in consultation at the request of Ilya Silvestre for persistent hypercalcemia and a likely left lower parathyroid adenoma. She has had elevated calciums since at least 2017. She does have a history of endometrial cancer but nothing to suggest metastatic disease. She denies history of kidney stones. She has not had any pathologic fractures. She does admit to issues with depression for which she is on medication. She does not have any gastrointestinal symptoms besides chronic constipation. She has no history of prior neck operations. She did have her tonsils out at age 1. She has never had any neck radiation.   She has not had any issues with her thyroid. She denies hoarseness. Past Medical History:  
Diagnosis Date  AICD (automatic cardioverter/defibrillator) present  Arthritis OSTEO  
 Cancer ([facility] [address]   
 skin cancer removed from nose  Chronic kidney disease 2020  
 stage 3  Depression 2014  Endometrial cancer ([facility] [address]   GERD (gastroesophageal reflux disease)  Hyperlipidemia  Hypertension  Left bundle branch block   Neuropathy, peripheral 2014  INDER on CPAP 2014  
 compliant  Stage 3a chronic kidney disease ([facility] [address] 10/8/2020 Past Surgical History:  
Procedure Laterality Date  COLONOSCOPY  2010 Dr. Job José [id number] [facility]  
   HX DILATION AND CURETTAGE  2020 HYSTROSCOPY  
 HX GI    
 COLONOSCOPY  
 HX HEART CATHETERIZATION Left 2020  HX HEART CATHETERIZATION    
 HX HEENT   BCCA REMOVED FROM NOSE   
 HX HYSTERECTOMY  2020  HX IMPLANTABLE CARDIOVERTER DEFIBRILLATOR  HX OTHER SURGICAL    
 hysteroscopy surg [address]  [place] INSJ/RPLCMT PERM DFB W/TRNSVNS LDS 1/DUAL CHMBR N/A 2/15/2021 INSERT ICD BIV MULTI performed by Radha Conde MD at [facility] CARDIAC CATH LAB Social History Tobacco Use  Smoking status: Never Smoker  Smokeless tobacco: Never Used Substance Use Topics  Alcohol use: No  
  Alcohol/week: 0.0 standard drinks Family History Problem Relation Age of Onset  Heart Disease Mother  Breast Cancer Mother  Heart Disease Father  Kidney Disease Paternal Aunt   
     renal cancer  Colon Cancer Maternal Aunt  Cancer Paternal Uncle   
     leukemia  Anesth Problems Neg Hx Current Outpatient Medications Medication Sig  
 sacubitriL-valsartan (Entresto) 49-51 mg tab tablet Take 1 Tab by mouth two (2) times a day.  carvediloL (COREG) 6.25 mg tablet Take 1 Tab by mouth two (2) times a day.  atorvastatin (LIPITOR) 20 mg tablet TAKE 1 TABLET BY MOUTH EVERY DAY  risperiDONE (RisperDAL) 2 mg tablet TAKE 1 TABLET BY MOUTH EVERY DAY AT NIGHT  buPROPion XL (WELLBUTRIN XL) 300 mg XL tablet TAKE 1 TABLET BY MOUTH EVERY MORNING  
 ascorbic acid, vitamin C, (VITAMIN C) 500 mg tablet Take 500 mg by mouth daily (after breakfast).  vitamin e (E GEMS) 100 unit capsule Take 400 Units by mouth daily.  aspirin delayed-release 81 mg tablet Take 81 mg by mouth daily.  LOSARTAN POTASSIUM, BULK, 100 mg by Does Not Apply route daily. No current facility-administered medications for this visit. Allergies: Allergies Allergen Reactions  Erythromycin Rash Review of Systems:  10 systems reviewed. See scanned sheet in \"Media\" section. See HPI for pertinent positives and negatives. Objective:  
 
Visit Vitals /72 (BP 1 Location: Right upper arm, BP Patient Position: Sitting, BP Cuff Size: Adult) Pulse 78 Temp 97.5 °F (36.4 °C) Resp 20 Ht 5' 8\" (1.727 m) Wt 107 kg (236 lb) SpO2 97% BMI 35.88 kg/m² General appearance  Alert, cooperative, no distress, appears stated age HEENT  Anicteric. Neck Supple. No palpable mass. Nodule seen on hospital US confirmed here with [address]. Back   No CVA tenderness Lungs   CTAB Heart  Regular rate and rhythm. No murmur, rub or gallop Abdomen   Soft. NT. Extremities No CCE. Pulses 2+ right radial  
Skin Skin color, texture, turgor normal.   
Lymph nodes No palpable lymphadenopathy. Neurologic Without overt sensory or motor deficit Signed By: Stacey King MD   
 April 13, 2021

## 2021-04-13 NOTE — TELEPHONE ENCOUNTER
Need letter for cardiac clearance. Patient having perathyroidectomy  On May 5,2021.      [facility] Job CRENSHAW) with Dr Issa Armijo  [phone]    Thanks  Allison Liu

## 2021-04-13 NOTE — PROGRESS NOTES
To: Franca Gomez MD  CC:  Keyona Dimas MD, Nuvia Hodge MD    From: Patria Vila MD    Thank you for sending Sylvia Pina to see us. I enjoyed meeting her. Please note that this dictation was completed with [employer], the computer voice recognition software. Quite often unanticipated grammatical, syntax, homophones, and other interpretive errors are inadvertently transcribed by the software. Please disregard these errors. Please excuse any errors that have escaped final proofreading. Encounter Date: 4/12/2021  History and Physical    Assessment:   Likely lower left parathyroid adenoma. Hypercalcemia. Candidate 1.4cm gland noted on US. Body mass index is 35.88 kg/m². Comorbid arrhythmia and h/o CHF s/p AICD February this year, depression, HTN, INDER, mild anemia, arthritis. Plan/Recommendations/Medical Decision Making:   Parathyroidectomy. Discussed transient hypocalcemia, injury to the recurrent laryngeal nerve. We also discussed the risks of bleeding, infection, and the risks of general anesthesia. After a lengthy discussion, she would like to proceed. I will have my office contact Dr. Linden Salinas office to be sure there is nothing you need to do perioperatively with regard to her heart disease. HPI:   Patient is a 74 y.o. female who is seen in consultation at the request of Franca Gomez for persistent hypercalcemia and a likely left lower parathyroid adenoma. She has had elevated calciums since at least 2017. She does have a history of endometrial cancer but nothing to suggest metastatic disease. She denies history of kidney stones. She has not had any pathologic fractures. She does admit to issues with depression for which she is on medication. She does not have any gastrointestinal symptoms besides chronic constipation. She has no history of prior neck operations. She did have her tonsils out at age 1. She has never had any neck radiation.   She has not had any issues with her thyroid. She denies hoarseness. Past Medical History:   Diagnosis Date    AICD (automatic cardioverter/defibrillator) present     Arthritis     OSTEO    Cancer ([facility] [address]     skin cancer removed from nose    Chronic kidney disease 2020    stage 3     Depression 2014    Endometrial cancer ([facility] [address] [facility]     GERD (gastroesophageal reflux disease)     Hyperlipidemia     Hypertension     Left bundle branch block     Neuropathy, peripheral 2014    INDER on CPAP 2014    compliant    Stage 3a chronic kidney disease ([facility] [address] 10/8/2020           Past Surgical History:   Procedure Laterality Date    COLONOSCOPY  2010     Dr. Hare Foot        HX DILATION AND CURETTAGE  2020    HYSTROSCOPY    HX GI      COLONOSCOPY    HX HEART CATHETERIZATION Left 2020    HX HEART CATHETERIZATION      HX HEENT      BCCA REMOVED FROM NOSE     HX HYSTERECTOMY  2020    HX IMPLANTABLE CARDIOVERTER DEFIBRILLATOR      HX OTHER SURGICAL      hysteroscopy surg    HX TONSILLECTOMY      [place] INSJ/RPLCMT PERM DFB W/TRNSVNS LDS 1/DUAL CHMBR N/A 2/15/2021    INSERT ICD BIV MULTI performed by Kelly Valadez MD at [facility] CARDIAC CATH LAB     Social History     Tobacco Use    Smoking status: Never Smoker    Smokeless tobacco: Never Used   Substance Use Topics    Alcohol use: No     Alcohol/week: 0.0 standard drinks      Family History   Problem Relation Age of Onset    Heart Disease Mother     Breast Cancer Mother     Heart Disease Father     Kidney Disease Paternal Aunt         renal cancer    Colon Cancer Maternal Aunt     Cancer Paternal Uncle         leukemia    Anesth Problems Neg Hx        Current Outpatient Medications   Medication Sig    sacubitriL-valsartan (Entresto) 49-51 mg tab tablet Take 1 Tab by mouth two (2) times a day.  carvediloL (COREG) 6.25 mg tablet Take 1 Tab by mouth two (2) times a day.  atorvastatin (LIPITOR) 20 mg tablet TAKE 1 TABLET BY MOUTH EVERY DAY    risperiDONE (RisperDAL) 2 mg tablet TAKE 1 TABLET BY MOUTH EVERY DAY AT NIGHT    buPROPion XL (WELLBUTRIN XL) 300 mg XL tablet TAKE 1 TABLET BY MOUTH EVERY MORNING    ascorbic acid, vitamin C, (VITAMIN C) 500 mg tablet Take 500 mg by mouth daily (after breakfast).  vitamin e (E GEMS) 100 unit capsule Take 400 Units by mouth daily.  aspirin delayed-release 81 mg tablet Take 81 mg by mouth daily.  LOSARTAN POTASSIUM, BULK, 100 mg by Does Not Apply route daily. No current facility-administered medications for this visit. Allergies: Allergies   Allergen Reactions    Erythromycin Rash        Review of Systems:  10 systems reviewed. See scanned sheet in \"Media\" section. See HPI for pertinent positives and negatives. Objective:     Visit Vitals  /72 (BP 1 Location: Right upper arm, BP Patient Position: Sitting, BP Cuff Size: Adult)   Pulse 78   Temp 97.5 °F (36.4 °C)   Resp 20   Ht 5' 8\" (1.727 m)   Wt 107 kg (236 lb)   SpO2 97%   BMI 35.88 kg/m²     General appearance  Alert, cooperative, no distress, appears stated age   [de-identified]  Anicteric. Neck Supple. No palpable mass. Nodule seen on hospital US confirmed here with [address]. Back   No CVA tenderness   Lungs   CTAB   Heart  Regular rate and rhythm. No murmur, rub or gallop   Abdomen   Soft. NT. Extremities No CCE. Pulses 2+ right radial   Skin Skin color, texture, turgor normal.    Lymph nodes No palpable lymphadenopathy.    Neurologic Without overt sensory or motor deficit       Signed By: Chandrakant Ponce MD     April 13, 2021

## 2021-04-13 NOTE — TELEPHONE ENCOUNTER
Spoke with patient. Verified patient with two patient identifiers. Advised she is cleared for surgery at moderate risk. May hold ASA 7 days preop, resume ASAP after. Faxing this note, last OV and EKG to Brooke Garcia with Dr. Joann Leiva at fax # [phone] (fax # given to me by answering service). Patient verbalized understanding.

## 2021-04-13 NOTE — TELEPHONE ENCOUNTER
She can be cleared at moderate risk for surgery with reduced EF, recently started on new medication for management.  She can hold ASA 7 days prior, resume asap

## 2021-04-16 RX ORDER — RISPERIDONE 2 MG/1
TABLET, FILM COATED ORAL
Qty: 90 TAB | Refills: 0 | Status: ON HOLD | OUTPATIENT
Start: 2021-04-16 | End: 2021-05-05

## 2021-04-23 ENCOUNTER — HOSPITAL ENCOUNTER (OUTPATIENT)
Dept: PREADMISSION TESTING | Age: 66
Discharge: HOME | End: 2021-04-23
Attending: SURGERY
Payer: MEDICARE

## 2021-04-23 VITALS
RESPIRATION RATE: 20 BRPM | HEIGHT: 68 IN | HEART RATE: 65 BPM | DIASTOLIC BLOOD PRESSURE: 63 MMHG | SYSTOLIC BLOOD PRESSURE: 132 MMHG | BODY MASS INDEX: 36.35 KG/M2 | OXYGEN SATURATION: 98 % | TEMPERATURE: 97.6 F | WEIGHT: 239.86 LBS

## 2021-04-23 LAB
ATRIAL RATE: 66 BPM
CALCULATED P AXIS, ECG09: 30 DEGREES
CALCULATED R AXIS, ECG10: 81 DEGREES
CALCULATED T AXIS, ECG11: 34 DEGREES
DIAGNOSIS, 93000: NORMAL
ERYTHROCYTE [DISTWIDTH] IN BLOOD BY AUTOMATED COUNT: 12.8 % (ref 11.5–14.5)
HCT VFR BLD AUTO: 36.7 % (ref 35–47)
HGB BLD-MCNC: 11.6 G/DL (ref 11.5–16)
MCH RBC QN AUTO: 29.5 PG (ref 26–34)
MCHC RBC AUTO-ENTMCNC: 31.6 G/DL (ref 30–36.5)
MCV RBC AUTO: 93.4 FL (ref 80–99)
NRBC # BLD: 0 K/UL (ref 0–0.01)
NRBC BLD-RTO: 0 PER 100 WBC
P-R INTERVAL, ECG05: 170 MS
PLATELET # BLD AUTO: 187 K/UL (ref 150–400)
PMV BLD AUTO: 9.7 FL (ref 8.9–12.9)
Q-T INTERVAL, ECG07: 414 MS
QRS DURATION, ECG06: 116 MS
QTC CALCULATION (BEZET), ECG08: 434 MS
RBC # BLD AUTO: 3.93 M/UL (ref 3.8–5.2)
VENTRICULAR RATE, ECG03: 66 BPM
WBC # BLD AUTO: 5 K/UL (ref 3.6–11)

## 2021-04-23 PROCEDURE — 85027 COMPLETE CBC AUTOMATED: CPT

## 2021-04-23 PROCEDURE — 93005 ELECTROCARDIOGRAM TRACING: CPT

## 2021-04-23 PROCEDURE — 36415 COLL VENOUS BLD VENIPUNCTURE: CPT

## 2021-04-23 RX ORDER — CEFAZOLIN SODIUM 1 G/3ML
2 INJECTION, POWDER, FOR SOLUTION INTRAMUSCULAR; INTRAVENOUS ONCE
Status: CANCELLED | OUTPATIENT
Start: 2021-05-05 | End: 2021-05-05

## 2021-04-23 RX ORDER — VITAMIN E 268 MG
400 CAPSULE ORAL DAILY
COMMUNITY
End: 2022-10-12

## 2021-04-23 NOTE — PERIOP NOTES
[facility]  Preoperative Instructions    COVID TESTING/May 1 Saturday  MOB1 [place] [address]  Anytime between 7 am and 09:45  Surgery Date 05/05/21         Time of Arrival 0730  Contact # [phone] [place] Talavera call home number [phone] and leave message    1. On the day of your surgery, please report to the Surgical Services Registration Desk and sign in at your designated time. The Surgery Center is located to the right of the Emergency Room. 2. You must have someone with you to drive you home. You should not drive a car for 24 hours following surgery. Please make arrangements for a friend or family member to stay with you for the first 24 hours after your surgery. 3. Do not have anything to eat or drink (including water, gum, mints, coffee, juice) after midnight ?05/04/21   . ? This may not apply to medications prescribed by your physician. ?(Please note below the special instructions with medications to take the morning of your procedure.)    4. We recommend you do not drink any alcoholic beverages for 24 hours before and after your surgery. 5. Contact your surgeons office for instructions on the following medications: non-steroidal anti-inflammatory drugs (i.e. Advil, Aleve), vitamins, and supplements. (Some surgeons will want you to stop these medications prior to surgery and others may allow you to take them)  **If you are currently taking Plavix, Coumadin, Aspirin and/or other blood-thinning agents, contact your surgeon for instructions. ** Your surgeon will partner with the physician prescribing these medications to determine if it is safe to stop or if you need to continue taking. Please do not stop taking these medications without instructions from your surgeon    6. Wear comfortable clothes. Wear glasses instead of contacts. Do not bring any money or jewelry. Please bring picture ID, insurance card, and any prearranged co-payment or hospital payment.   Do not wear make-up, particularly mascara the morning of your surgery. Do not wear nail polish, particularly if you are having foot /hand surgery. Wear your hair loose or down, no ponytails, buns, richie pins or clips. All body piercings must be removed. Please shower with antibacterial soap for three consecutive days before and on the morning of surgery, but do not apply any lotions, powders or deodorants after the shower on the day of surgery. Please use a fresh towels after each shower. Please sleep in clean clothes and change bed linens the night before surgery. Please do not shave for 48 hours prior to surgery. Shaving of the face is acceptable. 7. You should understand that if you do not follow these instructions your surgery may be cancelled. If your physical condition changes (I.e. fever, cold or flu) please contact your surgeon as soon as possible. 8. It is important that you be on time. If a situation occurs where you may be late, please call [phone] (OR Holding Area). 9. If you have any questions and or problems, please call [phone] (Pre-admission Testing). 10. Your surgery time may be subject to change. You will receive a phone call the evening prior if your time changes. 11.  If having outpatient surgery, you must have someone to drive you here, stay with you during the duration of your stay, and to drive you home at time of discharge. Special Instructions: Bring c-pap to the hospital  Stop aspirin per cardiologist 7 days prior to surgery  TAKE ALL MEDICATIONS DAY OF SURGERY EXCEPT:aspirin/vitamins      I understand a pre-operative phone call will be made to verify my surgery time. In the event that I am not available, I give permission for a message to be left on my answering service and/or with another person?   yes         ___________________      __________   _________    (Signature of Patient)             (Witness)                (Date and Time)

## 2021-04-30 ENCOUNTER — TELEPHONE (OUTPATIENT)
Dept: SURGERY | Age: 66
End: 2021-04-30

## 2021-04-30 NOTE — TELEPHONE ENCOUNTER
Have questions before surgery. If you cannot reach her at the first number, please call her on [phone].

## 2021-04-30 NOTE — TELEPHONE ENCOUNTER
Informed patient incision will most likely be closed with dissolving sutures covered with dermabond & she probably be able to shower in a couple days. Express understanding.

## 2021-05-01 ENCOUNTER — HOSPITAL ENCOUNTER (OUTPATIENT)
Dept: PREADMISSION TESTING | Age: 66
Discharge: HOME | End: 2021-05-01
Payer: MEDICARE

## 2021-05-01 LAB — SARS-COV-2, COV2: NORMAL

## 2021-05-01 PROCEDURE — U0003 INFECTIOUS AGENT DETECTION BY NUCLEIC ACID (DNA OR RNA); SEVERE ACUTE RESPIRATORY SYNDROME CORONAVIRUS 2 (SARS-COV-2) (CORONAVIRUS DISEASE [COVID-19]), AMPLIFIED PROBE TECHNIQUE, MAKING USE OF HIGH THROUGHPUT TECHNOLOGIES AS DESCRIBED BY CMS-2020-01-R: HCPCS

## 2021-05-02 LAB — SARS-COV-2, COV2NT: NOT DETECTED

## 2021-05-05 ENCOUNTER — ANESTHESIA (OUTPATIENT)
Dept: SURGERY | Age: 66
End: 2021-05-05
Payer: MEDICARE

## 2021-05-05 ENCOUNTER — ANESTHESIA EVENT (OUTPATIENT)
Dept: SURGERY | Age: 66
End: 2021-05-05
Payer: MEDICARE

## 2021-05-05 ENCOUNTER — HOSPITAL ENCOUNTER (OUTPATIENT)
Age: 66
Setting detail: OUTPATIENT SURGERY
Discharge: HOME | End: 2021-05-05
Attending: SURGERY | Admitting: SURGERY
Payer: MEDICARE

## 2021-05-05 VITALS
HEIGHT: 68 IN | TEMPERATURE: 98 F | RESPIRATION RATE: 16 BRPM | WEIGHT: 234.13 LBS | OXYGEN SATURATION: 97 % | SYSTOLIC BLOOD PRESSURE: 143 MMHG | DIASTOLIC BLOOD PRESSURE: 68 MMHG | BODY MASS INDEX: 35.48 KG/M2 | HEART RATE: 67 BPM

## 2021-05-05 DIAGNOSIS — R73.9 HYPERGLYCEMIA: ICD-10-CM

## 2021-05-05 DIAGNOSIS — E83.52 HYPERCALCEMIA: Primary | ICD-10-CM

## 2021-05-05 PROCEDURE — 74011000250 HC RX REV CODE- 250: Performed by: SURGERY

## 2021-05-05 PROCEDURE — 74011250636 HC RX REV CODE- 250/636: Performed by: NURSE ANESTHETIST, CERTIFIED REGISTERED

## 2021-05-05 PROCEDURE — 77030031139 HC SUT VCRL2 J&J -A: Performed by: SURGERY

## 2021-05-05 PROCEDURE — 77030002933 HC SUT MCRYL J&J -A: Performed by: SURGERY

## 2021-05-05 PROCEDURE — 76210000016 HC OR PH I REC 1 TO 1.5 HR: Performed by: SURGERY

## 2021-05-05 PROCEDURE — 74011250636 HC RX REV CODE- 250/636: Performed by: ANESTHESIOLOGY

## 2021-05-05 PROCEDURE — 77030010938 HC CLP LIG TELE -A: Performed by: SURGERY

## 2021-05-05 PROCEDURE — 88331 PATH CONSLTJ SURG 1 BLK 1SPC: CPT

## 2021-05-05 PROCEDURE — 76060000034 HC ANESTHESIA 1.5 TO 2 HR: Performed by: SURGERY

## 2021-05-05 PROCEDURE — 60500 EXPLORE PARATHYROID GLANDS: CPT | Performed by: SURGERY

## 2021-05-05 PROCEDURE — 76210000021 HC REC RM PH II 0.5 TO 1 HR: Performed by: SURGERY

## 2021-05-05 PROCEDURE — 74011250637 HC RX REV CODE- 250/637: Performed by: SURGERY

## 2021-05-05 PROCEDURE — 2709999900 HC NON-CHARGEABLE SUPPLY: Performed by: SURGERY

## 2021-05-05 PROCEDURE — 74011250636 HC RX REV CODE- 250/636: Performed by: SURGERY

## 2021-05-05 PROCEDURE — 77030031753 HC SHR ENDO COAG HARM J&J -E: Performed by: SURGERY

## 2021-05-05 PROCEDURE — 74011000250 HC RX REV CODE- 250: Performed by: NURSE ANESTHETIST, CERTIFIED REGISTERED

## 2021-05-05 PROCEDURE — 77030008698 HC TU ET REINF MEDT -D: Performed by: NURSE ANESTHETIST, CERTIFIED REGISTERED

## 2021-05-05 PROCEDURE — 76010000162 HC OR TIME 1.5 TO 2 HR INTENSV-TIER 1: Performed by: SURGERY

## 2021-05-05 PROCEDURE — 77030003028 HC SUT VCRL J&J -A: Performed by: SURGERY

## 2021-05-05 PROCEDURE — 77030026438 HC STYL ET INTUB CARD -A: Performed by: NURSE ANESTHETIST, CERTIFIED REGISTERED

## 2021-05-05 PROCEDURE — 77030040361 HC SLV COMPR DVT MDII -B: Performed by: SURGERY

## 2021-05-05 PROCEDURE — 77030011267 HC ELECTRD BLD COVD -A: Performed by: SURGERY

## 2021-05-05 PROCEDURE — 88305 TISSUE EXAM BY PATHOLOGIST: CPT

## 2021-05-05 PROCEDURE — 77030002996 HC SUT SLK J&J -A: Performed by: SURGERY

## 2021-05-05 PROCEDURE — 77030010507 HC ADH SKN DERMBND J&J -B: Performed by: SURGERY

## 2021-05-05 PROCEDURE — 77030038692 HC WND DEB SYS IRMX -B: Performed by: SURGERY

## 2021-05-05 RX ORDER — MICONAZOLE NITRATE 2 %
1 CREAM WITH APPLICATOR VAGINAL 4 TIMES DAILY
Qty: 40 TAB | Refills: 1 | Status: SHIPPED | OUTPATIENT
Start: 2021-05-05 | End: 2021-08-20

## 2021-05-05 RX ORDER — FENTANYL CITRATE 50 UG/ML
25 INJECTION, SOLUTION INTRAMUSCULAR; INTRAVENOUS
Status: DISCONTINUED | OUTPATIENT
Start: 2021-05-05 | End: 2021-05-05 | Stop reason: HOSPADM

## 2021-05-05 RX ORDER — HYDROMORPHONE HYDROCHLORIDE 1 MG/ML
0.2 INJECTION, SOLUTION INTRAMUSCULAR; INTRAVENOUS; SUBCUTANEOUS
Status: DISCONTINUED | OUTPATIENT
Start: 2021-05-05 | End: 2021-05-05 | Stop reason: HOSPADM

## 2021-05-05 RX ORDER — SODIUM CHLORIDE, SODIUM LACTATE, POTASSIUM CHLORIDE, CALCIUM CHLORIDE 600; 310; 30; 20 MG/100ML; MG/100ML; MG/100ML; MG/100ML
25 INJECTION, SOLUTION INTRAVENOUS CONTINUOUS
Status: DISCONTINUED | OUTPATIENT
Start: 2021-05-05 | End: 2021-05-05 | Stop reason: HOSPADM

## 2021-05-05 RX ORDER — MIDAZOLAM HYDROCHLORIDE 1 MG/ML
INJECTION, SOLUTION INTRAMUSCULAR; INTRAVENOUS AS NEEDED
Status: DISCONTINUED | OUTPATIENT
Start: 2021-05-05 | End: 2021-05-05 | Stop reason: HOSPADM

## 2021-05-05 RX ORDER — SODIUM CHLORIDE 0.9 % (FLUSH) 0.9 %
5-40 SYRINGE (ML) INJECTION AS NEEDED
Status: DISCONTINUED | OUTPATIENT
Start: 2021-05-05 | End: 2021-05-05 | Stop reason: HOSPADM

## 2021-05-05 RX ORDER — RISPERIDONE 2 MG/1
TABLET, FILM COATED ORAL
Qty: 90 TAB | Refills: 0 | Status: SHIPPED | OUTPATIENT
Start: 2021-05-05 | End: 2021-10-11 | Stop reason: SDUPTHER

## 2021-05-05 RX ORDER — BUPIVACAINE HYDROCHLORIDE 5 MG/ML
INJECTION, SOLUTION EPIDURAL; INTRACAUDAL AS NEEDED
Status: DISCONTINUED | OUTPATIENT
Start: 2021-05-05 | End: 2021-05-05 | Stop reason: HOSPADM

## 2021-05-05 RX ORDER — SODIUM CHLORIDE 0.9 % (FLUSH) 0.9 %
5-40 SYRINGE (ML) INJECTION EVERY 8 HOURS
Status: DISCONTINUED | OUTPATIENT
Start: 2021-05-05 | End: 2021-05-05 | Stop reason: HOSPADM

## 2021-05-05 RX ORDER — DEXAMETHASONE SODIUM PHOSPHATE 4 MG/ML
INJECTION, SOLUTION INTRA-ARTICULAR; INTRALESIONAL; INTRAMUSCULAR; INTRAVENOUS; SOFT TISSUE AS NEEDED
Status: DISCONTINUED | OUTPATIENT
Start: 2021-05-05 | End: 2021-05-05 | Stop reason: HOSPADM

## 2021-05-05 RX ORDER — SUCCINYLCHOLINE CHLORIDE 20 MG/ML
INJECTION INTRAMUSCULAR; INTRAVENOUS AS NEEDED
Status: DISCONTINUED | OUTPATIENT
Start: 2021-05-05 | End: 2021-05-05 | Stop reason: HOSPADM

## 2021-05-05 RX ORDER — DEXMEDETOMIDINE HYDROCHLORIDE 100 UG/ML
INJECTION, SOLUTION INTRAVENOUS AS NEEDED
Status: DISCONTINUED | OUTPATIENT
Start: 2021-05-05 | End: 2021-05-05 | Stop reason: HOSPADM

## 2021-05-05 RX ORDER — PHENYLEPHRINE HCL IN 0.9% NACL 0.4MG/10ML
SYRINGE (ML) INTRAVENOUS AS NEEDED
Status: DISCONTINUED | OUTPATIENT
Start: 2021-05-05 | End: 2021-05-05 | Stop reason: HOSPADM

## 2021-05-05 RX ORDER — POLYETHYLENE GLYCOL 3350 17 G/17G
17 POWDER, FOR SOLUTION ORAL DAILY
Qty: 510 G | Refills: 0 | Status: SHIPPED | OUTPATIENT
Start: 2021-05-05 | End: 2021-08-20

## 2021-05-05 RX ORDER — FENTANYL CITRATE 50 UG/ML
INJECTION, SOLUTION INTRAMUSCULAR; INTRAVENOUS AS NEEDED
Status: DISCONTINUED | OUTPATIENT
Start: 2021-05-05 | End: 2021-05-05 | Stop reason: HOSPADM

## 2021-05-05 RX ORDER — LIDOCAINE HYDROCHLORIDE 10 MG/ML
0.1 INJECTION, SOLUTION EPIDURAL; INFILTRATION; INTRACAUDAL; PERINEURAL AS NEEDED
Status: DISCONTINUED | OUTPATIENT
Start: 2021-05-05 | End: 2021-05-05 | Stop reason: HOSPADM

## 2021-05-05 RX ORDER — ONDANSETRON 2 MG/ML
INJECTION INTRAMUSCULAR; INTRAVENOUS AS NEEDED
Status: DISCONTINUED | OUTPATIENT
Start: 2021-05-05 | End: 2021-05-05 | Stop reason: HOSPADM

## 2021-05-05 RX ORDER — CEFAZOLIN SODIUM 1 G/3ML
2 INJECTION, POWDER, FOR SOLUTION INTRAMUSCULAR; INTRAVENOUS ONCE
Status: DISCONTINUED | OUTPATIENT
Start: 2021-05-05 | End: 2021-05-05 | Stop reason: CLARIF

## 2021-05-05 RX ORDER — HYDROCODONE BITARTRATE AND ACETAMINOPHEN 5; 325 MG/1; MG/1
1-2 TABLET ORAL
Qty: 30 TAB | Refills: 0 | Status: SHIPPED | OUTPATIENT
Start: 2021-05-05 | End: 2021-05-10

## 2021-05-05 RX ORDER — PROPOFOL 10 MG/ML
INJECTION, EMULSION INTRAVENOUS AS NEEDED
Status: DISCONTINUED | OUTPATIENT
Start: 2021-05-05 | End: 2021-05-05 | Stop reason: HOSPADM

## 2021-05-05 RX ORDER — LIDOCAINE HYDROCHLORIDE 20 MG/ML
INJECTION, SOLUTION EPIDURAL; INFILTRATION; INTRACAUDAL; PERINEURAL AS NEEDED
Status: DISCONTINUED | OUTPATIENT
Start: 2021-05-05 | End: 2021-05-05 | Stop reason: HOSPADM

## 2021-05-05 RX ORDER — DIPHENHYDRAMINE HYDROCHLORIDE 50 MG/ML
12.5 INJECTION, SOLUTION INTRAMUSCULAR; INTRAVENOUS AS NEEDED
Status: DISCONTINUED | OUTPATIENT
Start: 2021-05-05 | End: 2021-05-05 | Stop reason: HOSPADM

## 2021-05-05 RX ORDER — IBUPROFEN 600 MG/1
600 TABLET ORAL
Qty: 20 TAB | Refills: 0 | Status: SHIPPED | OUTPATIENT
Start: 2021-05-05 | End: 2021-08-20

## 2021-05-05 RX ADMIN — DEXMEDETOMIDINE HYDROCHLORIDE 2 MCG: 100 INJECTION, SOLUTION, CONCENTRATE INTRAVENOUS at 10:05

## 2021-05-05 RX ADMIN — PROPOFOL 150 MG: 10 INJECTION, EMULSION INTRAVENOUS at 09:42

## 2021-05-05 RX ADMIN — DEXMEDETOMIDINE HYDROCHLORIDE 2 MCG: 100 INJECTION, SOLUTION, CONCENTRATE INTRAVENOUS at 10:16

## 2021-05-05 RX ADMIN — MIDAZOLAM HYDROCHLORIDE 1 MG: 1 INJECTION, SOLUTION INTRAMUSCULAR; INTRAVENOUS at 09:34

## 2021-05-05 RX ADMIN — Medication 40 MCG: at 09:57

## 2021-05-05 RX ADMIN — DEXAMETHASONE SODIUM PHOSPHATE 4 MG: 4 INJECTION, SOLUTION INTRAMUSCULAR; INTRAVENOUS at 09:56

## 2021-05-05 RX ADMIN — DEXMEDETOMIDINE HYDROCHLORIDE 2 MCG: 100 INJECTION, SOLUTION, CONCENTRATE INTRAVENOUS at 10:24

## 2021-05-05 RX ADMIN — SUCCINYLCHOLINE CHLORIDE 140 MG: 20 INJECTION, SOLUTION INTRAMUSCULAR; INTRAVENOUS at 09:42

## 2021-05-05 RX ADMIN — SODIUM CHLORIDE, POTASSIUM CHLORIDE, SODIUM LACTATE AND CALCIUM CHLORIDE: 600; 310; 30; 20 INJECTION, SOLUTION INTRAVENOUS at 09:32

## 2021-05-05 RX ADMIN — FENTANYL CITRATE 25 MCG: 50 INJECTION, SOLUTION INTRAMUSCULAR; INTRAVENOUS at 11:03

## 2021-05-05 RX ADMIN — DEXMEDETOMIDINE HYDROCHLORIDE 2 MCG: 100 INJECTION, SOLUTION, CONCENTRATE INTRAVENOUS at 10:31

## 2021-05-05 RX ADMIN — WATER 2 G: 1 INJECTION INTRAMUSCULAR; INTRAVENOUS; SUBCUTANEOUS at 09:50

## 2021-05-05 RX ADMIN — MIDAZOLAM HYDROCHLORIDE 1 MG: 1 INJECTION, SOLUTION INTRAMUSCULAR; INTRAVENOUS at 09:37

## 2021-05-05 RX ADMIN — Medication 3 AMPULE: at 08:42

## 2021-05-05 RX ADMIN — SODIUM CHLORIDE, POTASSIUM CHLORIDE, SODIUM LACTATE AND CALCIUM CHLORIDE 25 ML/HR: 600; 310; 30; 20 INJECTION, SOLUTION INTRAVENOUS at 08:42

## 2021-05-05 RX ADMIN — DEXMEDETOMIDINE HYDROCHLORIDE 2 MCG: 100 INJECTION, SOLUTION, CONCENTRATE INTRAVENOUS at 10:21

## 2021-05-05 RX ADMIN — DEXMEDETOMIDINE HYDROCHLORIDE 2 MCG: 100 INJECTION, SOLUTION, CONCENTRATE INTRAVENOUS at 10:07

## 2021-05-05 RX ADMIN — LIDOCAINE HYDROCHLORIDE 50 MG: 20 INJECTION, SOLUTION EPIDURAL; INFILTRATION; INTRACAUDAL; PERINEURAL at 09:42

## 2021-05-05 RX ADMIN — FENTANYL CITRATE 50 MCG: 50 INJECTION, SOLUTION INTRAMUSCULAR; INTRAVENOUS at 09:42

## 2021-05-05 RX ADMIN — Medication 40 MCG: at 10:09

## 2021-05-05 RX ADMIN — ONDANSETRON HYDROCHLORIDE 4 MG: 2 INJECTION, SOLUTION INTRAMUSCULAR; INTRAVENOUS at 10:49

## 2021-05-05 NOTE — PERIOP NOTES
Irrisept Wound Debridement and Cleansing System  Ref: Belkis Hunger: [id number] LOT: [id number] Expiration Date: 12/31/2022

## 2021-05-05 NOTE — ANESTHESIA PREPROCEDURE EVALUATION
Relevant Problems   RESPIRATORY SYSTEM   (+) INDER on CPAP      NEUROLOGY   (+) Depression      CARDIOVASCULAR   (+) Chronic systolic congestive heart failure (HCC)   (+) Hypertension   (+) Left bundle branch block      GASTROINTESTINAL   (+) GERD (gastroesophageal reflux disease)      RENAL FAILURE   (+) Stage 3a chronic kidney disease (HCC)      ENDOCRINE   (+) Severe obesity (HCC)      PERSONAL HX & FAMILY HX OF CANCER   (+) Endometrial cancer (HCC)       Anesthetic History   No history of anesthetic complications            Review of Systems / Medical History  Patient summary reviewed, nursing notes reviewed and pertinent labs reviewed    Pulmonary  Within defined limits      Sleep apnea: CPAP           Neuro/Psych         Psychiatric history    Comments: Peripheral Neuropathy  Depression Cardiovascular    Hypertension  Valvular problems/murmurs: tricuspid insufficiency    CHF  Dysrhythmias   Hyperlipidemia    Exercise tolerance: >4 METS  Comments: Non-ischemic cardiomyopathy s/p Biventricular ICD placement (2/15/21)    ECG (4/23/21):  Electronic ventricular pacemaker   When compared with ECG of 19-JUN-2020 01:00,   Electronic ventricular pacemaker has replaced Sinus rhythm    Cath (12/3/20):  ·Normal coronaries  ·Non-ischemic cardiomyopathy; will rec bi-V pacer    TTE (5/11/20):  ·Normal left ventricular cavity size. Estimated left ventricular ejection fraction is 30 - 35%. Abnormal left ventricular septal motion consistent with interventricular conduction delay (IVCD). Abnormal left ventricular strain. Mild (grade 1) left ventricular diastolic dysfunction. ·Mild mitral valve regurgitation is present. ·Mild tricuspid valve regurgitation is present.    GI/Hepatic/Renal     GERD    Renal disease: CRI      Comments: CRI, Stage III Endo/Other        Morbid obesity, arthritis and cancer    Comments: Parathyroid Adenoma    H/O Endometrial Cancer s/p Hysterectomy (2/20/20) Other Findings              Physical Exam    Airway  Mallampati: III  TM Distance: < 4 cm  Neck ROM: normal range of motion   Mouth opening: Normal     Cardiovascular  Regular rate and rhythm,  S1 and S2 normal,  no murmur, click, rub, or gallop             Dental    Dentition: Caps/crowns  Comments: Several missing teeth, none loose. Pulmonary  Breath sounds clear to auscultation               Abdominal  GI exam deferred       Other Findings            Anesthetic Plan    ASA: 3  Anesthesia type: general          Induction: Intravenous  Anesthetic plan and risks discussed with: Patient      Previous Grade II view with MAC 3 blade - DVL x 2 attempts at [address] on 2/20/20.

## 2021-05-05 NOTE — PERIOP NOTES
Handoff Report from Operating Room to PACU    Report received from Morales Estrada RN and Reyna Cintron CRNA regarding Vick Nair. Surgeon(s):  Kadi West MD  And Procedure(s) (LRB):  LEFT LOWER PARATHYROIDECTOMY (Left)  confirmed   with dressings discussed. Anesthesia type, drugs, patient history, complications, estimated blood loss, vital signs, intake and output, and last pain medication, lines and temperature were reviewed.

## 2021-05-05 NOTE — BRIEF OP NOTE
[id number]  Brief Postoperative Note    Patient: Kayden Olivas  YOB: 1955  MRN: [id number]    Date of Procedure: 5/5/2021     Pre-Op Diagnosis: Parathyroid adenoma, likely left lower    Post-Op Diagnosis: Same as preoperative diagnosis.       Procedure(s):  LEFT LOWER PARATHYROIDECTOMY    Surgeon(s):  Luis A Benz MD    Surgical Assistant: Surg Asst-1: Jane Kaminski    Anesthesia: General     Estimated Blood Loss (mL): Minimal    Complications: None immediate  Specimens:   ID Type Source Tests Collected by Time Destination   1 : stich marks pedicle Frozen Section Parathyroid  Luis A Benz MD 5/5/2021 1015 Pathology        Implants: * No implants in log *    Drains: * No LDAs found *    Findings: large sub-clavicular left lower parathyroid    Electronically Signed by Colten Caruso MD on 5/5/2021 at 10:52 AM

## 2021-05-05 NOTE — DISCHARGE INSTRUCTIONS
Discharge Instructions: Dr. Cinthia Downs    Call on next business day to arrange appointment for follow up in 2-3 weeks [phone]    Activity:  Walk regularly - can walk today as tolerated, but make yourself walk at least 50 yards at least 6 times per day starting tomorrow. No lifting more than 10 -15 pounds for 1-2 weeks. You may resume driving in 5-7 days unless still requiring narcotics for pain. Work:  You may return to work in 1 or 2 weeks to light activity. No lifting more than 10 pounds (=\"light duty\") for four weeks. If your employer can not accommodate \"light duty,\" your employer will need to provide any necessary paperwork to our office. This document with serve as the initial \"note\" to your employer. Wound Care:  Remove tape and gauze 2 days after surgery. Leave the steri-strips in place until your post-op visit. You may shower, but no swimming or baths for 2 weeks. Your incisions may ooze for a few days. Do not worry about this. If you experience a lot of drainage, develop redness around the wound, or a fever over 101 F occurs please call the office. Medications:  Resume home medications as indicated on the Medical Reconciliation form. Do not use blood thinners (such as Aspirin, Coumadin, or Plavix) until 3 days after surgery. Start taking Citracal (calcium + vit D) using the dose schedule below. This is very important. If you do not take the Citracal, you blood calcium will drop. Low blood calcium is dangerous. Citracal dosing schedule:   4 calcium pills per day for one week (5 if your calcium is over 12; 6 pills if your calcium is over 13)    After one week start taking 3 calcium pills per day until you office appointment. (4 if your calcium was over 12)     The Symptoms of LOW calcium are:   Tingling around your mouth, lips or finger tips. Cramps in your hands or feet. Feeling poor, confused, and \"in a fog\".     If you have symptoms of low calcium:   - Take 2 extra calcium pills right away   - After two hours, if you still have symptoms, take 2 more   - If you still have symptoms, take two pills every two hour until you have taken 10 extra pills. - If you are still having symptoms, please call or go to to emergency room and have them check your calcium. You may need IV calcium. Pain medications:  Non steroidal antiinflammatories (ibuprofen -- Advil or Motrin) seem to work best for post surgical pain. Try these first.  A narcotic prescription will also be given for breakthrough pain. Do not use Tylenol while taking the narcotic because there is Tylenol in the narcotic pill, and too much Tylenol can injure your liver. PUT ICE ON YOUR INCISION(S) 20 MINUTES EVERY HOUR WHILE AWAKE FOR THE NEXT 3 DAYS AT LEAST. PLACE A THIN CLOTH BETWEEN YOUR SKIN AND THE ICE BAG. Colace should be used to prevent constipation while on narcotics. If you are having diarrhea you can stop the colace. Narcotics and anesthesia sometimes cause nausea and vomiting. If persistent please call the office. Do not hesitate to call with questions or concerns. Minnie Gee MD  [facility]. Tel. [phone]  Fax [phone] from Nurse    PATIENT INSTRUCTIONS:    After general anesthesia or intravenous sedation, for 24 hours or while taking prescription Narcotics:  · Limit your activities  · Do not drive and operate hazardous machinery  · Do not make important personal or business decisions  · Do  not drink alcoholic beverages  · If you have not urinated within 8 hours after discharge, please contact your surgeon on call.     Report the following to your surgeon:  · Excessive pain, swelling, redness or odor of or around the surgical area  · Temperature over 100.5  · Nausea and vomiting lasting longer than 4 hours or if unable to take medications  · Any signs of decreased circulation or nerve impairment to extremity: change in color, persistent  numbness, tingling, coldness or increase pain  · Any questions    What to do at Home:  A common side effect of anesthesia following surgery is nausea and/or vomiting. In order to decrease symptoms, it is wise to avoid foods that are high in fat, greasy foods, milk products, and spicy foods for the first 24 hours. Acceptable foods for the first 24 hours following surgery include but are not limited to:     soup   broth    toast    crackers    applesauce    bananas    mashed potatoes,   soft or scrambled eggs   oatmeal    jello    It is important to eat when taking your pain medication. This will help to prevent nausea. If possible, please try to time your meals with your medications. It is very important to stay hydrated following surgery. Sip fluids frequently while awake. Avoid acidic drinks such as citrus juices and soda for 24 hours. Carbonated beverages may cause bloating and gas. Acceptable fluids include:    - water (flavor packets may add variety)  - coffee or tea (in moderation)  - Gatorade  - Gabriele-aid  - apple juice  - cranberry juice    You are encouraged to cough and deep breathe every hour when awake. This will help to prevent respiratory complications following anesthesia. You may want to hug a pillow when coughing and sneezing to add additional support to the surgical area and to decrease discomfort if you had abdominal or chest surgery. If you are discharged home with support stockings, you may remove them after 24 hours. Support stockings are used to help prevent blood clots in the legs following surgery. Please take time to review all of your Home Care Instructions and Medication Information sheets provided in your discharge packet. If you have any questions, please contact your surgeons office. Thank you.             How to Care for Your Wound After Its Treated With  DERMABOND* Topical Skin Adhesive  DERMABOND* Topical Skin Adhesive (2-octyl cyanoacrylate) is a sterile, liquid skin adhesive  that holds wound edges together. The film will usually remain in place for 5 to 10 days, then  naturally fall off your skin. The following will answer some of your questions and provide instructions for proper care for your  wound while it is healing:    CHECK WOUND APPEARANCE   Some swelling, redness, and pain are common with all wounds and normally will go away as the  wound heals. If swelling, redness, or pain increases or if the wound feels warm to the touch,  contact a doctor. Also contact a doctor if the wound edges reopen or separate. REPLACE BANDAGES   If your wound is bandaged, keep the bandage dry.  Replace the dressing daily until the adhesive film has fallen off or if the  bandage should become wet, unless otherwise instructed by your  physician.  When changing the dressing, do not place tape directly over the  DERMABOND adhesive film, because removing the tape later may also  remove the film. AVOID TOPICAL MEDICATIONS   Do not apply liquid or ointment medications or any other product to your wound while the  DERMABOND adhesive film is in place. These may loosen the film before your wound is healed. KEEP WOUND DRY AND PROTECTED   You may occasionally and briefly wet your wound in the shower or bath. Do not soak or scrub  your wound, do not swim, and avoid periods of heavy perspiration until the DERMABOND  adhesive has naturally fallen off. After showering or bathing, gently blot your wound dry with a  soft towel. If a protective dressing is being used, apply a fresh, dry bandage, being sure to keep  the tape off the DERMABOND adhesive film.  Apply a clean, dry bandage over the wound if necessary to protect it.  Protect your wound from injury until the skin has had sufficient time to heal.   Do not scratch, rub, or pick at the DERMABOND adhesive film. This may loosen the film before  your wound is healed.    Protect the wound from prolonged exposure to sunlight or tanning lamps while the film is in  place. If you have any questions or concerns about this product, please consult your doctor. *[employer]ademark ©[employer] 8676MP PREVENT AN INFECTION      1. [address] YOUR HANDS     To prevent infection, good handwashing is the most important thing you or your caregiver can do.  Wash your hands with soap and water or use the hand  we gave you before you touch any wounds. 2. SHOWER     Use the antibacterial soap we gave you when you take a shower.  Shower with this soap until your wounds are healed.  To reach all areas of your body, you may need someone to help you.  Dont forget to clean your belly button with every shower. 3.  USE CLEAN SHEETS     Use freshly cleaned sheets on your bed after surgery.  To keep the surgery site clean, do not allow pets to sleep with you while your wound is still healing. 4. STOP SMOKING     Stop smoking, or at least cut back on smoking     Smoking slows your healing. 5.  CONTROL YOUR BLOOD SUGAR     High blood sugars slow wound healing. If you are diabetic, control your blood sugar levels before and after your surgery. These are general instructions for a healthy lifestyle:    No smoking/ No tobacco products/ Avoid exposure to second hand smoke  Surgeon General's Warning:  Quitting smoking now greatly reduces serious risk to your health. Obesity, smoking, and sedentary lifestyle greatly increases your risk for illness    A healthy diet, regular physical exercise & weight monitoring are important for maintaining a healthy lifestyle    You may be retaining fluid if you have a history of heart failure or if you experience any of the following symptoms:  Weight gain of 3 pounds or more overnight or 5 pounds in a week, increased swelling in our hands or feet or shortness of breath while lying flat in bed.   Please call your doctor as soon as you notice any of these symptoms; do not wait until your next office visit. The discharge information has been reviewed with the {PATIENT PARENT GUARDIAN:95720}. The {PATIENT PARENT GUARDIAN:81067} verbalized understanding. Discharge medications reviewed with the {Dishcarge meds reviewed EFLZ:94235} and appropriate educational materials and side effects teaching were provided.   ___________________________________________________________________________________________________________________________________

## 2021-05-05 NOTE — ANESTHESIA POSTPROCEDURE EVALUATION
Procedure(s):  LEFT LOWER PARATHYROIDECTOMY. general    Anesthesia Post Evaluation        Patient location during evaluation: PACU  Note status: Adequate. Level of consciousness: responsive to verbal stimuli and sleepy but conscious  Pain management: satisfactory to patient  Airway patency: patent  Anesthetic complications: no  Cardiovascular status: acceptable  Respiratory status: acceptable  Hydration status: acceptable  Comments: +Post-Anesthesia Evaluation and Assessment    Patient: Ramona Hernandez MRN: [id number]  SSN: [id number]   YOB: 1955  Age: 74 y.o. Sex: female      Cardiovascular Function/Vital Signs    /69   Pulse 72   Temp 36.4 °C (97.6 °F)   Resp 17   Ht 5' 8\" (1.727 m)   Wt 106.2 kg (234 lb 2.1 oz)   SpO2 98%   BMI 35.60 kg/m²     Patient is status post Procedure(s):  LEFT LOWER PARATHYROIDECTOMY. Nausea/Vomiting: Controlled. Postoperative hydration reviewed and adequate. Pain:  Pain Scale 1: Numeric (0 - 10) (05/05/21 1145)  Pain Intensity 1: 0 (05/05/21 1145)   Managed. Neurological Status:   Neuro (WDL): Exceptions to WDL (05/05/21 1130)   At baseline. Mental Status and Level of Consciousness: Arousable. Pulmonary Status:   O2 Device: Nasal cannula (05/05/21 1130)   Adequate oxygenation and airway patent. Complications related to anesthesia: None    Post-anesthesia assessment completed. No concerns. Signed By: Moshe Bonilla MD    5/5/2021  Post anesthesia nausea and vomiting:  controlled      INITIAL Post-op Vital signs:   Vitals Value Taken Time   /69 05/05/21 1200   Temp 36.4 °C (97.6 °F) 05/05/21 1130   Pulse 65 05/05/21 1209   Resp 21 05/05/21 1209   SpO2 96 % 05/05/21 1209   Vitals shown include unvalidated device data.

## 2021-05-05 NOTE — INTERVAL H&P NOTE
Update History & Physical    The Patient's History and Physical below was reviewed with the patient and I examined the patient today. There was no change. The surgical site was confirmed by the patient and me. Plan:  The risk, benefits, expected outcome, and alternative to the recommended procedure have been discussed with the patient. Patient understands and wants to proceed with the procedure. The patient was counseled at length about the risks of lam Covid-19 during their perioperative period and any recovery window from their procedure. The patient was made aware that lam Covid-19  may worsen their prognosis for recovering from their procedure and lend to a higher morbidity and/or mortality risk. All material risks, benefits, and reasonable alternatives including postponing the procedure were discussed. The patient wishes to proceed with the procedure at this time.

## 2021-05-06 NOTE — OP NOTES
[address]  OPERATIVE REPORT    Name:  Hilma Buerger  MR#:  [id number]  :  1955  ACCOUNT #:  [de-identified]  DATE OF SERVICE:  2021    PREOPERATIVE DIAGNOSES:  Hypercalcemia with a candidate left lower parathyroid adenoma. POSTOPERATIVE DIAGNOSES:  Hypercalcemia with a candidate left lower parathyroid adenoma, confirmed hypercellular left lower parathyroid adenoma. PROCEDURE PERFORMED:  Left lower parathyroidectomy. SURGEON:  Cholo Padron MD    ASSISTANT:  Olga Quijano CSA    ANESTHESIA:  General.    COMPLICATIONS:  None immediate. SPECIMENS REMOVED:  Candidate left lower parathyroid. IMPLANTS:  None. ESTIMATED BLOOD LOSS:  Minimal.    DRAINS:  None. FINDINGS:  There was a large subclavicular left lower parathyroid gland, confirmed hypercellular on frozen section. INDICATIONS:  The patient is a 17-year-old female with a history of hypercalcemia. She was found to have a possible left lower parathyroid adenoma on sestamibi. Ultrasound was also done, and this confirmed a likely enlarged parathyroid gland in the left lower position. DESCRIPTION OF PROCEDURE:  After obtaining informed consent, the patient was taken to the operating room and placed supine on the operating table. An operative time-out was performed, and general endotracheal anesthesia was induced. The NIMS endotracheal tube and monitor were employed. Prior to prepping, the neck was ultrasounded and compared to the previously done ultrasound. The likely gland was noted, and the skin was marked at this area. The neck was then prepped and draped in usual sterile fashion. A transverse incision was made two fingerbreadths above the sternal notch, and the skin was anesthetized with local anesthetic and then incised with blade. The subcutaneous tissues and platysma were divided with electrocautery. The inferior and superior flaps were raised beneath this layer.   The strap muscles were then divided with electrocautery, and the left-sided strap muscles were elevated as they were dissected away from the thyroid gland. This was done with electrocautery and blunt dissection. Working further laterally and posteriorly, dissection was carried out with a tonsil clamp. A candidate gland was identified in the left lower area, and this was carefully dissected out. The nerve monitor was used to inspect the vascular pedicles before they were divided using the Harmonic scalpel. The gland had a significant amount of fatty tissue in continuity with it, and this pedicle actually extended down beneath the clavicle. It was dissected out fairly deeply, and then when it became obvious that this was all a fatty pedicle, it was divided here with the Harmonic scalpel. The fatty end on the specimen was marked with a stitch, and it was brought down to surgical pathology. I discussed the fact that the stitch marked likely fatty tissue and asked that this be confirmed. After their gross inspection and frozen section, it was confirmed that there was a well-circumscribed parathyroid adenoma which was hypercellular contained within the fatty tissue that was removed. The stitch marked fatty tissue only. The operative field was then inspected for hemostasis, and excellent hemostasis was noted. The strap muscles were reapproximated using 3-0 Vicryl sutures. The platysma was likewise reapproximated. The deep dermal tissues were likewise reapproximated. The subcuticular layer was then closed with a running 4-0 Monocryl, and the incision was dressed with Dermabond, and the patient was recovered from general anesthesia and taken to the recovery area in satisfactory condition. All instrument, sponge and needle counts were reported as correct.       Houston Seals MD      [id number]  D:  05/05/2021 11:15  T:  05/05/2021 21:53  JOB #:  [id number]  CC:  MD Saba Rausch MD Dylan Canela MD

## 2021-05-11 ENCOUNTER — OFFICE VISIT (OUTPATIENT)
Dept: INTERNAL MEDICINE CLINIC | Age: 66
End: 2021-05-11
Payer: MEDICARE

## 2021-05-11 VITALS
SYSTOLIC BLOOD PRESSURE: 127 MMHG | OXYGEN SATURATION: 95 % | HEIGHT: 68 IN | RESPIRATION RATE: 16 BRPM | WEIGHT: 236.8 LBS | BODY MASS INDEX: 35.89 KG/M2 | DIASTOLIC BLOOD PRESSURE: 67 MMHG | HEART RATE: 67 BPM | TEMPERATURE: 97.3 F

## 2021-05-11 DIAGNOSIS — D35.1 PARATHYROID ADENOMA: Primary | ICD-10-CM

## 2021-05-11 PROCEDURE — 99495 TRANSJ CARE MGMT MOD F2F 14D: CPT | Performed by: INTERNAL MEDICINE

## 2021-05-11 PROCEDURE — G8427 DOCREV CUR MEDS BY ELIG CLIN: HCPCS | Performed by: INTERNAL MEDICINE

## 2021-05-11 NOTE — PROGRESS NOTES
SUBJECTIVE:   Ms. Jimmy Handley is a 74 y.o. female who is here for follow up of parathyroid surgery. H and P:   Patient is a 74 y.o. female who is seen in consultation at the request of Jayla Wagoner for persistent hypercalcemia and a likely left lower parathyroid adenoma. She has had elevated calciums since at least 2017. She does have a history of endometrial cancer but nothing to suggest metastatic disease. She denies history of kidney stones. She has not had any pathologic fractures. She does admit to issues with depression for which she is on medication. She does not have any gastrointestinal symptoms besides chronic constipation.     She has no history of prior neck operations. She did have her tonsils out at age 1. She has never had any neck radiation. She has not had any issues with her thyroid. She denies hoarseness. Procedure: L lower parathyroidectomy. Hospital course: Apparently uneventful. Now, doing well at home. She is off pain meds. No wound dehiscence, infection, etc. No fevers. No cough. Dr. Bud Alexandre just checked calcium. At this time, she is otherwise doing well and has brought no other complaints to my attention today. For a list of the medical issues addressed today, see the assessment and plan below.     PMH:   Past Medical History:   Diagnosis Date    AICD (automatic cardioverter/defibrillator) present     Arthritis     OSTEO    Cancer ([address] 2005    skin cancer removed from nose    Chronic kidney disease 09/24/2020    stage 3     Chronic pain     left hip    Depression 1/9/2014    Endometrial cancer ([address] 2020    GERD (gastroesophageal reflux disease)     Heart failure ([address]     Hyperlipidemia     Hypertension     Left bundle branch block 2013    Neuropathy, peripheral 4/9/2014    INDER on CPAP 4/8/2014    CPAP -regular use    Stage 3a chronic kidney disease ([address] 10/8/2020            Past Surgical History:   Procedure Laterality Date    COLONOSCOPY  2010     Dr. Kamryn Corbett HX CATARACT REMOVAL Right     HX  SECTION          HX DILATION AND CURETTAGE  2020    hysteroscopy    HX GI      COLONOSCOPY    HX HEART CATHETERIZATION Left 2020    HX HEENT      BCCA REMOVED FROM NOSE     HX HYSTERECTOMY  2020    HX IMPLANTABLE CARDIOVERTER DEFIBRILLATOR      HX OTHER SURGICAL      hysteroscopy surg    HX PACEMAKER  2021    BIV-ICD    HX TONSILLECTOMY      AL INSJ/RPLCMT PERM DFB W/TRNSVNS LDS 1/DUAL CHMBR N/A 2/15/2021    INSERT ICD BIV MULTI performed by Bertha Dasilva MD at [facility] CARDIAC CATH LAB       All: is allergic to erythromycin. Current Outpatient Medications   Medication Sig    risperiDONE (RisperDAL) 2 mg tablet TAKE 1 TABLET BY MOUTH EVERY DAY AT NIGHT    polyethylene glycol (MIRALAX) 17 gram/dose powder Take 17 g by mouth daily.  ibuprofen (MOTRIN) 600 mg tablet Take 1 Tab by mouth every six (6) hours as needed for Pain.  calcium citrate-vitamin D3 (Citracal + D) tablet Take 1 Tab by mouth four (4) times daily.  vitamin E (AQUA GEMS) 400 unit capsule Take 400 Units by mouth daily.  sacubitriL-valsartan (Entresto) 49-51 mg tab tablet Take 1 Tab by mouth two (2) times a day.  carvediloL (COREG) 6.25 mg tablet Take 1 Tab by mouth two (2) times a day.  atorvastatin (LIPITOR) 20 mg tablet TAKE 1 TABLET BY MOUTH EVERY DAY    buPROPion XL (WELLBUTRIN XL) 300 mg XL tablet TAKE 1 TABLET BY MOUTH EVERY MORNING    ascorbic acid, vitamin C, (VITAMIN C) 500 mg tablet Take 500 mg by mouth daily (after breakfast).  aspirin delayed-release 81 mg tablet Take 81 mg by mouth daily. No current facility-administered medications for this visit. FH: family history includes Breast Cancer in her mother; Cancer in her paternal uncle; Colon Cancer in her maternal aunt; Heart Disease in her father and mother; Kidney Disease in her paternal aunt. SH:  reports that she has never smoked. She has never used smokeless tobacco. She reports that she does not drink alcohol or use drugs. ROS: See above; Complete ROS otherwise negative. OBJECTIVE:   Vitals:   Visit Vitals  /67 (BP 1 Location: Left arm, BP Patient Position: Sitting)   Pulse 67   Temp 97.3 °F (36.3 °C) (Temporal)   Resp 16   Ht 5' 8\" (1.727 m)   Wt 236 lb 12.8 oz (107.4 kg)   SpO2 95%   BMI 36.01 kg/m²      Gen: Pleasant 74 y.o.  female in NAD. HEENT: PERRLA. EOMI. OP moist and pink. Neck: Supple. No LAD. HEART: RRR, No M/G/R.    LUNGS: CTAB No W/R. ABDOMEN: S, NT, ND, BS+. EXTREMITIES: Warm. No C/C/E.  MUSCULOSKELETAL: Normal ROM, muscle strength 5/5 all groups. NEURO: Alert and oriented x 3. Cranial nerves grossly intact. No focal sensory or motor deficits noted. SKIN: Warm. Dry. No rashes or other lesions noted. ASSESSMENT/ PLAN: Diagnoses and all orders for this visit:    1. Parathyroid adenoma: She is s/p L parathyroidectomy, doing well. No wound issues. F/u as per surgeon. 2. Hypercalcemia: Dr. Dayanara Hazel is checking this. Follow-up and Dispositions    · Return if symptoms worsen or fail to improve.

## 2021-05-24 ENCOUNTER — OFFICE VISIT (OUTPATIENT)
Dept: SURGERY | Age: 66
End: 2021-05-24
Payer: MEDICARE

## 2021-05-24 VITALS
WEIGHT: 238.6 LBS | TEMPERATURE: 97.3 F | HEART RATE: 68 BPM | OXYGEN SATURATION: 97 % | SYSTOLIC BLOOD PRESSURE: 138 MMHG | BODY MASS INDEX: 36.28 KG/M2 | DIASTOLIC BLOOD PRESSURE: 58 MMHG

## 2021-05-24 DIAGNOSIS — Z09 POSTOPERATIVE EXAMINATION: Primary | ICD-10-CM

## 2021-05-24 PROCEDURE — 99024 POSTOP FOLLOW-UP VISIT: CPT | Performed by: SURGERY

## 2021-05-24 RX ORDER — GLUCOSAMINE SULFATE 1500 MG
2000 POWDER IN PACKET (EA) ORAL DAILY
COMMUNITY

## 2021-05-24 RX ORDER — CALCIUM CARBONATE 600 MG
600 TABLET ORAL 2 TIMES DAILY
COMMUNITY
End: 2021-08-20

## 2021-05-24 RX ORDER — BISMUTH SUBSALICYLATE 262 MG
1 TABLET,CHEWABLE ORAL DAILY
COMMUNITY

## 2021-05-24 NOTE — Clinical Note
6/11/2021 Patient: Kari Olivas YOB: 1955 Date of Visit: 5/24/2021 Juan Pablo Luu MD 
[address] [facility] [address] [facility] [address] [address] [address] [employer] Ashley Bernard MD 
[address] 
[address] [address] [address] [employer] Dear MD Ashley Fisher MD, Thank you for referring Ms. Kari Olivas to [address] for evaluation. My notes for this consultation are attached. If you have questions, please do not hesitate to call me. I look forward to following your patient along with you.  
 
 
Sincerely, 
 
Raya Pro MD

## 2021-05-24 NOTE — PROGRESS NOTES
Identified pt with two pt identifiers(name and ). Reviewed record in preparation for visit and have obtained necessary documentation. All patient medications has been reviewed. Chief Complaint   Patient presents with    Surgical Follow-up     3 week s/p thyroid surgery on 21       Health Maintenance Due   Topic    Breast Cancer Screen Mammogram        Vitals:    21 1333   BP: (!) 138/58   Pulse: 68   Temp: 97.3 °F (36.3 °C)   TempSrc: Temporal   SpO2: 97%   Weight: 108.2 kg (238 lb 9.6 oz)   PainSc:   2   PainLoc: Hip       4. Have you been to the ER, urgent care clinic since your last visit? Hospitalized since your last visit? No    5. Have you seen or consulted any other health care providers outside of the [address] [place] [address] since your last visit? Include any pap smears or colon screening. Yes When: 21 Where: PCP Reason for visit: post op check up      Patient is accompanied by self I have received verbal consent from James Olivas to discuss any/all medical information while they are present in the room.

## 2021-06-08 ENCOUNTER — OFFICE VISIT (OUTPATIENT)
Dept: CARDIOLOGY CLINIC | Age: 66
End: 2021-06-08
Payer: MEDICARE

## 2021-06-08 VITALS
DIASTOLIC BLOOD PRESSURE: 70 MMHG | HEART RATE: 64 BPM | RESPIRATION RATE: 18 BRPM | BODY MASS INDEX: 36.4 KG/M2 | SYSTOLIC BLOOD PRESSURE: 120 MMHG | OXYGEN SATURATION: 98 % | HEIGHT: 68 IN | WEIGHT: 240.2 LBS

## 2021-06-08 DIAGNOSIS — I50.22 CHRONIC SYSTOLIC CONGESTIVE HEART FAILURE (HCC): ICD-10-CM

## 2021-06-08 DIAGNOSIS — I49.5 SSS (SICK SINUS SYNDROME) (HCC): ICD-10-CM

## 2021-06-08 DIAGNOSIS — Z95.810 AICD (AUTOMATIC CARDIOVERTER/DEFIBRILLATOR) PRESENT: Primary | ICD-10-CM

## 2021-06-08 DIAGNOSIS — E78.2 MIXED HYPERLIPIDEMIA: ICD-10-CM

## 2021-06-08 DIAGNOSIS — I10 ESSENTIAL HYPERTENSION: ICD-10-CM

## 2021-06-08 DIAGNOSIS — I42.0 DILATED CARDIOMYOPATHY (HCC): Primary | ICD-10-CM

## 2021-06-08 DIAGNOSIS — I42.0 DILATED CARDIOMYOPATHY (HCC): ICD-10-CM

## 2021-06-08 PROCEDURE — 93010 ELECTROCARDIOGRAM REPORT: CPT | Performed by: INTERNAL MEDICINE

## 2021-06-08 PROCEDURE — 93005 ELECTROCARDIOGRAM TRACING: CPT | Performed by: INTERNAL MEDICINE

## 2021-06-08 PROCEDURE — G9899 SCRN MAM PERF RSLTS DOC: HCPCS | Performed by: INTERNAL MEDICINE

## 2021-06-08 PROCEDURE — G0463 HOSPITAL OUTPT CLINIC VISIT: HCPCS | Performed by: INTERNAL MEDICINE

## 2021-06-08 PROCEDURE — G8399 PT W/DXA RESULTS DOCUMENT: HCPCS | Performed by: INTERNAL MEDICINE

## 2021-06-08 PROCEDURE — 1090F PRES/ABSN URINE INCON ASSESS: CPT | Performed by: INTERNAL MEDICINE

## 2021-06-08 PROCEDURE — G8536 NO DOC ELDER MAL SCRN: HCPCS | Performed by: INTERNAL MEDICINE

## 2021-06-08 PROCEDURE — G8754 DIAS BP LESS 90: HCPCS | Performed by: INTERNAL MEDICINE

## 2021-06-08 PROCEDURE — G8752 SYS BP LESS 140: HCPCS | Performed by: INTERNAL MEDICINE

## 2021-06-08 PROCEDURE — 93284 PRGRMG EVAL IMPLANTABLE DFB: CPT | Performed by: INTERNAL MEDICINE

## 2021-06-08 PROCEDURE — 99214 OFFICE O/P EST MOD 30 MIN: CPT | Performed by: INTERNAL MEDICINE

## 2021-06-08 PROCEDURE — G8427 DOCREV CUR MEDS BY ELIG CLIN: HCPCS | Performed by: INTERNAL MEDICINE

## 2021-06-08 PROCEDURE — 1101F PT FALLS ASSESS-DOCD LE1/YR: CPT | Performed by: INTERNAL MEDICINE

## 2021-06-08 PROCEDURE — 3017F COLORECTAL CA SCREEN DOC REV: CPT | Performed by: INTERNAL MEDICINE

## 2021-06-08 PROCEDURE — G8417 CALC BMI ABV UP PARAM F/U: HCPCS | Performed by: INTERNAL MEDICINE

## 2021-06-08 PROCEDURE — G9717 DOC PT DX DEP/BP F/U NT REQ: HCPCS | Performed by: INTERNAL MEDICINE

## 2021-06-08 NOTE — PROGRESS NOTES
Chief Complaint   Patient presents with    Post OP Follow Up     ICD Placement Post Op; 3 Follow Up    Shortness of Breath     Upon exertion     Ankle swelling     marcell'     1. Have you been to the ER, urgent care clinic since your last visit? Hospitalized since your last visit? Yes ED [facility] ER for leg pain on 4/3/21    2. Have you seen or consulted any other health care providers outside of the [address] [place] [address] since your last visit? Include any pap smears or colon screening.   Yes Dr Meagan Castaneda - surgeon

## 2021-06-08 NOTE — PROGRESS NOTES
Subjective: Madhu Alves is a 74 y.o. female is here for EP consult. The patient denies chest pain/ shortness of breath, orthopnea, PND, LE edema, palpitations, syncope, presyncope or fatigue.        Patient Active Problem List    Diagnosis Date Noted    Dilated cardiomyopathy ([address] 02/15/2021    Chronic systolic congestive heart failure ([address] 02/15/2021    Cardiomyopathy ([address] 02/15/2021    S/P cardiac cath 2020    Stage 3a chronic kidney disease ([address] 10/08/2020    Left bundle branch block 2020    Endometrial cancer ([address] 2020    Severe obesity ([address] 2019    Nuclear cataract 2016    Posterior subcapsular polar senile cataract 2016    Neuropathy, peripheral 2014    Hypertension 2014    Hyperglycemia 2014    GERD (gastroesophageal reflux disease) 2014    Hyperlipidemia 2014    INDER on CPAP 2014    Depression 2014      Sharon Machado MD  Past Medical History:   Diagnosis Date    AICD (automatic cardioverter/defibrillator) present     Arthritis     OSTEO    Cancer ([address]     skin cancer removed from nose    Chronic kidney disease 2020    stage 3     Chronic pain     left hip    Depression 2014    Endometrial cancer ([address]     GERD (gastroesophageal reflux disease)     Heart failure ([address]     Hyperlipidemia     Hypertension     Left bundle branch block     Neuropathy, peripheral 2014    INDER on CPAP 2014    CPAP -regular use    Stage 3a chronic kidney disease ([address] 10/8/2020           Past Surgical History:   Procedure Laterality Date    COLONOSCOPY  2010     Dr. Angie Maier HX CATARACT REMOVAL Right     HX [address]        HX DILATION AND CURETTAGE  2020    hysteroscopy    HX GI      COLONOSCOPY    HX HEART CATHETERIZATION Left 2020    HX HEENT  2005    BCCA REMOVED FROM NOSE     HX HYSTERECTOMY 02/20/2020    HX IMPLANTABLE CARDIOVERTER DEFIBRILLATOR      HX OTHER SURGICAL      hysteroscopy surg    HX PACEMAKER  02/2021    BIV-ICD    HX PARATHYROIDECTOMY Left 05/05/2021    lower left     HX PARTIAL THYROIDECTOMY  05/05/2021    HX TONSILLECTOMY  1957    GA INSJ/RPLCMT PERM DFB W/TRNSVNS LDS 1/DUAL CHMBR N/A 2/15/2021    INSERT ICD BIV MULTI performed by Dwight Davis MD at [facility] CARDIAC CATH LAB     Allergies   Allergen Reactions    Erythromycin Rash      Family History   Problem Relation Age of Onset    Heart Disease Mother     Breast Cancer Mother     Heart Disease Father     Kidney Disease Paternal Aunt         renal cancer    Colon Cancer Maternal Aunt     Cancer Paternal Uncle         leukemia    Anesth Problems Neg Hx     negative for cardiac disease  Social History     Socioeconomic History    Marital status:      Spouse name: Not on file    Number of children: Not on file    Years of education: Not on file    Highest education level: Not on file   Tobacco Use    Smoking status: Never Smoker    Smokeless tobacco: Never Used   Vaping Use    Vaping Use: Never used   Substance and Sexual Activity    Alcohol use: No     Alcohol/week: 0.0 standard drinks    Drug use: No    Sexual activity: Not Currently     Social Determinants of Health     Financial Resource Strain:     Difficulty of Paying Living Expenses:    Food Insecurity:     Worried About Running Out of Food in the Last Year:     Ran Out of Food in the Last Year:    Transportation Needs:     Lack of Transportation (Medical):      Lack of Transportation (Non-Medical):    Physical Activity:     Days of Exercise per Week:     Minutes of Exercise per Session:    Stress:     Feeling of Stress :    Social Connections:     Frequency of Communication with Friends and Family:     Frequency of Social Gatherings with Friends and Family:     Attends Zoroastrian Services:     Active Member of Clubs or Organizations:  Attends Club or Organization Meetings:     Marital Status:      Current Outpatient Medications   Medication Sig    multivitamin (ONE A DAY) tablet Take 1 Tablet by mouth daily.  cholecalciferol (Vitamin D3) 25 mcg (1,000 unit) cap Take  by mouth daily.  risperiDONE (RisperDAL) 2 mg tablet TAKE 1 TABLET BY MOUTH EVERY DAY AT NIGHT    vitamin E (AQUA GEMS) 400 unit capsule Take 400 Units by mouth daily.  sacubitriL-valsartan (Entresto) 49-51 mg tab tablet Take 1 Tab by mouth two (2) times a day.  carvediloL (COREG) 6.25 mg tablet Take 1 Tab by mouth two (2) times a day.  atorvastatin (LIPITOR) 20 mg tablet TAKE 1 TABLET BY MOUTH EVERY DAY    buPROPion XL (WELLBUTRIN XL) 300 mg XL tablet TAKE 1 TABLET BY MOUTH EVERY MORNING    ascorbic acid, vitamin C, (VITAMIN C) 500 mg tablet Take 500 mg by mouth daily (after breakfast).  aspirin delayed-release 81 mg tablet Take 81 mg by mouth daily.  calcium carbonate (CALTREX) 600 mg calcium (1,500 mg) tablet Take 600 mg by mouth two (2) times a day. (Patient not taking: Reported on 6/8/2021)    polyethylene glycol (MIRALAX) 17 gram/dose powder Take 17 g by mouth daily. (Patient not taking: Reported on 6/8/2021)    ibuprofen (MOTRIN) 600 mg tablet Take 1 Tab by mouth every six (6) hours as needed for Pain. (Patient not taking: Reported on 6/8/2021)    calcium citrate-vitamin D3 (Citracal + D) tablet Take 1 Tab by mouth four (4) times daily. (Patient not taking: Reported on 6/8/2021)     No current facility-administered medications for this visit. Vitals:    06/08/21 1429   BP: 120/70   Pulse: 64   Resp: 18   SpO2: 98%   Weight: 240 lb 3.2 oz (109 kg)   Height: 5' 8\" (1.727 m)       I have reviewed the nurses notes, vitals, problem list, allergy list, medical history, family, social history and medications. Review of Symptoms:    General: Pt denies excessive weight gain or loss.  Pt is able to conduct ADL's  HEENT: Denies blurred vision, headaches, hearing loss, epistaxis and difficulty swallowing. Respiratory: Denies cough, congestion, shortness of breath, MUÑOZ, wheezing or stridor. Cardiovascular: Denies precordial pain, palpitations, edema or PND  Gastrointestinal: Denies poor appetite, indigestion, abdominal pain or blood in stool  Genitourinary: Denies hematuria, dysuria, increased urinary frequency  Musculoskeletal: Denies joint pain or swelling from muscles or joints  Neurologic: Denies tremor, paresthesias, headache, or sensory motor disturbance  Psychiatric: Denies confusion, insomnia, depression  Integumentray: Denies rash, itching or ulcers. Hematologic: Denies easy bruising, bleeding    Physical Exam:      General: Well developed, in no acute distress. HEENT: Eyes - PERRL, no jvd  Heart:  Normal S1/S2 negative S3 or S4. Regular, no murmur, gallop or rub. Respiratory: Clear bilaterally x 4, no wheezing or rales  Abdomen:   Soft, non-tender, bowel sounds are active. Extremities:  No edema, normal cap refill, no cyanosis. Musculoskeletal: No clubbing  Neuro: A&Ox3, speech clear, gait stable. Skin: Skin color is normal. No rashes or lesions.  Non diaphoretic, no ulcers or subcutaneous nodule  Vascular: 2+ pulses symmetric in all extremities  Psych - judgement intact and orientation is wnl     Cardiographics    Ekg: nsr, biv paced    icd - A paced 9%, lv paced 100%    Results for orders placed or performed during the hospital encounter of 04/23/21   EKG, 12 LEAD, INITIAL   Result Value Ref Range    Ventricular Rate 66 BPM    Atrial Rate 66 BPM    P-R Interval 170 ms    QRS Duration 116 ms    Q-T Interval 414 ms    QTC Calculation (Bezet) 434 ms    Calculated P Axis 30 degrees    Calculated R Axis 81 degrees    Calculated T Axis 34 degrees    Diagnosis       Electronic ventricular pacemaker  When compared with ECG of 19-JUN-2020 01:00,  Electronic ventricular pacemaker has replaced Sinus rhythm  Confirmed by AMALIA Glynn ([id number]) on 4/23/2021 11:20:50 PM           Lab Results   Component Value Date/Time    WBC 5.0 04/23/2021 01:54 PM    HGB 11.6 04/23/2021 01:54 PM    HCT 36.7 04/23/2021 01:54 PM    PLATELET 592 45/59/3086 01:54 PM    MCV 93.4 04/23/2021 01:54 PM      Lab Results   Component Value Date/Time    Sodium 142 02/03/2021 10:32 AM    Potassium 4.7 02/03/2021 10:32 AM    Chloride 108 (H) 02/03/2021 10:32 AM    CO2 27 02/03/2021 10:32 AM    Anion gap 4 (L) 06/19/2020 01:15 AM    Glucose 99 02/03/2021 10:32 AM    BUN 16 02/03/2021 10:32 AM    Creatinine 1.04 (H) 02/03/2021 10:32 AM    BUN/Creatinine ratio 15 02/03/2021 10:32 AM    GFR est AA 64 02/03/2021 10:32 AM    GFR est non-AA 55 (L) 02/03/2021 10:32 AM    Calcium 12.5 (H) 02/03/2021 10:32 AM    Bilirubin, total 0.3 02/03/2021 10:32 AM    Alk. phosphatase 123 (H) 02/03/2021 10:32 AM    Protein, total 6.2 02/03/2021 10:32 AM    Albumin 4.2 02/03/2021 10:32 AM    Globulin 3.4 06/19/2020 01:15 AM    A-G Ratio 2.1 02/03/2021 10:32 AM    ALT (SGPT) 22 02/03/2021 10:32 AM         Assessment:     Assessment:        ICD-10-CM ICD-9-CM    1. Dilated cardiomyopathy (HCC)  I42.0 425.4 AMB POC EKG ROUTINE W/ 12 LEADS, INTER & REP   2. Chronic systolic congestive heart failure (HCC)  I50.22 428.22      428.0    3. Essential hypertension  I10 401.9    4. Mixed hyperlipidemia  E78.2 272.2    5. SSS (sick sinus syndrome) (HCC)  I49.5 427.81      Orders Placed This Encounter    AMB POC EKG ROUTINE W/ 12 LEADS, INTER & REP     Order Specific Question:   Reason for Exam:     Answer:   routine        Plan: Nicolas Olivas is doing well. She is A paced 9% for sss and lv paced for cardiomyopathy. She is stable and compliant with med rx for chf and htn. F/u in one year. Continue medical management for sss, htn and cardiomyopathy. Thank you for allowing me to participate in Brandan Riding 's care.     Bella Bobo MD, [facility]    On this date 06/08/2021 I have spent 31 minutes reviewing previous notes, test results and face to face with the patient discussing the diagnosis and importance of compliance with the treatment plan as well as documenting on the day of the visit.

## 2021-06-08 NOTE — LETTER
6/8/2021 Patient: Kari Olivas YOB: 1955 Date of Visit: 6/8/2021 Chandrakant Crouch MD 
[address] [address] [address] [employer] Dear Chandrakant Crouch MD, Thank you for referring Ms. Kari Olivas to [facility] for evaluation. My notes for this consultation are attached. If you have questions, please do not hesitate to call me. I look forward to following your patient along with you. Sincerely, Phyliss Rinne, MD

## 2021-06-11 NOTE — PROGRESS NOTES
To: Alisa Wood MD  CC:  Irwin Velasquez MD, Isma Pollack MD  From: Brendon Pina MD    Thank you for referring [facility]. Encounter Date: 5/24/2021    Subjective: Rebecca Horta is a 74 y.o. female presents for postop care. The patient has no complaints. Denies pain, perioral tingling, and cramping in the extremities. Objective:     General:  alert, cooperative, no distress, appears stated age   Incision:  healing well, no drainage, no erythema, no seroma     Assessment:     S/p excision of left lower parathyroid adenoma. No evidence of hypocalcemia. Doing well postoperatively. Plan:     She can stop the supplemental calcium if she likes. Did tell her to call me if she stops it and has any perioral tingling or extremity cramps. Follow-up only as needed. Told to call for any concerns.       Brendon Pina MD

## 2021-06-21 ENCOUNTER — HOSPITAL ENCOUNTER (OUTPATIENT)
Dept: RADIATION THERAPY | Age: 66
Discharge: HOME | End: 2021-06-21

## 2021-06-23 ENCOUNTER — TELEPHONE (OUTPATIENT)
Dept: FAMILY MEDICINE CLINIC | Age: 66
End: 2021-06-23

## 2021-06-26 DIAGNOSIS — F32.A DEPRESSION, UNSPECIFIED DEPRESSION TYPE: Primary | ICD-10-CM

## 2021-06-28 RX ORDER — BUPROPION HYDROCHLORIDE 300 MG/1
TABLET ORAL
Qty: 90 TABLET | Refills: 3 | Status: SHIPPED | OUTPATIENT
Start: 2021-06-28 | End: 2022-01-13 | Stop reason: SDUPTHER

## 2021-06-28 NOTE — TELEPHONE ENCOUNTER
PCP: Kamilla Cruz MD    Last appt: 6/22/2020    Future Appointments   Date Time Provider Mady Tejada   7/9/2021  1:30 PM GONZALEZ TREVIÑO [facility] BS AMB   7/9/2021  2:30 PM Zuleyma Fischer MD [facility] BS AMB   9/9/2021  9:30 AM REMOTE_RCA [facility] BS AMB   9/15/2021  2:15 PM Melo Garcia MD [facility] BS AMB   10/5/2021 10:00 AM Kamilla Cruz MD [facility] BS AMB   3/3/2022 10:20 AM Roland Rider MD [facility] BS AMB   6/15/2022  2:00 PM PACEMAKER, RCAM [facility] BS AMB   6/15/2022  2:20 PM Rebecca Jones, ANP [facility] BS AMB       Requested Prescriptions     Pending Prescriptions Disp Refills    buPROPion XL (WELLBUTRIN XL) 300 mg XL tablet 90 Tablet 3     Sig: TAKE 1 TABLET BY MOUTH EVERY MORNING       Prior labs and Blood pressures:  BP Readings from Last 3 Encounters:   06/08/21 120/70   05/24/21 (!) 138/58   05/11/21 127/67     Lab Results   Component Value Date/Time    Sodium 142 02/03/2021 10:32 AM    Potassium 4.7 02/03/2021 10:32 AM    Chloride 108 (H) 02/03/2021 10:32 AM    CO2 27 02/03/2021 10:32 AM    Anion gap 4 (L) 06/19/2020 01:15 AM    Glucose 99 02/03/2021 10:32 AM    BUN 16 02/03/2021 10:32 AM    Creatinine 1.04 (H) 02/03/2021 10:32 AM    BUN/Creatinine ratio 15 02/03/2021 10:32 AM    GFR est AA 64 02/03/2021 10:32 AM    GFR est non-AA 55 (L) 02/03/2021 10:32 AM    Calcium 12.5 (H) 02/03/2021 10:32 AM     Lab Results   Component Value Date/Time    Hemoglobin A1c 5.7 (H) 09/29/2020 07:52 AM     Lab Results   Component Value Date/Time    Cholesterol, total 134 09/29/2020 07:52 AM    HDL Cholesterol 53 09/29/2020 07:52 AM    LDL, calculated 68 09/29/2020 07:52 AM    LDL, calculated 65 10/02/2019 08:50 AM    VLDL, calculated 13 09/29/2020 07:52 AM    VLDL, calculated 16 10/02/2019 08:50 AM    Triglyceride 65 09/29/2020 07:52 AM     Lab Results   Component Value Date/Time    VITAMIN D, 25-HYDROXY 45.2 02/27/2018 11:21 AM       Lab Results   Component Value Date/Time    TSH 1.480 02/20/2018 09:32 AM

## 2021-07-06 NOTE — TELEPHONE ENCOUNTER
Called out and spoke to pt. Two pt identifiers confirmed. Pt informed me she needed a refill and was telling me the pharmacy had to yaya something because they didn't have it. Pt verbalized understanding of information discussed w/ no further questions at this time.

## 2021-07-09 ENCOUNTER — ANCILLARY PROCEDURE (OUTPATIENT)
Dept: CARDIOLOGY CLINIC | Age: 66
End: 2021-07-09
Payer: MEDICARE

## 2021-07-09 VITALS
HEIGHT: 68 IN | BODY MASS INDEX: 36.37 KG/M2 | WEIGHT: 240 LBS | DIASTOLIC BLOOD PRESSURE: 70 MMHG | SYSTOLIC BLOOD PRESSURE: 120 MMHG

## 2021-07-09 DIAGNOSIS — I10 ESSENTIAL HYPERTENSION: ICD-10-CM

## 2021-07-09 DIAGNOSIS — G47.33 OSA ON CPAP: ICD-10-CM

## 2021-07-09 DIAGNOSIS — E78.2 MIXED HYPERLIPIDEMIA: ICD-10-CM

## 2021-07-09 DIAGNOSIS — Z99.89 OSA ON CPAP: ICD-10-CM

## 2021-07-09 DIAGNOSIS — I44.7 LEFT BUNDLE BRANCH BLOCK: ICD-10-CM

## 2021-07-09 DIAGNOSIS — I42.0 DILATED CARDIOMYOPATHY (HCC): ICD-10-CM

## 2021-07-09 PROCEDURE — 93306 TTE W/DOPPLER COMPLETE: CPT | Performed by: INTERNAL MEDICINE

## 2021-07-12 LAB
ECHO AO ASC DIAM: 3.26 CM
ECHO AO ROOT DIAM: 2.97 CM
ECHO AV PEAK GRADIENT: 9.8 MMHG
ECHO AV PEAK VELOCITY: 156.5 CM/S
ECHO EST RA PRESSURE: 3 MMHG
ECHO LA AREA 4C: 18.57 CM2
ECHO LA MAJOR AXIS: 4 CM
ECHO LA MINOR AXIS: 1.81 CM
ECHO LA VOL 2C: 52.76 ML (ref 22–52)
ECHO LA VOL 4C: 48.57 ML (ref 22–52)
ECHO LA VOL BP: 52.25 ML (ref 22–52)
ECHO LA VOL/BSA BIPLANE: 23.64 ML/M2 (ref 16–28)
ECHO LA VOLUME INDEX A2C: 23.87 ML/M2 (ref 16–28)
ECHO LA VOLUME INDEX A4C: 21.98 ML/M2 (ref 16–28)
ECHO LV E' LATERAL VELOCITY: 7.14 CM/S
ECHO LV EDV A2C: 85.48 ML
ECHO LV EDV A4C: 100.59 ML
ECHO LV EDV BP: 92.38 ML (ref 56–104)
ECHO LV EDV INDEX A4C: 45.5 ML/M2
ECHO LV EDV INDEX BP: 41.8 ML/M2
ECHO LV EDV NDEX A2C: 38.7 ML/M2
ECHO LV EJECTION FRACTION A2C: 60 PERCENT
ECHO LV EJECTION FRACTION A4C: 51 PERCENT
ECHO LV EJECTION FRACTION BIPLANE: 55.5 PERCENT (ref 55–100)
ECHO LV ESV A2C: 34.39 ML
ECHO LV ESV A4C: 49.05 ML
ECHO LV ESV BP: 41.1 ML (ref 19–49)
ECHO LV ESV INDEX A2C: 15.6 ML/M2
ECHO LV ESV INDEX A4C: 22.2 ML/M2
ECHO LV ESV INDEX BP: 18.6 ML/M2
ECHO LV GLOBAL LONGITUDINAL STRAIN (GLS): -16.3 PERCENT
ECHO LV INTERNAL DIMENSION DIASTOLIC: 4.69 CM (ref 3.9–5.3)
ECHO LV INTERNAL DIMENSION SYSTOLIC: 3.77 CM
ECHO LV ISOVOLUMETRIC RELAXATION TIME (IVRT): 70.41 MS
ECHO LV IVSD: 1.06 CM (ref 0.6–0.9)
ECHO LV MASS 2D: 194.1 G (ref 67–162)
ECHO LV MASS INDEX 2D: 87.8 G/M2 (ref 43–95)
ECHO LV POSTERIOR WALL DIASTOLIC: 1.2 CM (ref 0.6–0.9)
ECHO LVOT PEAK GRADIENT: 5.19 MMHG
ECHO LVOT PEAK VELOCITY: 113.91 CM/S
ECHO MV "A" WAVE DURATION: 175.07 MS
ECHO MV A VELOCITY: 112.72 CM/S
ECHO MV E DECELERATION TIME (DT): 221.91 MS
ECHO MV E VELOCITY: 105.28 CM/S
ECHO MV E/A RATIO: 0.93
ECHO MV E/E' LATERAL: 14.75
ECHO PVEIN A DURATION: 138.92 MS
ECHO PVEIN A VELOCITY: 28.11 CM/S
ECHO RIGHT VENTRICULAR SYSTOLIC PRESSURE (RVSP): 27.3 MMHG
ECHO TV REGURGITANT MAX VELOCITY: 246.5 CM/S
ECHO TV REGURGITANT PEAK GRADIENT: 24.3 MMHG
GLOBAL LONGITUDINAL STRAIN 2 CHAMBER: -18.5 PERCENT
GLOBAL LONGITUDINAL STRAIN 4 CHAMBER: -14.6 PERCENT
GLOBAL LONGITUDINAL STRAIN LONG AXIS: -15.8 PERCENT
LA VOL DISK BP: 51.2 ML (ref 22–52)

## 2021-07-13 ENCOUNTER — TELEPHONE (OUTPATIENT)
Dept: CARDIOLOGY CLINIC | Age: 66
End: 2021-07-13

## 2021-07-13 NOTE — TELEPHONE ENCOUNTER
Verified patient with two patient identifiers. Spoke with patient. Discussed echo results, heart pumping strength is norm, EF 55- 60%. Mildly leaky valves. Pt still uses CPAP. Advised to keep track of BP, states machine is broken. Patient verbalized understanding.

## 2021-07-13 NOTE — PROGRESS NOTES
Heart pumping strength is normal which is great, now 55-60%. Mildly leaky valves which can come high blood pressure over time, sleep apnea, and age for example. Make sure she keeps her BP under good control, wears CPAP.

## 2021-07-13 NOTE — TELEPHONE ENCOUNTER
----- Message from Cordell Ortiz NP sent at 7/13/2021  9:12 AM EDT -----  Heart pumping strength is normal which is great, now 55-60%. Mildly leaky valves which can come high blood pressure over time, sleep apnea, and age for example. Make sure she keeps her BP under good control, wears CPAP.

## 2021-08-20 ENCOUNTER — OFFICE VISIT (OUTPATIENT)
Dept: CARDIOLOGY CLINIC | Age: 66
End: 2021-08-20
Payer: MEDICARE

## 2021-08-20 VITALS
RESPIRATION RATE: 18 BRPM | BODY MASS INDEX: 38.12 KG/M2 | HEIGHT: 68 IN | WEIGHT: 251.5 LBS | OXYGEN SATURATION: 97 % | SYSTOLIC BLOOD PRESSURE: 118 MMHG | HEART RATE: 68 BPM | DIASTOLIC BLOOD PRESSURE: 78 MMHG

## 2021-08-20 DIAGNOSIS — I42.0 DILATED CARDIOMYOPATHY (HCC): Primary | ICD-10-CM

## 2021-08-20 DIAGNOSIS — Z99.89 OSA ON CPAP: ICD-10-CM

## 2021-08-20 DIAGNOSIS — I10 ESSENTIAL HYPERTENSION: ICD-10-CM

## 2021-08-20 DIAGNOSIS — G47.33 OSA ON CPAP: ICD-10-CM

## 2021-08-20 DIAGNOSIS — E78.2 MIXED HYPERLIPIDEMIA: ICD-10-CM

## 2021-08-20 PROCEDURE — G9717 DOC PT DX DEP/BP F/U NT REQ: HCPCS | Performed by: INTERNAL MEDICINE

## 2021-08-20 PROCEDURE — G8427 DOCREV CUR MEDS BY ELIG CLIN: HCPCS | Performed by: INTERNAL MEDICINE

## 2021-08-20 PROCEDURE — 99214 OFFICE O/P EST MOD 30 MIN: CPT | Performed by: INTERNAL MEDICINE

## 2021-08-20 PROCEDURE — G8399 PT W/DXA RESULTS DOCUMENT: HCPCS | Performed by: INTERNAL MEDICINE

## 2021-08-20 PROCEDURE — G9899 SCRN MAM PERF RSLTS DOC: HCPCS | Performed by: INTERNAL MEDICINE

## 2021-08-20 PROCEDURE — 3017F COLORECTAL CA SCREEN DOC REV: CPT | Performed by: INTERNAL MEDICINE

## 2021-08-20 PROCEDURE — 93005 ELECTROCARDIOGRAM TRACING: CPT | Performed by: INTERNAL MEDICINE

## 2021-08-20 PROCEDURE — G8417 CALC BMI ABV UP PARAM F/U: HCPCS | Performed by: INTERNAL MEDICINE

## 2021-08-20 PROCEDURE — G8536 NO DOC ELDER MAL SCRN: HCPCS | Performed by: INTERNAL MEDICINE

## 2021-08-20 PROCEDURE — 1090F PRES/ABSN URINE INCON ASSESS: CPT | Performed by: INTERNAL MEDICINE

## 2021-08-20 PROCEDURE — G8754 DIAS BP LESS 90: HCPCS | Performed by: INTERNAL MEDICINE

## 2021-08-20 PROCEDURE — G0463 HOSPITAL OUTPT CLINIC VISIT: HCPCS | Performed by: INTERNAL MEDICINE

## 2021-08-20 PROCEDURE — 93010 ELECTROCARDIOGRAM REPORT: CPT | Performed by: INTERNAL MEDICINE

## 2021-08-20 PROCEDURE — 1101F PT FALLS ASSESS-DOCD LE1/YR: CPT | Performed by: INTERNAL MEDICINE

## 2021-08-20 PROCEDURE — G8752 SYS BP LESS 140: HCPCS | Performed by: INTERNAL MEDICINE

## 2021-08-20 RX ORDER — FUROSEMIDE 20 MG/1
20 TABLET ORAL DAILY
Qty: 90 TABLET | Refills: 3 | Status: SHIPPED | OUTPATIENT
Start: 2021-08-20 | End: 2022-01-10 | Stop reason: SDUPTHER

## 2021-08-20 NOTE — PROGRESS NOTES
1. Have you been to the ER, urgent care clinic since your last visit? Hospitalized since your last visit? No    2. Have you seen or consulted any other health care providers outside of the [address] [place] [address] since your last visit? Include any pap smears or colon screening.  No         Chief Complaint   Patient presents with    Cardiomyopathy     C/O SOB, Bilateral Ankle swelling

## 2021-08-20 NOTE — LETTER
8/20/2021    Patient: Gustavo Olivas   YOB: 1955   Date of Visit: 8/20/2021     Jerry Burkett MD  [address]  [facility]  [address] [facility] [address]    Dear Jerry Burkett MD,      Thank you for referring Ms. Kari Olivas to [facility] for evaluation. My notes for this consultation are attached. If you have questions, please do not hesitate to call me. I look forward to following your patient along with you.       Sincerely,    Susan Patterson MD

## 2021-08-20 NOTE — PROGRESS NOTES
Viry Daugherty, [facility]    Subjective/HPI:     Isabela Miramontes is a 74 y.o. female is here for routine f/u. She has a PMHx of NICM s/p BiV ICD, HTN, HLD, INDER on CPAP. Has been having shortness of breath symptoms with exertion. Not worsened since last visit. Gets out of breath going up stairs or walking a short distance. Gets swelling in her legs and ankles towards the end of the day  Also feels a fullness in her chest at times. Denies orthopnea, PND. Does use CPAP mask every night. Current Outpatient Medications on File Prior to Visit   Medication Sig Dispense Refill    buPROPion XL (WELLBUTRIN XL) 300 mg XL tablet TAKE 1 TABLET BY MOUTH EVERY MORNING 90 Tablet 3    multivitamin (ONE A DAY) tablet Take 1 Tablet by mouth daily.  cholecalciferol (Vitamin D3) 25 mcg (1,000 unit) cap Take  by mouth daily.  risperiDONE (RisperDAL) 2 mg tablet TAKE 1 TABLET BY MOUTH EVERY DAY AT NIGHT 90 Tab 0    vitamin E (AQUA GEMS) 400 unit capsule Take 400 Units by mouth daily.  sacubitriL-valsartan (Entresto) 49-51 mg tab tablet Take 1 Tab by mouth two (2) times a day. 180 Tab 1    carvediloL (COREG) 6.25 mg tablet Take 1 Tab by mouth two (2) times a day. 180 Tab 1    atorvastatin (LIPITOR) 20 mg tablet TAKE 1 TABLET BY MOUTH EVERY DAY 90 Tab 3    ascorbic acid, vitamin C, (VITAMIN C) 500 mg tablet Take 500 mg by mouth daily (after breakfast).  aspirin delayed-release 81 mg tablet Take 81 mg by mouth daily. No current facility-administered medications on file prior to visit. Review of Symptoms:    Review of Systems   Constitutional: Negative for chills, fever and weight loss. HENT: Negative for nosebleeds. Eyes: Negative for blurred vision and double vision. Respiratory: Positive for shortness of breath. Negative for cough and wheezing. Cardiovascular: Positive for leg swelling. Negative for chest pain, palpitations, orthopnea and PND.    Skin: Negative for rash. Neurological: Negative for dizziness and loss of consciousness. Physical Exam:      General: Well developed, in no acute distress, cooperative and alert  Heart:  reg rate and rhythm; normal S1/S2; no murmurs, no gallops or rubs. Respiratory: Clear bilaterally x 4, no wheezing or rales  Extremities:  Normal cap refill, no cyanosis, atraumatic. No edema. Vascular: 2+ pulses symmetric in all extremities    Vitals:    08/20/21 1326   BP: 118/78   BP 1 Location: Right upper arm   BP Patient Position: Sitting   BP Cuff Size: Large adult   Pulse: 68   Resp: 18   Height: 5' 8\" (1.727 m)   Weight: 251 lb 8 oz (114.1 kg)   SpO2: 97%       ECG done today shows sinus rhythm     Assessment:       ICD-10-CM ICD-9-CM    1. Dilated cardiomyopathy (HCC)  I42.0 425.4 AMB POC EKG ROUTINE W/ 12 LEADS, INTER & REP      furosemide (LASIX) 20 mg tablet      NT-PRO BNP   2. Essential hypertension  I10 401.9    3. Mixed hyperlipidemia  E78.2 272.2    4. INDER on CPAP  G47.33 327.23     Z99.89 V46.8         Plan:     1. Dilated cardiomyopathy (HCC)  Hx of NICM, now resolved  Cardiac cath done 12/2020 without evidence of coronary disease  Echo done 7/2021 with preserved LVEF 55-60%, mild LVH, mild AI/MR  Continue Entresto, carvedilol  Will add low dose Lasix 20 mg daily; check BNP to assess for volume overload  If BNP is negative, may use Lasix PRN and refer to PCP for SOB    2. Essential hypertension  BP controlled. Continue anti-hypertensive therapy and low sodium diet    3. Mixed hyperlipidemia  Continue statin therapy and low fat, low cholesterol diet  Lipids managed by PCP    4. INDER on CPAP  Continue CPAP mask nightly    5.   S/P biventricular cardiac defibrillator  Continue with routine device interrogation with Dr. Susan Diaz    Patient seen and examined by me with nurse practitioner. Jamaica Powell personally performed all components of the history, physical, and medical decision making and agree with the assessment and plan with minor modifications as noted. Add furosemide for pedal edema.   Normal systolic function and no CAD         Rudy Raines MD

## 2021-08-21 LAB — NT-PROBNP SERPL-MCNC: 152 PG/ML (ref 0–301)

## 2021-08-23 ENCOUNTER — TELEPHONE (OUTPATIENT)
Dept: CARDIOLOGY CLINIC | Age: 66
End: 2021-08-23

## 2021-08-23 NOTE — TELEPHONE ENCOUNTER
----- Message from Liang Rubio NP sent at 8/23/2021 12:34 PM EDT -----  Adrien Carcamo,    Please call patient and inform that labwork shows BNP level is normal. This is a test for heart failure, and since it is normal, no evidence for heart failure at this time. Also less concern that she has a lot of fluid build up in her body. Would continue lasix PRN for LE edema. See PCP for shortness of breath symptoms. Thanks,  Jc pt ,verified pt with two pt identifiers, advised pt her BNP level is normal, no evidence of heart failure at this time. Advised less concerned for swelling in legs, would continue lasix as needed for now. Advised to see PCP office for any concerns of sob. Verified we sent in lasix script and would refill when needed. Verified 90 tab with 3 refills sent in. Pt verbalized understanding.

## 2021-08-23 NOTE — PROGRESS NOTES
Nishant Osorio,    Please call patient and inform that labwork shows BNP level is normal. This is a test for heart failure, and since it is normal, no evidence for heart failure at this time. Also less concern that she has a lot of fluid build up in her body. Would continue lasix PRN for LE edema. See PCP for shortness of breath symptoms.     Thanks,  [employer]

## 2021-09-09 ENCOUNTER — OFFICE VISIT (OUTPATIENT)
Dept: CARDIOLOGY CLINIC | Age: 66
End: 2021-09-09
Payer: MEDICARE

## 2021-09-09 DIAGNOSIS — I42.0 DILATED CARDIOMYOPATHY (HCC): ICD-10-CM

## 2021-09-09 DIAGNOSIS — Z95.810 AICD (AUTOMATIC CARDIOVERTER/DEFIBRILLATOR) PRESENT: Primary | ICD-10-CM

## 2021-09-09 PROCEDURE — 93295 DEV INTERROG REMOTE 1/2/MLT: CPT | Performed by: INTERNAL MEDICINE

## 2021-09-09 PROCEDURE — 93296 REM INTERROG EVL PM/IDS: CPT | Performed by: INTERNAL MEDICINE

## 2021-09-15 ENCOUNTER — OFFICE VISIT (OUTPATIENT)
Dept: GYNECOLOGY | Age: 66
End: 2021-09-15
Payer: MEDICARE

## 2021-09-15 VITALS
HEART RATE: 72 BPM | WEIGHT: 251.4 LBS | DIASTOLIC BLOOD PRESSURE: 45 MMHG | SYSTOLIC BLOOD PRESSURE: 94 MMHG | BODY MASS INDEX: 38.1 KG/M2 | HEIGHT: 68 IN

## 2021-09-15 DIAGNOSIS — C54.1 ENDOMETRIAL CANCER (HCC): Primary | ICD-10-CM

## 2021-09-15 DIAGNOSIS — E66.01 SEVERE OBESITY (BMI 35.0-35.9 WITH COMORBIDITY) (HCC): ICD-10-CM

## 2021-09-15 PROCEDURE — G8752 SYS BP LESS 140: HCPCS | Performed by: OBSTETRICS & GYNECOLOGY

## 2021-09-15 PROCEDURE — G9899 SCRN MAM PERF RSLTS DOC: HCPCS | Performed by: OBSTETRICS & GYNECOLOGY

## 2021-09-15 PROCEDURE — G8417 CALC BMI ABV UP PARAM F/U: HCPCS | Performed by: OBSTETRICS & GYNECOLOGY

## 2021-09-15 PROCEDURE — G8536 NO DOC ELDER MAL SCRN: HCPCS | Performed by: OBSTETRICS & GYNECOLOGY

## 2021-09-15 PROCEDURE — G8427 DOCREV CUR MEDS BY ELIG CLIN: HCPCS | Performed by: OBSTETRICS & GYNECOLOGY

## 2021-09-15 PROCEDURE — 3017F COLORECTAL CA SCREEN DOC REV: CPT | Performed by: OBSTETRICS & GYNECOLOGY

## 2021-09-15 PROCEDURE — 99214 OFFICE O/P EST MOD 30 MIN: CPT | Performed by: OBSTETRICS & GYNECOLOGY

## 2021-09-15 PROCEDURE — G9717 DOC PT DX DEP/BP F/U NT REQ: HCPCS | Performed by: OBSTETRICS & GYNECOLOGY

## 2021-09-15 PROCEDURE — G0463 HOSPITAL OUTPT CLINIC VISIT: HCPCS | Performed by: OBSTETRICS & GYNECOLOGY

## 2021-09-15 PROCEDURE — G8399 PT W/DXA RESULTS DOCUMENT: HCPCS | Performed by: OBSTETRICS & GYNECOLOGY

## 2021-09-15 PROCEDURE — 1101F PT FALLS ASSESS-DOCD LE1/YR: CPT | Performed by: OBSTETRICS & GYNECOLOGY

## 2021-09-15 PROCEDURE — G8754 DIAS BP LESS 90: HCPCS | Performed by: OBSTETRICS & GYNECOLOGY

## 2021-09-15 PROCEDURE — 1090F PRES/ABSN URINE INCON ASSESS: CPT | Performed by: OBSTETRICS & GYNECOLOGY

## 2021-09-15 NOTE — PROGRESS NOTES
[address] [facility] [address] [facility]  P [phone]  F [phone]    Office Note  Patient ID:  Name:  Darlene Regan  MRN:  [id number]  :   y.o. Date:  9/15/2021      HISTORY OF PRESENT ILLNESS:  Ms. Darlene Regan is a 74 y.o. female who on  underwent Robotic-assisted total laparoscopic hysterectomy, Bilateral salpingo-oophorectomy, Bilateral pelvic sentinel lymph node mapping and biopsy. Final pathology consistent with Stage Ib, FIGO Grade 1 endometrial cancer. 8mm out of 15mm invasion. Negative washings. Negative SLNDs. LVSI negative. Completed VBT 2020 (30Gy in 3 fractions). Presents today for continued surveillance. Doing well overall. She reports that using vaginal dilators was painful. She was seen by Dr. Octavia Orta last month to help break up some of the vaginal adhesions and she is using the smallest dilator. The patient is not sexually active. Denies vaginal bleeding/discharge, abdominal/pelvic pain, nausea, vomiting, constipation, diarrhea, CP, SOB, hematuria, hematochezia, change in appetite or bowel movements, bloating, fevers, chills, or urinary symptoms. Initial History:  Ms. Darlene Regan is a 74 y.o.  postmenopausal female who presents in consultation from Dr. Liam Mary for FIGO Grade 1 endometrial cancer. The patient first reports vaginal spotting/bleeding in 2019. She was ultimately referred to Dr. Liam Mary by her PCP. On 2020 underwent hysteroscopy/D&C with final pathology consistent with FIGO Grade 1 endometrial cancer. Reports some spotting since her surgery. History of urinary incontinence for which she wears a pad. Denies hematuria or hematochezia. Denies change in appetite or bowel habits, nausea, vomiting, diarrhea, CP, SOB, fevers, or chills. Reports long history of constipation for which she uses senna S.      Pertinent PMH/PSH: h/o , obesity, HTN, INDER on CPAP Active, no restrictions. Pathology Review:   2/20/202:  FINAL PATHOLOGIC DIAGNOSIS   1. Lymph node, right pelvic, sentinel node excision:   No evidence for malignancy in one node (0/1). See comment. 2. Lymph node, left pelvic, sentinel node excision:   No evidence for malignancy in one node (0/1). See comment. 3. Uterus, cervix, fallopian tubes, ovaries, hysterectomy, salpingo-oophorectomy:   Cervix: No evidence for dysplasia or malignancy. Endometrium: Endometrioid adenocarcinoma, FIGO grade 1. See synoptic report   Myometrium: Invasive adenocarcinoma. Leiomyoma. Adenomyosis. Fallopian tubes: No histopathologic abnormality. Ovaries: Benign physiologic changes. Synoptic report - endometrium   SPECIMEN   Procedure: Hysterectomy and salpingo-oophorectomy. Lymph Node Sampling: Bilateral pelvic lymph nodes. Specimen Integrity: Intact. TUMOR   Histologic Type: Endometrioid Adenocarcinoma   Histologic Grade: Grade 1   Tumor Extent:   Myometrial Invasion: Present. Depth of invasion: 8 mm   Myometrial thickness: 15 mm   Cervical stromal involvement: Not Identified. Extent of Involvement of Other Organs: Not identified. Accessory Tumor Findings   Lymph-Vascular Invasion: Not identified   LYMPH NODES   Number of Pelvic Nodes with Macrometastasis (>2 mm): 0   Number of Pelvic Nodes with Micrometastasis (>0.2 mm <= 2 mm): 0   Number of Pelvic Nodes with Isolated Tumor Cells (0.2 mm or less): 0   Total Number of Pelvic Nodes Examined (sentinel and non-sentinel): 2   Number of Pelvic [place] Nodes Examined: 2   Pathologic Stage   Primary Tumor (pT): pT1b   Regional Lymph Nodes (pN): pN0   Comment   Immunohistochemistry stains       2/5/2020:  Endometrium and polyp, curetting:  Well differentiated endometrioid adenocarcinoma, FIGO grade 1 in a background of extensive atypical complex hyperplasia.     ROS:  A comprehensive review of systems was negative except for that written in the History of Present Illness.  , 10 point ROS    OB/GYN ROS:  Per HPI    ECOG ndGndrndanddndend:nd nd2nd Problem List:  Patient Active Problem List    Diagnosis Date Noted    Dilated cardiomyopathy ([address] 02/15/2021    Chronic systolic congestive heart failure ([address] 02/15/2021    Cardiomyopathy ([address] 02/15/2021    S/P cardiac cath 2020    Stage 3a chronic kidney disease ([address] 10/08/2020    Left bundle branch block 2020    Endometrial cancer ([address] 2020    Severe obesity ([address] 2019    Nuclear cataract 2016    Posterior subcapsular polar senile cataract 2016    Neuropathy, peripheral 2014    Essential hypertension 2014    Hyperglycemia 2014    GERD (gastroesophageal reflux disease) 2014    Mixed hyperlipidemia 2014    INDER on CPAP 2014    Depression 2014     PMH:  Past Medical History:   Diagnosis Date    AICD (automatic cardioverter/defibrillator) present     Arthritis     OSTEO    Cancer ([address]     skin cancer removed from nose    Chronic kidney disease 2020    stage 3     Chronic pain     left hip    Depression 2014    Endometrial cancer ([address]     GERD (gastroesophageal reflux disease)     Heart failure ([address]     Hyperlipidemia     Hypertension     Left bundle branch block     Neuropathy, peripheral 2014    INDER on CPAP 2014    CPAP -regular use    Stage 3a chronic kidney disease ([address] 10/8/2020           PSH:  Past Surgical History:   Procedure Laterality Date    COLONOSCOPY  2010     Dr. Carla Proctor HX CATARACT REMOVAL Right      [facility]        HX DILATION AND CURETTAGE  2020    hysteroscopy    HX GI      COLONOSCOPY    HX HEART CATHETERIZATION Left 2020    HX HEENT  2005    BCCA REMOVED FROM NOSE     HX HYSTERECTOMY  2020    HX IMPLANTABLE CARDIOVERTER DEFIBRILLATOR      HX OTHER SURGICAL      hysteroscopy surg    HX PACEMAKER  02/2021    BIV-ICD    HX PARATHYROIDECTOMY Left 05/05/2021    lower left     HX PARTIAL THYROIDECTOMY  05/05/2021    HX TONSILLECTOMY  1957    AL INSJ/RPLCMT PERM DFB W/TRNSVNS LDS 1/DUAL CHMBR N/A 2/15/2021    INSERT ICD BIV MULTI performed by Lillian Vazquez MD at [facility] CARD[facility] CATH LAB      Social History:  Social History     Tobacco Use    Smoking status: Never Smoker    Smokeless tobacco: Never Used   Substance Use Topics    Alcohol use: No     Alcohol/week: 0.0 standard drinks      Family History:  Family History   Problem Relation Age of Onset    Heart Disease Mother     Breast Cancer Mother     Heart Disease Father     Kidney Disease Paternal Aunt         renal cancer    Colon Cancer Maternal Aunt     Cancer Paternal Uncle         leukemia    Anesth Problems Neg Hx       Medications: (reviewed)  Current Outpatient Medications   Medication Sig    furosemide (LASIX) 20 mg tablet Take 1 Tablet by mouth daily.  buPROPion XL (WELLBUTRIN XL) 300 mg XL tablet TAKE 1 TABLET BY MOUTH EVERY MORNING    multivitamin (ONE A DAY) tablet Take 1 Tablet by mouth daily.  cholecalciferol (Vitamin D3) 25 mcg (1,000 unit) cap Take  by mouth daily.  risperiDONE (RisperDAL) 2 mg tablet TAKE 1 TABLET BY MOUTH EVERY DAY AT NIGHT    vitamin E (AQUA GEMS) 400 unit capsule Take 400 Units by mouth daily.  sacubitriL-valsartan (Entresto) 49-51 mg tab tablet Take 1 Tab by mouth two (2) times a day.  carvediloL (COREG) 6.25 mg tablet Take 1 Tab by mouth two (2) times a day.  atorvastatin (LIPITOR) 20 mg tablet TAKE 1 TABLET BY MOUTH EVERY DAY    ascorbic acid, vitamin C, (VITAMIN C) 500 mg tablet Take 500 mg by mouth daily (after breakfast).  aspirin delayed-release 81 mg tablet Take 81 mg by mouth daily. No current facility-administered medications for this visit.      Allergies: (reviewed)  Allergies   Allergen Reactions    Erythromycin Rash          OBJECTIVE:    Physical Exam:  Visit Vitals  BP (!) 94/45 (BP 1 Location: Left arm, BP Patient Position: Sitting)   Pulse 72   Ht 5' 8\" (1.727 m)   Wt 251 lb 6.4 oz (114 kg)   BMI 38.23 kg/m²      General: Alert and oriented. No acute distress. Well-nourished  HEENT: No thyroid enlargment. Neck supple without restrictions. Sclera normal. Normal occular motion. Moist mucous membranes. Lymphatics: No evidence of axillary, cervical, or subclavicular adenopathy. Respiratory: clear to auscultation and percussion to the bases. No CVAT. Cardiovascular: regular rate and rhythm. No murmurs, rubs, or gallops. Gastrointestinal: soft, non-tender, non-distended, no masses or organomegaly. Well-healed incision. Musculoskeletal: normal gait. No joint tenderness, deformity or swelling. No muscular tenderness. Extremities: extremities normal, atraumatic, no cyanosis or edema. Pelvic: exam chaperoned by nurse. Normal appearing external genitalia. On speculum exam, the vagina is atrophic. Mild radiation changes. The uterus and cervix are surgically absent. No evidence of masses or nodularity on bimanual exam. Deferred rectovaginal exam.   Neuro: Grossly intact. Normal gait and movement. No acute deficit  Skin: No evidence of rashes or skin changes. IMPRESSION/PLAN:    Ms. Christine Davis is a 74 y.o. female who on 2/20/2020 underwent Robotic-assisted total laparoscopic hysterectomy, Bilateral salpingo-oophorectomy, Bilateral pelvic sentinel lymph node mapping and biopsy. Final pathology consistent with Stage Ib, FIGO Grade 1 endometrial cancer. 8mm out of 15mm invasion. Negative washings. Negative SLNDs. LVSI negative. Completed VBT 6/26/2020 (30Gy in 3 fractions).     Problems:     Patient Active Problem List    Diagnosis Date Noted    Dilated cardiomyopathy ([facility] 02/15/2021    Chronic systolic congestive heart failure ([facility] 02/15/2021    Cardiomyopathy ([facility] 02/15/2021    S/P cardiac cath 12/03/2020    Stage 3a chronic kidney disease ([facility] 10/08/2020    Left bundle branch block 02/18/2020    Endometrial cancer ([facility] 02/12/2020    Severe obesity ([facility] 01/11/2019    Nuclear cataract 02/05/2016    Posterior subcapsular polar senile cataract 02/05/2016    Neuropathy, peripheral 04/09/2014    Essential hypertension 04/08/2014    Hyperglycemia 04/08/2014    GERD (gastroesophageal reflux disease) 04/08/2014    Mixed hyperlipidemia 04/08/2014    INDER on CPAP 04/08/2014    Depression 01/09/2014       Reviewed patient's course to date. CONY on exam today. Reassured patient. Patient to follow-up with Dr. Marnie Bell in June. We will alternate visits. I will see her in 6 months from now. Reviewed precautionary symptoms to return sooner. All questions and concerns were addressed with the patient and she is comfortable with the plan. An electronic signature was used to authenticate this note.      Claudine Smalls MD

## 2021-09-15 NOTE — PROGRESS NOTES
6 month follow up for endometrial cancer ,  pt reports left sided abdominal pain that radiates to her back, possibly related to moving furniture    1. Have you been to the ER, urgent care clinic since your last visit? Hospitalized since your last visit?  no    2. Have you seen or consulted any other health care providers outside of the [address] [place] [address] since your last visit? Include any pap smears or colon screening.    no

## 2021-09-20 ENCOUNTER — TELEPHONE (OUTPATIENT)
Dept: GYNECOLOGY | Age: 66
End: 2021-09-20

## 2021-09-20 NOTE — TELEPHONE ENCOUNTER
Patient , she wanted to let you know that the pain she was having at her visit on 9/15/2021, left sided abdominal pain that radiates to her back, it has resolved.

## 2021-09-28 ENCOUNTER — APPOINTMENT (OUTPATIENT)
Dept: INTERNAL MEDICINE CLINIC | Age: 66
End: 2021-09-28

## 2021-09-29 LAB
ALBUMIN SERPL-MCNC: 4.3 G/DL (ref 3.8–4.8)
ALBUMIN/GLOB SERPL: 2 {RATIO} (ref 1.2–2.2)
ALP SERPL-CCNC: 97 IU/L (ref 44–121)
ALT SERPL-CCNC: 23 IU/L (ref 0–32)
AST SERPL-CCNC: 21 IU/L (ref 0–40)
BASOPHILS # BLD AUTO: 0.1 X10E3/UL (ref 0–0.2)
BASOPHILS NFR BLD AUTO: 2 %
BILIRUB SERPL-MCNC: 0.3 MG/DL (ref 0–1.2)
BUN SERPL-MCNC: 19 MG/DL (ref 8–27)
BUN/CREAT SERPL: 18 (ref 12–28)
CALCIUM SERPL-MCNC: 9.2 MG/DL (ref 8.7–10.3)
CHLORIDE SERPL-SCNC: 106 MMOL/L (ref 96–106)
CHOLEST SERPL-MCNC: 170 MG/DL (ref 100–199)
CO2 SERPL-SCNC: 25 MMOL/L (ref 20–29)
CREAT SERPL-MCNC: 1.07 MG/DL (ref 0.57–1)
EOSINOPHIL # BLD AUTO: 0.2 X10E3/UL (ref 0–0.4)
EOSINOPHIL NFR BLD AUTO: 3 %
ERYTHROCYTE [DISTWIDTH] IN BLOOD BY AUTOMATED COUNT: 12.8 % (ref 11.7–15.4)
EST. AVERAGE GLUCOSE BLD GHB EST-MCNC: 131 MG/DL
GLOBULIN SER CALC-MCNC: 2.2 G/DL (ref 1.5–4.5)
GLUCOSE SERPL-MCNC: 115 MG/DL (ref 65–99)
HBA1C MFR BLD: 6.2 % (ref 4.8–5.6)
HCT VFR BLD AUTO: 34.9 % (ref 34–46.6)
HDLC SERPL-MCNC: 46 MG/DL
HGB BLD-MCNC: 11.8 G/DL (ref 11.1–15.9)
IMM GRANULOCYTES # BLD AUTO: 0 X10E3/UL (ref 0–0.1)
IMM GRANULOCYTES NFR BLD AUTO: 0 %
LDLC SERPL CALC-MCNC: 100 MG/DL (ref 0–99)
LYMPHOCYTES # BLD AUTO: 1 X10E3/UL (ref 0.7–3.1)
LYMPHOCYTES NFR BLD AUTO: 23 %
MCH RBC QN AUTO: 30.3 PG (ref 26.6–33)
MCHC RBC AUTO-ENTMCNC: 33.8 G/DL (ref 31.5–35.7)
MCV RBC AUTO: 90 FL (ref 79–97)
MONOCYTES # BLD AUTO: 0.6 X10E3/UL (ref 0.1–0.9)
MONOCYTES NFR BLD AUTO: 13 %
NEUTROPHILS # BLD AUTO: 2.6 X10E3/UL (ref 1.4–7)
NEUTROPHILS NFR BLD AUTO: 59 %
PLATELET # BLD AUTO: 214 X10E3/UL (ref 150–450)
POTASSIUM SERPL-SCNC: 4.6 MMOL/L (ref 3.5–5.2)
PROT SERPL-MCNC: 6.5 G/DL (ref 6–8.5)
RBC # BLD AUTO: 3.89 X10E6/UL (ref 3.77–5.28)
SODIUM SERPL-SCNC: 144 MMOL/L (ref 134–144)
TRIGL SERPL-MCNC: 134 MG/DL (ref 0–149)
VLDLC SERPL CALC-MCNC: 24 MG/DL (ref 5–40)
WBC # BLD AUTO: 4.4 X10E3/UL (ref 3.4–10.8)

## 2021-10-05 ENCOUNTER — OFFICE VISIT (OUTPATIENT)
Dept: INTERNAL MEDICINE CLINIC | Age: 66
End: 2021-10-05
Payer: MEDICARE

## 2021-10-05 VITALS
HEART RATE: 68 BPM | DIASTOLIC BLOOD PRESSURE: 75 MMHG | RESPIRATION RATE: 18 BRPM | HEIGHT: 68 IN | BODY MASS INDEX: 38.34 KG/M2 | SYSTOLIC BLOOD PRESSURE: 116 MMHG | WEIGHT: 253 LBS | OXYGEN SATURATION: 97 % | TEMPERATURE: 97.7 F

## 2021-10-05 DIAGNOSIS — Z00.00 MEDICARE ANNUAL WELLNESS VISIT, SUBSEQUENT: Primary | ICD-10-CM

## 2021-10-05 DIAGNOSIS — R06.00 DYSPNEA, UNSPECIFIED TYPE: ICD-10-CM

## 2021-10-05 DIAGNOSIS — Z23 NEEDS FLU SHOT: ICD-10-CM

## 2021-10-05 DIAGNOSIS — Z23 ENCOUNTER FOR IMMUNIZATION: ICD-10-CM

## 2021-10-05 PROCEDURE — G8417 CALC BMI ABV UP PARAM F/U: HCPCS | Performed by: INTERNAL MEDICINE

## 2021-10-05 PROCEDURE — G9899 SCRN MAM PERF RSLTS DOC: HCPCS | Performed by: INTERNAL MEDICINE

## 2021-10-05 PROCEDURE — 90694 VACC AIIV4 NO PRSRV 0.5ML IM: CPT | Performed by: INTERNAL MEDICINE

## 2021-10-05 PROCEDURE — G9717 DOC PT DX DEP/BP F/U NT REQ: HCPCS | Performed by: INTERNAL MEDICINE

## 2021-10-05 PROCEDURE — G8536 NO DOC ELDER MAL SCRN: HCPCS | Performed by: INTERNAL MEDICINE

## 2021-10-05 PROCEDURE — G8399 PT W/DXA RESULTS DOCUMENT: HCPCS | Performed by: INTERNAL MEDICINE

## 2021-10-05 PROCEDURE — G8754 DIAS BP LESS 90: HCPCS | Performed by: INTERNAL MEDICINE

## 2021-10-05 PROCEDURE — G8427 DOCREV CUR MEDS BY ELIG CLIN: HCPCS | Performed by: INTERNAL MEDICINE

## 2021-10-05 PROCEDURE — 3017F COLORECTAL CA SCREEN DOC REV: CPT | Performed by: INTERNAL MEDICINE

## 2021-10-05 PROCEDURE — G0439 PPPS, SUBSEQ VISIT: HCPCS | Performed by: INTERNAL MEDICINE

## 2021-10-05 PROCEDURE — 1101F PT FALLS ASSESS-DOCD LE1/YR: CPT | Performed by: INTERNAL MEDICINE

## 2021-10-05 PROCEDURE — G8752 SYS BP LESS 140: HCPCS | Performed by: INTERNAL MEDICINE

## 2021-10-05 RX ORDER — ROPINIROLE 0.5 MG/1
0.5 TABLET, FILM COATED ORAL
Qty: 90 TABLET | Refills: 3 | Status: SHIPPED | OUTPATIENT
Start: 2021-10-05 | End: 2022-01-13 | Stop reason: SDUPTHER

## 2021-10-05 NOTE — PATIENT INSTRUCTIONS
Medicare Wellness Visit, Female     The best way to live healthy is to have a lifestyle where you eat a well-balanced diet, exercise regularly, limit alcohol use, and quit all forms of tobacco/nicotine, if applicable. Regular preventive services are another way to keep healthy. Preventive services (vaccines, screening tests, monitoring & exams) can help personalize your care plan, which helps you manage your own care. Screening tests can find health problems at the earliest stages, when they are easiest to treat. [facility] follows the current, evidence-based guidelines published by the [facility] Gerry Carrillo ([facility]) when recommending preventive services for our patients. Because we follow these guidelines, sometimes recommendations change over time as research supports it. (For example, mammograms used to be recommended annually. Even though Medicare will still pay for an annual mammogram, the newer guidelines recommend a mammogram every two years for women of average risk). Of course, you and your doctor may decide to screen more often for some diseases, based on your risk and your co-morbidities (chronic disease you are already diagnosed with). Preventive services for you include:  - Medicare offers their members a free annual wellness visit, which is time for you and your primary care provider to discuss and plan for your preventive service needs. Take advantage of this benefit every year!  -All adults over the age of 72 should receive the recommended pneumonia vaccines. Current USPSTF guidelines recommend a series of two vaccines for the best pneumonia protection.   -All adults should have a flu vaccine yearly and a tetanus vaccine every 10 years.   -All adults age 48 and older should receive the shingles vaccines (series of two vaccines).       -All adults age 38-68 who are overweight should have a diabetes screening test once every three years.   -All adults born between [birth year] and 1965 should be screened once for Hepatitis C.  -Other screening tests and preventive services for persons with diabetes include: an eye exam to screen for diabetic retinopathy, a kidney function test, a foot exam, and stricter control over your cholesterol.   -Cardiovascular screening for adults with routine risk involves an electrocardiogram (ECG) at intervals determined by your doctor.   -Colorectal cancer screenings should be done for adults age 54-65 with no increased risk factors for colorectal cancer. There are a number of acceptable methods of screening for this type of cancer. Each test has its own benefits and drawbacks. Discuss with your doctor what is most appropriate for you during your annual wellness visit. The different tests include: colonoscopy (considered the best screening method), a fecal occult blood test, a fecal DNA test, and sigmoidoscopy.    -A bone mass density test is recommended when a woman turns 65 to screen for osteoporosis. This test is only recommended one time, as a screening. Some providers will use this same test as a disease monitoring tool if you already have osteoporosis. -Breast cancer screenings are recommended every other year for women of normal risk, age 54-69.  -Cervical cancer screenings for women over age 72 are only recommended with certain risk factors. Here is a list of your current Health Maintenance items (your personalized list of preventive services) with a due date:  Health Maintenance Due   Topic Date Due    Mammogram  05/13/2021    Yearly Flu Vaccine (1) 09/01/2021         Vaccine Information Statement    Influenza (Flu) Vaccine (Inactivated or Recombinant): What You Need to Know    Many vaccine information statements are available in [place] and other languages. See www.immunize.org/vis. Hojas de información sobre vacunas están disponibles en español y en muchos otros idiomas. Visite www.immunize.org/vis.     1. Why get vaccinated? Influenza vaccine can prevent influenza (flu). Flu is a contagious disease that spreads around the [place] every year, usually between October and May. Anyone can get the flu, but it is more dangerous for some people. Infants and young children, people 72 years and older, pregnant people, and people with certain health conditions or a weakened immune system are at greatest risk of flu complications. Pneumonia, bronchitis, sinus infections, and ear infections are examples of flu-related complications. If you have a medical condition, such as heart disease, cancer, or diabetes, flu can make it worse. Flu can cause fever and chills, sore throat, muscle aches, fatigue, cough, headache, and runny or stuffy nose. Some people may have vomiting and diarrhea, though this is more common in children than adults. In an average year, thousands of people in the [facility] die from flu, and many more are hospitalized. Flu vaccine prevents millions of illnesses and flu-related visits to the doctor each year. 2. Influenza vaccines     CDC recommends everyone 6 months and older get vaccinated every flu season. Children 6 months through 6years of age may need 2 doses during a single flu season. Everyone else needs only 1 dose each flu season. It takes about 2 weeks for protection to develop after vaccination. There are many flu viruses, and they are always changing. Each year a new flu vaccine is made to protect against the influenza viruses believed to be likely to cause disease in the upcoming flu season. Even when the vaccine doesnt exactly match these viruses, it may still provide some protection. Influenza vaccine does not cause flu. Influenza vaccine may be given at the same time as other vaccines.     3. Talk with your health care provider    Tell your vaccination provider if the person getting the vaccine:   Has had an allergic reaction after a previous dose of influenza vaccine, or has any severe, life-threatening allergies    Has ever had Guillain-Barré Syndrome (also called GBS)    In some cases, your health care provider may decide to postpone influenza vaccination until a future visit. Influenza vaccine can be administered at any time during pregnancy. People who are or will be pregnant during influenza season should receive inactivated influenza vaccine. People with minor illnesses, such as a cold, may be vaccinated. People who are moderately or severely ill should usually wait until they recover before getting influenza vaccine. Your health care provider can give you more information. 4. Risks of a vaccine reaction     Soreness, redness, and swelling where the shot is given, fever, muscle aches, and headache can happen after influenza vaccination.  There may be a very small increased risk of Guillain-Barré Syndrome (GBS) after inactivated influenza vaccine (the flu shot). Janeal Ray children who get the flu shot along with pneumococcal vaccine (PCV13) and/or DTaP vaccine at the same time might be slightly more likely to have a seizure caused by fever. Tell your health care provider if a child who is getting flu vaccine has ever had a seizure. People sometimes faint after medical procedures, including vaccination. Tell your provider if you feel dizzy or have vision changes or ringing in the ears. As with any medicine, there is a very remote chance of a vaccine causing a severe allergic reaction, other serious injury, or death. 5. What if there is a serious problem? An allergic reaction could occur after the vaccinated person leaves the clinic. If you see signs of a severe allergic reaction (hives, swelling of the face and throat, difficulty breathing, a fast heartbeat, dizziness, or weakness), call 9-1-1 and get the person to the nearest hospital.    For other signs that concern you, call your health care provider.     Adverse reactions should be reported to the Vaccine Adverse Event Reporting System (VAERS). Your health care provider will usually file this report, or you can do it yourself. Visit the VAERS website at www.vaers. [facility].gov or call 9-746.577.1370. VAERS is only for reporting reactions, and VAERS staff members do not give medical advice. 6. The National Vaccine Injury Compensation Program    The [facility] Vaccine Injury Compensation Program (VICP) is a federal program that was created to compensate people who may have been injured by certain vaccines. Claims regarding alleged injury or death due to vaccination have a time limit for filing, which may be as short as two years. Visit the VICP website at www.[facility].gov/vaccinecompensation or call 9-481.582.2242 to learn about the program and about filing a claim. 7. How can I learn more?  Ask your health care provider.  Call your local or state health department.  Visit the website of the Food and Drug Administration (FDA) for vaccine package inserts and additional information at www.fda.gov/vaccines-blood-biologics/vaccines.  Contact the Centers for Disease Control and Prevention (CDC):  - Call 1-747.379.1345 (1-800-CDC-INFO) or  - Visit CDCs influenza website at www.cdc.gov/flu. Vaccine Information Statement   Inactivated Influenza Vaccine   8/6/2021  42 CANDIDA Cool 625XP-64   Department of Health and Human Services  Centers for Disease Control and Prevention    Office Use Only

## 2021-10-05 NOTE — PROGRESS NOTES
Chief Complaint   Patient presents with   [facility] Annual Wellness Visit       1. Have you been to the ER, urgent care clinic since your last visit? Hospitalized since your last visit? No    2. Have you seen or consulted any other health care providers outside of the [address] [place] [address] since your last visit? Include any pap smears or colon screening. Fátima  Armida Osorio is a 74 y.o. female who presents for routine immunizations. She denies any symptoms , reactions or allergies that would exclude them from being immunized today. Risks and adverse reactions were discussed and the VIS was given to them. All questions were addressed. She was observed for 15 min post injection. There were no reactions observed.     Ruth Cotter LPN 4-6 weeks or until follow up

## 2021-10-05 NOTE — PROGRESS NOTES
PROGRESS NOTE  Name: Armida Osorio   : 1955       ASSESSMENT/ PLAN:     1. Dyspnea: She has had imaging (CTA), and echo, but no recent PFT--would like to hold off for now. 2. Endometrial cancer: Per Dr. Esvin Patel. 3. Anemia: Mild. ? Chronic disease. Keep an eye on this. 4. CKD \"stage II\": Watch labs. Seeing Dr. Jasmyn Reeves. 5. Cardiomyopathy/CHF: EF 30-35%. She is s/p ICD implantation in 2021. As per Cardiology. 6. Hypertension: BP fine today. Continue current meds:   7. Hip pain: Stable, R side, mainly. Can return as desired to Dr. Heath Cornell. OTC analgesics. 8. Constipation: Controlled with stool softener. 9. Neuropathy, LE: Stable. Ongoing. Likely arising from her metabolic syndrome / prediabetes. Sometimes neuropathy predates the diagnosis of DM. Neg RPR, ESR, TSH, JUNE, in   10. Depression: Stable. No SI.   - risperiDONE (RISPERDAL) 2 mg tablet; Take 1 Tab by mouth nightly as needed. - buPROPion XL (WELLBUTRIN XL) 300 mg XL tablet; Take 1 Tab by mouth every morning. 11. Hyperglycemia: \"Prediabetes\" / \"borderline DM\": G9W 6.2.   - METABOLIC PANEL, COMPREHENSIVE  - HEMOGLOBIN A1C  12. GERD (gastroesophageal reflux disease): Stable. - omeprazole (PRILOSEC) 20 mg capsule; Take 1 Cap by mouth daily. 13. Hyperlipidemia: Reviewed her last labs--normal 2020. We'll check again down the road. - atorvastatin (LIPITOR) 20 mg tablet; Take 1 Tab by mouth daily.  - LIPID PANEL  14. INDER on CPAP:  15. Obesity: stable--work on diet and exercise. RTC 6 months, HTN, etc                 SUBJECTIVE:   Ms. Armida Osorio is a 74 y.o. female who is here for follow up of routine medical issues. Chief Complaint   Patient presents with    Annual Wellness Visit       She complains of SOB. \"I get winded with even small exertions. \" Last CXR showed no airspace disease. She has had echo in 2021 showing EF normalized to 55-60%, and was told that this must be due to something else.      She c/o terrible restless leg. \"I can't get to sleep for hours. \"     She has a lot of problem with blowing her nose lately. Water rhinitis. Dr. Perla Porter, nephrologist, sees her. She has told the patient that she has stage II not stage III CKD. She underwent L lower parathyroidectomy in May. She had elevated Calcium and was noted to have a parathyroid adenoma. Dilated cardiomyopathy: EF 30-35% in Dec 2020, and on echo 5/2020. In Feb 2021 she had heart cath with Dr. Gabi Goff in Dec 2021; no blockages. She had ICD implantation on 2/15/2021. Now on Entresto. Her last echo in July 2021 showed EF 55-60%    Endometrial cancer: She is s/p Robotic-assisted total laparoscopic hysterectomy, Bilateral salpingo-oophorectomy, Bilateral pelvic sentinel lymph node mapping and biopsy Feb 2020. Follows with Dr. Ninfa Prabhakar. Dr. Kym Chaudhari managed the XRT. She was seen by Dr. Kiana Ramos, cardiologist, for preop evaluation. Found to have LBBB on EKG. Echo was performed and showed EF 30-35%. \"He wants to do Cath\"--she is interested in following up with her usual cardiologist, Dr. Gabi Goff. Sees gynecologist yearly. Prior to the endometrial cancer her most recent gynecologist was Dr. Monika Lacey (previously Dr. Jennifer Florence). Has had mammogram.  Had dexa. It is recalled that she saw orthopedist for L ankle pain. \"She wanted me to wear a device\", that was expensive. The pain got better with new shoes. Hurts if sits at desk too long. Neuropathy: Unchanged numbness and tingling in feet. It is recalled that she has had high blood sugars, as well as an A1C that was up. This is doing fine. Last A1C 5.7. Palpitations. Saw Dr. Gabi Goff in 2013. We noted in 2014: She was having palpitations and saw Dr. Cris Duarnd in January. She had EKG, echo and stress test--\"I think everything is okay. \"  The palpitations went away since getting treated for INDER. She is on CPAP for INDER. Mood okay, stress better.   \"Not too bad; I don't worry any more than anyone else. \"  As noted before: \"I have been depressed since I was a teenager. \"  \"I tried to go off the meds, but I got down in the dumps. \"  Back on meds. No SI at this time. At this time, she is otherwise doing well and has brought no other complaints to my attention today. For a list of the medical issues addressed today, see the assessment and plan below. At this time, she is otherwise doing well and has brought no other complaints to my attention today. For a list of the medical issues addressed today, see the assessment and plan below.     PMH:   Past Medical History:   Diagnosis Date    AICD (automatic cardioverter/defibrillator) present     Arthritis     OSTEO    [facility] [address]     skin cancer removed from nose    Chronic kidney disease 2020    stage 3     Chronic pain     left hip    Depression 2014    Endometrial cancer [facility] [address]     GERD (gastroesophageal reflux disease)     Heart failure ([facility] [address]     Hyperlipidemia     Hypertension     Left bundle branch block     Neuropathy, peripheral 2014    INDER on CPAP 2014    CPAP -regular use    Stage 3a chronic kidney disease ([facility] [address] 10/8/2020            Past Surgical History:   Procedure Laterality Date    COLONOSCOPY  2010     Dr. Evan Velasquez HX CATARACT REMOVAL Right      [facility],[address]        HX DILATION AND CURETTAGE  2020    hysteroscopy    HX GI      COLONOSCOPY    HX HEART CATHETERIZATION Left 2020    HX HEENT      BCCA REMOVED FROM NOSE     HX HYSTERECTOMY  2020    HX IMPLANTABLE CARDIOVERTER DEFIBRILLATOR      HX OTHER SURGICAL      hysteroscopy surg    HX PACEMAKER  2021    BIV-ICD    HX PARATHYROIDECTOMY Left 2021    lower left     HX PARTIAL THYROIDECTOMY  2021    HX TONSILLECTOMY      [place] INSJ/RPLCMT PERM DFB W/TRNSVNS LDS 1/DUAL CHMBR N/A 2/15/2021    INSERT ICD BIV MULTI performed by Elva Rodrigues MD at [facility] CARDIAC CATH LAB       All: is allergic to erythromycin. Current Outpatient Medications   Medication Sig    furosemide (LASIX) 20 mg tablet Take 1 Tablet by mouth daily.  buPROPion XL (WELLBUTRIN XL) 300 mg XL tablet TAKE 1 TABLET BY MOUTH EVERY MORNING    multivitamin (ONE A DAY) tablet Take 1 Tablet by mouth daily.  cholecalciferol (Vitamin D3) 25 mcg (1,000 unit) cap Take 2,000 Units by mouth daily.  risperiDONE (RisperDAL) 2 mg tablet TAKE 1 TABLET BY MOUTH EVERY DAY AT NIGHT    vitamin E (AQUA GEMS) 400 unit capsule Take 400 Units by mouth daily.  sacubitriL-valsartan (Entresto) 49-51 mg tab tablet Take 1 Tab by mouth two (2) times a day.  carvediloL (COREG) 6.25 mg tablet Take 1 Tab by mouth two (2) times a day.  atorvastatin (LIPITOR) 20 mg tablet TAKE 1 TABLET BY MOUTH EVERY DAY    ascorbic acid, vitamin C, (VITAMIN C) 500 mg tablet Take 500 mg by mouth daily (after breakfast).  aspirin delayed-release 81 mg tablet Take 81 mg by mouth daily. No current facility-administered medications for this visit. FH: Mother  breast cancer. Father  of CHF. SH: Retired insurance . She reports that she has never smoked. She has never used smokeless tobacco. She reports that she does not drink alcohol or use drugs. ROS: See above; Complete ROS otherwise negative. OBJECTIVE:   Vitals:   Visit Vitals  /75 (BP 1 Location: Left arm, BP Patient Position: Sitting, BP Cuff Size: Large adult)   Pulse 68   Temp 97.7 °F (36.5 °C) (Temporal)   Resp 18   Ht 5' 8\" (1.727 m)   Wt 253 lb (114.8 kg)   SpO2 97%   BMI 38.47 kg/m²      Gen: Pleasant 74 y.o.  female in NAD. HEENT: PERRLA. EOMI. OP moist and pink.  Neck: Supple.  No LAD.  HEART: RRR, No M/G/R.   LUNGS: CTAB No W/R.   ABDOMEN: S, NT, ND, BS+.   EXTREMITIES: Warm. No C/C/E. MUSCULOSKELETAL: Normal ROM, muscle strength 5/5 all groups.  NEURO: Alert and oriented x 3.  Cranial nerves grossly intact.  No focal sensory or motor deficits noted. SKIN: She has neck surgical wound that appears to have healed well. Lab Results   Component Value Date/Time    Sodium 144 09/28/2021 12:00 AM    Potassium 4.6 09/28/2021 12:00 AM    Chloride 106 09/28/2021 12:00 AM    CO2 25 09/28/2021 12:00 AM    Anion gap 4 (L) 06/19/2020 01:15 AM    Glucose 115 (H) 09/28/2021 12:00 AM    BUN 19 09/28/2021 12:00 AM    Creatinine 1.07 (H) 09/28/2021 12:00 AM    BUN/Creatinine ratio 18 09/28/2021 12:00 AM    GFR est AA 62 09/28/2021 12:00 AM    GFR est non-AA 53 (L) 09/28/2021 12:00 AM    Calcium 9.2 09/28/2021 12:00 AM    Bilirubin, total 0.3 09/28/2021 12:00 AM    ALT (SGPT) 23 09/28/2021 12:00 AM    Alk.  phosphatase 97 09/28/2021 12:00 AM    Protein, total 6.5 09/28/2021 12:00 AM    Albumin 4.3 09/28/2021 12:00 AM    Globulin 3.4 06/19/2020 01:15 AM    A-G Ratio 2.0 09/28/2021 12:00 AM       Lab Results   Component Value Date/Time    Cholesterol, total 170 09/28/2021 12:00 AM    HDL Cholesterol 46 09/28/2021 12:00 AM    LDL, calculated 100 (H) 09/28/2021 12:00 AM    LDL, calculated 65 10/02/2019 08:50 AM    Triglyceride 134 09/28/2021 12:00 AM        Lab Results   Component Value Date/Time    Hemoglobin A1c 6.2 (H) 09/28/2021 12:00 AM       Lab Results   Component Value Date/Time    WBC 4.4 09/28/2021 12:00 AM    HGB 11.8 09/28/2021 12:00 AM    HCT 34.9 09/28/2021 12:00 AM    PLATELET 547 55/86/8723 12:00 AM    MCV 90 09/28/2021 12:00 AM

## 2021-10-05 NOTE — PROGRESS NOTES
This is the Subsequent Medicare Annual Wellness Exam, performed 12 months or more after the Initial AWV or the last Subsequent AWV    I have reviewed the patient's medical history in detail and updated the computerized patient record. Assessment/Plan   Education and counseling provided:  Are appropriate based on today's review and evaluation         Depression Risk Factor Screening     3 most recent PHQ Screens 2/2/2021   Little interest or pleasure in doing things Not at all   Feeling down, depressed, irritable, or hopeless Not at all   Total Score PHQ 2 0       Alcohol Risk Screen    Do you average more than 1 drink per night or more than 7 drinks a week:  No    On any one occasion in the past three months have you have had more than 3 drinks containing alcohol:  No        Functional Ability and Level of Safety    Hearing: Hearing is good. Lives in 1 story home, with . Activities of Daily Living: The home contains: handrails and grab bars  Patient does total self care        Ambulation: with no difficulty     Fall Risk:  Fall Risk Assessment, last 12 mths 10/5/2021   Able to walk? Yes   Fall in past 12 months? 0   Do you feel unsteady? 0   Are you worried about falling 0   Is TUG test greater than 12 seconds?  0   Is the gait abnormal? 0   Number of falls in past 12 months 0      Abuse Screen:  Patient is not abused       Cognitive Screening    Has your family/caregiver stated any concerns about your memory: no     Cognitive Screening: Normal    Health Maintenance Due     Health Maintenance Due   Topic Date Due    Breast Cancer Screen Mammogram  05/13/2021    Flu Vaccine (1) 09/01/2021    Medicare Yearly Exam  09/25/2021       Patient Care Team   Patient Care Team:  Alma Avalos MD as PCP - General (Internal Medicine)  Alma Avalos MD as PCP - [facility]  Alma Avalos MD (Internal Medicine)  Chirag Roca MD as Physician (Sleep Medicine)  Darling Vega MD (Cardiology)  Konstantin Proctor MD as Physician (Cardiology)  Dianna Hassan MD as Physician (Cardiology)  Phil Carballo MD as Surgeon (General Surgery)    History     Patient Active Problem List   Diagnosis Code    Depression F32. A    Essential hypertension I10    Hyperglycemia R73.9    GERD (gastroesophageal reflux disease) K21.9    Mixed hyperlipidemia E78.2    INDER on CPAP G47.33, Z99.89    Neuropathy, peripheral G62.9    Nuclear cataract [id number]    Posterior subcapsular polar senile cataract H25.049    Severe obesity (HCC) E66.01    Endometrial cancer (HCC) C54.1    Left bundle branch block I44.7    Stage 3a chronic kidney disease ([facility]) N18.31    S/P cardiac cath Z98.890    Dilated cardiomyopathy (HCC) I42.0    Chronic systolic congestive heart failure (HCC) I50.22    Cardiomyopathy (HCC) I42.9     Past Medical History:   Diagnosis Date    AICD (automatic cardioverter/defibrillator) present     Arthritis     OSTEO    Cancer ([facility]     skin cancer removed from nose    Chronic kidney disease 2020    stage 3     Chronic pain     left hip    Depression 2014    Endometrial cancer ([facility]     GERD (gastroesophageal reflux disease)     Heart failure ([facility]     Hyperlipidemia     Hypertension     Left bundle branch block 2013    Neuropathy, peripheral 2014    INDRE on CPAP 2014    CPAP -regular use    Stage 3a chronic kidney disease ([facility] 10/8/2020           Past Surgical History:   Procedure Laterality Date    COLONOSCOPY  2010     Dr. Fam Jules HX CATARACT REMOVAL Right      [facility]        HX DILATION AND CURETTAGE  2020    hysteroscopy    HX GI      COLONOSCOPY    HX HEART CATHETERIZATION Left 2020    HX HEENT  2005    BCCA REMOVED FROM NOSE     HX HYSTERECTOMY  2020    HX IMPLANTABLE CARDIOVERTER DEFIBRILLATOR      HX OTHER SURGICAL      hysteroscopy surg    HX PACEMAKER 02/2021    BIV-ICD    HX PARATHYROIDECTOMY Left 05/05/2021    lower left     HX PARTIAL THYROIDECTOMY  05/05/2021    HX TONSILLECTOMY  1957    MI INSJ/RPLCMT PERM DFB W/TRNSVNS LDS 1/DUAL CHMBR N/A 2/15/2021    INSERT ICD BIV MULTI performed by Pk Dai MD at [facility] CARDIAC CATH LAB     Current Outpatient Medications   Medication Sig Dispense Refill    furosemide (LASIX) 20 mg tablet Take 1 Tablet by mouth daily. 90 Tablet 3    buPROPion XL (WELLBUTRIN XL) 300 mg XL tablet TAKE 1 TABLET BY MOUTH EVERY MORNING 90 Tablet 3    multivitamin (ONE A DAY) tablet Take 1 Tablet by mouth daily.  cholecalciferol (Vitamin D3) 25 mcg (1,000 unit) cap Take 2,000 Units by mouth daily.  risperiDONE (RisperDAL) 2 mg tablet TAKE 1 TABLET BY MOUTH EVERY DAY AT NIGHT 90 Tab 0    vitamin E (AQUA GEMS) 400 unit capsule Take 400 Units by mouth daily.  sacubitriL-valsartan (Entresto) 49-51 mg tab tablet Take 1 Tab by mouth two (2) times a day. 180 Tab 1    carvediloL (COREG) 6.25 mg tablet Take 1 Tab by mouth two (2) times a day. 180 Tab 1    atorvastatin (LIPITOR) 20 mg tablet TAKE 1 TABLET BY MOUTH EVERY DAY 90 Tab 3    ascorbic acid, vitamin C, (VITAMIN C) 500 mg tablet Take 500 mg by mouth daily (after breakfast).  aspirin delayed-release 81 mg tablet Take 81 mg by mouth daily.        Allergies   Allergen Reactions    Erythromycin Rash       Family History   Problem Relation Age of Onset    Heart Disease Mother     Breast Cancer Mother     Heart Disease Father     Kidney Disease Paternal Aunt         renal cancer    Colon Cancer Maternal Aunt     Cancer Paternal Uncle         leukemia    Anesth Problems Neg Hx      Social History     Tobacco Use    Smoking status: Never Smoker    Smokeless tobacco: Never Used   Substance Use Topics    Alcohol use: No     Alcohol/week: 0.0 standard drinks         Charly Kruger MD

## 2021-10-11 RX ORDER — RISPERIDONE 2 MG/1
TABLET, FILM COATED ORAL
Qty: 90 TABLET | Refills: 0 | Status: SHIPPED | OUTPATIENT
Start: 2021-10-11 | End: 2022-01-17 | Stop reason: SDUPTHER

## 2021-12-16 ENCOUNTER — HOSPITAL ENCOUNTER (OUTPATIENT)
Dept: RADIATION THERAPY | Age: 66
Discharge: HOME | End: 2021-12-16

## 2021-12-16 ENCOUNTER — VIRTUAL VISIT (OUTPATIENT)
Dept: INTERNAL MEDICINE CLINIC | Age: 66
End: 2021-12-16
Payer: MEDICARE

## 2021-12-16 ENCOUNTER — OFFICE VISIT (OUTPATIENT)
Dept: CARDIOLOGY CLINIC | Age: 66
End: 2021-12-16

## 2021-12-16 DIAGNOSIS — I42.0 DILATED CARDIOMYOPATHY (HCC): ICD-10-CM

## 2021-12-16 DIAGNOSIS — Z95.810 AICD (AUTOMATIC CARDIOVERTER/DEFIBRILLATOR) PRESENT: Primary | ICD-10-CM

## 2021-12-16 DIAGNOSIS — M25.561 ACUTE PAIN OF BOTH KNEES: Primary | ICD-10-CM

## 2021-12-16 DIAGNOSIS — M25.562 ACUTE PAIN OF BOTH KNEES: Primary | ICD-10-CM

## 2021-12-16 PROCEDURE — 93296 REM INTERROG EVL PM/IDS: CPT | Performed by: INTERNAL MEDICINE

## 2021-12-16 PROCEDURE — G9717 DOC PT DX DEP/BP F/U NT REQ: HCPCS | Performed by: FAMILY MEDICINE

## 2021-12-16 PROCEDURE — G8756 NO BP MEASURE DOC: HCPCS | Performed by: FAMILY MEDICINE

## 2021-12-16 PROCEDURE — G0463 HOSPITAL OUTPT CLINIC VISIT: HCPCS | Performed by: FAMILY MEDICINE

## 2021-12-16 PROCEDURE — 3017F COLORECTAL CA SCREEN DOC REV: CPT | Performed by: FAMILY MEDICINE

## 2021-12-16 PROCEDURE — G8427 DOCREV CUR MEDS BY ELIG CLIN: HCPCS | Performed by: FAMILY MEDICINE

## 2021-12-16 PROCEDURE — G8399 PT W/DXA RESULTS DOCUMENT: HCPCS | Performed by: FAMILY MEDICINE

## 2021-12-16 PROCEDURE — 99213 OFFICE O/P EST LOW 20 MIN: CPT | Performed by: FAMILY MEDICINE

## 2021-12-16 PROCEDURE — 93295 DEV INTERROG REMOTE 1/2/MLT: CPT | Performed by: INTERNAL MEDICINE

## 2021-12-16 PROCEDURE — G9899 SCRN MAM PERF RSLTS DOC: HCPCS | Performed by: FAMILY MEDICINE

## 2021-12-16 PROCEDURE — 1101F PT FALLS ASSESS-DOCD LE1/YR: CPT | Performed by: FAMILY MEDICINE

## 2021-12-16 PROCEDURE — 1090F PRES/ABSN URINE INCON ASSESS: CPT | Performed by: FAMILY MEDICINE

## 2021-12-16 NOTE — PROGRESS NOTES
Angelia Tomlin is a 74 y.o. female who was seen by synchronous (real-time) audio-video technology on 12/16/2021 for Knee Pain (has gained some weight which could contribute to the pain. ), Medication Evaluation (Pt recently started taking Requip at night and wanted to know if it was the cause of the pain. ), and Other (getting bloody noses due to blood pressure? or the dry heat? )        Assessment & Plan:   Diagnoses and all orders for this visit:    1. Acute pain of both knees    I recommended she keep her apt with Dr. Og Figueroa ([address] [place] [address]) on 01/11/21. For acute care, I recommended [facility] and discussed treatment such as possible cortisone injections. She also needs imaging of her BL knees. I looked through her med list and explained that she is not on any medication that would cause interaction with an NSAID. I recommended OTC ibuprofen PRN for pain. Subjective: The pt presents virtually today c/o knee pain. Her PCP is Dr. Claribel Calvillo. BL knee pain: Pt is c/p BL knee pain x2 months. Standing from sitting for prolonged periods of time will exacerbate the pain. She also notes pain at night. She denies any known injury or trauma. She does admit to weight gain in the past few months. She has been taking Tylenol for pain and states that she has been told she is unable to take NSAIDs, but she is unsure why. She states that she was recently started on Requip for restless leg syndrome and is wondering if this to be contributing to her pain. She has an appt with Dr. Og Figueroa ([address] [place] [address]) on 01/11/22. Prior to Admission medications    Medication Sig Start Date End Date Taking? Authorizing Provider   risperiDONE (RisperDAL) 2 mg tablet TAKE 1 TABLET BY MOUTH EVERY DAY AT NIGHT 10/11/21  Yes Giancarlo Keith MD   carvediloL (COREG) 6.25 mg tablet TAKE 1 TABLET BY MOUTH TWICE A DAY 10/6/21  Yes Chace Casanova MD   rOPINIRole (REQUIP) 0.5 mg tablet Take 1 Tablet by mouth nightly.  10/5/21  Yes Giancarlo Kieth MD furosemide (LASIX) 20 mg tablet Take 1 Tablet by mouth daily. 8/20/21  Yes Joey Thompson, NP   buPROPion XL (WELLBUTRIN XL) 300 mg XL tablet TAKE 1 TABLET BY MOUTH EVERY MORNING 6/28/21  Yes Omayra Westbrook MD   multivitamin (ONE A DAY) tablet Take 1 Tablet by mouth daily. Yes Provider, Historical   cholecalciferol (Vitamin D3) 25 mcg (1,000 unit) cap Take 2,000 Units by mouth daily. Yes Provider, Historical   vitamin E (AQUA GEMS) 400 unit capsule Take 400 Units by mouth daily. Yes Provider, Historical   sacubitriL-valsartan (Entresto) 49-51 mg tab tablet Take 1 Tab by mouth two (2) times a day. 4/8/21  Yes Mary PARKER NP   atorvastatin (LIPITOR) 20 mg tablet TAKE 1 TABLET BY MOUTH EVERY DAY 3/26/21  Yes Omayra Westbrook MD   ascorbic acid, vitamin C, (VITAMIN C) 500 mg tablet Take 500 mg by mouth daily (after breakfast). Yes Provider, Historical   aspirin delayed-release 81 mg tablet Take 81 mg by mouth daily.    Yes Provider, Historical     Patient Active Problem List    Diagnosis Date Noted    [facility] 02/15/2021    Chronic systolic congestive heart failure ([facility] 02/15/2021    Cardiomyopathy ([facility] 02/15/2021    S/P cardiac cath 12/03/2020    Left bundle branch block 02/18/2020    Endometrial cancer ([facility] 02/12/2020    Severe obesity ([facility] 01/11/2019    Nuclear cataract 02/05/2016    Posterior subcapsular polar senile cataract 02/05/2016    Neuropathy, peripheral 04/09/2014    Essential hypertension 04/08/2014    Hyperglycemia 04/08/2014    GERD (gastroesophageal reflux disease) 04/08/2014    Mixed hyperlipidemia 04/08/2014    INDER on CPAP 04/08/2014    Depression 01/09/2014     Current Outpatient Medications   Medication Sig Dispense Refill    risperiDONE (RisperDAL) 2 mg tablet TAKE 1 TABLET BY MOUTH EVERY DAY AT NIGHT 90 Tablet 0    carvediloL (COREG) 6.25 mg tablet TAKE 1 TABLET BY MOUTH TWICE A  Tablet 1    rOPINIRole (REQUIP) 0.5 mg tablet Take 1 Tablet by mouth nightly. 90 Tablet 3    furosemide (LASIX) 20 mg tablet Take 1 Tablet by mouth daily. 90 Tablet 3    buPROPion XL (WELLBUTRIN XL) 300 mg XL tablet TAKE 1 TABLET BY MOUTH EVERY MORNING 90 Tablet 3    multivitamin (ONE A DAY) tablet Take 1 Tablet by mouth daily.  cholecalciferol (Vitamin D3) 25 mcg (1,000 unit) cap Take 2,000 Units by mouth daily.  vitamin E (AQUA GEMS) 400 unit capsule Take 400 Units by mouth daily.  sacubitriL-valsartan (Entresto) 49-51 mg tab tablet Take 1 Tab by mouth two (2) times a day. 180 Tab 1    atorvastatin (LIPITOR) 20 mg tablet TAKE 1 TABLET BY MOUTH EVERY DAY 90 Tab 3    ascorbic acid, vitamin C, (VITAMIN C) 500 mg tablet Take 500 mg by mouth daily (after breakfast).  aspirin delayed-release 81 mg tablet Take 81 mg by mouth daily.        Allergies   Allergen Reactions    Erythromycin Rash     Past Medical History:   Diagnosis Date    AICD (automatic cardioverter/defibrillator) present     Arthritis     OSTEO    Cancer ([facility]     skin cancer removed from nose    Chronic pain     left hip    CKD (chronic kidney disease) stage 2, GFR 60-89 ml/min 10/08/2020         Depression 2014    Endometrial cancer ([facility]     GERD (gastroesophageal reflux disease)     Heart failure ([facility]     Hyperlipidemia     Hypertension     Left bundle branch block     Neuropathy, peripheral 2014    INDER on CPAP 2014    CPAP -regular use     Past Surgical History:   Procedure Laterality Date    COLONOSCOPY  2010     Dr. Princess Hernandez HX CATARACT REMOVAL Right     HX [place]        HX DILATION AND CURETTAGE  2020    hysteroscopy    HX GI      COLONOSCOPY    HX HEART CATHETERIZATION Left 2020    HX HEENT  2005    BCCA REMOVED FROM NOSE     HX HYSTERECTOMY  2020    HX IMPLANTABLE CARDIOVERTER DEFIBRILLATOR      HX OTHER SURGICAL      hysteroscopy surg    HX PACEMAKER  02/2021    BIV-ICD    HX PARATHYROIDECTOMY Left 05/05/2021    lower left     HX PARTIAL THYROIDECTOMY  05/05/2021    HX TONSILLECTOMY  1957    NV INSJ/RPLCMT PERM DFB W/TRNSVNS LDS 1/DUAL CHMBR N/A 2/15/2021    INSERT ICD BIV MULTI performed by Charmayne Leeds, MD at [facility] CARDIAC CATH LAB     Family History   Problem Relation Age of Onset    Heart Disease Mother     Breast Cancer Mother     Heart Disease Father     Kidney Disease Paternal Aunt         renal cancer    Colon Cancer Maternal Aunt     Cancer Paternal Uncle         leukemia    Anesth Problems Neg Hx      Social History     Tobacco Use    Smoking status: Never Smoker    Smokeless tobacco: Never Used   Substance Use Topics    Alcohol use: No     Alcohol/week: 0.0 standard drinks       Review of Systems   Constitutional: Negative. HENT: Negative. Eyes: Negative. Respiratory: Negative. Cardiovascular: Negative. Gastrointestinal: Negative. Genitourinary: Negative. Musculoskeletal: Positive for joint pain. Skin: Negative. Neurological: Negative. Endo/Heme/Allergies: Negative. Psychiatric/Behavioral: Negative.         Objective:     Patient-Reported Vitals 12/16/2021   Patient-Reported Weight 259lb   Patient-Reported Pulse -   Patient-Reported Temperature -   Patient-Reported SpO2 -   Patient-Reported Systolic  865   Patient-Reported Diastolic 56        [INSTRUCTIONS:  \"[x]\" Indicates a positive item  \"[]\" Indicates a negative item  -- DELETE ALL ITEMS NOT EXAMINED]    Constitutional: [x] Appears well-developed and well-nourished [x] No apparent distress      [] Abnormal -     Mental status: [x] Alert and awake  [x] Oriented to person/place/time [x] Able to follow commands    [] Abnormal -     Eyes:   EOM    [x]  Normal    [] Abnormal -   Sclera  [x]  Normal    [] Abnormal -          Discharge [x]  None visible   [] Abnormal -     HENT: [x] Normocephalic, atraumatic  [] Abnormal -   [x] Mouth/Throat: Mucous membranes are moist    External Ears [x] Normal  [] Abnormal -    Neck: [x] No visualized mass [] Abnormal -     Pulmonary/Chest: [x] Respiratory effort normal   [x] No visualized signs of difficulty breathing or respiratory distress        [] Abnormal -      Musculoskeletal:   [x] Normal gait with no signs of ataxia         [x] Normal range of motion of neck        [] Abnormal -     Neurological:        [x] No Facial Asymmetry (Cranial nerve 7 motor function) (limited exam due to video visit)          [x] No gaze palsy        [] Abnormal -          Skin:        [x] No significant exanthematous lesions or discoloration noted on facial skin         [] Abnormal -            Psychiatric:       [x] Normal Affect [] Abnormal -        [x] No Hallucinations    Other pertinent observable physical exam findings:-        We discussed the expected course, resolution and complications of the diagnosis(es) in detail. Medication risks, benefits, costs, interactions, and alternatives were discussed as indicated. I advised her to contact the office if her condition worsens, changes or fails to improve as anticipated. She expressed understanding with the diagnosis(es) and plan. Kari Olivas, was evaluated through a synchronous (real-time) audio-video encounter. The patient (or guardian if applicable) is aware that this is a billable service. Verbal consent to proceed has been obtained within the past 12 months. The visit was conducted pursuant to the emergency declaration under the [address] [place] [address] authority and the [employer] and [employer] General Act. Patient identification was verified, and a caregiver was present when appropriate. The patient was located in a state where the provider was credentialed to provide care.     Written by Ishan Hutchison, as dictated by Dr. Essence Kaiser MD.      Yola Contreras

## 2021-12-16 NOTE — PROGRESS NOTES
Identified pt with two pt identifiers(name and ). Reviewed record in preparation for visit and have obtained necessary documentation. Chief Complaint   Patient presents with    Knee Pain     has gained some weight which could contribute to the pain.  Medication Evaluation     Pt recently started taking Requip at night and wanted to know if it was the cause of the pain.  Other     getting bloody noses due to blood pressure? or the dry heat? There were no vitals filed for this visit. Health Maintenance Due   Topic    Breast Cancer Screen Mammogram        Coordination of Care Questionnaire:  :   1) Have you been to an emergency room, urgent care, or hospitalized since your last visit? If yes, where when, and reason for visit? no       2. Have seen or consulted any other health care provider since your last visit? If yes, where when, and reason for visit? NO      An electronic signature was used to authenticate this note.   -- Queen Dallas LPN

## 2022-01-10 ENCOUNTER — PATIENT MESSAGE (OUTPATIENT)
Dept: CARDIOLOGY CLINIC | Age: 67
End: 2022-01-10

## 2022-01-10 DIAGNOSIS — I42.0 DILATED CARDIOMYOPATHY (HCC): ICD-10-CM

## 2022-01-10 RX ORDER — FUROSEMIDE 20 MG/1
20 TABLET ORAL DAILY
Qty: 90 TABLET | Refills: 3 | Status: SHIPPED | OUTPATIENT
Start: 2022-01-10

## 2022-01-10 RX ORDER — CARVEDILOL 6.25 MG/1
6.25 TABLET ORAL 2 TIMES DAILY
Qty: 180 TABLET | Refills: 3 | Status: SHIPPED | OUTPATIENT
Start: 2022-01-10 | End: 2022-02-02 | Stop reason: SDUPTHER

## 2022-01-10 RX ORDER — SACUBITRIL AND VALSARTAN 49; 51 MG/1; MG/1
1 TABLET, FILM COATED ORAL 2 TIMES DAILY
Qty: 180 TABLET | Refills: 3 | Status: SHIPPED | OUTPATIENT
Start: 2022-01-10

## 2022-01-13 DIAGNOSIS — F32.A DEPRESSION, UNSPECIFIED DEPRESSION TYPE: ICD-10-CM

## 2022-01-13 NOTE — TELEPHONE ENCOUNTER
----- Message from [address] sent at 1/13/2022  8:15 AM EST -----  Regarding: FW: Change pharmacy    ----- Message -----  From: Rocío Olivas  Sent: 1/12/2022   5:21 PM EST  To: [facility] Nurse Pool  Subject: Change pharmacy                                  Thank you, but I need you to send my prescriptions to [facility] now so I will have them when I need them. 90 day supply with 3 refills. Thank you. My cardiologist has already forwarded the prescriptions for my entresto, carvedilol and furosemide to [facility] and I have picked those up today. I still need the prescriptions that  [facility] DANIELLA writes.

## 2022-01-14 DIAGNOSIS — F32.A DEPRESSION, UNSPECIFIED DEPRESSION TYPE: ICD-10-CM

## 2022-01-14 RX ORDER — ROPINIROLE 0.5 MG/1
0.5 TABLET, FILM COATED ORAL
Qty: 90 TABLET | Refills: 3 | Status: SHIPPED | OUTPATIENT
Start: 2022-01-14 | End: 2022-01-14 | Stop reason: SDUPTHER

## 2022-01-14 RX ORDER — BUPROPION HYDROCHLORIDE 300 MG/1
TABLET ORAL
Qty: 90 TABLET | Refills: 3 | Status: SHIPPED | OUTPATIENT
Start: 2022-01-14 | End: 2022-01-14 | Stop reason: SDUPTHER

## 2022-01-14 RX ORDER — ROPINIROLE 0.5 MG/1
0.5 TABLET, FILM COATED ORAL
Qty: 90 TABLET | Refills: 3 | Status: SHIPPED | OUTPATIENT
Start: 2022-01-14 | End: 2022-04-13 | Stop reason: SDUPTHER

## 2022-01-14 RX ORDER — ATORVASTATIN CALCIUM 20 MG/1
20 TABLET, FILM COATED ORAL DAILY
Qty: 90 TABLET | Refills: 3 | Status: SHIPPED | OUTPATIENT
Start: 2022-01-14

## 2022-01-14 RX ORDER — ATORVASTATIN CALCIUM 20 MG/1
20 TABLET, FILM COATED ORAL DAILY
Qty: 90 TABLET | Refills: 3 | Status: SHIPPED | OUTPATIENT
Start: 2022-01-14 | End: 2022-01-14 | Stop reason: SDUPTHER

## 2022-01-14 RX ORDER — BUPROPION HYDROCHLORIDE 300 MG/1
TABLET ORAL
Qty: 90 TABLET | Refills: 3 | Status: SHIPPED | OUTPATIENT
Start: 2022-01-14

## 2022-01-14 NOTE — TELEPHONE ENCOUNTER
----- Message from Louis Lindquist sent at 1/14/2022 10:40 AM EST -----  Regarding: Risperidone  The pharmacy has received the prescriptions for atorvastatin, ropinirole and buproprion. I still need to have the prescription sent for risperidone. 90 day supply with 3 refills. Thank you.

## 2022-01-14 NOTE — TELEPHONE ENCOUNTER
----- Message from Lewis Fernández sent at 1/14/2022 10:40 AM EST -----  Regarding: Risperidone  The pharmacy has received the prescriptions for atorvastatin, ropinirole and buproprion. I still need to have the prescription sent for risperidone. 90 day supply with 3 refills. Thank you.

## 2022-01-14 NOTE — TELEPHONE ENCOUNTER
Future Appointments:  Future Appointments   Date Time Provider Mady Tejada   2/2/2022 11:30 AM Shaun Dale MD [facility] BS AMB   3/3/2022 10:20 AM Madison Summers MD [facility] BS AMB   3/15/2022  2:15 PM MD SIMON MyersO BS AMB   3/17/2022  4:15 PM [facility]   4/12/2022 11:00 AM Katie Halsted, MD [facility] BS AMB   6/15/2022  2:00 PM PACEMAKER, [facility] BS AMB   6/15/2022  2:20 PM Rebecca Jones, ANP [facility] BS AMB        Last Appointment With Me:  10/5/2021     Requested Prescriptions     Pending Prescriptions Disp Refills    atorvastatin (LIPITOR) 20 mg tablet 90 Tablet 3     Sig: Take 1 Tablet by mouth daily.  buPROPion XL (WELLBUTRIN XL) 300 mg XL tablet 90 Tablet 3     Sig: TAKE 1 TABLET BY MOUTH EVERY MORNING    rOPINIRole (REQUIP) 0.5 mg tablet 90 Tablet 3     Sig: Take 1 Tablet by mouth nightly.

## 2022-01-17 RX ORDER — RISPERIDONE 2 MG/1
2 TABLET, FILM COATED ORAL
Qty: 90 TABLET | Refills: 0 | Status: SHIPPED | OUTPATIENT
Start: 2022-01-17 | End: 2022-01-19

## 2022-01-17 NOTE — TELEPHONE ENCOUNTER
Pt states she continues to try and get this and doesn't know why she can't? Please call with any questions/problems so pt knows when she will get her medication. She would like this today please as she has waited for some time she states.

## 2022-01-17 NOTE — TELEPHONE ENCOUNTER
Future Appointments:  Future Appointments   Date Time Provider Mady Tejada   2/2/2022 11:30 AM Vineet Doss MD [facility] BS AMB   3/3/2022 10:20 AM [facility]   3/15/2022  2:15 PM Memo Quijano MD [facility] BS AMB   3/17/2022  4:15 PM [facility] BS AMB   4/12/2022 11:00 AM Tod Her MD [facility] BS AMB   6/15/2022  2:00 PM [facility]   6/15/2022  2:20 PM Rebecca Jones, ANP [facility] BS AMB        Last Appointment With Me:  10/5/2021     Requested Prescriptions     Pending Prescriptions Disp Refills    risperiDONE (RisperDAL) 2 mg tablet 90 Tablet 0     Sig: Take 1 Tablet by mouth nightly.

## 2022-01-19 RX ORDER — RISPERIDONE 2 MG/1
TABLET, FILM COATED ORAL
Qty: 90 TABLET | Refills: 0 | Status: SHIPPED | OUTPATIENT
Start: 2022-01-19 | End: 2022-01-28

## 2022-01-28 RX ORDER — RISPERIDONE 2 MG/1
TABLET, FILM COATED ORAL
Qty: 90 TABLET | Refills: 0 | Status: SHIPPED | OUTPATIENT
Start: 2022-01-28 | End: 2022-04-20

## 2022-02-02 ENCOUNTER — OFFICE VISIT (OUTPATIENT)
Dept: CARDIOLOGY CLINIC | Age: 67
End: 2022-02-02
Payer: MEDICARE

## 2022-02-02 VITALS
BODY MASS INDEX: 39.84 KG/M2 | HEART RATE: 63 BPM | WEIGHT: 262.9 LBS | HEIGHT: 68 IN | RESPIRATION RATE: 18 BRPM | DIASTOLIC BLOOD PRESSURE: 96 MMHG | SYSTOLIC BLOOD PRESSURE: 190 MMHG | OXYGEN SATURATION: 98 %

## 2022-02-02 DIAGNOSIS — G47.33 OSA ON CPAP: ICD-10-CM

## 2022-02-02 DIAGNOSIS — I10 ESSENTIAL HYPERTENSION: ICD-10-CM

## 2022-02-02 DIAGNOSIS — Z95.810 AICD (AUTOMATIC CARDIOVERTER/DEFIBRILLATOR) PRESENT: ICD-10-CM

## 2022-02-02 DIAGNOSIS — E78.2 MIXED HYPERLIPIDEMIA: ICD-10-CM

## 2022-02-02 DIAGNOSIS — I42.0 DILATED CARDIOMYOPATHY (HCC): Primary | ICD-10-CM

## 2022-02-02 DIAGNOSIS — Z99.89 OSA ON CPAP: ICD-10-CM

## 2022-02-02 PROCEDURE — G9717 DOC PT DX DEP/BP F/U NT REQ: HCPCS | Performed by: INTERNAL MEDICINE

## 2022-02-02 PROCEDURE — 1090F PRES/ABSN URINE INCON ASSESS: CPT | Performed by: INTERNAL MEDICINE

## 2022-02-02 PROCEDURE — G8399 PT W/DXA RESULTS DOCUMENT: HCPCS | Performed by: INTERNAL MEDICINE

## 2022-02-02 PROCEDURE — G8417 CALC BMI ABV UP PARAM F/U: HCPCS | Performed by: INTERNAL MEDICINE

## 2022-02-02 PROCEDURE — 3017F COLORECTAL CA SCREEN DOC REV: CPT | Performed by: INTERNAL MEDICINE

## 2022-02-02 PROCEDURE — G8427 DOCREV CUR MEDS BY ELIG CLIN: HCPCS | Performed by: INTERNAL MEDICINE

## 2022-02-02 PROCEDURE — G9899 SCRN MAM PERF RSLTS DOC: HCPCS | Performed by: INTERNAL MEDICINE

## 2022-02-02 PROCEDURE — G8753 SYS BP > OR = 140: HCPCS | Performed by: INTERNAL MEDICINE

## 2022-02-02 PROCEDURE — 1101F PT FALLS ASSESS-DOCD LE1/YR: CPT | Performed by: INTERNAL MEDICINE

## 2022-02-02 PROCEDURE — 99214 OFFICE O/P EST MOD 30 MIN: CPT | Performed by: INTERNAL MEDICINE

## 2022-02-02 PROCEDURE — G0463 HOSPITAL OUTPT CLINIC VISIT: HCPCS | Performed by: INTERNAL MEDICINE

## 2022-02-02 PROCEDURE — 93010 ELECTROCARDIOGRAM REPORT: CPT | Performed by: INTERNAL MEDICINE

## 2022-02-02 PROCEDURE — G8536 NO DOC ELDER MAL SCRN: HCPCS | Performed by: INTERNAL MEDICINE

## 2022-02-02 PROCEDURE — 93005 ELECTROCARDIOGRAM TRACING: CPT | Performed by: INTERNAL MEDICINE

## 2022-02-02 PROCEDURE — G8755 DIAS BP > OR = 90: HCPCS | Performed by: INTERNAL MEDICINE

## 2022-02-02 RX ORDER — CARVEDILOL 12.5 MG/1
12.5 TABLET ORAL 2 TIMES DAILY
Qty: 180 TABLET | Refills: 3 | Status: SHIPPED | OUTPATIENT
Start: 2022-02-02

## 2022-02-02 NOTE — PROGRESS NOTES
1. Have you been to the ER, urgent care clinic since your last visit? Hospitalized since your last visit? No    2. Have you seen or consulted any other health care providers outside of the [address] [place] [address] since your last visit? Include any pap smears or colon screening.  No         Chief Complaint   Patient presents with    Cardiomyopathy     Pt denies cardiac symptoms

## 2022-02-02 NOTE — LETTER
2/2/2022    Patient: Home Olivas   YOB: 1955   Date of Visit: 2/2/2022     Ellen Lopez MD  [address]  [facility]  [place]    Dear Ellen Lopez MD,      Thank you for referring Ms. Kari Olivas to [facility] for evaluation. My notes for this consultation are attached. If you have questions, please do not hesitate to call me. I look forward to following your patient along with you.       Sincerely,    Jorje Wolf MD

## 2022-02-02 NOTE — PROGRESS NOTES
Last [facility], [facility]    Subjective/HPI:     Deedee Matta is a 69 y.o. female is here for routine f/u. She has a PMHx of NICM s/p BiV ICD, HTN, HLD, INDER on CPAP. Feels well. Has chronic shortness of breath on exertion, not worse. Saw pulmonary and was recommended PFTs but she is hesitant to get this testing because her  caught strep throat from it, and she is worried about exposure to COVID. Notes blood pressures have been high recently. Does not check it at home, but readings at other doctor offices around [facility]. Using lasix PRN -- admits she isn't taking it as often as she should, because she is constantly running to the bathroom. Current Outpatient Medications on File Prior to Visit   Medication Sig Dispense Refill    risperiDONE (RisperDAL) 2 mg tablet TAKE 1 TABLET BY MOUTH EVERY DAY AT NIGHT 90 Tablet 0    atorvastatin (LIPITOR) 20 mg tablet Take 1 Tablet by mouth daily. 90 Tablet 3    buPROPion XL (WELLBUTRIN XL) 300 mg XL tablet TAKE 1 TABLET BY MOUTH EVERY MORNING 90 Tablet 3    rOPINIRole (REQUIP) 0.5 mg tablet Take 1 Tablet by mouth nightly. 90 Tablet 3    furosemide (LASIX) 20 mg tablet Take 1 Tablet by mouth daily. (Patient taking differently: Take 20 mg by mouth as needed.) 90 Tablet 3    sacubitriL-valsartan (Entresto) 49-51 mg tab tablet Take 1 Tablet by mouth two (2) times a day. 180 Tablet 3    multivitamin (ONE A DAY) tablet Take 1 Tablet by mouth daily.  cholecalciferol (Vitamin D3) 25 mcg (1,000 unit) cap Take 2,000 Units by mouth daily.  vitamin E (AQUA GEMS) 400 unit capsule Take 400 Units by mouth daily.  ascorbic acid, vitamin C, (VITAMIN C) 500 mg tablet Take 500 mg by mouth daily (after breakfast).  aspirin delayed-release 81 mg tablet Take 81 mg by mouth daily. No current facility-administered medications on file prior to visit.        Review of Symptoms:    Review of Systems   Constitutional: Negative for chills, fever and weight loss. HENT: Negative for nosebleeds. Eyes: Negative for blurred vision and double vision. Respiratory: Positive for shortness of breath. Negative for cough and wheezing. Cardiovascular: Positive for leg swelling. Negative for chest pain, palpitations, orthopnea and PND. Skin: Negative for rash. Neurological: Negative for dizziness and loss of consciousness. Physical Exam:      General: Well developed, in no acute distress, cooperative and alert  Heart:  reg rate and rhythm; normal S1/S2; no murmurs, no gallops or rubs. Respiratory: Clear bilaterally x 4, no wheezing or rales  Extremities:  Normal cap refill, no cyanosis, atraumatic. No edema. Vascular: 2+ pulses symmetric in all extremities    Vitals:    02/02/22 1131 02/02/22 1137   BP: (!) 196/98 (!) 190/96   BP 1 Location: Left upper arm Right upper arm   BP Patient Position: Sitting Sitting   BP Cuff Size: Large adult Large adult   Pulse: 63    Resp: 18    Height: 5' 8\" (1.727 m)    Weight: 262 lb 14.4 oz (119.3 kg)    SpO2: 98%        ECG done today shows V-paced rhythm     Assessment:       ICD-10-CM ICD-9-CM    1. Dilated cardiomyopathy (HCC)  I42.0 425.4 AMB POC EKG ROUTINE W/ 12 LEADS, INTER & REP   2. Essential hypertension  I10 401.9    3. Mixed hyperlipidemia  E78.2 272.2    4. INDER on CPAP  G47.33 327.23     Z99.89 V46.8    5. AICD (automatic cardioverter/defibrillator) present  Z95.810 V45.02         Plan:     1. Dilated cardiomyopathy ([address]  Hx of NICM, now resolved  Cardiac cath done 12/2020 without evidence of coronary disease  Echo done 7/2021 with preserved LVEF 55-60%, mild LVH, mild AI/MR  Continue Entresto, carvedilol, lasix PRN. 2. Essential hypertension  Increase carvedilol 12.5 mg BID; consider increasing Entresto next if needed    3. Mixed hyperlipidemia   in 9/2021  Continue statin therapy and low fat, low cholesterol diet  Lipids managed by PCP    4.  INDER on CPAP  Continue CPAP mask nightly    5. S/P biventricular cardiac defibrillator  Continue with routine device interrogation with Dr. Cesar Raymond    Patient seen and examined by me with nurse practitioner. Hao Daugherty personally performed all components of the history, physical, and medical decision making and agree with the assessment and plan with minor modifications as noted. CHF compensated.   Will increase coreg due to elevated BP    Deb Logan MD

## 2022-02-22 ENCOUNTER — TRANSCRIBE ORDER (OUTPATIENT)
Dept: SCHEDULING | Age: 67
End: 2022-02-22

## 2022-02-22 DIAGNOSIS — Z12.31 VISIT FOR SCREENING MAMMOGRAM: Primary | ICD-10-CM

## 2022-03-14 NOTE — PROGRESS NOTES
6 month follow up for endometrial cancer ,  pt reports no abnormal spotting or bleeding, pt states she has been getting slight pain in the area where her ovaries used to be    1. Have you been to the ER, urgent care clinic since your last visit? Hospitalized since your last visit?  no    2. Have you seen or consulted any other health care providers outside of the [address] [place] [address] since your last visit? Include any pap smears or colon screening.    no

## 2022-03-15 ENCOUNTER — OFFICE VISIT (OUTPATIENT)
Dept: GYNECOLOGY | Age: 67
End: 2022-03-15
Payer: MEDICARE

## 2022-03-15 VITALS
WEIGHT: 258 LBS | BODY MASS INDEX: 39.1 KG/M2 | HEART RATE: 71 BPM | HEIGHT: 68 IN | SYSTOLIC BLOOD PRESSURE: 117 MMHG | DIASTOLIC BLOOD PRESSURE: 86 MMHG

## 2022-03-15 DIAGNOSIS — Z99.89 OSA ON CPAP: ICD-10-CM

## 2022-03-15 DIAGNOSIS — I42.0 DILATED CARDIOMYOPATHY (HCC): ICD-10-CM

## 2022-03-15 DIAGNOSIS — C54.1 ENDOMETRIAL CANCER (HCC): Primary | ICD-10-CM

## 2022-03-15 DIAGNOSIS — E66.01 SEVERE OBESITY (BMI 35.0-39.9) WITH COMORBIDITY (HCC): ICD-10-CM

## 2022-03-15 DIAGNOSIS — G47.33 OSA ON CPAP: ICD-10-CM

## 2022-03-15 PROCEDURE — G8536 NO DOC ELDER MAL SCRN: HCPCS | Performed by: OBSTETRICS & GYNECOLOGY

## 2022-03-15 PROCEDURE — G8427 DOCREV CUR MEDS BY ELIG CLIN: HCPCS | Performed by: OBSTETRICS & GYNECOLOGY

## 2022-03-15 PROCEDURE — G8754 DIAS BP LESS 90: HCPCS | Performed by: OBSTETRICS & GYNECOLOGY

## 2022-03-15 PROCEDURE — 99214 OFFICE O/P EST MOD 30 MIN: CPT | Performed by: OBSTETRICS & GYNECOLOGY

## 2022-03-15 PROCEDURE — 1101F PT FALLS ASSESS-DOCD LE1/YR: CPT | Performed by: OBSTETRICS & GYNECOLOGY

## 2022-03-15 PROCEDURE — 3017F COLORECTAL CA SCREEN DOC REV: CPT | Performed by: OBSTETRICS & GYNECOLOGY

## 2022-03-15 PROCEDURE — G9717 DOC PT DX DEP/BP F/U NT REQ: HCPCS | Performed by: OBSTETRICS & GYNECOLOGY

## 2022-03-15 PROCEDURE — G8399 PT W/DXA RESULTS DOCUMENT: HCPCS | Performed by: OBSTETRICS & GYNECOLOGY

## 2022-03-15 PROCEDURE — G9899 SCRN MAM PERF RSLTS DOC: HCPCS | Performed by: OBSTETRICS & GYNECOLOGY

## 2022-03-15 PROCEDURE — 1090F PRES/ABSN URINE INCON ASSESS: CPT | Performed by: OBSTETRICS & GYNECOLOGY

## 2022-03-15 PROCEDURE — G8752 SYS BP LESS 140: HCPCS | Performed by: OBSTETRICS & GYNECOLOGY

## 2022-03-15 PROCEDURE — G0463 HOSPITAL OUTPT CLINIC VISIT: HCPCS | Performed by: OBSTETRICS & GYNECOLOGY

## 2022-03-15 PROCEDURE — G8417 CALC BMI ABV UP PARAM F/U: HCPCS | Performed by: OBSTETRICS & GYNECOLOGY

## 2022-03-15 NOTE — PROGRESS NOTES
[facility] [address], [address]  P [phone]  F [phone]    Office Note  Patient ID:  Name:  Dio Toro  MRN:  [id number]  :  1955/67 y.o. Date:  3/22/2022      HISTORY OF PRESENT ILLNESS:  Ms. Dio Toro is a 69 y.o. female who on  underwent Robotic-assisted total laparoscopic hysterectomy, Bilateral salpingo-oophorectomy, Bilateral pelvic sentinel lymph node mapping and biopsy. Final pathology consistent with Stage Ib, FIGO Grade 1 endometrial cancer. 8mm out of 15mm invasion. Negative washings. Negative SLNDs. LVSI negative. Completed VBT 2020 (30Gy in 3 fractions). Presents today for continued surveillance. Doing well overall. She reports that using vaginal dilators was painful. She was seen by Dr. Sandra Clarke last month to help break up some of the vaginal adhesions and she is using the smallest dilator. The patient is not sexually active. Denies vaginal bleeding/discharge, abdominal/pelvic pain, nausea, vomiting, constipation, diarrhea, CP, SOB, hematuria, hematochezia, change in appetite or bowel movements, bloating, fevers, chills, or urinary symptoms. Initial History:  Ms. Dio Toro is a 69 y.o.  postmenopausal female who presents in consultation from Dr. Nikita Acosta for FIGO Grade 1 endometrial cancer. The patient first reports vaginal spotting/bleeding in 2019. She was ultimately referred to Dr. Nikita Acosta by her PCP. On 2020 underwent hysteroscopy/D&C with final pathology consistent with FIGO Grade 1 endometrial cancer. Reports some spotting since her surgery. History of urinary incontinence for which she wears a pad. Denies hematuria or hematochezia. Denies change in appetite or bowel habits, nausea, vomiting, diarrhea, CP, SOB, fevers, or chills. Reports long history of constipation for which she uses senna S.      Pertinent PMH/PSH: h/o , obesity, HTN, INDER on CPAP Active, no restrictions. Pathology Review:   2/20/202:  FINAL PATHOLOGIC DIAGNOSIS   1. Lymph node, right pelvic, sentinel node excision:   No evidence for malignancy in one node (0/1). See comment. 2. Lymph node, left pelvic, sentinel node excision:   No evidence for malignancy in one node (0/1). See comment. 3. Uterus, cervix, fallopian tubes, ovaries, hysterectomy, salpingo-oophorectomy:   Cervix: No evidence for dysplasia or malignancy. Endometrium: Endometrioid adenocarcinoma, FIGO grade 1. See synoptic report   Myometrium: Invasive adenocarcinoma. Leiomyoma. Adenomyosis. Fallopian tubes: No histopathologic abnormality. Ovaries: Benign physiologic changes. Synoptic report - endometrium   SPECIMEN   Procedure: Hysterectomy and salpingo-oophorectomy. Lymph Node Sampling: Bilateral pelvic lymph nodes. Specimen Integrity: Intact. TUMOR   Histologic Type: Endometrioid Adenocarcinoma   Histologic Grade: Grade 1   Tumor Extent:   Myometrial Invasion: Present. Depth of invasion: 8 mm   Myometrial thickness: 15 mm   Cervical stromal involvement: Not Identified. Extent of Involvement of Other Organs: Not identified. Accessory Tumor Findings   Lymph-Vascular Invasion: Not identified   LYMPH NODES   Number of Pelvic Nodes with Macrometastasis (>2 mm): 0   Number of Pelvic Nodes with Micrometastasis (>0.2 mm <= 2 mm): 0   Number of Pelvic Nodes with Isolated Tumor Cells (0.2 mm or less): 0   Total Number of Pelvic Nodes Examined (sentinel and non-sentinel): 2   Number of Pelvic [place] Nodes Examined: 2   Pathologic Stage   Primary Tumor (pT): pT1b   Regional Lymph Nodes (pN): pN0   Comment   Immunohistochemistry stains       2/5/2020:  Endometrium and polyp, curetting:  Well differentiated endometrioid adenocarcinoma, FIGO grade 1 in a background of extensive atypical complex hyperplasia.     ROS:  A comprehensive review of systems was negative except for that written in the History of Present Illness.  , 10 point ROS    OB/GYN ROS:  Per HPI    ECOG ndGndrndanddndend:nd nd2nd Problem List:  Patient Active Problem List    Diagnosis Date Noted    Dilated cardiomyopathy ([facility] 02/15/2021    Chronic systolic congestive heart failure ([address] 02/15/2021    Cardiomyopathy ([address] 02/15/2021    S/P cardiac cath 2020    Left bundle branch block 2020    Endometrial cancer ([address] 2020    Severe obesity ([address] 2019    Nuclear cataract 2016    Posterior subcapsular polar senile cataract 2016    Neuropathy, peripheral 2014    Essential hypertension 2014    Hyperglycemia 2014    GERD (gastroesophageal reflux disease) 2014    Mixed hyperlipidemia 2014    INDER on CPAP 2014    Depression 2014     PMH:  Past Medical History:   Diagnosis Date    AICD (automatic cardioverter/defibrillator) present     Arthritis     OSTEO    Cancer ([address]     skin cancer removed from nose    Chronic kidney disease     Chronic pain     left hip    CKD (chronic kidney disease) stage 2, GFR 60-89 ml/min 10/08/2020         Congestive heart failure ([address]     Depression 2014    Endometrial cancer ([address]     GERD (gastroesophageal reflux disease)     Heart failure ([address]     Hyperlipidemia     Hypertension     Left bundle branch block     Long term current use of anticoagulant therapy     Neuropathy, peripheral 2014    INDER on CPAP 2014    CPAP -regular use    Pacemaker       PSH:  Past Surgical History:   Procedure Laterality Date    COLONOSCOPY  2010     Dr. Camelia Nina HX CATARACT REMOVAL Right      [facility],[address]        HX DILATION AND CURETTAGE  2020    hysteroscopy    HX GI      COLONOSCOPY    HX HEART CATHETERIZATION Left 2020    HX HEENT  2005    BCCA REMOVED FROM NOSE     HX HYSTERECTOMY  2020    HX IMPLANTABLE CARDIOVERTER DEFIBRILLATOR      HX OTHER SURGICAL      hysteroscopy surg    HX PACEMAKER  02/2021    BIV-ICD    HX PARATHYROIDECTOMY Left 05/05/2021    lower left     HX PARTIAL THYROIDECTOMY  05/05/2021    HX TONSILLECTOMY  1957    CT INSJ/RPLCMT PERM DFB W/TRNSVNS LDS 1/DUAL CHMBR N/A 2/15/2021    INSERT ICD BIV MULTI performed by Evonne Jordan MD at [facility] CARDIAC CATH LAB      Social History:  Social History     Tobacco Use    Smoking status: Never Smoker    Smokeless tobacco: Never Used   Substance Use Topics    Alcohol use: No     Alcohol/week: 0.0 standard drinks      Family History:  Family History   Problem Relation Age of Onset    Heart Disease Mother     Breast Cancer Mother     Heart Disease Father     Kidney Disease Paternal Aunt         renal cancer    Colon Cancer Maternal Aunt     Cancer Paternal Uncle         leukemia    Anesth Problems Neg Hx       Medications: (reviewed)  Current Outpatient Medications   Medication Sig    CALCIUM PO Take  by mouth.  carvediloL (COREG) 12.5 mg tablet Take 1 Tablet by mouth two (2) times a day.  risperiDONE (RisperDAL) 2 mg tablet TAKE 1 TABLET BY MOUTH EVERY DAY AT NIGHT    atorvastatin (LIPITOR) 20 mg tablet Take 1 Tablet by mouth daily.  buPROPion XL (WELLBUTRIN XL) 300 mg XL tablet TAKE 1 TABLET BY MOUTH EVERY MORNING    rOPINIRole (REQUIP) 0.5 mg tablet Take 1 Tablet by mouth nightly.  furosemide (LASIX) 20 mg tablet Take 1 Tablet by mouth daily. (Patient taking differently: Take 20 mg by mouth as needed.)    sacubitriL-valsartan (Entresto) 49-51 mg tab tablet Take 1 Tablet by mouth two (2) times a day.  multivitamin (ONE A DAY) tablet Take 1 Tablet by mouth daily.  cholecalciferol (Vitamin D3) 25 mcg (1,000 unit) cap Take 2,000 Units by mouth daily.  vitamin E (AQUA GEMS) 400 unit capsule Take 400 Units by mouth daily.  ascorbic acid, vitamin C, (VITAMIN C) 500 mg tablet Take 500 mg by mouth daily (after breakfast).     aspirin delayed-release 81 mg tablet Take 81 mg by mouth daily. No current facility-administered medications for this visit. Allergies: (reviewed)  Allergies   Allergen Reactions    Erythromycin Rash          OBJECTIVE:    Physical Exam:  Visit Vitals  /86 (BP 1 Location: Left arm, BP Patient Position: Sitting)   Pulse 71   Ht 5' 8\" (1.727 m)   Wt 258 lb (117 kg)   BMI 39.23 kg/m²      General: Alert and oriented. No acute distress. Well-nourished  HEENT: No thyroid enlargment. Neck supple without restrictions. Sclera normal. Normal occular motion. Moist mucous membranes. Lymphatics: No evidence of axillary, cervical, or subclavicular adenopathy. Respiratory: clear to auscultation and percussion to the bases. No CVAT. Cardiovascular: regular rate and rhythm. No murmurs, rubs, or gallops. Gastrointestinal: soft, non-tender, non-distended, no masses or organomegaly. Well-healed incision. Musculoskeletal: normal gait. No joint tenderness, deformity or swelling. No muscular tenderness. Extremities: extremities normal, atraumatic, no cyanosis or edema. Pelvic: exam chaperoned by nurse. Normal appearing external genitalia. On speculum exam, the vagina is atrophic. Mild radiation changes. The uterus and cervix are surgically absent. No evidence of masses or nodularity on bimanual exam. Deferred rectovaginal exam.   Neuro: Grossly intact. Normal gait and movement. No acute deficit  Skin: No evidence of rashes or skin changes. IMPRESSION/PLAN:    Ms. Tina Diego is a 69 y.o. female who on 2/20/2020 underwent Robotic-assisted total laparoscopic hysterectomy, Bilateral salpingo-oophorectomy, Bilateral pelvic sentinel lymph node mapping and biopsy. Final pathology consistent with Stage Ib, FIGO Grade 1 endometrial cancer. 8mm out of 15mm invasion. Negative washings. Negative SLNDs. LVSI negative. Completed VBT 6/26/2020 (30Gy in 3 fractions).     Problems:     Patient Active Problem List    Diagnosis Date Noted    Dilated cardiomyopathy ([facility] 02/15/2021    [facility] systolic [facility] 02/15/2021    Cardiomyopathy ([facility] 02/15/2021    S/P cardiac cath 12/03/2020    Left bundle branch block 02/18/2020    Endometrial cancer ([facility] 02/12/2020    Severe obesity ([facility] 01/11/2019    Nuclear cataract 02/05/2016    Posterior subcapsular polar senile cataract 02/05/2016    Neuropathy, peripheral 04/09/2014    Essential hypertension 04/08/2014    Hyperglycemia 04/08/2014    GERD (gastroesophageal reflux disease) 04/08/2014    Mixed hyperlipidemia 04/08/2014    INDER on CPAP 04/08/2014    Depression 01/09/2014       Reviewed patient's course to date. CONY on exam today. Reassured patient. Patient to follow-up with Dr. Crow Jackson in June. We will alternate visits. I will see her in 6 months from now. Reviewed precautionary symptoms to return sooner. All questions and concerns were addressed with the patient and she is comfortable with the plan. An electronic signature was used to authenticate this note.      William Rivas MD

## 2022-03-17 ENCOUNTER — OFFICE VISIT (OUTPATIENT)
Dept: CARDIOLOGY CLINIC | Age: 67
End: 2022-03-17
Payer: MEDICARE

## 2022-03-17 DIAGNOSIS — I42.0 DILATED CARDIOMYOPATHY (HCC): ICD-10-CM

## 2022-03-17 DIAGNOSIS — Z95.810 AICD (AUTOMATIC CARDIOVERTER/DEFIBRILLATOR) PRESENT: Primary | ICD-10-CM

## 2022-03-17 PROCEDURE — 93296 REM INTERROG EVL PM/IDS: CPT | Performed by: INTERNAL MEDICINE

## 2022-03-17 PROCEDURE — 93295 DEV INTERROG REMOTE 1/2/MLT: CPT | Performed by: INTERNAL MEDICINE

## 2022-03-18 PROBLEM — I42.0 DILATED CARDIOMYOPATHY (HCC): Status: ACTIVE | Noted: 2021-02-15

## 2022-03-18 PROBLEM — E66.01 SEVERE OBESITY (HCC): Status: ACTIVE | Noted: 2019-01-11

## 2022-03-19 PROBLEM — C54.1 ENDOMETRIAL CANCER (HCC): Status: ACTIVE | Noted: 2020-02-12

## 2022-03-19 PROBLEM — I44.7 LEFT BUNDLE BRANCH BLOCK: Status: ACTIVE | Noted: 2020-02-18

## 2022-03-19 PROBLEM — I50.22 CHRONIC SYSTOLIC CONGESTIVE HEART FAILURE (HCC): Status: ACTIVE | Noted: 2021-02-15

## 2022-03-19 PROBLEM — Z98.890 S/P CARDIAC CATH: Status: ACTIVE | Noted: 2020-12-03

## 2022-03-19 PROBLEM — I42.9 CARDIOMYOPATHY (HCC): Status: ACTIVE | Noted: 2021-02-15

## 2022-03-30 ENCOUNTER — HOSPITAL ENCOUNTER (OUTPATIENT)
Dept: MAMMOGRAPHY | Age: 67
Discharge: HOME | End: 2022-03-30
Attending: OBSTETRICS & GYNECOLOGY
Payer: MEDICARE

## 2022-03-30 ENCOUNTER — PATIENT MESSAGE (OUTPATIENT)
Dept: CARDIOLOGY CLINIC | Age: 67
End: 2022-03-30

## 2022-03-30 DIAGNOSIS — Z12.31 VISIT FOR SCREENING MAMMOGRAM: ICD-10-CM

## 2022-03-30 PROCEDURE — 77063 BREAST TOMOSYNTHESIS BI: CPT

## 2022-03-30 NOTE — TELEPHONE ENCOUNTER
Called patient back and told that she should be fine using her . If she tries a new equipment what my causes fast movements of her arms/body and gets dizzy, patient should stop using the equipment.

## 2022-04-12 ENCOUNTER — OFFICE VISIT (OUTPATIENT)
Dept: INTERNAL MEDICINE CLINIC | Age: 67
End: 2022-04-12
Payer: MEDICARE

## 2022-04-12 VITALS
SYSTOLIC BLOOD PRESSURE: 115 MMHG | BODY MASS INDEX: 39.37 KG/M2 | TEMPERATURE: 98 F | OXYGEN SATURATION: 97 % | HEART RATE: 73 BPM | RESPIRATION RATE: 16 BRPM | HEIGHT: 68 IN | WEIGHT: 259.8 LBS | DIASTOLIC BLOOD PRESSURE: 72 MMHG

## 2022-04-12 DIAGNOSIS — E66.01 SEVERE OBESITY (HCC): ICD-10-CM

## 2022-04-12 DIAGNOSIS — I42.0 DILATED CARDIOMYOPATHY (HCC): ICD-10-CM

## 2022-04-12 DIAGNOSIS — F32.A DEPRESSION, UNSPECIFIED DEPRESSION TYPE: ICD-10-CM

## 2022-04-12 DIAGNOSIS — I10 ESSENTIAL HYPERTENSION: Primary | ICD-10-CM

## 2022-04-12 DIAGNOSIS — R73.9 HYPERGLYCEMIA: ICD-10-CM

## 2022-04-12 DIAGNOSIS — E78.5 HYPERLIPIDEMIA, UNSPECIFIED HYPERLIPIDEMIA TYPE: ICD-10-CM

## 2022-04-12 PROCEDURE — G9899 SCRN MAM PERF RSLTS DOC: HCPCS | Performed by: INTERNAL MEDICINE

## 2022-04-12 PROCEDURE — G8399 PT W/DXA RESULTS DOCUMENT: HCPCS | Performed by: INTERNAL MEDICINE

## 2022-04-12 PROCEDURE — G0463 HOSPITAL OUTPT CLINIC VISIT: HCPCS | Performed by: INTERNAL MEDICINE

## 2022-04-12 PROCEDURE — G8752 SYS BP LESS 140: HCPCS | Performed by: INTERNAL MEDICINE

## 2022-04-12 PROCEDURE — G8417 CALC BMI ABV UP PARAM F/U: HCPCS | Performed by: INTERNAL MEDICINE

## 2022-04-12 PROCEDURE — G8427 DOCREV CUR MEDS BY ELIG CLIN: HCPCS | Performed by: INTERNAL MEDICINE

## 2022-04-12 PROCEDURE — 1090F PRES/ABSN URINE INCON ASSESS: CPT | Performed by: INTERNAL MEDICINE

## 2022-04-12 PROCEDURE — G8754 DIAS BP LESS 90: HCPCS | Performed by: INTERNAL MEDICINE

## 2022-04-12 PROCEDURE — 99214 OFFICE O/P EST MOD 30 MIN: CPT | Performed by: INTERNAL MEDICINE

## 2022-04-12 PROCEDURE — G8536 NO DOC ELDER MAL SCRN: HCPCS | Performed by: INTERNAL MEDICINE

## 2022-04-12 PROCEDURE — 3017F COLORECTAL CA SCREEN DOC REV: CPT | Performed by: INTERNAL MEDICINE

## 2022-04-12 PROCEDURE — 1101F PT FALLS ASSESS-DOCD LE1/YR: CPT | Performed by: INTERNAL MEDICINE

## 2022-04-12 PROCEDURE — G9717 DOC PT DX DEP/BP F/U NT REQ: HCPCS | Performed by: INTERNAL MEDICINE

## 2022-04-12 NOTE — PROGRESS NOTES
1. \"Have you been to the ER, urgent care clinic since your last visit? Hospitalized since your last visit? \" No    2. \"Have you seen or consulted any other health care providers outside of the [address] [place] [address] since your last visit? \" No     3. For patients aged 39-70: Has the patient had a colonoscopy / FIT/ Cologuard? Yes - no Care Gap present      If the patient is female:    4. For patients aged 41-77: Has the patient had a mammogram within the past 2 years? Yes - no Care Gap present      5. For patients aged 21-65: Has the patient had a pap smear?  NA - based on age or sex

## 2022-04-12 NOTE — PROGRESS NOTES
PROGRESS NOTE  Name: Noe Montano   : 1955       ASSESSMENT/ PLAN:     1. Dyspnea: She has had imaging (CTA), and echo. Future pulmonology visit. 2. Endometrial cancer: Per Dr. Nya Hyde. 3. Anemia: Mild. ? Chronic disease. Keep an eye on this. 4. CKD \"stage II\": Watch labs. Seeing Dr. Toñito Motta. 5. Cardiomyopathy/CHF: EF 30-35%. She is s/p ICD implantation in 2021. As per Cardiology. 6. Hypertension: BP fine today. Continue current meds:   7. Hip pain: Stable, R side, mainly. Can return as desired to Dr. Christy Ely. OTC analgesics. 8. Constipation: Controlled with stool softener. 9. Neuropathy, LE: Stable. Ongoing. Likely arising from her metabolic syndrome / prediabetes. Sometimes neuropathy predates the diagnosis of DM. Neg RPR, ESR, TSH, JUNE, in   10. Depression: Stable. No SI.   - risperiDONE (RISPERDAL) 2 mg tablet; Take 1 Tab by mouth nightly as needed. - buPROPion XL (WELLBUTRIN XL) 300 mg XL tablet; Take 1 Tab by mouth every morning. 11. Hyperglycemia: \"Prediabetes\" / \"borderline DM\": C2K 6.2.   - METABOLIC PANEL, COMPREHENSIVE  - HEMOGLOBIN A1C  12. GERD (gastroesophageal reflux disease): Stable. - omeprazole (PRILOSEC) 20 mg capsule; Take 1 Cap by mouth daily. 13. Hyperlipidemia: Reviewed her last labs--normal 2020. We'll check again down the road. - atorvastatin (LIPITOR) 20 mg tablet; Take 1 Tab by mouth daily.  - LIPID PANEL  14. INDER on CPAP:  15. Obesity: stable--work on diet and exercise. RTC 6 months, HTN, etc                   SUBJECTIVE:   Ms. Noe Montano is a 69 y.o. female who is here for follow up of routine medical issues. Chief Complaint   Patient presents with    Follow-up       Appt pending in the future with Pulmonology. She complains of SOB. \"I get winded with even small exertions. \" Last CXR showed no airspace disease. She has had echo in 2021 showing EF normalized to 55-60%, and was told that this must be due to something else. She c/o terrible restless leg. \"I can't get to sleep for hours. \" Requip helps, but not completely alleviating it. She has a lot of problem with blowing her nose lately. Water rhinitis. Dr. Jessica Mistry, nephrologist, sees her. She has told the patient that she has stage II not stage III CKD. She underwent L lower parathyroidectomy in May. She had elevated Calcium and was noted to have a parathyroid adenoma. Dilated cardiomyopathy: EF 30-35% in Dec 2020, and on echo 5/2020. In Feb 2021 she had heart cath with Dr. Maliha Parker in Dec 2021; no blockages. She had ICD implantation on 2/15/2021. Now on Entresto. Her last echo in July 2021 showed EF 55-60%    Endometrial cancer: She is s/p Robotic-assisted total laparoscopic hysterectomy, Bilateral salpingo-oophorectomy, Bilateral pelvic sentinel lymph node mapping and biopsy Feb 2020. Follows with Dr. Jami Candelario. Dr. Jackie Holloway managed the XRT. She was seen by Dr. Carroll Shukla, cardiologist, for preop evaluation. Found to have LBBB on EKG. Echo was performed and showed EF 30-35%. \"He wants to do Cath\"--she is interested in following up with her usual cardiologist, Dr. Maliha Parker. Sees gynecologist yearly. Prior to the endometrial cancer her most recent gynecologist was Dr. Ori Saba (previously Dr. Gayle Sandifer Dr. Myrlene Berkeley). Has had mammogram.  Had dexa. It is recalled that she saw orthopedist for L ankle pain. \"She wanted me to wear a device\", that was expensive. The pain got better with new shoes. Hurts if sits at desk too long. Neuropathy: Unchanged numbness and tingling in feet. It is recalled that she has had high blood sugars, as well as an A1C that was up. This is doing fine. Last A1C 5.7. Palpitations. Saw Dr. Maliha Parker in 2013. We noted in 2014: She was having palpitations and saw Dr. Nancy Ugalde in January. She had EKG, echo and stress test--\"I think everything is okay. \"  The palpitations went away since getting treated for INDER. She is on CPAP for INDER. Mood okay, stress better. \"Not too bad; I don't worry any more than anyone else. \"  As noted before: \"I have been depressed since I was a teenager. \"  \"I tried to go off the meds, but I got down in the dumps. \"  Back on meds. No SI at this time. At this time, she is otherwise doing well and has brought no other complaints to my attention today. For a list of the medical issues addressed today, see the assessment and plan below. At this time, she is otherwise doing well and has brought no other complaints to my attention today. For a list of the medical issues addressed today, see the assessment and plan below. PMH:   Past Medical History:   Diagnosis Date    AICD (automatic cardioverter/defibrillator) present     Arthritis     OSTEO    Cancer ([facility] [address]     skin cancer removed from nose    Chronic kidney disease     Chronic pain     left hip    CKD (chronic kidney disease) stage 2, GFR 60-89 ml/min 10/08/2020         Congestive heart failure ([facility] [address]     Depression 2014    Endometrial cancer ([facility] [address]     GERD (gastroesophageal reflux disease)     Heart failure ([facility] [address]     Hyperlipidemia     Hypertension     Left bundle branch block     Long term current use of anticoagulant therapy     Menopause     Neuropathy, peripheral 2014    INDER on CPAP 2014    CPAP -regular use    Pacemaker        Past Surgical History:   Procedure Laterality Date    COLONOSCOPY  2010     Dr. Maura Robles HX BREAST BIOPSY      ?  left breast about 10 years ago    HX CATARACT REMOVAL Right     HX  SECTION  1976        HX DILATION AND CURETTAGE  2020    hysteroscopy    HX GI      COLONOSCOPY    HX HEART CATHETERIZATION Left 2020    HX HEENT  2005    BCCA REMOVED FROM NOSE     HX HYSTERECTOMY  2020    HX IMPLANTABLE CARDIOVERTER DEFIBRILLATOR      HX OOPHORECTOMY      HX OTHER SURGICAL      hysteroscopy surg    HX PACEMAKER 2021    BIV-ICD    HX PARATHYROIDECTOMY Left 2021    lower left     HX PARTIAL THYROIDECTOMY  2021    HX TONSILLECTOMY      RI INSJ/RPLCMT PERM DFB W/TRNSVNS LDS 1/DUAL CHMBR N/A 2/15/2021    INSERT ICD BIV MULTI performed by Maddi Jerry MD at [facility]       All: She is allergic to erythromycin. Current Outpatient Medications   Medication Sig    CALCIUM PO Take  by mouth.  carvediloL (COREG) 12.5 mg tablet Take 1 Tablet by mouth two (2) times a day.  risperiDONE (RisperDAL) 2 mg tablet TAKE 1 TABLET BY MOUTH EVERY DAY AT NIGHT    atorvastatin (LIPITOR) 20 mg tablet Take 1 Tablet by mouth daily.  buPROPion XL (WELLBUTRIN XL) 300 mg XL tablet TAKE 1 TABLET BY MOUTH EVERY MORNING    rOPINIRole (REQUIP) 0.5 mg tablet Take 1 Tablet by mouth nightly.  furosemide (LASIX) 20 mg tablet Take 1 Tablet by mouth daily. (Patient taking differently: Take 20 mg by mouth as needed.)    sacubitriL-valsartan (Entresto) 49-51 mg tab tablet Take 1 Tablet by mouth two (2) times a day.  multivitamin (ONE A DAY) tablet Take 1 Tablet by mouth daily.  cholecalciferol (Vitamin D3) 25 mcg (1,000 unit) cap Take 2,000 Units by mouth daily.  vitamin E (AQUA GEMS) 400 unit capsule Take 400 Units by mouth daily.  ascorbic acid, vitamin C, (VITAMIN C) 500 mg tablet Take 500 mg by mouth daily (after breakfast).  aspirin delayed-release 81 mg tablet Take 81 mg by mouth daily. No current facility-administered medications for this visit. FH: Mother  breast cancer. Father  of CHF. SH: Retired insurance . She reports that she has never smoked. She has never used smokeless tobacco. She reports that she does not drink alcohol or use drugs. ROS: See above; Complete ROS otherwise negative.      OBJECTIVE:   Vitals:   Visit Vitals  /72 (BP 1 Location: Left arm, BP Patient Position: Sitting, BP Cuff Size: Large adult)   Pulse 73   Temp 98 °F (36.7 °C) (Temporal)   Resp 16   Ht 5' 8\" (1.727 m)   Wt 259 lb 12.8 oz (117.8 kg)   SpO2 97%   BMI 39.50 kg/m²      Gen: Pleasant 69 y.o.  female in NAD. HEENT: PERRLA. EOMI. OP moist and pink.  Neck: Supple.  No LAD.  HEART: RRR, No M/G/R.   LUNGS: CTAB No W/R.   ABDOMEN: S, NT, ND, BS+.   EXTREMITIES: Warm. No C/C/E. MUSCULOSKELETAL: Normal ROM, muscle strength 5/5 all groups. NEURO: Alert and oriented x 3.  Cranial nerves grossly intact.  No focal sensory or motor deficits noted. SKIN: She has neck surgical wound that appears to have healed well. Lab Results   Component Value Date/Time    Sodium 144 09/28/2021 12:00 AM    Potassium 4.6 09/28/2021 12:00 AM    Chloride 106 09/28/2021 12:00 AM    CO2 25 09/28/2021 12:00 AM    Anion gap 4 (L) 06/19/2020 01:15 AM    Glucose 115 (H) 09/28/2021 12:00 AM    BUN 19 09/28/2021 12:00 AM    Creatinine 1.07 (H) 09/28/2021 12:00 AM    BUN/Creatinine ratio 18 09/28/2021 12:00 AM    GFR est AA 62 09/28/2021 12:00 AM    GFR est non-AA 53 (L) 09/28/2021 12:00 AM    Calcium 9.2 09/28/2021 12:00 AM    Bilirubin, total 0.3 09/28/2021 12:00 AM    ALT (SGPT) 23 09/28/2021 12:00 AM    Alk.  phosphatase 97 09/28/2021 12:00 AM    Protein, total 6.5 09/28/2021 12:00 AM    Albumin 4.3 09/28/2021 12:00 AM    Globulin 3.4 06/19/2020 01:15 AM    A-G Ratio 2.0 09/28/2021 12:00 AM       Lab Results   Component Value Date/Time    Cholesterol, total 170 09/28/2021 12:00 AM    HDL Cholesterol 46 09/28/2021 12:00 AM    LDL, calculated 100 (H) 09/28/2021 12:00 AM    LDL, calculated 65 10/02/2019 08:50 AM    Triglyceride 134 09/28/2021 12:00 AM        Lab Results   Component Value Date/Time    Hemoglobin A1c 6.2 (H) 09/28/2021 12:00 AM       Lab Results   Component Value Date/Time    WBC 4.4 09/28/2021 12:00 AM    HGB 11.8 09/28/2021 12:00 AM    HCT 34.9 09/28/2021 12:00 AM    PLATELET 257 19/36/0193 12:00 AM    MCV 90 09/28/2021 12:00 AM

## 2022-04-13 ENCOUNTER — TELEPHONE (OUTPATIENT)
Dept: INTERNAL MEDICINE CLINIC | Age: 67
End: 2022-04-13

## 2022-04-13 RX ORDER — ROPINIROLE 1 MG/1
1 TABLET, FILM COATED ORAL
Qty: 30 TABLET | Refills: 5 | Status: SHIPPED | OUTPATIENT
Start: 2022-04-13 | End: 2022-10-10 | Stop reason: SDUPTHER

## 2022-04-13 NOTE — TELEPHONE ENCOUNTER
----- Message from Mohinder Resendez sent at 4/12/2022  3:44 PM EDT -----  Regarding: Ropinirole  Dr Fawad Klein,  Today I told you I had just renewed my ropinirole prescription. [facility]. I am not going to renew that. Instead, [facility], as long as it won't hurt me or cause any other problems to do so. Please send a 90 day prescription with refills. Thank you,  Alberto Bailey.

## 2022-04-18 ENCOUNTER — APPOINTMENT (OUTPATIENT)
Dept: INTERNAL MEDICINE CLINIC | Age: 67
End: 2022-04-18

## 2022-04-18 DIAGNOSIS — F32.A DEPRESSION, UNSPECIFIED DEPRESSION TYPE: ICD-10-CM

## 2022-04-18 DIAGNOSIS — I10 ESSENTIAL HYPERTENSION: ICD-10-CM

## 2022-04-18 DIAGNOSIS — R73.9 HYPERGLYCEMIA: ICD-10-CM

## 2022-04-19 ENCOUNTER — PATIENT MESSAGE (OUTPATIENT)
Dept: INTERNAL MEDICINE CLINIC | Age: 67
End: 2022-04-19

## 2022-04-19 LAB
ALBUMIN SERPL-MCNC: 4.2 G/DL (ref 3.8–4.8)
ALBUMIN/GLOB SERPL: 2 {RATIO} (ref 1.2–2.2)
ALP SERPL-CCNC: 83 IU/L (ref 44–121)
ALT SERPL-CCNC: 19 IU/L (ref 0–32)
AST SERPL-CCNC: 14 IU/L (ref 0–40)
BASOPHILS # BLD AUTO: 0.1 X10E3/UL (ref 0–0.2)
BASOPHILS NFR BLD AUTO: 1 %
BILIRUB SERPL-MCNC: 0.3 MG/DL (ref 0–1.2)
BUN SERPL-MCNC: 18 MG/DL (ref 8–27)
BUN/CREAT SERPL: 16 (ref 12–28)
CALCIUM SERPL-MCNC: 9.3 MG/DL (ref 8.7–10.3)
CHLORIDE SERPL-SCNC: 103 MMOL/L (ref 96–106)
CHOLEST SERPL-MCNC: 150 MG/DL (ref 100–199)
CO2 SERPL-SCNC: 23 MMOL/L (ref 20–29)
CREAT SERPL-MCNC: 1.1 MG/DL (ref 0.57–1)
EGFR: 54 ML/MIN/1.73
EOSINOPHIL # BLD AUTO: 0.2 X10E3/UL (ref 0–0.4)
EOSINOPHIL NFR BLD AUTO: 3 %
ERYTHROCYTE [DISTWIDTH] IN BLOOD BY AUTOMATED COUNT: 12.5 % (ref 11.7–15.4)
EST. AVERAGE GLUCOSE BLD GHB EST-MCNC: 134 MG/DL
GLOBULIN SER CALC-MCNC: 2.1 G/DL (ref 1.5–4.5)
GLUCOSE SERPL-MCNC: 116 MG/DL (ref 65–99)
HBA1C MFR BLD: 6.3 % (ref 4.8–5.6)
HCT VFR BLD AUTO: 36.3 % (ref 34–46.6)
HDLC SERPL-MCNC: 48 MG/DL
HGB BLD-MCNC: 12 G/DL (ref 11.1–15.9)
IMM GRANULOCYTES # BLD AUTO: 0 X10E3/UL (ref 0–0.1)
IMM GRANULOCYTES NFR BLD AUTO: 0 %
LDLC SERPL CALC-MCNC: 82 MG/DL (ref 0–99)
LYMPHOCYTES # BLD AUTO: 1 X10E3/UL (ref 0.7–3.1)
LYMPHOCYTES NFR BLD AUTO: 21 %
MCH RBC QN AUTO: 30.2 PG (ref 26.6–33)
MCHC RBC AUTO-ENTMCNC: 33.1 G/DL (ref 31.5–35.7)
MCV RBC AUTO: 91 FL (ref 79–97)
MONOCYTES # BLD AUTO: 0.6 X10E3/UL (ref 0.1–0.9)
MONOCYTES NFR BLD AUTO: 13 %
NEUTROPHILS # BLD AUTO: 3 X10E3/UL (ref 1.4–7)
NEUTROPHILS NFR BLD AUTO: 62 %
PLATELET # BLD AUTO: 224 X10E3/UL (ref 150–450)
POTASSIUM SERPL-SCNC: 4.3 MMOL/L (ref 3.5–5.2)
PROT SERPL-MCNC: 6.3 G/DL (ref 6–8.5)
RBC # BLD AUTO: 3.98 X10E6/UL (ref 3.77–5.28)
SODIUM SERPL-SCNC: 143 MMOL/L (ref 134–144)
T4 FREE SERPL-MCNC: 1.07 NG/DL (ref 0.82–1.77)
TRIGL SERPL-MCNC: 112 MG/DL (ref 0–149)
TSH SERPL DL<=0.005 MIU/L-ACNC: 1.36 UIU/ML (ref 0.45–4.5)
VLDLC SERPL CALC-MCNC: 20 MG/DL (ref 5–40)
WBC # BLD AUTO: 4.9 X10E3/UL (ref 3.4–10.8)

## 2022-05-03 ENCOUNTER — OFFICE VISIT (OUTPATIENT)
Dept: SLEEP MEDICINE | Age: 67
End: 2022-05-03
Payer: MEDICARE

## 2022-05-03 VITALS
BODY MASS INDEX: 39.25 KG/M2 | SYSTOLIC BLOOD PRESSURE: 132 MMHG | OXYGEN SATURATION: 96 % | RESPIRATION RATE: 20 BRPM | HEART RATE: 68 BPM | HEIGHT: 68 IN | DIASTOLIC BLOOD PRESSURE: 76 MMHG | TEMPERATURE: 97.5 F | WEIGHT: 259 LBS

## 2022-05-03 DIAGNOSIS — G47.33 OBSTRUCTIVE SLEEP APNEA (ADULT) (PEDIATRIC): Primary | ICD-10-CM

## 2022-05-03 DIAGNOSIS — I10 ESSENTIAL HYPERTENSION: ICD-10-CM

## 2022-05-03 PROCEDURE — 3017F COLORECTAL CA SCREEN DOC REV: CPT | Performed by: INTERNAL MEDICINE

## 2022-05-03 PROCEDURE — G8754 DIAS BP LESS 90: HCPCS | Performed by: INTERNAL MEDICINE

## 2022-05-03 PROCEDURE — G8536 NO DOC ELDER MAL SCRN: HCPCS | Performed by: INTERNAL MEDICINE

## 2022-05-03 PROCEDURE — 1090F PRES/ABSN URINE INCON ASSESS: CPT | Performed by: INTERNAL MEDICINE

## 2022-05-03 PROCEDURE — 1101F PT FALLS ASSESS-DOCD LE1/YR: CPT | Performed by: INTERNAL MEDICINE

## 2022-05-03 PROCEDURE — G9717 DOC PT DX DEP/BP F/U NT REQ: HCPCS | Performed by: INTERNAL MEDICINE

## 2022-05-03 PROCEDURE — G9899 SCRN MAM PERF RSLTS DOC: HCPCS | Performed by: INTERNAL MEDICINE

## 2022-05-03 PROCEDURE — G8417 CALC BMI ABV UP PARAM F/U: HCPCS | Performed by: INTERNAL MEDICINE

## 2022-05-03 PROCEDURE — 99213 OFFICE O/P EST LOW 20 MIN: CPT | Performed by: INTERNAL MEDICINE

## 2022-05-03 PROCEDURE — G8399 PT W/DXA RESULTS DOCUMENT: HCPCS | Performed by: INTERNAL MEDICINE

## 2022-05-03 PROCEDURE — G8427 DOCREV CUR MEDS BY ELIG CLIN: HCPCS | Performed by: INTERNAL MEDICINE

## 2022-05-03 PROCEDURE — G8752 SYS BP LESS 140: HCPCS | Performed by: INTERNAL MEDICINE

## 2022-05-03 NOTE — PROGRESS NOTES
[address] [place], [address]  Tel.  [phone]  Fax. [address] [facility] [address]  [place], [address] [facility] [address] [facility]  Tel.  [phone]  Fax. [phone] [address] [facility] [address]  Tel.  [phone]  Fax. [phone]     S>Kari Olivas is a 69 y.o. female seen for a positive airway pressure follow-up. She reports no problems using the device. The following problems are identified:    Drowsiness no Problems exhaling no   Snoring No- bit feels like she needs more air Forget to put on no   Mask Comfortable yes Can't fall asleep no   Dry Mouth no Mask falls off no   Air Leaking no Frequent awakenings no     Download reviewed. She admits that her sleep has improved. Therapy Apnea Index averaged over PAP use: 1.5 /hr which reflects improved sleep breathing condition. Allergies   Allergen Reactions    Erythromycin Rash       She has a current medication list which includes the following prescription(s): risperidone, ropinirole, carvedilol, atorvastatin, bupropion xl, entresto, multivitamin, cholecalciferol, vitamin e, ascorbic acid (vitamin c), aspirin delayed-release, calcium, and furosemide. .      She  has a past medical history of AICD (automatic cardioverter/defibrillator) present, Arthritis, Cancer ([facility] [address] (2005), Chronic kidney disease, Chronic pain, CKD (chronic kidney disease) stage 2, GFR 60-89 ml/min (10/08/2020), Congestive heart failure ([address] Depression (1/9/2014), Endometrial cancer ([facility] [address] (2020), GERD (gastroesophageal reflux disease), Heart failure ([facility] [address] Hyperlipidemia, Hypertension, Left bundle branch block (2013), Long term current use of anticoagulant therapy, Menopause, Neuropathy, peripheral (4/9/2014), INDER on CPAP (4/8/2014), and Pacemaker. [place] Sleepiness Score: 4   and Modified F.O.S.Q. Score Total / 2: 19   which reflect improved sleep quality over therapy time.     O>    Visit Vitals  /76 (BP 1 Location: Right arm, BP Patient Position: Sitting, BP Cuff Size: Large adult)   Pulse 68   Temp 97.5 °F (36.4 °C) (Temporal)   Resp 20   Ht 5' 8\" (1.727 m)   Wt 259 lb (117.5 kg)   SpO2 96%   BMI 39.38 kg/m²           General:   Alert, oriented, not in distress   Neck:   No JVD    Chest/Lungs:  symetrical lung expansion , no accessory muscle use    Extremities:  no obvious rashes , negative edema    Neuro:  No focal deficits ; No obvious tremor    Psych:  Normal affect ,  Normal countenance ;         A>    ICD-10-CM ICD-9-CM    1. Obstructive sleep apnea (adult) (pediatric)  G47.33 327.23 AMB SUPPLY ORDER   2. Essential hypertension  I10 401.9      AHI = 69(2013). On CPAP, Resmed :  9-11 cmH2O. Compliant:      yes    Therapeutic Response:  Positive    P>    Change setting to 10-11 cmH20, turn off ramp if on.    *  I have ordered replacement supplies  . she is compliant with PAP therapy and PAP continues to benefit patient and remains necessary for control of her sleep apnea. * She was asked to contact our office for any problems regarding PAP therapy. * Counseling was provided regarding the importance of regular PAP use and on proper sleep hygiene and safe driving. * Re-enforced proper and regular cleaning for the device. 2. Hypertension - she continues on her current regimen. I have reviewed the relationship between hypertension as it relates to sleep-disordered breathing.      Electronically signed by    Brittany Handley MD  Diplomate in Sleep Medicine  [facility]    5/3/2022

## 2022-05-03 NOTE — PATIENT INSTRUCTIONS
[address] [place], [address]  Tel.  [phone]  Fax. [address] [facility] [address]  [facility], [address] [facility]  Tel.  [phone]  Fax. [phone] [address]  Tel.  [phone]  Fax. [phone]     PROPER SLEEP HYGIENE    What to avoid  · Do not have drinks with caffeine, such as coffee or black tea, for 8 hours before bed. · Do not smoke or use other types of tobacco near bedtime. Nicotine is a stimulant and can keep you awake. · Avoid drinking alcohol late in the evening, because it can cause you to wake in the middle of the night. · Do not eat a big meal close to bedtime. If you are hungry, eat a light snack. · Do not drink a lot of water close to bedtime, because the need to urinate may wake you up during the night. · Do not read or watch TV in bed. Use the bed only for sleeping and sexual activity. What to try  · Go to bed at the same time every night, and wake up at the same time every morning. Do not take naps during the day. · Keep your bedroom quiet, dark, and cool. · Get regular exercise, but not within 3 to 4 hours of your bedtime. .  · Sleep on a comfortable pillow and mattress. · If watching the clock makes you anxious, turn it facing away from you so you cannot see the time. · If you worry when you lie down, start a worry book. Well before bedtime, write down your worries, and then set the book and your concerns aside. · Try meditation or other relaxation techniques before you go to bed. · If you cannot fall asleep, get up and go to another room until you feel sleepy. Do something relaxing. Repeat your bedtime routine before you go to bed again. · Make your house quiet and calm about an hour before bedtime. Turn down the lights, turn off the TV, log off the computer, and turn down the volume on music. This can help you relax after a busy day.     Drowsy Driving  The [address] [place] [address] Road Traffic Safety Administration cites drowsiness as a causing factor in more than 039,732 police reported crashes annually, resulting in 76,000 injuries and 1,500 deaths. Other surveys suggest 55% of people polled have driven while drowsy in the past year, 23% had fallen asleep but not crashed, 3% crashed, and 2% had and accident due to drowsy driving. Who is at risk? Young Drivers: One study of drowsy driving accidents states that 55% of the drivers were under 25 years. Of those, 75% were male. Shift Workers and Travelers: People who work overnight or travel across time zones frequently are at higher risk of experiencing Circadian Rhythm Disorders. They are trying to work and function when their body is programed to sleep. Sleep Deprived: Lack of sleep has a serious impact on your ability to pay attention or focus on a task. Consistently getting less than the average of 8 hours your body needs creates partial or cumulative sleep deprivation. Untreated Sleep Disorders: Sleep Apnea, Narcolepsy, R.L.S., and other sleep disorders (untreated) prevent a person from getting enough restful sleep. This leads to excessive daytime sleepiness and increases the risk for drowsy driving accidents by up to 7 times. Medications / Alcohol: Even over the counter medications can cause drowsiness. Medications that impair a drivers attention should have a warning label. Alcohol naturally makes you sleepy and on its own can cause accidents. Combined with excessive drowsiness its effects are amplified. Signs of Drowsy Driving:   * You don't remember driving the last few miles   * You may drift out of your lady   * You are unable to focus and your thoughts wander   * You may yawn more often than normal   * You have difficulty keeping your eyes open / nodding off   * Missing traffic signs, speeding, or tailgating  Prevention-   Good sleep hygiene, lifestyle and behavioral choices have the most impact on drowsy driving.  There is no substitute for sleep and the average person requires 8 hours nightly. If you find yourself driving drowsy, stop and sleep. Consider the sleep hygiene tips provided during your visit as well. Medication Refill Policy: Refills for all medications require 1 week advance notice. Please have your pharmacy fax a refill request. We are unable to fax, or call in \"controled substance\" medications and you will need to pick these prescriptions up from our office. [employer] Activation    Thank you for requesting access to [employer]. Please follow the instructions below to securely access and download your online medical record. [employer] allows you to send messages to your doctor, view your test results, renew your prescriptions, schedule appointments, and more. How Do I Sign Up? 1. In your internet browser, go to [email or web address]. 2. Click on the First Time User? Click Here link in the Sign In box. You will see the New Member Sign Up page. 3. Enter your [employer] Access Code exactly as it appears below. You will not need to use this code after youve completed the sign-up process. If you do not sign up before the expiration date, you must request a new code. [employer] Access Code: Activation code not generated  Current [employer] Status: Active (This is the date your [employer] access code will )    4. Enter the last four digits of your Social Security Number (xxxx) and Date of Birth (mm/dd/yyyy) as indicated and click Submit. You will be taken to the next sign-up page. 5. Create a [employer] ID. This will be your [employer] login ID and cannot be changed, so think of one that is secure and easy to remember. 6. Create a [employer] password. You can change your password at any time. 7. Enter your Password Reset Question and Answer. This can be used at a later time if you forget your password. 8. Enter your e-mail address. You will receive e-mail notification when new information is available in [address]   9. Click Sign Up. You can now view and download portions of your medical record. 10. Click the Download Summary menu link to download a portable copy of your medical information. Additional Information    If you have questions, please call [phone]. Remember, [employer] is NOT to be used for urgent needs. For medical emergencies, dial 911.

## 2022-05-03 NOTE — Clinical Note
Kindly make setting change to 10-11 cm and turn off ramp if it is on and send her a Petsy message when completed to inform her of setting changes

## 2022-06-16 ENCOUNTER — HOSPITAL ENCOUNTER (OUTPATIENT)
Dept: RADIATION THERAPY | Age: 67
Discharge: HOME | End: 2022-06-16

## 2022-06-22 RX ORDER — NIRMATRELVIR AND RITONAVIR 300-100 MG
KIT ORAL
Qty: 1 BOX | Refills: 0 | Status: SHIPPED | OUTPATIENT
Start: 2022-06-22 | End: 2022-07-11 | Stop reason: SDUPTHER

## 2022-06-23 ENCOUNTER — TELEPHONE (OUTPATIENT)
Dept: INTERNAL MEDICINE CLINIC | Age: 67
End: 2022-06-23

## 2022-06-23 NOTE — TELEPHONE ENCOUNTER
[facility] states they need a renal function GFR or serum creatinine     Please call [facility] back as soon as possible they ask. Thanks.

## 2022-07-11 ENCOUNTER — TELEPHONE (OUTPATIENT)
Dept: INTERNAL MEDICINE CLINIC | Age: 67
End: 2022-07-11

## 2022-07-11 RX ORDER — NIRMATRELVIR AND RITONAVIR 300-100 MG
KIT ORAL
Qty: 1 BOX | Refills: 0 | Status: SHIPPED | OUTPATIENT
Start: 2022-07-11 | End: 2022-10-12

## 2022-07-11 NOTE — TELEPHONE ENCOUNTER
[facility] needs a call with a renal function test result right away in order to fill script. Thanks.

## 2022-07-11 NOTE — TELEPHONE ENCOUNTER
Called, spoke with Pharmacist at [place]. Two pt identifiers confirmed. Pharmacist wanted to know has GFR changed since last Rx for Paxlovid. Pharmacist informed still 47. Pharmacist stated thank you. Pharmacist verbalized understanding of information discussed w/ no further questions at this time.

## 2022-09-30 DIAGNOSIS — E78.5 HYPERLIPIDEMIA, UNSPECIFIED HYPERLIPIDEMIA TYPE: ICD-10-CM

## 2022-09-30 DIAGNOSIS — R73.9 HYPERGLYCEMIA: ICD-10-CM

## 2022-09-30 DIAGNOSIS — I10 ESSENTIAL HYPERTENSION: Primary | ICD-10-CM

## 2022-10-08 LAB
ALBUMIN SERPL-MCNC: 4.2 G/DL (ref 3.8–4.8)
ALBUMIN/GLOB SERPL: 1.9 {RATIO} (ref 1.2–2.2)
ALP SERPL-CCNC: 79 IU/L (ref 44–121)
ALT SERPL-CCNC: 21 IU/L (ref 0–32)
AST SERPL-CCNC: 18 IU/L (ref 0–40)
BASOPHILS # BLD AUTO: 0.1 X10E3/UL (ref 0–0.2)
BASOPHILS NFR BLD AUTO: 1 %
BILIRUB SERPL-MCNC: 0.4 MG/DL (ref 0–1.2)
BUN SERPL-MCNC: 16 MG/DL (ref 8–27)
BUN/CREAT SERPL: 14 (ref 12–28)
CALCIUM SERPL-MCNC: 9.4 MG/DL (ref 8.7–10.3)
CHLORIDE SERPL-SCNC: 107 MMOL/L (ref 96–106)
CHOLEST SERPL-MCNC: 143 MG/DL (ref 100–199)
CO2 SERPL-SCNC: 23 MMOL/L (ref 20–29)
CREAT SERPL-MCNC: 1.18 MG/DL (ref 0.57–1)
EGFR: 50 ML/MIN/1.73
EOSINOPHIL # BLD AUTO: 0.2 X10E3/UL (ref 0–0.4)
EOSINOPHIL NFR BLD AUTO: 3 %
ERYTHROCYTE [DISTWIDTH] IN BLOOD BY AUTOMATED COUNT: 12.7 % (ref 11.7–15.4)
EST. AVERAGE GLUCOSE BLD GHB EST-MCNC: 128 MG/DL
GLOBULIN SER CALC-MCNC: 2.2 G/DL (ref 1.5–4.5)
GLUCOSE SERPL-MCNC: 118 MG/DL (ref 70–99)
HBA1C MFR BLD: 6.1 % (ref 4.8–5.6)
HCT VFR BLD AUTO: 35.7 % (ref 34–46.6)
HDLC SERPL-MCNC: 48 MG/DL
HGB BLD-MCNC: 12 G/DL (ref 11.1–15.9)
IMM GRANULOCYTES # BLD AUTO: 0 X10E3/UL (ref 0–0.1)
IMM GRANULOCYTES NFR BLD AUTO: 0 %
LDLC SERPL CALC-MCNC: 76 MG/DL (ref 0–99)
LYMPHOCYTES # BLD AUTO: 1 X10E3/UL (ref 0.7–3.1)
LYMPHOCYTES NFR BLD AUTO: 17 %
MCH RBC QN AUTO: 30.5 PG (ref 26.6–33)
MCHC RBC AUTO-ENTMCNC: 33.6 G/DL (ref 31.5–35.7)
MCV RBC AUTO: 91 FL (ref 79–97)
MONOCYTES # BLD AUTO: 0.7 X10E3/UL (ref 0.1–0.9)
MONOCYTES NFR BLD AUTO: 12 %
NEUTROPHILS # BLD AUTO: 4 X10E3/UL (ref 1.4–7)
NEUTROPHILS NFR BLD AUTO: 67 %
PLATELET # BLD AUTO: 213 X10E3/UL (ref 150–450)
POTASSIUM SERPL-SCNC: 4.4 MMOL/L (ref 3.5–5.2)
PROT SERPL-MCNC: 6.4 G/DL (ref 6–8.5)
RBC # BLD AUTO: 3.94 X10E6/UL (ref 3.77–5.28)
SODIUM SERPL-SCNC: 143 MMOL/L (ref 134–144)
TRIGL SERPL-MCNC: 104 MG/DL (ref 0–149)
VLDLC SERPL CALC-MCNC: 19 MG/DL (ref 5–40)
WBC # BLD AUTO: 5.9 X10E3/UL (ref 3.4–10.8)

## 2022-10-10 RX ORDER — ROPINIROLE 1 MG/1
1 TABLET, FILM COATED ORAL
Qty: 30 TABLET | Refills: 5 | Status: SHIPPED | OUTPATIENT
Start: 2022-10-10

## 2022-10-10 NOTE — TELEPHONE ENCOUNTER
PCP: Camille Kramer MD    Last appt: 4/18/2022  Future Appointments   Date Time Provider Mady Tejada   10/12/2022 11:15 AM Camille Kramer MD [facility] BS AMB   10/18/2022  2:15 PM Christopher Goldberg MD [facility] BS AMB       Requested Prescriptions     Pending Prescriptions Disp Refills    rOPINIRole (REQUIP) 1 mg tablet 30 Tablet 5     Sig: Take 1 Tablet by mouth nightly.

## 2022-10-12 ENCOUNTER — OFFICE VISIT (OUTPATIENT)
Dept: INTERNAL MEDICINE CLINIC | Age: 67
End: 2022-10-12
Payer: MEDICARE

## 2022-10-12 VITALS
SYSTOLIC BLOOD PRESSURE: 109 MMHG | BODY MASS INDEX: 39.68 KG/M2 | WEIGHT: 261.8 LBS | OXYGEN SATURATION: 97 % | HEIGHT: 68 IN | RESPIRATION RATE: 16 BRPM | HEART RATE: 68 BPM | TEMPERATURE: 97.4 F | DIASTOLIC BLOOD PRESSURE: 71 MMHG

## 2022-10-12 DIAGNOSIS — C54.1 ENDOMETRIAL CANCER (HCC): ICD-10-CM

## 2022-10-12 DIAGNOSIS — Z23 NEEDS FLU SHOT: ICD-10-CM

## 2022-10-12 DIAGNOSIS — Z00.00 MEDICARE ANNUAL WELLNESS VISIT, SUBSEQUENT: Primary | ICD-10-CM

## 2022-10-12 DIAGNOSIS — N18.31 STAGE 3A CHRONIC KIDNEY DISEASE (HCC): ICD-10-CM

## 2022-10-12 DIAGNOSIS — Z12.31 ENCOUNTER FOR SCREENING MAMMOGRAM FOR BREAST CANCER: ICD-10-CM

## 2022-10-12 PROCEDURE — G9899 SCRN MAM PERF RSLTS DOC: HCPCS | Performed by: INTERNAL MEDICINE

## 2022-10-12 PROCEDURE — G0439 PPPS, SUBSEQ VISIT: HCPCS | Performed by: INTERNAL MEDICINE

## 2022-10-12 PROCEDURE — 90694 VACC AIIV4 NO PRSRV 0.5ML IM: CPT | Performed by: INTERNAL MEDICINE

## 2022-10-12 PROCEDURE — 1101F PT FALLS ASSESS-DOCD LE1/YR: CPT | Performed by: INTERNAL MEDICINE

## 2022-10-12 PROCEDURE — G8399 PT W/DXA RESULTS DOCUMENT: HCPCS | Performed by: INTERNAL MEDICINE

## 2022-10-12 PROCEDURE — G8752 SYS BP LESS 140: HCPCS | Performed by: INTERNAL MEDICINE

## 2022-10-12 PROCEDURE — G8427 DOCREV CUR MEDS BY ELIG CLIN: HCPCS | Performed by: INTERNAL MEDICINE

## 2022-10-12 PROCEDURE — 3017F COLORECTAL CA SCREEN DOC REV: CPT | Performed by: INTERNAL MEDICINE

## 2022-10-12 PROCEDURE — G8754 DIAS BP LESS 90: HCPCS | Performed by: INTERNAL MEDICINE

## 2022-10-12 PROCEDURE — G8536 NO DOC ELDER MAL SCRN: HCPCS | Performed by: INTERNAL MEDICINE

## 2022-10-12 PROCEDURE — G8417 CALC BMI ABV UP PARAM F/U: HCPCS | Performed by: INTERNAL MEDICINE

## 2022-10-12 PROCEDURE — G9717 DOC PT DX DEP/BP F/U NT REQ: HCPCS | Performed by: INTERNAL MEDICINE

## 2022-10-12 NOTE — PROGRESS NOTES
PROGRESS NOTE  Name: Sahra Hwoard   : 1955       ASSESSMENT/ PLAN:     Dyspnea: She has had imaging (CTA), and echo. Endometrial cancer: Per Dr. Saji Reynolds. Anemia: Mild. ? Chronic disease. Keep an eye on this. CKD stage 3: Watch labs. She is not currently seeing Dr. Bria Ortiz. Cardiomyopathy/CHF: EF 30-35%. She is s/p ICD implantation in 2021. As per Cardiology. Hypertension: BP fine today. Continue current meds:   Hip pain: Stable, R side, mainly. Can return as desired to orthopedics (prev Dr. Jt Vasquez). OTC analgesics. Constipation: Controlled with stool softener. Neuropathy, LE: Stable. Ongoing. Likely arising from her metabolic syndrome / prediabetes. Sometimes neuropathy predates the diagnosis of DM. Neg RPR, ESR, TSH, JUNE, in . She is interested in trying the [facility]. Depression: Stable. No SI.   - risperiDONE (RISPERDAL) 2 mg tablet; Take 1 Tab by mouth nightly as needed. - buPROPion XL (WELLBUTRIN XL) 300 mg XL tablet; Take 1 Tab by mouth every morning. Hyperglycemia: \"Prediabetes\" / \"borderline DM\": R3C 6.2.   - METABOLIC PANEL, COMPREHENSIVE  - HEMOGLOBIN A1C  GERD (gastroesophageal reflux disease): Stable. - omeprazole (PRILOSEC) 20 mg capsule; Take 1 Cap by mouth daily. Hyperlipidemia: Reviewed her last labs--normal 2020. We'll check again down the road. - atorvastatin (LIPITOR) 20 mg tablet; Take 1 Tab by mouth daily.  - LIPID PANEL  INDER on CPAP:  Obesity: stable--work on diet and exercise. RTC 6 months, HTN, etc                   SUBJECTIVE:   Ms. Sahra Howard is a 69 y.o. female who is here for follow up of routine medical issues. Chief Complaint   Patient presents with    Follow-up     6 month fu       She has neuropathy that is worsening. She is interested in [facility]. She still has SOB. She saw Dr. Sherron Bustos nurse and was told to increase diuretics for a few days.  She ended up not seeing pulmonologist. Last CXR showed no airspace disease. She has had echo in July 2021 showing EF normalized to 55-60%, and was told that this must be due to something else. Dilated cardiomyopathy: EF 30-35% in Dec 2020, and on echo 5/2020. In Feb 2021 she had heart cath with Dr. Fam Smith in Dec 2021; no blockages. She had ICD implantation on 2/15/2021. Now on Entresto. Her last echo in July 2021 showed EF 55-60%    She has been getting nosebleeds lately. She c/o terrible restless leg. \"I can't get to sleep for hours. \" Requip helps, but is not completely alleviating it. Dr. Jorge A Kern, nephrologist, sees her. She has told the patient that she has stage II not stage III CKD. \"She discharged me; she said it is pretty normal for my age. \"    She underwent L lower parathyroidectomy in May. She had elevated Calcium and was noted to have a parathyroid adenoma. Endometrial cancer: She is s/p Robotic-assisted total laparoscopic hysterectomy, Bilateral salpingo-oophorectomy, Bilateral pelvic sentinel lymph node mapping and biopsy Feb 2020. Follows with Dr. Irina Oneill. Dr. Remington Calderon managed the XRT. She was seen by Dr. Jaylon Lara, cardiologist, for preop evaluation. Found to have LBBB on EKG. Echo was performed and showed EF 30-35%. \"He wants to do Cath\"--she is interested in following up with her usual cardiologist, Dr. Fam Smith. Sees gynecologist yearly. Prior to the endometrial cancer her most recent gynecologist was Dr. Delmy Doe (previously Dr. Sanford Lay Dr. Macy Cross). Has had mammogram.  Had dexa. It is recalled that she saw orthopedist for L ankle pain. \"She wanted me to wear a device\", that was expensive. The pain got better with new shoes. Hurts if sits at desk too long. Neuropathy: Unchanged numbness and tingling in feet. It is recalled that she has had high blood sugars, as well as an A1C that was up. This is doing fine. Last A1C 5.7. Palpitations. Saw Dr. Fam Smith in 2013.  We noted in : She was having palpitations and saw Dr. Silva Redman in January. She had EKG, echo and stress test--\"I think everything is okay. \"  The palpitations went away since getting treated for INDER. She is on CPAP for INDER. Mood okay, stress better. \"Not too bad; I don't worry any more than anyone else. \"  As noted before: \"I have been depressed since I was a teenager. \"  \"I tried to go off the meds, but I got down in the dumps. \"  Back on meds. No SI at this time. At this time, she is otherwise doing well and has brought no other complaints to my attention today. For a list of the medical issues addressed today, see the assessment and plan below. At this time, she is otherwise doing well and has brought no other complaints to my attention today. For a list of the medical issues addressed today, see the assessment and plan below. PMH:   Past Medical History:   Diagnosis Date    AICD (automatic cardioverter/defibrillator) present     Arthritis     OSTEO    Cancer ([facility] [address]     skin cancer removed from nose    Chronic kidney disease     Chronic pain     left hip    CKD (chronic kidney disease) stage 2, GFR 60-89 ml/min 10/08/2020         Congestive heart failure ([facility] [address]     Depression 2014    Endometrial cancer ([facility] [address]     GERD (gastroesophageal reflux disease)     Heart failure ([facility] [address]     Hyperlipidemia     Hypertension     Left bundle branch block 2013    Long term current use of anticoagulant therapy     Menopause     Neuropathy, peripheral 2014    INDER on CPAP 2014    CPAP -regular use    Pacemaker        Past Surgical History:   Procedure Laterality Date    COLONOSCOPY  2010     Dr. Jennifer Gomes BREAST BIOPSY      ?  left breast about 10 years ago    HX CATARACT REMOVAL Right     HX  SECTION  1976        HX DILATION AND CURETTAGE  2020    hysteroscopy    HX GI      COLONOSCOPY    HX HEART CATHETERIZATION Left 2020    HX HEENT      BCCA REMOVED FROM NOSE     HX HYSTERECTOMY  2020    HX IMPLANTABLE CARDIOVERTER DEFIBRILLATOR      HX OOPHORECTOMY      HX OTHER SURGICAL      hysteroscopy surg    HX PACEMAKER  2021    BIV-ICD    HX PARATHYROIDECTOMY Left 2021    lower left     HX PARTIAL THYROIDECTOMY  2021    HX TONSILLECTOMY      WI INSJ/RPLCMT PERM DFB W/TRNSVNS LDS 1/DUAL CHMBR N/A 2/15/2021    INSERT ICD BIV MULTI performed by Paulette eMi MD at [facility] CARDIAC CATH LAB       All: She is allergic to erythromycin. Current Outpatient Medications   Medication Sig    rOPINIRole (REQUIP) 1 mg tablet Take 1 Tablet by mouth nightly. risperiDONE (RisperDAL) 2 mg tablet TAKE 1 TABLET BY MOUTH EVERY NIGHT    carvediloL (COREG) 12.5 mg tablet Take 1 Tablet by mouth two (2) times a day. atorvastatin (LIPITOR) 20 mg tablet Take 1 Tablet by mouth daily. buPROPion XL (WELLBUTRIN XL) 300 mg XL tablet TAKE 1 TABLET BY MOUTH EVERY MORNING    furosemide (LASIX) 20 mg tablet Take 1 Tablet by mouth daily. (Patient taking differently: Take 20 mg by mouth as needed.)    sacubitriL-valsartan (Entresto) 49-51 mg tab tablet Take 1 Tablet by mouth two (2) times a day. multivitamin (ONE A DAY) tablet Take 1 Tablet by mouth daily. cholecalciferol (VITAMIN D3) 25 mcg (1,000 unit) cap Take 2,000 Units by mouth daily. ascorbic acid, vitamin C, (VITAMIN C) 500 mg tablet Take 500 mg by mouth daily (after breakfast). aspirin delayed-release 81 mg tablet Take 81 mg by mouth daily. nirmatrelvir-ritonavir (Paxlovid, EUA,) 150 mg x 2- 100 mg tablet Take as directed    vitamin E (AQUA GEMS) 400 unit capsule Take 400 Units by mouth daily. No current facility-administered medications for this visit. FH: Mother  breast cancer. Father  of CHF. SH: Retired insurance . She reports that she has never smoked. She has never used smokeless tobacco. She reports that she does not drink alcohol or use drugs.    ROS: See above; Complete ROS otherwise negative. OBJECTIVE:   Vitals:   Visit Vitals  /71 (BP 1 Location: Left upper arm, BP Patient Position: Sitting, BP Cuff Size: Adult)   Pulse 68   Temp 97.4 °F (36.3 °C) (Temporal)   Resp 16   Ht 5' 8\" (1.727 m)   Wt 261 lb 12.8 oz (118.8 kg)   SpO2 97%   BMI 39.81 kg/m²      Gen: Pleasant 69 y.o.  female in NAD. HEENT: PERRLA. EOMI. OP moist and pink. Neck: Supple. No LAD. HEART: RRR, No M/G/R.   LUNGS: CTAB No W/R. ABDOMEN: S, NT, ND, BS+. EXTREMITIES: Warm. No C/C/E. MUSCULOSKELETAL: Normal ROM, muscle strength 5/5 all groups. NEURO: Alert and oriented x 3. Cranial nerves grossly intact. No focal sensory or motor deficits noted. SKIN: She has neck surgical wound that appears to have healed well. Lab Results   Component Value Date/Time    Sodium 143 10/07/2022 07:36 AM    Potassium 4.4 10/07/2022 07:36 AM    Chloride 107 (H) 10/07/2022 07:36 AM    CO2 23 10/07/2022 07:36 AM    Anion gap 4 (L) 06/19/2020 01:15 AM    Glucose 118 (H) 10/07/2022 07:36 AM    BUN 16 10/07/2022 07:36 AM    Creatinine 1.18 (H) 10/07/2022 07:36 AM    BUN/Creatinine ratio 14 10/07/2022 07:36 AM    GFR est AA 62 09/28/2021 12:00 AM    GFR est non-AA 53 (L) 09/28/2021 12:00 AM    Calcium 9.4 10/07/2022 07:36 AM    Bilirubin, total 0.4 10/07/2022 07:36 AM    ALT (SGPT) 21 10/07/2022 07:36 AM    Alk.  phosphatase 79 10/07/2022 07:36 AM    Protein, total 6.4 10/07/2022 07:36 AM    Albumin 4.2 10/07/2022 07:36 AM    Globulin 3.4 06/19/2020 01:15 AM    A-G Ratio 1.9 10/07/2022 07:36 AM       Lab Results   Component Value Date/Time    Cholesterol, total 143 10/07/2022 07:36 AM    HDL Cholesterol 48 10/07/2022 07:36 AM    LDL, calculated 76 10/07/2022 07:36 AM    LDL, calculated 65 10/02/2019 08:50 AM    Triglyceride 104 10/07/2022 07:36 AM        Lab Results   Component Value Date/Time    Hemoglobin A1c 6.1 (H) 10/07/2022 07:36 AM       Lab Results   Component Value Date/Time    WBC 5.9 10/07/2022 07:36 AM    HGB 12.0 10/07/2022 07:36 AM    HCT 35.7 10/07/2022 07:36 AM    PLATELET 700 79/87/0192 07:36 AM    MCV 91 10/07/2022 07:36 AM

## 2022-10-12 NOTE — PROGRESS NOTES
1. \"Have you been to the ER, urgent care clinic since your last visit? Hospitalized since your last visit? \" No    2. \"Have you seen or consulted any other health care providers outside of the [address] [place] [address] since your last visit? \" No     3. For patients aged 39-70: Has the patient had a colonoscopy / FIT/ Cologuard? Yes - no Care Gap present      If the patient is female:    4. For patients aged 41-77: Has the patient had a mammogram within the past 2 years? Yes - no Care Gap present      5. For patients aged 21-65: Has the patient had a pap smear?  Yes - no Care Gap present

## 2022-10-12 NOTE — PROGRESS NOTES
This is the Subsequent Medicare Annual Wellness Exam, performed 12 months or more after the Initial AWV or the last Subsequent AWV    I have reviewed the patient's medical history in detail and updated the computerized patient record. Assessment/Plan   Education and counseling provided:  Are appropriate based on today's review and evaluation         Depression Risk Factor Screening     3 most recent PHQ Screens 10/12/2022   Little interest or pleasure in doing things Not at all   Feeling down, depressed, irritable, or hopeless Not at all   Total Score PHQ 2 0       Alcohol Risk Screen    Do you average more than 1 drink per night or more than 7 drinks a week:  No    On any one occasion in the past three months have you have had more than 3 drinks containing alcohol:  No        Functional Ability and Level of Safety    Hearing: Hearing is good. Lives in 1 story home, with . Activities of Daily Living: The home contains: handrails and grab bars  Patient does total self care        Ambulation: with no difficulty     Fall Risk:  Fall Risk Assessment, last 12 mths 10/12/2022   Able to walk? Yes   Fall in past 12 months? 0   Do you feel unsteady? 0   Are you worried about falling 0   Is TUG test greater than 12 seconds?  -   Is the gait abnormal? -   Number of falls in past 12 months -      Abuse Screen:  Patient is not abused       Cognitive Screening    Has your family/caregiver stated any concerns about your memory: no     Cognitive Screening: Normal    Health Maintenance Due     Health Maintenance Due   Topic Date Due    Flu Vaccine (1) 08/01/2022    Medicare Yearly Exam  10/06/2022       Patient Care Team   Patient Care Team:  Basia Reese MD as PCP - General (Internal Medicine Physician)  Basia Reese MD as PCP - [address] [place] [address]  [facility] Provider  Basia Reese MD (Internal Medicine Physician)  Liliya Henderson MD as Physician (Sleep Medicine Physician)  Poncho Borden MD (Cardiovascular Disease Physician)  Antony Ortega MD as Physician (Cardiovascular Disease Physician)  Callie Rodriguez MD as Physician (Cardiovascular Disease Physician)  Whitney Schwab MD as Surgeon (General Surgery)    History     Patient Active Problem List   Diagnosis Code    Depression F32. A    Essential hypertension I10    Hyperglycemia R73.9    GERD (gastroesophageal reflux disease) K21.9    Mixed hyperlipidemia E78.2    INDER on CPAP G47.33, Z99.89    Neuropathy, peripheral G62.9    Nuclear cataract BSA3163    Posterior subcapsular polar senile cataract H25.049    Severe obesity (HCC) E66.01    Endometrial cancer ([facility]) C54.1    Left bundle branch block I44.7    S/P cardiac cath Z98.890    Dilated cardiomyopathy (HCC) I42.0    Chronic systolic congestive heart failure (HCC) I50.22    Cardiomyopathy (HCC) I42.9     Past Medical History:   Diagnosis Date    AICD (automatic cardioverter/defibrillator) present     Arthritis     OSTEO    Cancer ([facility] [address]     skin cancer removed from nose    Chronic kidney disease     Chronic pain     left hip    CKD (chronic kidney disease) stage 2, GFR 60-89 ml/min 10/08/2020         Congestive heart failure ([facility] [address]     Depression 2014    Endometrial cancer ([facility] [address]     GERD (gastroesophageal reflux disease)     Heart failure ([facility] [address]     Hyperlipidemia     Hypertension     Left bundle branch block     Long term current use of anticoagulant therapy     Menopause     Neuropathy, peripheral 2014    INDER on CPAP 2014    CPAP -regular use    Pacemaker       Past Surgical History:   Procedure Laterality Date    COLONOSCOPY  2010     Dr. Abram Zendejas BREAST BIOPSY      ?  left breast about 10 years ago    HX CATARACT REMOVAL Right     HX  SECTION          HX DILATION AND CURETTAGE  2020    hysteroscopy    HX GI      COLONOSCOPY    HX HEART CATHETERIZATION Left 2020    HX HEENT      BCCA REMOVED FROM NOSE     HX HYSTERECTOMY  2020 HX IMPLANTABLE CARDIOVERTER DEFIBRILLATOR      HX OOPHORECTOMY      HX OTHER SURGICAL      hysteroscopy surg    HX PACEMAKER  02/2021    BIV-ICD    HX PARATHYROIDECTOMY Left 05/05/2021    lower left     HX PARTIAL THYROIDECTOMY  05/05/2021    HX TONSILLECTOMY  1957    ND INSJ/RPLCMT PERM DFB W/TRNSVNS LDS 1/DUAL CHMBR N/A 2/15/2021    INSERT ICD BIV MULTI performed by Evonne Jordan MD at [facility] CARDIAC CATH LAB     Current Outpatient Medications   Medication Sig Dispense Refill    rOPINIRole (REQUIP) 1 mg tablet Take 1 Tablet by mouth nightly. 30 Tablet 5    risperiDONE (RisperDAL) 2 mg tablet TAKE 1 TABLET BY MOUTH EVERY NIGHT 90 Tablet 3    carvediloL (COREG) 12.5 mg tablet Take 1 Tablet by mouth two (2) times a day. 180 Tablet 3    atorvastatin (LIPITOR) 20 mg tablet Take 1 Tablet by mouth daily. 90 Tablet 3    buPROPion XL (WELLBUTRIN XL) 300 mg XL tablet TAKE 1 TABLET BY MOUTH EVERY MORNING 90 Tablet 3    furosemide (LASIX) 20 mg tablet Take 1 Tablet by mouth daily. (Patient taking differently: Take 20 mg by mouth as needed.) 90 Tablet 3    sacubitriL-valsartan (Entresto) 49-51 mg tab tablet Take 1 Tablet by mouth two (2) times a day. 180 Tablet 3    multivitamin (ONE A DAY) tablet Take 1 Tablet by mouth daily. cholecalciferol (VITAMIN D3) 25 mcg (1,000 unit) cap Take 2,000 Units by mouth daily. ascorbic acid, vitamin C, (VITAMIN C) 500 mg tablet Take 500 mg by mouth daily (after breakfast). aspirin delayed-release 81 mg tablet Take 81 mg by mouth daily. nirmatrelvir-ritonavir (Paxlovid, EUA,) 150 mg x 2- 100 mg tablet Take as directed 1 Box 0    vitamin E (AQUA GEMS) 400 unit capsule Take 400 Units by mouth daily.        Allergies   Allergen Reactions    Erythromycin Rash       Family History   Problem Relation Age of Onset    Heart Disease Mother     Breast Cancer Mother     Heart Disease Father     Kidney Disease Paternal Aunt         renal cancer    Colon Cancer Maternal Aunt Cancer Paternal Uncle         leukemia    Anesth Problems Neg Hx      Social History     Tobacco Use    Smoking status: Never    Smokeless tobacco: Never   Substance Use Topics    Alcohol use: No     Alcohol/week: 0.0 standard drinks         Fabiana Stiles MD

## 2022-10-12 NOTE — PATIENT INSTRUCTIONS

## 2022-10-14 NOTE — PROGRESS NOTES
7 month follow up for endometrial cancer ,  pt reports no abnormal spotting or bleeding, pt states no questions or concerns for today's visit    1. Have you been to the ER, urgent care clinic since your last visit? Hospitalized since your last visit?  no    2. Have you seen or consulted any other health care providers outside of the [address] [place] [address] since your last visit? Include any pap smears or colon screening.    no

## 2022-10-18 ENCOUNTER — OFFICE VISIT (OUTPATIENT)
Dept: GYNECOLOGY | Age: 67
End: 2022-10-18
Payer: MEDICARE

## 2022-10-18 DIAGNOSIS — G47.33 OSA ON CPAP: ICD-10-CM

## 2022-10-18 DIAGNOSIS — C54.1 ENDOMETRIAL CANCER (HCC): Primary | ICD-10-CM

## 2022-10-18 DIAGNOSIS — Z99.89 OSA ON CPAP: ICD-10-CM

## 2022-10-18 PROCEDURE — G8427 DOCREV CUR MEDS BY ELIG CLIN: HCPCS | Performed by: OBSTETRICS & GYNECOLOGY

## 2022-10-18 PROCEDURE — G9717 DOC PT DX DEP/BP F/U NT REQ: HCPCS | Performed by: OBSTETRICS & GYNECOLOGY

## 2022-10-18 PROCEDURE — 99214 OFFICE O/P EST MOD 30 MIN: CPT | Performed by: OBSTETRICS & GYNECOLOGY

## 2022-10-18 PROCEDURE — 3017F COLORECTAL CA SCREEN DOC REV: CPT | Performed by: OBSTETRICS & GYNECOLOGY

## 2022-10-18 PROCEDURE — G0463 HOSPITAL OUTPT CLINIC VISIT: HCPCS | Performed by: OBSTETRICS & GYNECOLOGY

## 2022-10-18 PROCEDURE — 1090F PRES/ABSN URINE INCON ASSESS: CPT | Performed by: OBSTETRICS & GYNECOLOGY

## 2022-10-18 PROCEDURE — G8399 PT W/DXA RESULTS DOCUMENT: HCPCS | Performed by: OBSTETRICS & GYNECOLOGY

## 2022-10-18 PROCEDURE — 1123F ACP DISCUSS/DSCN MKR DOCD: CPT | Performed by: OBSTETRICS & GYNECOLOGY

## 2022-10-18 PROCEDURE — G9899 SCRN MAM PERF RSLTS DOC: HCPCS | Performed by: OBSTETRICS & GYNECOLOGY

## 2022-10-18 PROCEDURE — 1101F PT FALLS ASSESS-DOCD LE1/YR: CPT | Performed by: OBSTETRICS & GYNECOLOGY

## 2022-10-18 PROCEDURE — G8417 CALC BMI ABV UP PARAM F/U: HCPCS | Performed by: OBSTETRICS & GYNECOLOGY

## 2022-10-18 PROCEDURE — G8536 NO DOC ELDER MAL SCRN: HCPCS | Performed by: OBSTETRICS & GYNECOLOGY

## 2022-10-18 PROCEDURE — G8756 NO BP MEASURE DOC: HCPCS | Performed by: OBSTETRICS & GYNECOLOGY

## 2022-10-18 NOTE — PROGRESS NOTES
[address] [place] [address] Mathias Moritz [address], [address] [facility]  P [phone]  F [phone]    Office Note  Patient ID:  Name:  Silvia Almanza  MRN:  [id number]  :  1955/67 y.o. Date:  10/19/2022      HISTORY OF PRESENT ILLNESS:  Ms. Silvia Almanza is a 69 y.o. female who on  underwent Robotic-assisted total laparoscopic hysterectomy, Bilateral salpingo-oophorectomy, Bilateral pelvic sentinel lymph node mapping and biopsy. Final pathology consistent with Stage Ib, FIGO Grade 1 endometrial cancer. 8mm out of 15mm invasion. Negative washings. Negative SLNDs. LVSI negative. Completed VBT 2020 (30Gy in 3 fractions). Presents today for continued surveillance. Doing well overall. She reports that using vaginal dilators was painful and she is no longer using these. The patient is not sexually active. Denies vaginal bleeding/discharge, abdominal/pelvic pain, nausea, vomiting, constipation, diarrhea, CP, SOB, hematuria, hematochezia, change in appetite or bowel movements, bloating, fevers, chills, or urinary symptoms. Initial History:  Ms. Silvia Almanza is a 69 y.o.  postmenopausal female who presents in consultation from Dr. Demi Gutierrez for FIGO Grade 1 endometrial cancer. The patient first reports vaginal spotting/bleeding in 2019. She was ultimately referred to Dr. Demi Gutierrez by her PCP. On 2020 underwent hysteroscopy/D&C with final pathology consistent with FIGO Grade 1 endometrial cancer. Reports some spotting since her surgery. History of urinary incontinence for which she wears a pad. Denies hematuria or hematochezia. Denies change in appetite or bowel habits, nausea, vomiting, diarrhea, CP, SOB, fevers, or chills. Reports long history of constipation for which she uses senna S. Pertinent PMH/PSH: h/o , obesity, HTN, INDER on CPAP      Active, no restrictions.      Pathology Review:   :  FINAL PATHOLOGIC DIAGNOSIS   1. Lymph node, right pelvic, sentinel node excision:   No evidence for malignancy in one node (0/1). See comment. 2. Lymph node, left pelvic, sentinel node excision:   No evidence for malignancy in one node (0/1). See comment. 3. Uterus, cervix, fallopian tubes, ovaries, hysterectomy, salpingo-oophorectomy:   Cervix: No evidence for dysplasia or malignancy. Endometrium: Endometrioid adenocarcinoma, FIGO grade 1. See synoptic report   Myometrium: Invasive adenocarcinoma. Leiomyoma. Adenomyosis. Fallopian tubes: No histopathologic abnormality. Ovaries: Benign physiologic changes. Synoptic report - endometrium   SPECIMEN   Procedure: Hysterectomy and salpingo-oophorectomy. Lymph Node Sampling: Bilateral pelvic lymph nodes. Specimen Integrity: Intact. TUMOR   Histologic Type: Endometrioid Adenocarcinoma   Histologic Grade: Grade 1   Tumor Extent:   Myometrial Invasion: Present. Depth of invasion: 8 mm   Myometrial thickness: 15 mm   Cervical stromal involvement: Not Identified. Extent of Involvement of Other Organs: Not identified. Accessory Tumor Findings   Lymph-Vascular Invasion: Not identified   LYMPH NODES   Number of Pelvic Nodes with Macrometastasis (>2 mm): 0   Number of Pelvic Nodes with Micrometastasis (>0.2 mm <= 2 mm): 0   Number of Pelvic Nodes with Isolated Tumor Cells (0.2 mm or less): 0   Total Number of Pelvic Nodes Examined (sentinel and non-sentinel): 2   Number of Pelvic Frostproof Nodes Examined: 2   Pathologic Stage   Primary Tumor (pT): pT1b   Regional Lymph Nodes (pN): pN0   Comment   Immunohistochemistry stains       2/5/2020:  Endometrium and polyp, curetting:  Well differentiated endometrioid adenocarcinoma, FIGO grade 1 in a background of extensive atypical complex hyperplasia. ROS:  A comprehensive review of systems was negative except for that written in the History of Present Illness.  , 10 point ROS    OB/GYN ROS:  Per HPI    ECOG ndGndrndanddndend:nd nd2nd Problem List:  Patient Active Problem List    Diagnosis Date Noted    Dilated cardiomyopathy ([address] 02/15/2021    Chronic systolic congestive heart failure ([address] 02/15/2021    Cardiomyopathy ([address] 02/15/2021    S/P cardiac cath 2020    Left bundle branch block 2020    Endometrial cancer ([address] 2020    Severe obesity ([address] 2019    Nuclear cataract 2016    Posterior subcapsular polar senile cataract 2016    Neuropathy, peripheral 2014    Essential hypertension 2014    Hyperglycemia 2014    GERD (gastroesophageal reflux disease) 2014    Mixed hyperlipidemia 2014    INDER on CPAP 2014    Depression 2014     PMH:  Past Medical History:   Diagnosis Date    AICD (automatic cardioverter/defibrillator) present     Arthritis     OSTEO    Cancer ([address]     skin cancer removed from nose    Chronic kidney disease     Chronic pain     left hip    CKD (chronic kidney disease) stage 2, GFR 60-89 ml/min 10/08/2020         Congestive heart failure ([address]     Depression 2014    Endometrial cancer ([address]     GERD (gastroesophageal reflux disease)     Heart failure ([address]     Hyperlipidemia     Hypertension     Left bundle branch block 2013    Long term current use of anticoagulant therapy     Menopause     Neuropathy, peripheral 2014    INDER on CPAP 2014    CPAP -regular use    Pacemaker       PSH:  Past Surgical History:   Procedure Laterality Date    COLONOSCOPY  2010     Dr. Garcia Stack BREAST BIOPSY      ?  left breast about 10 years ago    HX CATARACT REMOVAL Right     HX  SECTION  1976        HX DILATION AND CURETTAGE  2020    hysteroscopy    HX GI      COLONOSCOPY    HX HEART CATHETERIZATION Left 2020    HX HEENT  2005    BCCA REMOVED FROM NOSE     HX HYSTERECTOMY  2020    HX IMPLANTABLE CARDIOVERTER DEFIBRILLATOR      HX OOPHORECTOMY      HX OTHER SURGICAL      hysteroscopy surg    HX PACEMAKER  2021 BIV-ICD    HX PARATHYROIDECTOMY Left 05/05/2021    lower left     HX PARTIAL THYROIDECTOMY  05/05/2021    HX TONSILLECTOMY  1957    LA INSJ/RPLCMT PERM DFB W/TRNSVNS LDS 1/DUAL CHMBR N/A 2/15/2021    INSERT ICD BIV MULTI performed by Alexander Bhardwaj MD at [facility] CARDIAC CATH LAB      Social History:  Social History     Tobacco Use    Smoking status: Never    Smokeless tobacco: Never   Substance Use Topics    Alcohol use: No     Alcohol/week: 0.0 standard drinks      Family History:  Family History   Problem Relation Age of Onset    Heart Disease Mother     Breast Cancer Mother     Heart Disease Father     Kidney Disease Paternal Aunt         renal cancer    Colon Cancer Maternal Aunt     Cancer Paternal Uncle         leukemia    Anesth Problems Neg Hx       Medications: (reviewed)  Current Outpatient Medications   Medication Sig    rOPINIRole (REQUIP) 1 mg tablet Take 1 Tablet by mouth nightly. risperiDONE (RisperDAL) 2 mg tablet TAKE 1 TABLET BY MOUTH EVERY NIGHT    carvediloL (COREG) 12.5 mg tablet Take 1 Tablet by mouth two (2) times a day. atorvastatin (LIPITOR) 20 mg tablet Take 1 Tablet by mouth daily. buPROPion XL (WELLBUTRIN XL) 300 mg XL tablet TAKE 1 TABLET BY MOUTH EVERY MORNING    furosemide (LASIX) 20 mg tablet Take 1 Tablet by mouth daily. (Patient taking differently: Take 20 mg by mouth as needed.)    sacubitriL-valsartan (Entresto) 49-51 mg tab tablet Take 1 Tablet by mouth two (2) times a day. multivitamin (ONE A DAY) tablet Take 1 Tablet by mouth daily. cholecalciferol (VITAMIN D3) 25 mcg (1,000 unit) cap Take 2,000 Units by mouth daily. ascorbic acid, vitamin C, (VITAMIN C) 500 mg tablet Take 500 mg by mouth daily (after breakfast). aspirin delayed-release 81 mg tablet Take 81 mg by mouth daily. No current facility-administered medications for this visit.      Allergies: (reviewed)  Allergies   Allergen Reactions    Erythromycin Rash          OBJECTIVE:    Physical Exam:  There were no vitals taken for this visit. General: Alert and oriented. No acute distress. Well-nourished  HEENT: No thyroid enlargment. Neck supple without restrictions. Sclera normal. Normal occular motion. Moist mucous membranes. Lymphatics: No evidence of axillary, cervical, or subclavicular adenopathy. Respiratory: clear to auscultation and percussion to the bases. No CVAT. Cardiovascular: regular rate and rhythm. No murmurs, rubs, or gallops. Gastrointestinal: soft, non-tender, non-distended, no masses or organomegaly. Well-healed incision. Musculoskeletal: normal gait. No joint tenderness, deformity or swelling. No muscular tenderness. Extremities: extremities normal, atraumatic, no cyanosis or edema. Pelvic: exam chaperoned by nurse. Normal appearing external genitalia. On speculum exam, the vagina is atrophic. Mild radiation changes. The uterus and cervix are surgically absent. No evidence of masses or nodularity on bimanual exam. Deferred rectovaginal exam.   Neuro: Grossly intact. Normal gait and movement. No acute deficit  Skin: No evidence of rashes or skin changes. IMPRESSION/PLAN:    Ms. Fabricio Randle is a 69 y.o. female who on 2/20/2020 underwent Robotic-assisted total laparoscopic hysterectomy, Bilateral salpingo-oophorectomy, Bilateral pelvic sentinel lymph node mapping and biopsy. Final pathology consistent with Stage Ib, FIGO Grade 1 endometrial cancer. 8mm out of 15mm invasion. Negative washings. Negative SLNDs. LVSI negative. Completed VBT 6/26/2020 (30Gy in 3 fractions).     Problems:     Patient Active Problem List    Diagnosis Date Noted    Dilated cardiomyopathy ([address] 02/15/2021    Chronic systolic congestive heart failure ([address] 02/15/2021    Cardiomyopathy ([address] 02/15/2021    S/P cardiac cath 12/03/2020    Left bundle branch block 02/18/2020    Endometrial cancer ([address] 02/12/2020    Severe obesity ([address] 01/11/2019    Nuclear cataract 02/05/2016    Posterior subcapsular polar senile cataract 02/05/2016    Neuropathy, peripheral 04/09/2014    Essential hypertension 04/08/2014    Hyperglycemia 04/08/2014    GERD (gastroesophageal reflux disease) 04/08/2014    Mixed hyperlipidemia 04/08/2014    INDER on CPAP 04/08/2014    Depression 01/09/2014       Reviewed patient's course to date. CONY on exam today. Reassured patient. Patient to follow-up with Dr. Sandra Clarke in June. We will alternate visits. I will see her in 6 months from now. Reviewed precautionary symptoms to return sooner. All questions and concerns were addressed with the patient and she is comfortable with the plan. An electronic signature was used to authenticate this note.      Josh Pollack MD

## 2022-10-19 VITALS — BODY MASS INDEX: 39.71 KG/M2 | WEIGHT: 262 LBS | HEIGHT: 68 IN

## 2022-11-23 ENCOUNTER — OFFICE VISIT (OUTPATIENT)
Dept: INTERNAL MEDICINE CLINIC | Age: 67
End: 2022-11-23
Payer: MEDICARE

## 2022-11-23 VITALS
HEIGHT: 68 IN | WEIGHT: 263 LBS | RESPIRATION RATE: 16 BRPM | TEMPERATURE: 98.1 F | OXYGEN SATURATION: 96 % | HEART RATE: 78 BPM | BODY MASS INDEX: 39.86 KG/M2 | DIASTOLIC BLOOD PRESSURE: 73 MMHG | SYSTOLIC BLOOD PRESSURE: 120 MMHG

## 2022-11-23 DIAGNOSIS — M54.50 ACUTE LOW BACK PAIN WITHOUT SCIATICA, UNSPECIFIED BACK PAIN LATERALITY: Primary | ICD-10-CM

## 2022-11-23 PROCEDURE — G0463 HOSPITAL OUTPT CLINIC VISIT: HCPCS | Performed by: STUDENT IN AN ORGANIZED HEALTH CARE EDUCATION/TRAINING PROGRAM

## 2022-11-23 PROCEDURE — 99213 OFFICE O/P EST LOW 20 MIN: CPT | Performed by: STUDENT IN AN ORGANIZED HEALTH CARE EDUCATION/TRAINING PROGRAM

## 2022-11-23 NOTE — PROGRESS NOTES
[facility]   Internal Medicine  [address] [facility] [address], [address] [facility]  [address]  [place], [address] [facility]  [phone]      Primary Care Visit Note    Assessment/Plan:     Diagnoses and all orders for this visit:    1. Acute low back pain without sciatica, unspecified back pain laterality  - start PRN tylenol  - start heat pad intermittently when able. - given back stretches/exercises  - she declines other interventions at this time, including topical or oral medications. Domi Salinas MD    CC:     Chief Complaint   Patient presents with    Back Pain    Epistaxis     Nose bleeds daily       HPI:     Destinee Rodriguez is a 69 y.o. female who presents for evaluation of back pain. Pt c/o back pain for 3 days, not very severe, but achy  - pt was lifting some big heavy totes which she thinks may have contributed. - she has not doing anything for the pain. - worsening with standing after bending over  - pain doesn't go down the legs. - pt is concerned that she may worsen the pain over the next couple days with all the cooking and cleaning she will be doing. ROS:   All 10 point review of systems negative except those mentioned in the HPI. Past Medical History:      Active Ambulatory Problems     Diagnosis Date Noted    Depression 01/09/2014    Essential hypertension 04/08/2014    Hyperglycemia 04/08/2014    GERD (gastroesophageal reflux disease) 04/08/2014    Mixed hyperlipidemia 04/08/2014    INDER on CPAP 04/08/2014    Neuropathy, peripheral 04/09/2014    Nuclear cataract 02/05/2016    Posterior subcapsular polar senile cataract 02/05/2016    Severe obesity ([address] 01/11/2019    Endometrial cancer ([address] 02/12/2020    Left bundle branch block 02/18/2020    S/P cardiac cath 12/03/2020    Dilated cardiomyopathy ([address] 02/15/2021    Chronic systolic congestive heart failure ([address] 02/15/2021    Cardiomyopathy ([address] 02/15/2021     Resolved Ambulatory Problems Diagnosis Date Noted    No Resolved Ambulatory Problems     Past Medical History:   Diagnosis Date    AICD (automatic cardioverter/defibrillator) present     Arthritis     [facility]     Chronic kidney disease     Chronic pain     CKD (chronic kidney disease) stage 2, GFR 60-89 ml/min 10/08/2020    Congestive heart failure (HCC)     Heart failure (HCC)     Hyperlipidemia     Hypertension     Long term current use of anticoagulant therapy     Menopause     Pacemaker           Current Medications:     Current Outpatient Medications:     rOPINIRole (REQUIP) 1 mg tablet, Take 1 Tablet by mouth nightly., Disp: 30 Tablet, Rfl: 5    risperiDONE (RisperDAL) 2 mg tablet, TAKE 1 TABLET BY MOUTH EVERY NIGHT, Disp: 90 Tablet, Rfl: 3    carvediloL (COREG) 12.5 mg tablet, Take 1 Tablet by mouth two (2) times a day., Disp: 180 Tablet, Rfl: 3    atorvastatin (LIPITOR) 20 mg tablet, Take 1 Tablet by mouth daily. , Disp: 90 Tablet, Rfl: 3    buPROPion XL (WELLBUTRIN XL) 300 mg XL tablet, TAKE 1 TABLET BY MOUTH EVERY MORNING, Disp: 90 Tablet, Rfl: 3    furosemide (LASIX) 20 mg tablet, Take 1 Tablet by mouth daily. (Patient taking differently: Take 20 mg by mouth as needed.), Disp: 90 Tablet, Rfl: 3    sacubitriL-valsartan (Entresto) 49-51 mg tab tablet, Take 1 Tablet by mouth two (2) times a day., Disp: 180 Tablet, Rfl: 3    multivitamin (ONE A DAY) tablet, Take 1 Tablet by mouth daily. , Disp: , Rfl:     cholecalciferol (VITAMIN D3) 25 mcg (1,000 unit) cap, Take 2,000 Units by mouth daily. , Disp: , Rfl:     ascorbic acid, vitamin C, (VITAMIN C) 500 mg tablet, Take 500 mg by mouth daily (after breakfast). , Disp: , Rfl:     aspirin delayed-release 81 mg tablet, Take 81 mg by mouth daily. , Disp: , Rfl:       Past Surgical History:     Past Surgical History:   Procedure Laterality Date    COLONOSCOPY  2010     Dr. Prosper Resendiz      ?  left breast about 10 years ago    HX CATARACT REMOVAL Right     HX  SECTION  1976        HX DILATION AND CURETTAGE  2020    hysteroscopy    HX GI      COLONOSCOPY    HX HEART CATHETERIZATION Left 2020    HX HEENT  2005    BCCA REMOVED FROM NOSE     HX HYSTERECTOMY  2020    HX IMPLANTABLE CARDIOVERTER DEFIBRILLATOR      HX OOPHORECTOMY      HX OTHER SURGICAL      hysteroscopy surg    HX PACEMAKER  2021    BIV-ICD    HX PARATHYROIDECTOMY Left 2021    lower left     HX PARTIAL THYROIDECTOMY  2021    HX TONSILLECTOMY      ND INSJ/RPLCMT PERM DFB W/TRNSVNS LDS 1/DUAL CHMBR N/A 2/15/2021    INSERT ICD BIV MULTI performed by Ned Rodriguez MD at [facility] CARDIAC CATH LAB         Family History:     Family History   Problem Relation Age of Onset    Heart Disease Mother     Breast Cancer Mother     Heart Disease Father     Kidney Disease Paternal Aunt         renal cancer    Colon Cancer Maternal Aunt     Cancer Paternal Uncle         leukemia    Anesth Problems Neg Hx          Social History:     Social History     Socioeconomic History    Marital status:      Spouse name: Not on file    Number of children: Not on file    Years of education: Not on file    Highest education level: Not on file   Occupational History    Not on file   Tobacco Use    Smoking status: Never    Smokeless tobacco: Never   Vaping Use    Vaping Use: Never used   Substance and Sexual Activity    Alcohol use: No     Alcohol/week: 0.0 standard drinks    Drug use: No    Sexual activity: Not Currently   Other Topics Concern    Not on file   Social History Narrative    Not on file     Social Determinants of Health     Financial Resource Strain: Low Risk     Difficulty of Paying Living Expenses: Not very hard   Food Insecurity: No Food Insecurity    Worried About Running Out of Food in the Last Year: Never true    Ran Out of Food in the Last Year: Never true   Transportation Needs: Not on file   Physical Activity: Not on file   Stress: Not on file   Social Connections: Not on file   Intimate Partner Violence: Not on file   Housing Stability: Not on file            Visit Vitals  /73   Pulse 78   Temp 98.1 °F (36.7 °C) (Temporal)   Resp 16   Ht 5' 8\" (1.727 m)   Wt 263 lb (119.3 kg)   SpO2 96%   BMI 39.99 kg/m²       Physical Exam:   General - Well appearing female  HEENT - PERRL, TM no erythema/opacification, normal nasal turbinates, no oropharyngeal erythema or exudate, MMM  Neck - supple, no thyroidomegaly, no lymphadenopathy  Pulm - clear to auscultation bilaterally  Cardio - RRR, normal S1 S2, no murmur  Abd - soft, nontender, no masses, no HSM  Extrem - no edema, +2 distal pulses  Neuro-  Alert and oriented, No focal deficits           Labs/Imaging:     Labs and imaging reviewed by me and significant for:    Lab Results   Component Value Date/Time    Hemoglobin A1c 6.1 (H) 10/07/2022 07:36 AM     Lab Results   Component Value Date/Time    WBC 5.9 10/07/2022 07:36 AM    HGB 12.0 10/07/2022 07:36 AM    HCT 35.7 10/07/2022 07:36 AM    PLATELET 263 42/25/2213 07:36 AM    MCV 91 10/07/2022 07:36 AM     Lab Results   Component Value Date/Time    Sodium 143 10/07/2022 07:36 AM    Potassium 4.4 10/07/2022 07:36 AM    Chloride 107 (H) 10/07/2022 07:36 AM    CO2 23 10/07/2022 07:36 AM    Anion gap 4 (L) 06/19/2020 01:15 AM    Glucose 118 (H) 10/07/2022 07:36 AM    BUN 16 10/07/2022 07:36 AM    Creatinine 1.18 (H) 10/07/2022 07:36 AM    BUN/Creatinine ratio 14 10/07/2022 07:36 AM    GFR est AA 62 09/28/2021 12:00 AM    GFR est non-AA 53 (L) 09/28/2021 12:00 AM    Calcium 9.4 10/07/2022 07:36 AM    Bilirubin, total 0.4 10/07/2022 07:36 AM    Alk.  phosphatase 79 10/07/2022 07:36 AM    Protein, total 6.4 10/07/2022 07:36 AM    Albumin 4.2 10/07/2022 07:36 AM    Globulin 3.4 06/19/2020 01:15 AM    A-G Ratio 1.9 10/07/2022 07:36 AM    ALT (SGPT) 21 10/07/2022 07:36 AM    AST (SGOT) 18 10/07/2022 07:36 AM

## 2023-01-16 RX ORDER — ATORVASTATIN CALCIUM 20 MG/1
20 TABLET, FILM COATED ORAL DAILY
Qty: 90 TABLET | Refills: 3 | Status: SHIPPED | OUTPATIENT
Start: 2023-01-16

## 2023-01-17 RX ORDER — ROPINIROLE 1 MG/1
TABLET, FILM COATED ORAL
Qty: 30 TABLET | Refills: 5 | Status: SHIPPED | OUTPATIENT
Start: 2023-01-17

## 2023-02-22 ENCOUNTER — HOSPITAL ENCOUNTER (OUTPATIENT)
Dept: RADIATION THERAPY | Age: 68
Discharge: HOME | End: 2023-02-22

## 2023-03-19 DIAGNOSIS — F32.A DEPRESSION, UNSPECIFIED DEPRESSION TYPE: ICD-10-CM

## 2023-03-20 RX ORDER — BUPROPION HYDROCHLORIDE 300 MG/1
TABLET ORAL
Qty: 90 TABLET | Refills: 3 | Status: SHIPPED | OUTPATIENT
Start: 2023-03-20

## 2023-03-23 ENCOUNTER — PATIENT MESSAGE (OUTPATIENT)
Dept: INTERNAL MEDICINE CLINIC | Age: 68
End: 2023-03-23

## 2023-03-23 DIAGNOSIS — R73.9 HYPERGLYCEMIA: ICD-10-CM

## 2023-03-23 DIAGNOSIS — E78.5 HYPERLIPIDEMIA, UNSPECIFIED HYPERLIPIDEMIA TYPE: ICD-10-CM

## 2023-03-23 DIAGNOSIS — D35.1 PARATHYROID ADENOMA: ICD-10-CM

## 2023-03-23 DIAGNOSIS — I10 ESSENTIAL HYPERTENSION: Primary | ICD-10-CM

## 2023-03-23 DIAGNOSIS — N18.31 STAGE 3A CHRONIC KIDNEY DISEASE (HCC): ICD-10-CM

## 2023-03-23 RX ORDER — ROPINIROLE 0.5 MG/1
TABLET, FILM COATED ORAL
Qty: 90 TABLET | Refills: 3 | Status: SHIPPED | OUTPATIENT
Start: 2023-03-23 | End: 2023-03-24 | Stop reason: CLARIF

## 2023-03-24 RX ORDER — ROPINIROLE 1 MG/1
1 TABLET, FILM COATED ORAL
Qty: 30 TABLET | Refills: 5 | Status: SHIPPED | OUTPATIENT
Start: 2023-03-24

## 2023-03-24 NOTE — TELEPHONE ENCOUNTER
From: Johnny Olivas  To: Lilo Langston MD  Sent: 3/23/2023 1:50 PM EDT  Subject: labs    Dr,  I have an appt with you on 4/4/23 in the office. If needed, can you send my labs requisition to me via us mail for [facility]? I would like to have the labs done next week, prior to my visit.     Thank you,  Linda Jarrell

## 2023-03-29 LAB
ALBUMIN SERPL-MCNC: 4.3 G/DL (ref 3.8–4.8)
ALBUMIN/GLOB SERPL: 2.2 {RATIO} (ref 1.2–2.2)
ALP SERPL-CCNC: 79 IU/L (ref 44–121)
ALT SERPL-CCNC: 20 IU/L (ref 0–32)
AST SERPL-CCNC: 17 IU/L (ref 0–40)
BASOPHILS # BLD AUTO: 0 X10E3/UL (ref 0–0.2)
BASOPHILS NFR BLD AUTO: 1 %
BILIRUB SERPL-MCNC: 0.4 MG/DL (ref 0–1.2)
BUN SERPL-MCNC: 20 MG/DL (ref 8–27)
BUN/CREAT SERPL: 17 (ref 12–28)
CALCIUM SERPL-MCNC: 9.5 MG/DL (ref 8.7–10.3)
CHLORIDE SERPL-SCNC: 106 MMOL/L (ref 96–106)
CHOLEST SERPL-MCNC: 153 MG/DL (ref 100–199)
CO2 SERPL-SCNC: 24 MMOL/L (ref 20–29)
CREAT SERPL-MCNC: 1.19 MG/DL (ref 0.57–1)
EGFRCR SERPLBLD CKD-EPI 2021: 49 ML/MIN/1.73
EOSINOPHIL # BLD AUTO: 0.1 X10E3/UL (ref 0–0.4)
EOSINOPHIL NFR BLD AUTO: 2 %
ERYTHROCYTE [DISTWIDTH] IN BLOOD BY AUTOMATED COUNT: 12.7 % (ref 11.7–15.4)
EST. AVERAGE GLUCOSE BLD GHB EST-MCNC: 126 MG/DL
GLOBULIN SER CALC-MCNC: 2 G/DL (ref 1.5–4.5)
GLUCOSE SERPL-MCNC: 123 MG/DL (ref 70–99)
HBA1C MFR BLD: 6 % (ref 4.8–5.6)
HCT VFR BLD AUTO: 36.6 % (ref 34–46.6)
HDLC SERPL-MCNC: 52 MG/DL
HGB BLD-MCNC: 12.1 G/DL (ref 11.1–15.9)
IMM GRANULOCYTES # BLD AUTO: 0 X10E3/UL (ref 0–0.1)
IMM GRANULOCYTES NFR BLD AUTO: 0 %
LDLC SERPL CALC-MCNC: 82 MG/DL (ref 0–99)
LYMPHOCYTES # BLD AUTO: 0.9 X10E3/UL (ref 0.7–3.1)
LYMPHOCYTES NFR BLD AUTO: 17 %
MCH RBC QN AUTO: 30.3 PG (ref 26.6–33)
MCHC RBC AUTO-ENTMCNC: 33.1 G/DL (ref 31.5–35.7)
MCV RBC AUTO: 92 FL (ref 79–97)
MONOCYTES # BLD AUTO: 0.6 X10E3/UL (ref 0.1–0.9)
MONOCYTES NFR BLD AUTO: 11 %
NEUTROPHILS # BLD AUTO: 3.8 X10E3/UL (ref 1.4–7)
NEUTROPHILS NFR BLD AUTO: 69 %
PLATELET # BLD AUTO: 223 X10E3/UL (ref 150–450)
POTASSIUM SERPL-SCNC: 4.6 MMOL/L (ref 3.5–5.2)
PROT SERPL-MCNC: 6.3 G/DL (ref 6–8.5)
RBC # BLD AUTO: 4 X10E6/UL (ref 3.77–5.28)
SODIUM SERPL-SCNC: 145 MMOL/L (ref 134–144)
TRIGL SERPL-MCNC: 107 MG/DL (ref 0–149)
VLDLC SERPL CALC-MCNC: 19 MG/DL (ref 5–40)
WBC # BLD AUTO: 5.5 X10E3/UL (ref 3.4–10.8)

## 2023-04-04 ENCOUNTER — OFFICE VISIT (OUTPATIENT)
Dept: INTERNAL MEDICINE CLINIC | Age: 68
End: 2023-04-04
Payer: MEDICARE

## 2023-04-04 PROBLEM — N18.31 STAGE 3A CHRONIC KIDNEY DISEASE (HCC): Status: ACTIVE | Noted: 2023-04-04

## 2023-04-04 PROCEDURE — G0463 HOSPITAL OUTPT CLINIC VISIT: HCPCS | Performed by: INTERNAL MEDICINE

## 2023-04-04 PROCEDURE — G8536 NO DOC ELDER MAL SCRN: HCPCS | Performed by: INTERNAL MEDICINE

## 2023-04-04 PROCEDURE — 1090F PRES/ABSN URINE INCON ASSESS: CPT | Performed by: INTERNAL MEDICINE

## 2023-04-04 PROCEDURE — G8427 DOCREV CUR MEDS BY ELIG CLIN: HCPCS | Performed by: INTERNAL MEDICINE

## 2023-04-04 PROCEDURE — G8417 CALC BMI ABV UP PARAM F/U: HCPCS | Performed by: INTERNAL MEDICINE

## 2023-04-04 PROCEDURE — G8399 PT W/DXA RESULTS DOCUMENT: HCPCS | Performed by: INTERNAL MEDICINE

## 2023-04-04 PROCEDURE — G9717 DOC PT DX DEP/BP F/U NT REQ: HCPCS | Performed by: INTERNAL MEDICINE

## 2023-04-04 PROCEDURE — 3017F COLORECTAL CA SCREEN DOC REV: CPT | Performed by: INTERNAL MEDICINE

## 2023-04-04 PROCEDURE — G9899 SCRN MAM PERF RSLTS DOC: HCPCS | Performed by: INTERNAL MEDICINE

## 2023-04-04 PROCEDURE — 1101F PT FALLS ASSESS-DOCD LE1/YR: CPT | Performed by: INTERNAL MEDICINE

## 2023-04-04 PROCEDURE — 99214 OFFICE O/P EST MOD 30 MIN: CPT | Performed by: INTERNAL MEDICINE

## 2023-04-04 RX ORDER — FLUTICASONE FUROATE AND VILANTEROL TRIFENATATE 100; 25 UG/1; UG/1
POWDER RESPIRATORY (INHALATION)
Start: 2023-03-09

## 2023-04-04 NOTE — PROGRESS NOTES
1. \"Have you been to the ER, urgent care clinic since your last visit? Hospitalized since your last visit? \" No    2. \"Have you seen or consulted any other health care providers outside of the [address] [place] [address] since your last visit? \" No     3. For patients aged 39-70: Has the patient had a colonoscopy / FIT/ Cologuard? No care gap      If the patient is female:    4. For patients aged 41-77: Has the patient had a mammogram within the past 2 years? Yes - no Care Gap present      5. For patients aged 21-65: Has the patient had a pap smear?  Yes - no Care Gap present

## 2023-04-04 NOTE — PROGRESS NOTES
PROGRESS NOTE  Name: Barry Ruffin   : 1955       ASSESSMENT/ PLAN:     Dyspnea: She has had imaging (CTA), and echo. Endometrial cancer: Per Dr. Iris Basilio. Anemia: Mild. ? Chronic disease. Keep an eye on this. CKD stage 3: Watch labs. She is not currently seeing Dr. Jarrell Louis. Cardiomyopathy/CHF: EF 30-35%. She is s/p ICD implantation in 2021. As per Cardiology. Hypertension: BP fine today. Continue current meds:   Hip pain: Stable, R side, mainly. Can return as desired to orthopedics (prev Dr. Antonio York). OTC analgesics. Constipation: Controlled with stool softener. Neuropathy, LE: Stable. Ongoing. Likely arising from her metabolic syndrome / prediabetes. Sometimes neuropathy predates the diagnosis of DM. Neg RPR, ESR, TSH, JUNE, in . She is interested in trying the [facility] SYSTEM. Depression: Stable. No SI.   - risperiDONE (RISPERDAL) 2 mg tablet; Take 1 Tab by mouth nightly as needed. - buPROPion XL (WELLBUTRIN XL) 300 mg XL tablet; Take 1 Tab by mouth every morning. Hyperglycemia: \"Prediabetes\" / \"borderline DM\": E1F 6.2.   - METABOLIC PANEL, COMPREHENSIVE  - HEMOGLOBIN A1C  GERD (gastroesophageal reflux disease): Stable. - omeprazole (PRILOSEC) 20 mg capsule; Take 1 Cap by mouth daily. Hyperlipidemia: Reviewed her last labs--normal 2020. We'll check again down the road. - atorvastatin (LIPITOR) 20 mg tablet; Take 1 Tab by mouth daily.  - LIPID PANEL  INDER on CPAP:  Obesity: stable--work on diet and exercise. RTC 6 months, HTN, etc                   SUBJECTIVE:   Ms. Barry Ruffin is a 77 y.o. female who is here for follow up of routine medical issues. Chief Complaint   Patient presents with    Follow-up     6 month fu       She still has SOB. She saw Dr. Lady Desouza, who put her on Breo ellipta, \"which doesn't seem to have helped\". He plans to check her for Alpha-1 antitrypsin. Last CXR showed no airspace disease.  She has had echo in 2021 showing EF normalized to 55-60%. Dilated cardiomyopathy: EF 30-35% in Dec 2020, and on echo 5/2020. In Feb 2021 she had heart cath with Dr. Yodit Jurado in Dec 2021; no blockages. She had ICD implantation on 2/15/2021. Now on Entresto. Her last echo in July 2021 showed EF 55-60%    Acid reflux is worse. She has been on omeprazole in the past.     Restless leg syndrome seems better. Requip helps. Unfortunately, she has \"pretty bad neuropathy\" in feet, with tingling and numbness in toes and distal foot. Also has burning and stinging in L dorsal MTP. Dr. Javan Steele, nephrologist, sees her. She has told the patient that she has stage II not stage III CKD. \"She discharged me; she said it is pretty normal for my age. \"    She underwent L lower parathyroidectomy in May. She had elevated Calcium and was noted to have a parathyroid adenoma. Endometrial cancer: She is s/p Robotic-assisted total laparoscopic hysterectomy, Bilateral salpingo-oophorectomy, Bilateral pelvic sentinel lymph node mapping and biopsy Feb 2020. Follows with Dr. Bart Nuno. Dr. Zoya Bailey managed the XRT. She was seen by Dr. Sonny Castañeda, cardiologist, for preop evaluation. Found to have LBBB on EKG. Echo was performed and showed EF 30-35%. \"He wants to do Cath\"--she is interested in following up with her usual cardiologist, Dr. Yodit Jurado. Sees gynecologist yearly. Prior to the endometrial cancer her most recent gynecologist was Dr. Jean Ruiz (previously Dr. Ellen Meuse Dr. Lionel Soulier). Has had mammogram.  Had dexa. It is recalled that she saw orthopedist for L ankle pain. \"She wanted me to wear a device\", that was expensive. The pain got better with new shoes. Hurts if sits at desk too long. Neuropathy: Unchanged numbness and tingling in feet. It is recalled that she has had high blood sugars, as well as an A1C that was up. This is doing fine. Last A1C 5.7. Palpitations. Saw Dr. Yodit Jurado in 2013. We noted in 2014:  She was having palpitations and saw Dr. Rene Lala in January. She had EKG, echo and stress test--\"I think everything is okay. \"  The palpitations went away since getting treated for INDER. She is on CPAP for INDER. Mood okay, stress better. \"Not too bad; I don't worry any more than anyone else. \"  As noted before: \"I have been depressed since I was a teenager. \"  \"I tried to go off the meds, but I got down in the dumps. \"  Back on meds. No SI at this time. At this time, she is otherwise doing well and has brought no other complaints to my attention today. For a list of the medical issues addressed today, see the assessment and plan below. PMH:   Past Medical History:   Diagnosis Date    AICD (automatic cardioverter/defibrillator) present     Arthritis     OSTEO    Cancer ([facility]     skin cancer removed from nose    Chronic kidney disease     Chronic pain     left hip    CKD (chronic kidney disease) stage 2, GFR 60-89 ml/min 10/08/2020         Congestive heart failure ([facility]     Depression 2014    Endometrial cancer ([facility]     GERD (gastroesophageal reflux disease)     Heart failure ([facility]     Hyperlipidemia     Hypertension     Left bundle branch block     Long term current use of anticoagulant therapy     Menopause     Neuropathy, peripheral 2014    INDER on CPAP 2014    CPAP -regular use    Pacemaker        Past Surgical History:   Procedure Laterality Date    COLONOSCOPY  2010     Dr. Troy Piña BREAST BIOPSY      ?  left breast about 10 years ago    HX CATARACT REMOVAL Right     HX  SECTION  1976        HX DILATION AND CURETTAGE  2020    hysteroscopy    HX GI      COLONOSCOPY    HX HEART CATHETERIZATION Left 2020    HX HEENT  2005    BCCA REMOVED FROM NOSE     HX HYSTERECTOMY  2020    HX IMPLANTABLE CARDIOVERTER DEFIBRILLATOR      HX OOPHORECTOMY      HX OTHER SURGICAL      hysteroscopy surg    HX PACEMAKER  2021    BIV-ICD    HX PARATHYROIDECTOMY Left 2021    lower left     HX PARTIAL THYROIDECTOMY  2021    HX TONSILLECTOMY      UT INSJ/RPLCMT PERM DFB W/TRNSVNS LDS 1/DUAL CHMBR N/A 2/15/2021    INSERT ICD BIV MULTI performed by Juana Ramos MD at [facility] CARDIAC CATH LAB       All: She is allergic to erythromycin. Current Outpatient Medications   Medication Sig    rOPINIRole (REQUIP) 1 mg tablet Take 1 Tablet by mouth nightly. buPROPion XL (WELLBUTRIN XL) 300 mg XL tablet TAKE 1 TABLET BY MOUTH EVERY MORNING    atorvastatin (LIPITOR) 20 mg tablet TAKE 1 TABLET BY MOUTH DAILY    risperiDONE (RisperDAL) 2 mg tablet TAKE 1 TABLET BY MOUTH EVERY NIGHT    carvediloL (COREG) 12.5 mg tablet Take 1 Tablet by mouth two (2) times a day. furosemide (LASIX) 20 mg tablet Take 1 Tablet by mouth daily. (Patient taking differently: Take 20 mg by mouth as needed.)    sacubitriL-valsartan (Entresto) 49-51 mg tab tablet Take 1 Tablet by mouth two (2) times a day. multivitamin (ONE A DAY) tablet Take 1 Tablet by mouth daily. cholecalciferol (VITAMIN D3) 25 mcg (1,000 unit) cap Take 1,000 Units by mouth daily. aspirin delayed-release 81 mg tablet Take 81 mg by mouth daily. Breo Ellipta 100-25 mcg/dose inhaler INHALE 1 PUFF BY MOUTH DAILY     No current facility-administered medications for this visit. FH: Mother  breast cancer. Father  of CHF. SH: Retired insurance . She reports that she has never smoked. She has never used smokeless tobacco. She reports that she does not drink alcohol or use drugs. ROS: See above; Complete ROS otherwise negative. OBJECTIVE:   Vitals:   Visit Vitals  /79 (BP 1 Location: Left upper arm, BP Patient Position: Sitting, BP Cuff Size: Adult)   Pulse 73   Temp 98.1 °F (36.7 °C) (Temporal)   Resp 18   Ht 5' 8\" (1.727 m)   Wt 267 lb 3.2 oz (121.2 kg)   SpO2 97%   BMI 40.63 kg/m²      Gen: Pleasant 77 y.o.  female in NAD. HEENT: PERRLA. EOMI. OP moist and pink.   Neck: Supple. No LAD. HEART: RRR, No M/G/R.   LUNGS: CTAB No W/R. ABDOMEN: S, NT, ND, BS+. EXTREMITIES: Warm. No C/C/E. MUSCULOSKELETAL: Normal ROM, muscle strength 5/5 all groups. NEURO: Alert and oriented x 3. Cranial nerves grossly intact. No focal sensory or motor deficits noted. SKIN: She has neck surgical wound that appears to have healed well. Lab Results   Component Value Date/Time    Sodium 145 (H) 03/28/2023 08:32 AM    Potassium 4.6 03/28/2023 08:32 AM    Chloride 106 03/28/2023 08:32 AM    CO2 24 03/28/2023 08:32 AM    Anion gap 4 (L) 06/19/2020 01:15 AM    Glucose 123 (H) 03/28/2023 08:32 AM    BUN 20 03/28/2023 08:32 AM    Creatinine 1.19 (H) 03/28/2023 08:32 AM    BUN/Creatinine ratio 17 03/28/2023 08:32 AM    GFR est AA 62 09/28/2021 12:00 AM    GFR est non-AA 53 (L) 09/28/2021 12:00 AM    Calcium 9.5 03/28/2023 08:32 AM    Bilirubin, total 0.4 03/28/2023 08:32 AM    ALT (SGPT) 20 03/28/2023 08:32 AM    Alk.  phosphatase 79 03/28/2023 08:32 AM    Protein, total 6.3 03/28/2023 08:32 AM    Albumin 4.3 03/28/2023 08:32 AM    Globulin 3.4 06/19/2020 01:15 AM    A-G Ratio 2.2 03/28/2023 08:32 AM       Lab Results   Component Value Date/Time    Cholesterol, total 153 03/28/2023 08:32 AM    HDL Cholesterol 52 03/28/2023 08:32 AM    LDL, calculated 82 03/28/2023 08:32 AM    LDL, calculated 65 10/02/2019 08:50 AM    Triglyceride 107 03/28/2023 08:32 AM        Lab Results   Component Value Date/Time    Hemoglobin A1c 6.0 (H) 03/28/2023 08:32 AM       Lab Results   Component Value Date/Time    WBC 5.5 03/28/2023 08:32 AM    HGB 12.1 03/28/2023 08:32 AM    HCT 36.6 03/28/2023 08:32 AM    PLATELET 649 05/40/4782 08:32 AM    MCV 92 03/28/2023 08:32 AM

## 2023-04-23 DIAGNOSIS — E78.5 HYPERLIPIDEMIA, UNSPECIFIED HYPERLIPIDEMIA TYPE: Primary | ICD-10-CM

## 2023-04-23 DIAGNOSIS — R73.9 HYPERGLYCEMIA: Primary | ICD-10-CM

## 2023-05-01 RX ORDER — RISPERIDONE 2 MG/1
2 TABLET, FILM COATED ORAL
Qty: 90 TABLET | Refills: 3 | Status: SHIPPED | OUTPATIENT
Start: 2023-05-01

## 2023-05-01 NOTE — TELEPHONE ENCOUNTER
Future Appointments:  Future Appointments   Date Time Provider Mady Tejada   5/23/2023  2:15 PM Shefali Stevens MD [facility] AMB   10/10/2023  1:30 PM Tesha Smalls MD [facility] BS AMB        Last Appointment With Me:  4/4/2023     Requested Prescriptions     Pending Prescriptions Disp Refills    risperiDONE (RisperDAL) 2 mg tablet 90 Tablet 3     Sig: Take 1 Tablet by mouth nightly.

## 2023-05-22 NOTE — PROGRESS NOTES
6 month follow up for endometrial cancer ,  pt reports no abnormal spotting or bleeding, pt states no questions or concerns for today's visit    1. Have you been to the ER, urgent care clinic since your last visit? Hospitalized since your last visit?  no    2. Have you seen or consulted any other health care providers outside of the [address] [place] [address] since your last visit? Include any pap smears or colon screening.    no

## 2023-05-23 ENCOUNTER — OFFICE VISIT (OUTPATIENT)
Age: 68
End: 2023-05-23
Payer: MEDICARE

## 2023-05-23 VITALS
SYSTOLIC BLOOD PRESSURE: 99 MMHG | WEIGHT: 267 LBS | DIASTOLIC BLOOD PRESSURE: 64 MMHG | HEIGHT: 68 IN | HEART RATE: 87 BPM | BODY MASS INDEX: 40.47 KG/M2

## 2023-05-23 DIAGNOSIS — C54.1 ENDOMETRIAL CANCER (HCC): Primary | ICD-10-CM

## 2023-05-23 PROCEDURE — G8427 DOCREV CUR MEDS BY ELIG CLIN: HCPCS | Performed by: OBSTETRICS & GYNECOLOGY

## 2023-05-23 PROCEDURE — 3074F SYST BP LT 130 MM HG: CPT | Performed by: OBSTETRICS & GYNECOLOGY

## 2023-05-23 PROCEDURE — 99214 OFFICE O/P EST MOD 30 MIN: CPT | Performed by: OBSTETRICS & GYNECOLOGY

## 2023-05-23 PROCEDURE — 1036F TOBACCO NON-USER: CPT | Performed by: OBSTETRICS & GYNECOLOGY

## 2023-05-23 PROCEDURE — 3078F DIAST BP <80 MM HG: CPT | Performed by: OBSTETRICS & GYNECOLOGY

## 2023-05-23 PROCEDURE — 1090F PRES/ABSN URINE INCON ASSESS: CPT | Performed by: OBSTETRICS & GYNECOLOGY

## 2023-05-23 PROCEDURE — 3017F COLORECTAL CA SCREEN DOC REV: CPT | Performed by: OBSTETRICS & GYNECOLOGY

## 2023-05-23 PROCEDURE — G8417 CALC BMI ABV UP PARAM F/U: HCPCS | Performed by: OBSTETRICS & GYNECOLOGY

## 2023-05-23 PROCEDURE — 1123F ACP DISCUSS/DSCN MKR DOCD: CPT | Performed by: OBSTETRICS & GYNECOLOGY

## 2023-05-23 PROCEDURE — G8400 PT W/DXA NO RESULTS DOC: HCPCS | Performed by: OBSTETRICS & GYNECOLOGY

## 2023-05-24 DIAGNOSIS — I10 ESSENTIAL (PRIMARY) HYPERTENSION: Primary | ICD-10-CM

## 2023-06-27 ENCOUNTER — OFFICE VISIT (OUTPATIENT)
Age: 68
End: 2023-06-27
Payer: MEDICARE

## 2023-06-27 VITALS
HEART RATE: 76 BPM | DIASTOLIC BLOOD PRESSURE: 76 MMHG | HEIGHT: 68 IN | SYSTOLIC BLOOD PRESSURE: 121 MMHG | RESPIRATION RATE: 18 BRPM | OXYGEN SATURATION: 96 % | BODY MASS INDEX: 40.68 KG/M2 | WEIGHT: 268.4 LBS | TEMPERATURE: 97.3 F

## 2023-06-27 DIAGNOSIS — H02.403 PTOSIS OF BOTH EYELIDS: Primary | ICD-10-CM

## 2023-06-27 DIAGNOSIS — M25.551 RIGHT HIP PAIN: ICD-10-CM

## 2023-06-27 DIAGNOSIS — Z78.0 POSTMENOPAUSAL: ICD-10-CM

## 2023-06-27 PROCEDURE — 3017F COLORECTAL CA SCREEN DOC REV: CPT | Performed by: INTERNAL MEDICINE

## 2023-06-27 PROCEDURE — 1123F ACP DISCUSS/DSCN MKR DOCD: CPT | Performed by: INTERNAL MEDICINE

## 2023-06-27 PROCEDURE — 99213 OFFICE O/P EST LOW 20 MIN: CPT | Performed by: INTERNAL MEDICINE

## 2023-06-27 PROCEDURE — 1090F PRES/ABSN URINE INCON ASSESS: CPT | Performed by: INTERNAL MEDICINE

## 2023-06-27 PROCEDURE — G8427 DOCREV CUR MEDS BY ELIG CLIN: HCPCS | Performed by: INTERNAL MEDICINE

## 2023-06-27 PROCEDURE — G8417 CALC BMI ABV UP PARAM F/U: HCPCS | Performed by: INTERNAL MEDICINE

## 2023-06-27 PROCEDURE — G8400 PT W/DXA NO RESULTS DOC: HCPCS | Performed by: INTERNAL MEDICINE

## 2023-06-27 PROCEDURE — 1036F TOBACCO NON-USER: CPT | Performed by: INTERNAL MEDICINE

## 2023-06-27 PROCEDURE — 3074F SYST BP LT 130 MM HG: CPT | Performed by: INTERNAL MEDICINE

## 2023-06-27 PROCEDURE — 3078F DIAST BP <80 MM HG: CPT | Performed by: INTERNAL MEDICINE

## 2023-06-27 SDOH — ECONOMIC STABILITY: FOOD INSECURITY: WITHIN THE PAST 12 MONTHS, THE FOOD YOU BOUGHT JUST DIDN'T LAST AND YOU DIDN'T HAVE MONEY TO GET MORE.: NEVER TRUE

## 2023-06-27 SDOH — ECONOMIC STABILITY: INCOME INSECURITY: HOW HARD IS IT FOR YOU TO PAY FOR THE VERY BASICS LIKE FOOD, HOUSING, MEDICAL CARE, AND HEATING?: SOMEWHAT HARD

## 2023-06-27 SDOH — ECONOMIC STABILITY: HOUSING INSECURITY
IN THE LAST 12 MONTHS, WAS THERE A TIME WHEN YOU DID NOT HAVE A STEADY PLACE TO SLEEP OR SLEPT IN A SHELTER (INCLUDING NOW)?: NO

## 2023-06-27 SDOH — ECONOMIC STABILITY: FOOD INSECURITY: WITHIN THE PAST 12 MONTHS, YOU WORRIED THAT YOUR FOOD WOULD RUN OUT BEFORE YOU GOT MONEY TO BUY MORE.: NEVER TRUE

## 2023-06-27 ASSESSMENT — PATIENT HEALTH QUESTIONNAIRE - PHQ9
4. FEELING TIRED OR HAVING LITTLE ENERGY: 0
1. LITTLE INTEREST OR PLEASURE IN DOING THINGS: 0
8. MOVING OR SPEAKING SO SLOWLY THAT OTHER PEOPLE COULD HAVE NOTICED. OR THE OPPOSITE, BEING SO FIGETY OR RESTLESS THAT YOU HAVE BEEN MOVING AROUND A LOT MORE THAN USUAL: 0
SUM OF ALL RESPONSES TO PHQ QUESTIONS 1-9: 0
7. TROUBLE CONCENTRATING ON THINGS, SUCH AS READING THE NEWSPAPER OR WATCHING TELEVISION: 0
SUM OF ALL RESPONSES TO PHQ QUESTIONS 1-9: 0
2. FEELING DOWN, DEPRESSED OR HOPELESS: 0
SUM OF ALL RESPONSES TO PHQ QUESTIONS 1-9: 0
3. TROUBLE FALLING OR STAYING ASLEEP: 0
SUM OF ALL RESPONSES TO PHQ QUESTIONS 1-9: 0
SUM OF ALL RESPONSES TO PHQ9 QUESTIONS 1 & 2: 0
5. POOR APPETITE OR OVEREATING: 0
6. FEELING BAD ABOUT YOURSELF - OR THAT YOU ARE A FAILURE OR HAVE LET YOURSELF OR YOUR FAMILY DOWN: 0
9. THOUGHTS THAT YOU WOULD BE BETTER OFF DEAD, OR OF HURTING YOURSELF: 0
10. IF YOU CHECKED OFF ANY PROBLEMS, HOW DIFFICULT HAVE THESE PROBLEMS MADE IT FOR YOU TO DO YOUR WORK, TAKE CARE OF THINGS AT HOME, OR GET ALONG WITH OTHER PEOPLE: 0

## 2023-06-27 ASSESSMENT — ANXIETY QUESTIONNAIRES
3. WORRYING TOO MUCH ABOUT DIFFERENT THINGS: 0
6. BECOMING EASILY ANNOYED OR IRRITABLE: 0
4. TROUBLE RELAXING: 0
5. BEING SO RESTLESS THAT IT IS HARD TO SIT STILL: 0
1. FEELING NERVOUS, ANXIOUS, OR ON EDGE: 0
GAD7 TOTAL SCORE: 0
IF YOU CHECKED OFF ANY PROBLEMS ON THIS QUESTIONNAIRE, HOW DIFFICULT HAVE THESE PROBLEMS MADE IT FOR YOU TO DO YOUR WORK, TAKE CARE OF THINGS AT HOME, OR GET ALONG WITH OTHER PEOPLE: NOT DIFFICULT AT ALL
2. NOT BEING ABLE TO STOP OR CONTROL WORRYING: 0
7. FEELING AFRAID AS IF SOMETHING AWFUL MIGHT HAPPEN: 0

## 2023-07-02 ENCOUNTER — OFFICE VISIT (OUTPATIENT)
Age: 68
End: 2023-07-02

## 2023-07-02 VITALS
RESPIRATION RATE: 17 BRPM | WEIGHT: 267 LBS | BODY MASS INDEX: 40.6 KG/M2 | SYSTOLIC BLOOD PRESSURE: 131 MMHG | DIASTOLIC BLOOD PRESSURE: 81 MMHG | OXYGEN SATURATION: 97 % | HEART RATE: 81 BPM | TEMPERATURE: 97.3 F

## 2023-07-02 DIAGNOSIS — J01.00 ACUTE NON-RECURRENT MAXILLARY SINUSITIS: Primary | ICD-10-CM

## 2023-07-02 RX ORDER — AMOXICILLIN AND CLAVULANATE POTASSIUM 875; 125 MG/1; MG/1
1 TABLET, FILM COATED ORAL 2 TIMES DAILY
Qty: 14 TABLET | Refills: 0 | Status: SHIPPED | OUTPATIENT
Start: 2023-07-02 | End: 2023-07-09

## 2023-08-16 ENCOUNTER — TELEPHONE (OUTPATIENT)
Age: 68
End: 2023-08-16

## 2023-08-16 NOTE — TELEPHONE ENCOUNTER
Called bother numbers on file to schedule appt per patients request. She left a voicemail at 10 am today and again at 1130 c/o of no return call. Left message on [phone] to schedule.

## 2023-08-17 ENCOUNTER — HOSPITAL ENCOUNTER (OUTPATIENT)
Facility: HOSPITAL | Age: 68
Discharge: HOME | End: 2023-08-20

## 2023-10-03 ENCOUNTER — HOSPITAL ENCOUNTER (OUTPATIENT)
Facility: HOSPITAL | Age: 68
Discharge: HOME | End: 2023-10-06
Attending: INTERNAL MEDICINE
Payer: MEDICARE

## 2023-10-03 DIAGNOSIS — Z78.0 POSTMENOPAUSAL: ICD-10-CM

## 2023-10-03 PROCEDURE — 77080 DXA BONE DENSITY AXIAL: CPT

## 2023-10-04 ENCOUNTER — NURSE ONLY (OUTPATIENT)
Age: 68
End: 2023-10-04

## 2023-10-04 DIAGNOSIS — R73.9 HYPERGLYCEMIA: ICD-10-CM

## 2023-10-04 DIAGNOSIS — E78.2 MIXED HYPERLIPIDEMIA: ICD-10-CM

## 2023-10-04 DIAGNOSIS — R73.9 HYPERGLYCEMIA, UNSPECIFIED: ICD-10-CM

## 2023-10-04 DIAGNOSIS — I10 ESSENTIAL (PRIMARY) HYPERTENSION: Primary | ICD-10-CM

## 2023-10-04 DIAGNOSIS — E78.5 HYPERLIPIDEMIA, UNSPECIFIED HYPERLIPIDEMIA TYPE: ICD-10-CM

## 2023-10-04 DIAGNOSIS — I10 ESSENTIAL (PRIMARY) HYPERTENSION: ICD-10-CM

## 2023-10-04 LAB
ALBUMIN SERPL-MCNC: 3.6 G/DL (ref 3.5–5)
ALBUMIN/GLOB SERPL: 1.3 (ref 1.1–2.2)
ALP SERPL-CCNC: 75 U/L (ref 45–117)
ALT SERPL-CCNC: 32 U/L (ref 12–78)
ANION GAP SERPL CALC-SCNC: 2 MMOL/L (ref 5–15)
AST SERPL-CCNC: 15 U/L (ref 15–37)
BASOPHILS # BLD: 0.1 K/UL (ref 0–0.1)
BASOPHILS NFR BLD: 1 % (ref 0–1)
BILIRUB SERPL-MCNC: 0.5 MG/DL (ref 0.2–1)
BUN SERPL-MCNC: 22 MG/DL (ref 6–20)
BUN/CREAT SERPL: 17 (ref 12–20)
CALCIUM SERPL-MCNC: 9.7 MG/DL (ref 8.5–10.1)
CHLORIDE SERPL-SCNC: 109 MMOL/L (ref 97–108)
CHOLEST SERPL-MCNC: 132 MG/DL
CO2 SERPL-SCNC: 32 MMOL/L (ref 21–32)
CREAT SERPL-MCNC: 1.26 MG/DL (ref 0.55–1.02)
DIFFERENTIAL METHOD BLD: NORMAL
EOSINOPHIL # BLD: 0.1 K/UL (ref 0–0.4)
EOSINOPHIL NFR BLD: 3 % (ref 0–7)
ERYTHROCYTE [DISTWIDTH] IN BLOOD BY AUTOMATED COUNT: 13.5 % (ref 11.5–14.5)
EST. AVERAGE GLUCOSE BLD GHB EST-MCNC: 128 MG/DL
GLOBULIN SER CALC-MCNC: 2.8 G/DL (ref 2–4)
GLUCOSE SERPL-MCNC: 114 MG/DL (ref 65–100)
HBA1C MFR BLD: 6.1 % (ref 4–5.6)
HCT VFR BLD AUTO: 37.3 % (ref 35–47)
HDLC SERPL-MCNC: 51 MG/DL
HDLC SERPL: 2.6 (ref 0–5)
HGB BLD-MCNC: 12 G/DL (ref 11.5–16)
IMM GRANULOCYTES # BLD AUTO: 0 K/UL (ref 0–0.04)
IMM GRANULOCYTES NFR BLD AUTO: 0 % (ref 0–0.5)
LDLC SERPL CALC-MCNC: 62.6 MG/DL (ref 0–100)
LYMPHOCYTES # BLD: 1 K/UL (ref 0.8–3.5)
LYMPHOCYTES NFR BLD: 23 % (ref 12–49)
MCH RBC QN AUTO: 30.1 PG (ref 26–34)
MCHC RBC AUTO-ENTMCNC: 32.2 G/DL (ref 30–36.5)
MCV RBC AUTO: 93.5 FL (ref 80–99)
MONOCYTES # BLD: 0.5 K/UL (ref 0–1)
MONOCYTES NFR BLD: 13 % (ref 5–13)
NEUTS SEG # BLD: 2.6 K/UL (ref 1.8–8)
NEUTS SEG NFR BLD: 60 % (ref 32–75)
NRBC # BLD: 0 K/UL (ref 0–0.01)
NRBC BLD-RTO: 0 PER 100 WBC
PLATELET # BLD AUTO: 212 K/UL (ref 150–400)
PMV BLD AUTO: 10.9 FL (ref 8.9–12.9)
POTASSIUM SERPL-SCNC: 4.8 MMOL/L (ref 3.5–5.1)
PROT SERPL-MCNC: 6.4 G/DL (ref 6.4–8.2)
RBC # BLD AUTO: 3.99 M/UL (ref 3.8–5.2)
SODIUM SERPL-SCNC: 143 MMOL/L (ref 136–145)
TRIGL SERPL-MCNC: 92 MG/DL
VLDLC SERPL CALC-MCNC: 18.4 MG/DL
WBC # BLD AUTO: 4.2 K/UL (ref 3.6–11)

## 2023-10-10 ENCOUNTER — OFFICE VISIT (OUTPATIENT)
Age: 68
End: 2023-10-10
Payer: MEDICARE

## 2023-10-10 VITALS
RESPIRATION RATE: 18 BRPM | SYSTOLIC BLOOD PRESSURE: 137 MMHG | TEMPERATURE: 98.2 F | BODY MASS INDEX: 40.74 KG/M2 | OXYGEN SATURATION: 97 % | HEIGHT: 68 IN | HEART RATE: 71 BPM | WEIGHT: 268.8 LBS | DIASTOLIC BLOOD PRESSURE: 74 MMHG

## 2023-10-10 DIAGNOSIS — I42.0 DILATED CARDIOMYOPATHY (HCC): ICD-10-CM

## 2023-10-10 DIAGNOSIS — I49.5 SICK SINUS SYNDROME (HCC): ICD-10-CM

## 2023-10-10 DIAGNOSIS — I10 ESSENTIAL (PRIMARY) HYPERTENSION: ICD-10-CM

## 2023-10-10 DIAGNOSIS — K21.9 GASTROESOPHAGEAL REFLUX DISEASE, UNSPECIFIED WHETHER ESOPHAGITIS PRESENT: ICD-10-CM

## 2023-10-10 DIAGNOSIS — R73.9 HYPERGLYCEMIA, UNSPECIFIED: ICD-10-CM

## 2023-10-10 DIAGNOSIS — Z23 IMMUNIZATION DUE: ICD-10-CM

## 2023-10-10 DIAGNOSIS — Z00.00 ENCOUNTER FOR SUBSEQUENT ANNUAL WELLNESS VISIT (AWV) IN MEDICARE PATIENT: Primary | ICD-10-CM

## 2023-10-10 DIAGNOSIS — E66.01 MORBID (SEVERE) OBESITY DUE TO EXCESS CALORIES (HCC): ICD-10-CM

## 2023-10-10 DIAGNOSIS — E78.2 MIXED HYPERLIPIDEMIA: ICD-10-CM

## 2023-10-10 PROCEDURE — 1090F PRES/ABSN URINE INCON ASSESS: CPT | Performed by: INTERNAL MEDICINE

## 2023-10-10 PROCEDURE — 99214 OFFICE O/P EST MOD 30 MIN: CPT | Performed by: INTERNAL MEDICINE

## 2023-10-10 PROCEDURE — G8399 PT W/DXA RESULTS DOCUMENT: HCPCS | Performed by: INTERNAL MEDICINE

## 2023-10-10 PROCEDURE — 90694 VACC AIIV4 NO PRSRV 0.5ML IM: CPT | Performed by: INTERNAL MEDICINE

## 2023-10-10 PROCEDURE — 3017F COLORECTAL CA SCREEN DOC REV: CPT | Performed by: INTERNAL MEDICINE

## 2023-10-10 PROCEDURE — G8417 CALC BMI ABV UP PARAM F/U: HCPCS | Performed by: INTERNAL MEDICINE

## 2023-10-10 PROCEDURE — 3075F SYST BP GE 130 - 139MM HG: CPT | Performed by: INTERNAL MEDICINE

## 2023-10-10 PROCEDURE — 1036F TOBACCO NON-USER: CPT | Performed by: INTERNAL MEDICINE

## 2023-10-10 PROCEDURE — 3078F DIAST BP <80 MM HG: CPT | Performed by: INTERNAL MEDICINE

## 2023-10-10 PROCEDURE — G0439 PPPS, SUBSEQ VISIT: HCPCS | Performed by: INTERNAL MEDICINE

## 2023-10-10 PROCEDURE — G8484 FLU IMMUNIZE NO ADMIN: HCPCS | Performed by: INTERNAL MEDICINE

## 2023-10-10 PROCEDURE — 1123F ACP DISCUSS/DSCN MKR DOCD: CPT | Performed by: INTERNAL MEDICINE

## 2023-10-10 PROCEDURE — PBSHW INFLUENZA, FLUAD, (AGE 65 Y+), IM, PF, 0.5 ML: Performed by: INTERNAL MEDICINE

## 2023-10-10 PROCEDURE — G8427 DOCREV CUR MEDS BY ELIG CLIN: HCPCS | Performed by: INTERNAL MEDICINE

## 2023-10-10 RX ORDER — ALBUTEROL SULFATE 90 UG/1
AEROSOL, METERED RESPIRATORY (INHALATION)
COMMUNITY
Start: 2023-09-19

## 2023-10-10 NOTE — PROGRESS NOTES
1. \"Have you been to the ER, urgent care clinic since your last visit? Hospitalized since your last visit? \" No    2. \"Have you seen or consulted any other health care providers outside of the [address] [place] [address] since your last visit? \" No     3. For patients aged 43-73: Has the patient had a colonoscopy / FIT/ Cologuard? Yes - no Care Gap present      If the patient is female:    4. For patients aged 43-66: Has the patient had a mammogram within the past 2 years? Yes - no Care Gap present      5. For patients aged 21-65: Has the patient had a pap smear?  NA - based on age or sex
[FreeTextEntry1] : fever control and fluids and also discussed in detail with persitent fever  with persistent weakness \par to 
Menopause     Neuropathy, peripheral 2014    MCKENNA on CPAP 2014    CPAP -regular use    Pacemaker        Past Surgical History:   Procedure Laterality Date    BREAST BIOPSY      ?  left breast about 10 years ago    CARDIAC CATHETERIZATION Left 2020    CARDIAC DEFIBRILLATOR PLACEMENT      CATARACT REMOVAL Right      SECTION          COLONOSCOPY  2010     Dr. Nick Manjarrez  2020    hysteroscopy    GI      COLONOSCOPY    HEENT      BCCA REMOVED FROM NOSE     HYSTERECTOMY (CERVIX STATUS UNKNOWN)  2020    INSJ/RPLCMT PERM DFB W/TRNSVNS LDS 1/DUAL CHMBR N/A 2/15/2021    INSERT ICD BIV MULTI performed by Leigh Jorgensen MD at [facility] CARDIAC CATH LAB    OTHER SURGICAL HISTORY      hysteroscopy surg    OVARY REMOVAL      PACEMAKER  2021    BIV-ICD    PARATHYROIDECTOMY Left 2021    lower left     THYROIDECTOMY, PARTIAL  2021    TONSILLECTOMY            Current Outpatient Medications on File Prior to Visit   Medication Sig Dispense Refill    albuterol sulfate HFA (PROVENTIL;VENTOLIN;PROAIR) 108 (90 Base) MCG/ACT inhaler INHALE 2 PUFFS BY MOUTH FOUR TIMES DAILY AS NEEDED FOR WHEEZING OR SHORTNESS OF BREATH      aspirin 81 MG EC tablet Take by mouth daily      atorvastatin (LIPITOR) 20 MG tablet Take by mouth daily      buPROPion (WELLBUTRIN XL) 300 MG extended release tablet TAKE 1 TABLET BY MOUTH EVERY MORNING      carvedilol (COREG) 12.5 MG tablet Take by mouth 2 times daily      vitamin D 25 MCG (1000 UT) CAPS Take by mouth daily      furosemide (LASIX) 20 MG tablet Take by mouth daily      risperiDONE (RISPERDAL) 2 MG tablet TAKE 1 TABLET BY MOUTH EVERY NIGHT      rOPINIRole (REQUIP) 1 MG tablet TAKE 1 TABLET BY MOUTH EVERY NIGHT      sacubitril-valsartan (ENTRESTO) 49-51 MG per tablet Take 1 tablet by mouth 2 times daily      ascorbic acid (VITAMIN C) 500 MG tablet Take by mouth (Patient not taking: Reported on 2023)
numbness and tingling in feet. It is recalled that she has had high blood sugars, as well as an A1C that was up. This is doing fine. Last A1C 5.7. Palpitations. Saw Dr. Elisabeth Bloch in . We noted in : She was having palpitations and saw Dr. Rosalind Littlejohn in January. She had EKG, echo and stress test--\"I think everything is okay. \"  The palpitations went away since getting treated for MCKENNA. She is on CPAP for MCKENNA. Mood okay, stress better. \"Not too bad; I don't worry any more than anyone else. \"  As noted before: \"I have been depressed since I was a teenager. \"  \"I tried to go off the meds, but I got down in the dumps. \"  Back on meds. No SI at this time. At this time, she is otherwise doing well and has brought no other complaints to my attention today. For a list of the medical issues addressed today, see the assessment and plan below. PMH:   Past Medical History:   Diagnosis Date    AICD (automatic cardioverter/defibrillator) present     Arthritis     OSTEO    Cancer ([address])     skin cancer removed from nose    Chronic kidney disease     Chronic pain     left hip    CKD (chronic kidney disease) stage 2, GFR 60-89 ml/min 10/08/2020         Congestive heart failure ([address])     Depression 2014    Endometrial cancer ([address])     GERD (gastroesophageal reflux disease)     Heart failure ([address])     Hyperlipidemia     Hypertension     Left bundle branch block 2013    Long term current use of anticoagulant therapy     Menopause     Neuropathy, peripheral 2014    MCKENNA on CPAP 2014    CPAP -regular use    Pacemaker        Past Surgical History:   Procedure Laterality Date    COLONOSCOPY  2010     Dr. Yina Hernandez BREAST BIOPSY      ?  left breast about 10 years ago    HX CATARACT REMOVAL Right     HX  SECTION  1976        HX DILATION AND CURETTAGE  2020    hysteroscopy    HX GI      COLONOSCOPY    HX HEART CATHETERIZATION Left 2020    HX HEENT  2005    BCCA

## 2023-10-23 ASSESSMENT — SLEEP AND FATIGUE QUESTIONNAIRES
FOSQ SCORE: 19.5
ESS TOTAL SCORE: 2
HOW LIKELY ARE YOU TO NOD OFF OR FALL ASLEEP WHEN YOU ARE A PASSENGER IN A CAR FOR AN HOUR WITHOUT A BREAK: 0
DO YOU GENERALLY HAVE DIFFICULTY REMEMBERING THINGS BECAUSE YOU ARE SLEEPY OR TIRED: NO
DO YOU HAVE DIFFICULTY BEING AS ACTIVE AS YOU WANT TO BE IN THE EVENING BECAUSE YOU ARE SLEEPY OR TIRED: NO
HAS YOUR RELATIONSHIP WITH FAMILY, FRIENDS OR WORK COLLEAGUES BEEN AFFECTED BECAUSE YOU ARE SLEEPY OR TIRED: NO
HOW LIKELY ARE YOU TO NOD OFF OR FALL ASLEEP WHILE SITTING QUIETLY AFTER LUNCH WITHOUT ALCOHOL: 0
DO YOU HAVE DIFFICULTY CONCENTRATING ON THE THINGS YOU DO BECAUSE YOU ARE SLEEPY OR TIRED: NO
HOW LIKELY ARE YOU TO NOD OFF OR FALL ASLEEP WHILE SITTING AND READING: 1
DO YOU HAVE DIFFICULTY OPERATING A MOTOR VEHICLE FOR LONG DISTANCES (GREATER THAN 100 MILES) BECAUSE YOU BECOME SLEEPY: NO
HOW LIKELY ARE YOU TO NOD OFF OR FALL ASLEEP WHILE SITTING INACTIVE IN A PUBLIC PLACE: WOULD NEVER DOZE
HOW LIKELY ARE YOU TO NOD OFF OR FALL ASLEEP WHILE LYING DOWN TO REST IN THE AFTERNOON WHEN CIRCUMSTANCES PERMIT: SLIGHT CHANCE OF DOZING
HOW LIKELY ARE YOU TO NOD OFF OR FALL ASLEEP WHILE SITTING AND READING: SLIGHT CHANCE OF DOZING
HOW LIKELY ARE YOU TO NOD OFF OR FALL ASLEEP WHILE SITTING QUIETLY AFTER LUNCH WITHOUT ALCOHOL: WOULD NEVER DOZE
HOW LIKELY ARE YOU TO NOD OFF OR FALL ASLEEP WHEN YOU ARE A PASSENGER IN A CAR FOR AN HOUR WITHOUT A BREAK: WOULD NEVER DOZE
DO YOU HAVE DIFFICULTY WATCHING A MOVIE OR VIDEO BECAUSE YOU BECOME SLEEPY OR TIRED: NO
HOW LIKELY ARE YOU TO NOD OFF OR FALL ASLEEP IN A CAR, WHILE STOPPED FOR A FEW MINUTES IN TRAFFIC: WOULD NEVER DOZE
DO YOU HAVE DIFFICULTY VISITING YOUR FAMILY OR FRIENDS IN THEIR HOME BECAUSE YOU BECOME SLEEPY OR TIRED: NO
HOW LIKELY ARE YOU TO NOD OFF OR FALL ASLEEP WHILE WATCHING TV: WOULD NEVER DOZE
DO YOU HAVE DIFFICULTY BEING AS ACTIVE AS YOU WANT TO BE IN THE MORNING BECAUSE YOU ARE SLEEPY OR TIRED: YES, LITTLE
HOW LIKELY ARE YOU TO NOD OFF OR FALL ASLEEP WHILE SITTING INACTIVE IN A PUBLIC PLACE: 0
HOW LIKELY ARE YOU TO NOD OFF OR FALL ASLEEP IN A CAR, WHILE STOPPED FOR A FEW MINUTES IN TRAFFIC: 0
DO YOU HAVE DIFFICULTY OPERATING A MOTOR VEHICLE FOR SHORT DISTANCES (LESS THAN 100 MILES) BECAUSE YOU BECOME SLEEPY: NO
HOW LIKELY ARE YOU TO NOD OFF OR FALL ASLEEP WHILE LYING DOWN TO REST IN THE AFTERNOON WHEN CIRCUMSTANCES PERMIT: 1
HAS YOUR MOOD BEEN AFFECTED BECAUSE YOU ARE SLEEPY OR TIRED: NO
HOW LIKELY ARE YOU TO NOD OFF OR FALL ASLEEP WHILE SITTING AND TALKING TO SOMEONE: WOULD NEVER DOZE
HOW LIKELY ARE YOU TO NOD OFF OR FALL ASLEEP WHILE SITTING AND TALKING TO SOMEONE: 0
HOW LIKELY ARE YOU TO NOD OFF OR FALL ASLEEP WHILE WATCHING TV: 0

## 2023-10-24 ENCOUNTER — OFFICE VISIT (OUTPATIENT)
Age: 68
End: 2023-10-24
Payer: MEDICARE

## 2023-10-24 VITALS
WEIGHT: 268.8 LBS | TEMPERATURE: 97 F | OXYGEN SATURATION: 94 % | DIASTOLIC BLOOD PRESSURE: 57 MMHG | HEART RATE: 73 BPM | BODY MASS INDEX: 40.74 KG/M2 | SYSTOLIC BLOOD PRESSURE: 115 MMHG | HEIGHT: 68 IN

## 2023-10-24 DIAGNOSIS — I10 ESSENTIAL (PRIMARY) HYPERTENSION: ICD-10-CM

## 2023-10-24 DIAGNOSIS — G47.33 OBSTRUCTIVE SLEEP APNEA (ADULT) (PEDIATRIC): Primary | ICD-10-CM

## 2023-10-24 DIAGNOSIS — E66.01 SEVERE OBESITY (BMI >= 40) (HCC): ICD-10-CM

## 2023-10-24 PROCEDURE — G8399 PT W/DXA RESULTS DOCUMENT: HCPCS | Performed by: INTERNAL MEDICINE

## 2023-10-24 PROCEDURE — 3017F COLORECTAL CA SCREEN DOC REV: CPT | Performed by: INTERNAL MEDICINE

## 2023-10-24 PROCEDURE — 3074F SYST BP LT 130 MM HG: CPT | Performed by: INTERNAL MEDICINE

## 2023-10-24 PROCEDURE — 1090F PRES/ABSN URINE INCON ASSESS: CPT | Performed by: INTERNAL MEDICINE

## 2023-10-24 PROCEDURE — 3078F DIAST BP <80 MM HG: CPT | Performed by: INTERNAL MEDICINE

## 2023-10-24 PROCEDURE — 99213 OFFICE O/P EST LOW 20 MIN: CPT | Performed by: INTERNAL MEDICINE

## 2023-10-24 PROCEDURE — G8484 FLU IMMUNIZE NO ADMIN: HCPCS | Performed by: INTERNAL MEDICINE

## 2023-10-24 PROCEDURE — G8427 DOCREV CUR MEDS BY ELIG CLIN: HCPCS | Performed by: INTERNAL MEDICINE

## 2023-10-24 PROCEDURE — 1036F TOBACCO NON-USER: CPT | Performed by: INTERNAL MEDICINE

## 2023-10-24 PROCEDURE — G8417 CALC BMI ABV UP PARAM F/U: HCPCS | Performed by: INTERNAL MEDICINE

## 2023-10-24 PROCEDURE — 1123F ACP DISCUSS/DSCN MKR DOCD: CPT | Performed by: INTERNAL MEDICINE

## 2023-10-24 NOTE — PATIENT INSTRUCTIONS
[address] [facility] [place], [address]  Tel.  [phone]  Fax. [address] [facility], [address] [facility]  Tel.  [phone]  Fax. [phone] [facility], [address] [facility]  Tel.  [phone]  Fax. [phone]     PROPER SLEEP HYGIENE    What to avoid  Do not have drinks with caffeine, such as coffee or black tea, for 8 hours before bed. Do not smoke or use other types of tobacco near bedtime. Nicotine is a stimulant and can keep you awake. Avoid drinking alcohol late in the evening, because it can cause you to wake in the middle of the night. Do not eat a big meal close to bedtime. If you are hungry, eat a light snack. Do not drink a lot of water close to bedtime, because the need to urinate may wake you up during the night. Do not read or watch TV in bed. Use the bed only for sleeping and sexual activity. What to try  Go to bed at the same time every night, and wake up at the same time every morning. Do not take naps during the day. Keep your bedroom quiet, dark, and cool. Get regular exercise, but not within 3 to 4 hours of your bedtime. .  Sleep on a comfortable pillow and mattress. If watching the clock makes you anxious, turn it facing away from you so you cannot see the time. If you worry when you lie down, start a worry book. Well before bedtime, write down your worries, and then set the book and your concerns aside. Try meditation or other relaxation techniques before you go to bed. If you cannot fall asleep, get up and go to another room until you feel sleepy. Do something relaxing. Repeat your bedtime routine before you go to bed again. Make your house quiet and calm about an hour before bedtime. Turn down the lights, turn off the TV, log off the computer, and turn down the volume on music. This can help you relax after a busy day.     Drowsy Driving  The [address] [place] [address] Traffic Safety Administration cites drowsiness as a

## 2023-10-25 ENCOUNTER — CLINICAL DOCUMENTATION (OUTPATIENT)
Age: 68
End: 2023-10-25

## 2023-11-03 ENCOUNTER — PATIENT MESSAGE (OUTPATIENT)
Age: 68
End: 2023-11-03

## 2023-11-06 RX ORDER — ROPINIROLE 1 MG/1
1 TABLET, FILM COATED ORAL NIGHTLY
Qty: 90 TABLET | Refills: 3 | Status: SHIPPED | OUTPATIENT
Start: 2023-11-06

## 2023-11-06 NOTE — TELEPHONE ENCOUNTER
From: Puneet Quan  To: Dr. Sterling [facility]: 11/3/2023 4:29 PM EDT  Subject: Ropinirole 1 mg    Dr Zain Schultz,  Can you please send a 90 day prescription to [facility] for ropinirole 1 mg. The pharmacy inadvertently requested a 30 day prescription, which was incorrect.    Thanks so much,  Enrique Carrillo

## 2023-11-29 ENCOUNTER — OFFICE VISIT (OUTPATIENT)
Age: 68
End: 2023-11-29
Payer: MEDICARE

## 2023-11-29 VITALS
SYSTOLIC BLOOD PRESSURE: 138 MMHG | HEIGHT: 68 IN | HEART RATE: 73 BPM | BODY MASS INDEX: 40.56 KG/M2 | DIASTOLIC BLOOD PRESSURE: 74 MMHG | WEIGHT: 267.6 LBS

## 2023-11-29 DIAGNOSIS — C54.1 ENDOMETRIAL CANCER (HCC): Primary | ICD-10-CM

## 2023-11-29 PROCEDURE — G8399 PT W/DXA RESULTS DOCUMENT: HCPCS | Performed by: OBSTETRICS & GYNECOLOGY

## 2023-11-29 PROCEDURE — 99212 OFFICE O/P EST SF 10 MIN: CPT | Performed by: OBSTETRICS & GYNECOLOGY

## 2023-11-29 PROCEDURE — 1123F ACP DISCUSS/DSCN MKR DOCD: CPT | Performed by: OBSTETRICS & GYNECOLOGY

## 2023-11-29 PROCEDURE — 1090F PRES/ABSN URINE INCON ASSESS: CPT | Performed by: OBSTETRICS & GYNECOLOGY

## 2023-11-29 PROCEDURE — 3075F SYST BP GE 130 - 139MM HG: CPT | Performed by: OBSTETRICS & GYNECOLOGY

## 2023-11-29 PROCEDURE — 3078F DIAST BP <80 MM HG: CPT | Performed by: OBSTETRICS & GYNECOLOGY

## 2023-11-29 PROCEDURE — 3017F COLORECTAL CA SCREEN DOC REV: CPT | Performed by: OBSTETRICS & GYNECOLOGY

## 2023-11-29 PROCEDURE — G8427 DOCREV CUR MEDS BY ELIG CLIN: HCPCS | Performed by: OBSTETRICS & GYNECOLOGY

## 2023-11-29 PROCEDURE — G8484 FLU IMMUNIZE NO ADMIN: HCPCS | Performed by: OBSTETRICS & GYNECOLOGY

## 2023-11-29 PROCEDURE — G8417 CALC BMI ABV UP PARAM F/U: HCPCS | Performed by: OBSTETRICS & GYNECOLOGY

## 2023-11-29 PROCEDURE — 1036F TOBACCO NON-USER: CPT | Performed by: OBSTETRICS & GYNECOLOGY

## 2023-11-29 NOTE — PROGRESS NOTES
6 month follow up for endometrial cancer ,  pt reports no abnormal spotting or bleeding, pt states no questions or concerns for today's visit    1. Have you been to the ER, urgent care clinic since your last visit? Hospitalized since your last visit?  no    2. Have you seen or consulted any other health care providers outside of the [address] [place] [address] Avenue since your last visit? Include any pap smears or colon screening.    no
Emily Esquivel is a 77 y.o.   female who on 2/20/2020 underwent Robotic-assisted total laparoscopic hysterectomy, Bilateral salpingo-oophorectomy, Bilateral pelvic sentinel lymph node mapping and biopsy. Final pathology consistent with Stage Ib, FIGO Grade 1 endometrial cancer. 8mm out of 15mm invasion. Negative washings. Negative SLNDs. LVSI negative. Completed VBT 6/26/2020 (30Gy in 3 fractions). Patient Active Problem List    Diagnosis Date Noted    Sick sinus syndrome ([address]) 10/10/2023    Stage 3a chronic kidney disease ([address]) 04/04/2023    Dilated cardiomyopathy ([address]) 02/15/2021    Chronic systolic congestive heart failure ([address]) 02/15/2021    Cardiomyopathy ([address]) 02/15/2021    S/P cardiac cath 12/03/2020    Left bundle branch block 02/18/2020    Endometrial cancer ([address]) 02/12/2020    Severe obesity ([address]) 01/11/2019    Posterior subcapsular polar senile cataract 02/05/2016    Neuropathy, peripheral 04/09/2014    MCKENNA on CPAP 04/08/2014    GERD (gastroesophageal reflux disease) 04/08/2014    Mixed hyperlipidemia 04/08/2014    Essential hypertension 04/08/2014    Hyperglycemia 04/08/2014    Depression 01/09/2014          Reviewed patient's course to date. PADMINI on exam today. Reassured patient. Patient to follow-up with Dr. Brody Hobson in June. We will alternate visits. I will see her in 6 months from now. Reviewed precautionary symptoms to return sooner. All questions and concerns were addressed with the patient and she is comfortable with the plan. An electronic signature was used to authenticate this note. Barbie Callahan MD    Please note that portions of this dictation were completed with Dragon, the [employer] voice recognition software. Quite often unanticipated grammatical, syntax, homophones, and other interpretive errors are inadvertently transcribed by the computer software. Please disregard these errors. Please excuse any errors that have escaped final proofreading.

## 2023-12-26 RX ORDER — ATORVASTATIN CALCIUM 20 MG/1
20 TABLET, FILM COATED ORAL DAILY
Qty: 90 TABLET | Refills: 1 | Status: SHIPPED | OUTPATIENT
Start: 2023-12-26

## 2023-12-26 NOTE — TELEPHONE ENCOUNTER
----- Message from Karla  sent at 12/26/2023 11:49 AM EST -----  Regarding: Atorvastatin, 90 day supply  Contact: [phone]  20 mg. Thanks!

## 2024-02-08 ENCOUNTER — OFFICE VISIT (OUTPATIENT)
Age: 69
End: 2024-02-08
Payer: MEDICARE

## 2024-02-08 VITALS
HEART RATE: 74 BPM | BODY MASS INDEX: 40.25 KG/M2 | RESPIRATION RATE: 17 BRPM | TEMPERATURE: 97.6 F | WEIGHT: 265.6 LBS | DIASTOLIC BLOOD PRESSURE: 63 MMHG | HEIGHT: 68 IN | OXYGEN SATURATION: 98 % | SYSTOLIC BLOOD PRESSURE: 118 MMHG

## 2024-02-08 DIAGNOSIS — D89.89 IMMUNE-MEDIATED NEUROPATHY (HCC): ICD-10-CM

## 2024-02-08 DIAGNOSIS — E53.8 B12 DEFICIENCY: ICD-10-CM

## 2024-02-08 DIAGNOSIS — E11.42 DIABETIC PERIPHERAL NEUROPATHY ASSOCIATED WITH TYPE 2 DIABETES MELLITUS (HCC): Primary | ICD-10-CM

## 2024-02-08 DIAGNOSIS — G25.81 RLS (RESTLESS LEGS SYNDROME): ICD-10-CM

## 2024-02-08 DIAGNOSIS — G60.9 IDIOPATHIC SMALL FIBER PERIPHERAL NEUROPATHY: ICD-10-CM

## 2024-02-08 DIAGNOSIS — G63 IMMUNE-MEDIATED NEUROPATHY (HCC): ICD-10-CM

## 2024-02-08 DIAGNOSIS — E11.42 DIABETIC PERIPHERAL NEUROPATHY ASSOCIATED WITH TYPE 2 DIABETES MELLITUS (HCC): ICD-10-CM

## 2024-02-08 DIAGNOSIS — E55.9 VITAMIN D DEFICIENCY: ICD-10-CM

## 2024-02-08 DIAGNOSIS — E66.01 OBESITY, CLASS III, BMI 40-49.9 (MORBID OBESITY) (HCC): ICD-10-CM

## 2024-02-08 DIAGNOSIS — R26.0 SENSORY ATAXIC GAIT: ICD-10-CM

## 2024-02-08 PROBLEM — E66.813 OBESITY, CLASS III, BMI 40-49.9 (MORBID OBESITY): Status: ACTIVE | Noted: 2024-02-08

## 2024-02-08 PROCEDURE — 1123F ACP DISCUSS/DSCN MKR DOCD: CPT | Performed by: PSYCHIATRY & NEUROLOGY

## 2024-02-08 PROCEDURE — 2022F DILAT RTA XM EVC RTNOPTHY: CPT | Performed by: PSYCHIATRY & NEUROLOGY

## 2024-02-08 PROCEDURE — G8427 DOCREV CUR MEDS BY ELIG CLIN: HCPCS | Performed by: PSYCHIATRY & NEUROLOGY

## 2024-02-08 PROCEDURE — 1036F TOBACCO NON-USER: CPT | Performed by: PSYCHIATRY & NEUROLOGY

## 2024-02-08 PROCEDURE — G8417 CALC BMI ABV UP PARAM F/U: HCPCS | Performed by: PSYCHIATRY & NEUROLOGY

## 2024-02-08 PROCEDURE — G8484 FLU IMMUNIZE NO ADMIN: HCPCS | Performed by: PSYCHIATRY & NEUROLOGY

## 2024-02-08 PROCEDURE — 3074F SYST BP LT 130 MM HG: CPT | Performed by: PSYCHIATRY & NEUROLOGY

## 2024-02-08 PROCEDURE — 99204 OFFICE O/P NEW MOD 45 MIN: CPT | Performed by: PSYCHIATRY & NEUROLOGY

## 2024-02-08 PROCEDURE — 3046F HEMOGLOBIN A1C LEVEL >9.0%: CPT | Performed by: PSYCHIATRY & NEUROLOGY

## 2024-02-08 PROCEDURE — 1090F PRES/ABSN URINE INCON ASSESS: CPT | Performed by: PSYCHIATRY & NEUROLOGY

## 2024-02-08 PROCEDURE — G8399 PT W/DXA RESULTS DOCUMENT: HCPCS | Performed by: PSYCHIATRY & NEUROLOGY

## 2024-02-08 PROCEDURE — 3017F COLORECTAL CA SCREEN DOC REV: CPT | Performed by: PSYCHIATRY & NEUROLOGY

## 2024-02-08 PROCEDURE — 3078F DIAST BP <80 MM HG: CPT | Performed by: PSYCHIATRY & NEUROLOGY

## 2024-02-08 ASSESSMENT — PATIENT HEALTH QUESTIONNAIRE - PHQ9
SUM OF ALL RESPONSES TO PHQ QUESTIONS 1-9: 0
1. LITTLE INTEREST OR PLEASURE IN DOING THINGS: 0
2. FEELING DOWN, DEPRESSED OR HOPELESS: 0
SUM OF ALL RESPONSES TO PHQ QUESTIONS 1-9: 0
SUM OF ALL RESPONSES TO PHQ QUESTIONS 1-9: 0
SUM OF ALL RESPONSES TO PHQ9 QUESTIONS 1 & 2: 0
SUM OF ALL RESPONSES TO PHQ QUESTIONS 1-9: 0

## 2024-02-08 NOTE — PROGRESS NOTES
normal and speech is fluent and no aphasia or dysarthria. Mood and affect appropriate, but depressed.   Cranial Nerves:   Intact visual fields. Fundi are benign, disc are flat, no lesions seen on funduscopy. FREEMAN, EOM's full, no nystagmus, no ptosis. Facial sensation is normal. Corneal reflexes are not tested. Facial movement is symmetric. Hearing is normal bilaterally. Palate is midline with normal sternocleidomastoid and trapezius muscles are normal. Tongue is midline.  Neck without meningismus or bruits  Temporal arteries are not tender or enlarged  TMJ areas are not tender on palpation   Motor:  4/5 strength in upper and lower proximal and distal muscles. Normal bulk and tone. No fasciculations.  Rapid alternating movement is symmetric and intact bilaterally   Reflexes:   Deep tendon reflexes 1+/4 and symmetrical, with absent ankle jerks bilaterally.  No babinski or clonus present   Sensory:   Abnormal to touch, pinprick and vibration and temperature in both feet to just below the ankle bilaterally.  DSS is intact   Gait:  Normal gait for patient's age, though she does move a little slowly due to arthritis in her hips knees and back.   Tremor:   No tremor noted.   Cerebellar:  Mildly abnormal cerebellar signs present on Romberg and tandem testing and finger-nose-finger exam.   Neurovascular:  Normal heart sounds and regular rhythm, peripheral pulses are decreased and no carotid bruits.           Assessment:      Diagnosis Orders   1. Diabetic peripheral neuropathy associated with type 2 diabetes mellitus ([facility])  SYDNIE Comprehensive Plus Panel    Anti Neuronal AB, Serum    GM1 IgG Autoantibodies    WILL and PE, Serum    Myelin Associated Glycoprotein    Vitamin D 25 Hydroxy    Vitamin B12 & Folate    EMG LIMITED      2. Obesity, Class III, BMI 40-49.9 (morbid obesity) ([facility])        3. RLS (restless legs syndrome)        4. B12 deficiency  Vitamin B12 & Folate      5. Vitamin D deficiency  Vitamin D 25 Hydroxy      6.

## 2024-02-09 LAB
25(OH)D3 SERPL-MCNC: 31.2 NG/ML (ref 30–100)
FOLATE SERPL-MCNC: 36.1 NG/ML (ref 5–21)
VIT B12 SERPL-MCNC: 852 PG/ML (ref 193–986)

## 2024-02-10 LAB
CENTROMERE B AB SER-ACNC: <0.2 AI (ref 0–0.9)
CHROMATIN AB SERPL-ACNC: <0.2 AI (ref 0–0.9)
DSDNA AB SER-ACNC: <1 IU/ML (ref 0–9)
ENA JO1 AB SER-ACNC: <0.2 AI (ref 0–0.9)
ENA RNP AB SER-ACNC: <0.2 AI (ref 0–0.9)
ENA SCL70 AB SER-ACNC: <0.2 AI (ref 0–0.9)
ENA SM AB SER-ACNC: <0.2 AI (ref 0–0.9)
ENA SM+RNP AB SER-ACNC: <0.2 AI (ref 0–0.9)
ENA SS-A AB SER-ACNC: <0.2 AI (ref 0–0.9)
ENA SS-B AB SER-ACNC: <0.2 AI (ref 0–0.9)
RIBOSOMAL P AB SER-ACNC: <0.2 AI (ref 0–0.9)
SEE BELOW:, 164879: NORMAL

## 2024-02-13 LAB
GM 1 IGG: <20 % (ref 0–30)
INTERPRETATION: NORMAL
INTERPRETATION: NORMAL
MAG (MYELIN ASSOC. GLYCOPROTEIN) AB IGM: <900 BTU (ref 0–999)

## 2024-02-16 LAB
ALBUMIN SERPL ELPH-MCNC: 3.7 G/DL (ref 2.9–4.4)
ALBUMIN/GLOB SERPL: 1.4 (ref 0.7–1.7)
ALPHA1 GLOB SERPL ELPH-MCNC: 0.3 G/DL (ref 0–0.4)
ALPHA2 GLOB SERPL ELPH-MCNC: 0.7 G/DL (ref 0.4–1)
B-GLOBULIN SERPL ELPH-MCNC: 0.9 G/DL (ref 0.7–1.3)
GAMMA GLOB SERPL ELPH-MCNC: 0.8 G/DL (ref 0.4–1.8)
GLOBULIN SER-MCNC: 2.7 G/DL (ref 2.2–3.9)
IGA SERPL-MCNC: 74 MG/DL (ref 64–422)
IGG SERPL-MCNC: 931 MG/DL (ref 586–1602)
IGM SERPL-MCNC: 98 MG/DL (ref 26–217)
INTERPRETATION SERPL IEP-IMP: NORMAL
M PROTEIN SERPL ELPH-MCNC: NORMAL G/DL
PROT SERPL-MCNC: 6.4 G/DL (ref 6–8.5)

## 2024-02-21 LAB
INTERPRETATION: NORMAL
METHOD: NORMAL
NEURONAL ANTIBODIES: 7 UNITS (ref 0–54)

## 2024-03-04 RX ORDER — BUPROPION HYDROCHLORIDE 300 MG/1
300 TABLET ORAL EVERY MORNING
Qty: 90 TABLET | Refills: 1 | Status: SHIPPED | OUTPATIENT
Start: 2024-03-04

## 2024-03-04 NOTE — TELEPHONE ENCOUNTER
----- Message from Jaylin Quan sent at 3/3/2024 10:17 AM EST -----  Regarding: Buproprion  Contact: [phone]  Please send a prescription to [facility] for Bupropion, 90 day supply with 3 refills. They requested this on 2/29/24 but have not received it yet. Thank you.

## 2024-03-04 NOTE — TELEPHONE ENCOUNTER
PCP: Faisal Day MD    Last appt:   10/10/2023    Future Appointments   Date Time Provider Department Center   3/13/2024  [facility]   4/16/2024  1:30 PM Faisal Day MD [facility]   5/29/2024  2:00 PM Chi Sierra MD [facility]   9/18/2024  2:00 PM Da Rockwell MD [facility]   10/28/2024  3:00 PM Claudia Yoder MD [facility]       Requested Prescriptions     Pending Prescriptions Disp Refills    buPROPion (WELLBUTRIN XL) 300 MG extended release tablet 90 tablet 1     Sig: Take 1 tablet by mouth every morning

## 2024-03-13 ENCOUNTER — PROCEDURE VISIT (OUTPATIENT)
Age: 69
End: 2024-03-13
Payer: MEDICARE

## 2024-03-13 DIAGNOSIS — D89.89 IMMUNE-MEDIATED NEUROPATHY (HCC): ICD-10-CM

## 2024-03-13 DIAGNOSIS — G60.9 IDIOPATHIC SMALL FIBER PERIPHERAL NEUROPATHY: ICD-10-CM

## 2024-03-13 DIAGNOSIS — G63 IMMUNE-MEDIATED NEUROPATHY (HCC): ICD-10-CM

## 2024-03-13 DIAGNOSIS — E11.42 DIABETIC PERIPHERAL NEUROPATHY ASSOCIATED WITH TYPE 2 DIABETES MELLITUS (HCC): ICD-10-CM

## 2024-03-13 PROCEDURE — 95913 NRV CNDJ TEST 13/> STUDIES: CPT | Performed by: PSYCHIATRY & NEUROLOGY

## 2024-03-13 PROCEDURE — 95886 MUSC TEST DONE W/N TEST COMP: CPT | Performed by: PSYCHIATRY & NEUROLOGY

## 2024-03-13 NOTE — PROCEDURES
ELECTRODIANOSTIC REPORT  Test Date:  3/13/2024    Patient: Jaylin Quan : 1955 Physician: Da Rockwell MD   Sex: Female Tech: Lucie Blackmon, EMG Technician  Ref Phys:      Technician: Lucie Blackmon    Clinical Indication: Patient is a 69-year-old female with a history of latent diabetes and numbness and pain in her feet, for EMG study to evaluate for neuropathy, radiculopathy, or other neuromuscular disease.  Patient with longstanding latent diabetes, and no family history of neuropathy.    Neurologic exam: Patient has normal muscle bulk and tone and strength in all extremities, and deep tendon reflexes are somewhat hypoactive but symmetric throughout slight decreased ankle jerks and no Babinski or clonus was present.  Patient has slight decrease sensation to pin temperature and vibration in both lower extremities to ankle level but otherwise intact elsewhere.  Patient gait and station is normal and cranial nerves II through XII intact and mental status is normal.      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  On the basis of the study there was no clear evidence of lumbar radiculopathy present.  Cannot completely rule out other polyneuropathy causes, and correlation with metabolic studies and imaging modalities may be of further diagnostic benefit.  Also repeat studies in 6 to 12 months time may also be of further diagnostic benefit if clinically indicated.  Clinical correlation is recommended.        EMG & NCV Findings:  Evaluation of the right Fibular motor nerve showed reduced amplitude (1.0 mV).  The left tibial motor nerve showed no response (Ankle) and no response (Knee).  The left Sup Fibular sensory and the right Sup Fibular sensory nerves

## 2024-03-14 NOTE — PROGRESS NOTES
Patient's EMG showed a polyneuropathy, a mild degree, probably consistent with the patient's known history of latent diabetes and diabetic neuropathy

## 2024-03-22 ENCOUNTER — TRANSCRIBE ORDERS (OUTPATIENT)
Facility: HOSPITAL | Age: 69
End: 2024-03-22

## 2024-03-22 DIAGNOSIS — Z12.31 SCREENING MAMMOGRAM FOR HIGH-RISK PATIENT: Primary | ICD-10-CM

## 2024-03-25 DIAGNOSIS — R73.9 HYPERGLYCEMIA, UNSPECIFIED: ICD-10-CM

## 2024-03-25 DIAGNOSIS — I10 ESSENTIAL (PRIMARY) HYPERTENSION: Primary | ICD-10-CM

## 2024-03-26 PROBLEM — R73.03 PREDIABETES: Status: ACTIVE | Noted: 2024-03-26

## 2024-03-26 PROBLEM — R26.0 SENSORY ATAXIC GAIT: Status: ACTIVE | Noted: 2024-03-26

## 2024-04-09 DIAGNOSIS — R73.9 HYPERGLYCEMIA, UNSPECIFIED: ICD-10-CM

## 2024-04-09 DIAGNOSIS — I10 ESSENTIAL (PRIMARY) HYPERTENSION: ICD-10-CM

## 2024-04-09 LAB
ALBUMIN SERPL-MCNC: 3.7 G/DL (ref 3.5–5)
ALBUMIN/GLOB SERPL: 1.3 (ref 1.1–2.2)
ALP SERPL-CCNC: 76 U/L (ref 45–117)
ALT SERPL-CCNC: 33 U/L (ref 12–78)
ANION GAP SERPL CALC-SCNC: 6 MMOL/L (ref 5–15)
AST SERPL-CCNC: 15 U/L (ref 15–37)
BASOPHILS # BLD: 0.1 K/UL (ref 0–0.1)
BASOPHILS NFR BLD: 1 % (ref 0–1)
BILIRUB SERPL-MCNC: 0.5 MG/DL (ref 0.2–1)
BUN SERPL-MCNC: 23 MG/DL (ref 6–20)
BUN/CREAT SERPL: 19 (ref 12–20)
CALCIUM SERPL-MCNC: 9.8 MG/DL (ref 8.5–10.1)
CHLORIDE SERPL-SCNC: 109 MMOL/L (ref 97–108)
CHOLEST SERPL-MCNC: 143 MG/DL
CO2 SERPL-SCNC: 28 MMOL/L (ref 21–32)
CREAT SERPL-MCNC: 1.21 MG/DL (ref 0.55–1.02)
DIFFERENTIAL METHOD BLD: NORMAL
EOSINOPHIL # BLD: 0.1 K/UL (ref 0–0.4)
EOSINOPHIL NFR BLD: 3 % (ref 0–7)
ERYTHROCYTE [DISTWIDTH] IN BLOOD BY AUTOMATED COUNT: 13.5 % (ref 11.5–14.5)
EST. AVERAGE GLUCOSE BLD GHB EST-MCNC: 126 MG/DL
GLOBULIN SER CALC-MCNC: 2.9 G/DL (ref 2–4)
GLUCOSE SERPL-MCNC: 121 MG/DL (ref 65–100)
HBA1C MFR BLD: 6 % (ref 4–5.6)
HCT VFR BLD AUTO: 38.6 % (ref 35–47)
HDLC SERPL-MCNC: 53 MG/DL
HDLC SERPL: 2.7 (ref 0–5)
HGB BLD-MCNC: 12.3 G/DL (ref 11.5–16)
IMM GRANULOCYTES # BLD AUTO: 0 K/UL (ref 0–0.04)
IMM GRANULOCYTES NFR BLD AUTO: 0 % (ref 0–0.5)
LDLC SERPL CALC-MCNC: 72.4 MG/DL (ref 0–100)
LYMPHOCYTES # BLD: 1.1 K/UL (ref 0.8–3.5)
LYMPHOCYTES NFR BLD: 24 % (ref 12–49)
MCH RBC QN AUTO: 29.7 PG (ref 26–34)
MCHC RBC AUTO-ENTMCNC: 31.9 G/DL (ref 30–36.5)
MCV RBC AUTO: 93.2 FL (ref 80–99)
MONOCYTES # BLD: 0.6 K/UL (ref 0–1)
MONOCYTES NFR BLD: 13 % (ref 5–13)
NEUTS SEG # BLD: 2.7 K/UL (ref 1.8–8)
NEUTS SEG NFR BLD: 59 % (ref 32–75)
NRBC # BLD: 0 K/UL (ref 0–0.01)
NRBC BLD-RTO: 0 PER 100 WBC
PLATELET # BLD AUTO: 202 K/UL (ref 150–400)
PMV BLD AUTO: 10.9 FL (ref 8.9–12.9)
POTASSIUM SERPL-SCNC: 4.6 MMOL/L (ref 3.5–5.1)
PROT SERPL-MCNC: 6.6 G/DL (ref 6.4–8.2)
RBC # BLD AUTO: 4.14 M/UL (ref 3.8–5.2)
SODIUM SERPL-SCNC: 143 MMOL/L (ref 136–145)
TRIGL SERPL-MCNC: 88 MG/DL
VLDLC SERPL CALC-MCNC: 17.6 MG/DL
WBC # BLD AUTO: 4.5 K/UL (ref 3.6–11)

## 2024-04-16 ENCOUNTER — OFFICE VISIT (OUTPATIENT)
Age: 69
End: 2024-04-16
Payer: MEDICARE

## 2024-04-16 VITALS
RESPIRATION RATE: 18 BRPM | DIASTOLIC BLOOD PRESSURE: 70 MMHG | HEART RATE: 66 BPM | HEIGHT: 68 IN | BODY MASS INDEX: 40.01 KG/M2 | TEMPERATURE: 98.1 F | OXYGEN SATURATION: 97 % | SYSTOLIC BLOOD PRESSURE: 109 MMHG | WEIGHT: 264 LBS

## 2024-04-16 DIAGNOSIS — C54.1 ENDOMETRIAL CANCER (HCC): ICD-10-CM

## 2024-04-16 DIAGNOSIS — I42.0 DILATED CARDIOMYOPATHY (HCC): ICD-10-CM

## 2024-04-16 DIAGNOSIS — R73.9 HYPERGLYCEMIA, UNSPECIFIED: ICD-10-CM

## 2024-04-16 DIAGNOSIS — I50.22 CHRONIC SYSTOLIC (CONGESTIVE) HEART FAILURE (HCC): ICD-10-CM

## 2024-04-16 DIAGNOSIS — N18.31 CHRONIC KIDNEY DISEASE, STAGE 3A (HCC): ICD-10-CM

## 2024-04-16 DIAGNOSIS — I49.5 SICK SINUS SYNDROME (HCC): ICD-10-CM

## 2024-04-16 DIAGNOSIS — I10 ESSENTIAL (PRIMARY) HYPERTENSION: Primary | ICD-10-CM

## 2024-04-16 DIAGNOSIS — E78.2 MIXED HYPERLIPIDEMIA: ICD-10-CM

## 2024-04-16 PROCEDURE — 3017F COLORECTAL CA SCREEN DOC REV: CPT | Performed by: INTERNAL MEDICINE

## 2024-04-16 PROCEDURE — 3074F SYST BP LT 130 MM HG: CPT | Performed by: INTERNAL MEDICINE

## 2024-04-16 PROCEDURE — 99214 OFFICE O/P EST MOD 30 MIN: CPT | Performed by: INTERNAL MEDICINE

## 2024-04-16 PROCEDURE — G8427 DOCREV CUR MEDS BY ELIG CLIN: HCPCS | Performed by: INTERNAL MEDICINE

## 2024-04-16 PROCEDURE — 1036F TOBACCO NON-USER: CPT | Performed by: INTERNAL MEDICINE

## 2024-04-16 PROCEDURE — G8399 PT W/DXA RESULTS DOCUMENT: HCPCS | Performed by: INTERNAL MEDICINE

## 2024-04-16 PROCEDURE — 1090F PRES/ABSN URINE INCON ASSESS: CPT | Performed by: INTERNAL MEDICINE

## 2024-04-16 PROCEDURE — G8417 CALC BMI ABV UP PARAM F/U: HCPCS | Performed by: INTERNAL MEDICINE

## 2024-04-16 PROCEDURE — 3078F DIAST BP <80 MM HG: CPT | Performed by: INTERNAL MEDICINE

## 2024-04-16 PROCEDURE — 1123F ACP DISCUSS/DSCN MKR DOCD: CPT | Performed by: INTERNAL MEDICINE

## 2024-04-16 NOTE — PROGRESS NOTES
\"Have you been to the ER, urgent care clinic since your last visit?  Hospitalized since your last visit?\"    NO    “Have you seen or consulted any other health care providers outside of [facility] since your last visit?”    NO    Have you had a mammogram?”   NO  Having mammogram 4-17-24    Date of last Mammogram: 4/11/2023             Click Here for Release of Records Request  
COLONOSCOPY    HEENT      BCCA REMOVED FROM NOSE     HYSTERECTOMY (CERVIX STATUS UNKNOWN)  2020    INSJ/RPLCMT PERM DFB W/TRNSVNS LDS 1/DUAL CHMBR N/A 2/15/2021    INSERT ICD BIV MULTI performed by Suman Howe MD at \[facility]\"" CARDIAC CATH LAB    OTHER SURGICAL HISTORY      hysteroscopy surg    OVARY REMOVAL      PACEMAKER  2021    BIV-ICD    PARATHYROIDECTOMY Left 2021    lower left     THYROIDECTOMY, PARTIAL  2021    TONSILLECTOMY       All: Erythromycin   Current Outpatient Medications on File Prior to Visit   Medication Sig Dispense Refill    Melatonin 5 MG CAPS Take 2 capsules every day by oral route at bedtime.      buPROPion (WELLBUTRIN XL) 300 MG extended release tablet Take 1 tablet by mouth every morning 90 tablet 1    atorvastatin (LIPITOR) 20 MG tablet Take 1 tablet by mouth daily 90 tablet 1    rOPINIRole (REQUIP) 1 MG tablet Take 1 tablet by mouth nightly 90 tablet 3    albuterol sulfate HFA (PROVENTIL;VENTOLIN;PROAIR) 108 (90 Base) MCG/ACT inhaler INHALE 2 PUFFS BY MOUTH FOUR TIMES DAILY AS NEEDED FOR WHEEZING OR SHORTNESS OF BREATH      aspirin 81 MG EC tablet Take by mouth daily      carvedilol (COREG) 12.5 MG tablet Take by mouth 2 times daily      vitamin D 25 MCG (1000 UT) CAPS Take 2 capsules by mouth daily      furosemide (LASIX) 20 MG tablet Take by mouth daily      risperiDONE (RISPERDAL) 2 MG tablet TAKE 1 TABLET BY MOUTH EVERY NIGHT      sacubitril-valsartan (ENTRESTO) 49-51 MG per tablet Take 1 tablet by mouth 2 times daily       No current facility-administered medications on file prior to visit.       FH: Mother  breast cancer.  Father  of CHF.    SH: Retired insurance . She reports that she has never smoked. She has never used smokeless tobacco. She reports that she does not drink alcohol and does not use drugs.   ROS: See above; Complete ROS otherwise negative.     OBJECTIVE:   Visit Vitals: Blood pressure 109/70, pulse 66,

## 2024-04-17 ENCOUNTER — HOSPITAL ENCOUNTER (OUTPATIENT)
Facility: HOSPITAL | Age: 69
Discharge: HOME | End: 2024-04-20
Attending: INTERNAL MEDICINE
Payer: MEDICARE

## 2024-04-17 VITALS — BODY MASS INDEX: 39.99 KG/M2 | WEIGHT: 263.89 LBS | HEIGHT: 68 IN

## 2024-04-17 DIAGNOSIS — Z12.31 SCREENING MAMMOGRAM FOR HIGH-RISK PATIENT: ICD-10-CM

## 2024-04-17 PROCEDURE — 77063 BREAST TOMOSYNTHESIS BI: CPT

## 2024-05-07 DIAGNOSIS — F32.A DEPRESSION, UNSPECIFIED DEPRESSION TYPE: Primary | ICD-10-CM

## 2024-05-07 RX ORDER — RISPERIDONE 2 MG/1
2 TABLET ORAL NIGHTLY
Qty: 30 TABLET | Refills: 11 | Status: SHIPPED | OUTPATIENT
Start: 2024-05-07

## 2024-06-24 DIAGNOSIS — L60.8 TOENAIL DEFORMITY: Primary | ICD-10-CM

## 2024-06-24 DIAGNOSIS — Z12.11 COLON CANCER SCREENING: ICD-10-CM

## 2024-06-24 RX ORDER — ATORVASTATIN CALCIUM 20 MG/1
20 TABLET, FILM COATED ORAL DAILY
Qty: 90 TABLET | Refills: 1 | Status: SHIPPED | OUTPATIENT
Start: 2024-06-24

## 2024-07-14 LAB — NONINV COLON CA DNA+OCC BLD SCRN STL QL: POSITIVE

## 2024-07-16 ENCOUNTER — TELEPHONE (OUTPATIENT)
Age: 69
End: 2024-07-16

## 2024-07-16 DIAGNOSIS — R19.5 POSITIVE COLORECTAL CANCER SCREENING USING COLOGUARD TEST: Primary | ICD-10-CM

## 2024-07-23 NOTE — PROGRESS NOTES
6 month follow up for endometrial cancer ,  pt reports no abnormal spotting or bleeding, pt states no questions or concerns for today's visit    1. Have you been to the ER, urgent care clinic since your last visit?  Hospitalized since your last visit?  no    2. Have you seen or consulted any other health care providers outside of the [facility] since your last visit?  Include any pap smears or colon screening.   no

## 2024-07-24 ENCOUNTER — OFFICE VISIT (OUTPATIENT)
Age: 69
End: 2024-07-24
Payer: MEDICARE

## 2024-07-24 VITALS
HEIGHT: 68 IN | DIASTOLIC BLOOD PRESSURE: 70 MMHG | HEART RATE: 82 BPM | SYSTOLIC BLOOD PRESSURE: 133 MMHG | WEIGHT: 256 LBS | BODY MASS INDEX: 38.8 KG/M2

## 2024-07-24 DIAGNOSIS — C54.1 ENDOMETRIAL CANCER (HCC): Primary | ICD-10-CM

## 2024-07-24 PROCEDURE — 1090F PRES/ABSN URINE INCON ASSESS: CPT | Performed by: OBSTETRICS & GYNECOLOGY

## 2024-07-24 PROCEDURE — 3017F COLORECTAL CA SCREEN DOC REV: CPT | Performed by: OBSTETRICS & GYNECOLOGY

## 2024-07-24 PROCEDURE — 99212 OFFICE O/P EST SF 10 MIN: CPT | Performed by: OBSTETRICS & GYNECOLOGY

## 2024-07-24 PROCEDURE — 1036F TOBACCO NON-USER: CPT | Performed by: OBSTETRICS & GYNECOLOGY

## 2024-07-24 PROCEDURE — 3078F DIAST BP <80 MM HG: CPT | Performed by: OBSTETRICS & GYNECOLOGY

## 2024-07-24 PROCEDURE — 1123F ACP DISCUSS/DSCN MKR DOCD: CPT | Performed by: OBSTETRICS & GYNECOLOGY

## 2024-07-24 PROCEDURE — 3075F SYST BP GE 130 - 139MM HG: CPT | Performed by: OBSTETRICS & GYNECOLOGY

## 2024-07-24 PROCEDURE — G8399 PT W/DXA RESULTS DOCUMENT: HCPCS | Performed by: OBSTETRICS & GYNECOLOGY

## 2024-07-24 PROCEDURE — G8427 DOCREV CUR MEDS BY ELIG CLIN: HCPCS | Performed by: OBSTETRICS & GYNECOLOGY

## 2024-07-24 PROCEDURE — G8417 CALC BMI ABV UP PARAM F/U: HCPCS | Performed by: OBSTETRICS & GYNECOLOGY

## 2024-07-24 NOTE — PROGRESS NOTES
[facility]YNECOLOGIC [facility]COL[facility]  [address] [place] [address], [facility]  [place] [address]  P [phone]  F [phone]    Office Note  Patient ID:  Name:  Jaylin Quan  MRN:  [id number]  :  1955/67 y.o.  Date:  10/19/2022      HISTORY OF PRESENT ILLNESS:  Ms. Jaylin Quan is a 69 y.o.  female who on  underwent Robotic-assisted total laparoscopic hysterectomy, Bilateral salpingo-oophorectomy, Bilateral pelvic sentinel lymph node mapping and biopsy. Final pathology consistent with Stage Ib, FIGO Grade 1 endometrial cancer. 8mm out of 15mm invasion. Negative washings. Negative SLNDs. LVSI negative. Completed VBT 2020 (30Gy in 3 fractions).    Presents today for continued surveillance. Doing well overall. She reports that using vaginal dilators was painful and she is no longer using these. The patient is not sexually active. Denies vaginal bleeding/discharge, abdominal/pelvic pain, nausea, vomiting, constipation, diarrhea, CP, SOB, hematuria, hematochezia, change in appetite or bowel movements, bloating, fevers, chills, or urinary symptoms.       Initial History:  Ms. Jaylin Quan is a 67 y.o.  postmenopausal female who presents in consultation from Dr. Figueroa for FIGO Grade 1 endometrial cancer.    The patient first reports vaginal spotting/bleeding in 2019. She was ultimately referred to Dr. Figueroa by her PCP. On 2020 underwent hysteroscopy/D&C with final pathology consistent with FIGO Grade 1 endometrial cancer. Reports some spotting since her surgery. History of urinary incontinence for which she wears a pad. Denies hematuria or hematochezia. Denies change in appetite or bowel habits, nausea, vomiting, diarrhea, CP, SOB, fevers, or chills. Reports long history of constipation for which she uses senna S.     Pertinent PMH/PSH: h/o , obesity, HTN, MCKENNA on CPAP      Active, no restrictions.     Pathology Review:   :  FINAL PATHOLOGIC DIAGNOSIS   1.

## 2024-08-09 ENCOUNTER — TELEMEDICINE (OUTPATIENT)
Age: 69
End: 2024-08-09
Payer: MEDICARE

## 2024-08-09 DIAGNOSIS — M54.9 UPPER BACK PAIN ON RIGHT SIDE: Primary | ICD-10-CM

## 2024-08-09 PROCEDURE — 3017F COLORECTAL CA SCREEN DOC REV: CPT | Performed by: INTERNAL MEDICINE

## 2024-08-09 PROCEDURE — 1036F TOBACCO NON-USER: CPT | Performed by: INTERNAL MEDICINE

## 2024-08-09 PROCEDURE — G8417 CALC BMI ABV UP PARAM F/U: HCPCS | Performed by: INTERNAL MEDICINE

## 2024-08-09 PROCEDURE — 1123F ACP DISCUSS/DSCN MKR DOCD: CPT | Performed by: INTERNAL MEDICINE

## 2024-08-09 PROCEDURE — G8427 DOCREV CUR MEDS BY ELIG CLIN: HCPCS | Performed by: INTERNAL MEDICINE

## 2024-08-09 PROCEDURE — 99213 OFFICE O/P EST LOW 20 MIN: CPT | Performed by: INTERNAL MEDICINE

## 2024-08-09 PROCEDURE — 1090F PRES/ABSN URINE INCON ASSESS: CPT | Performed by: INTERNAL MEDICINE

## 2024-08-09 PROCEDURE — G8399 PT W/DXA RESULTS DOCUMENT: HCPCS | Performed by: INTERNAL MEDICINE

## 2024-08-09 RX ORDER — TIZANIDINE 2 MG/1
2 TABLET ORAL 3 TIMES DAILY PRN
Qty: 30 TABLET | Refills: 0 | Status: SHIPPED | OUTPATIENT
Start: 2024-08-09

## 2024-08-09 SDOH — ECONOMIC STABILITY: FOOD INSECURITY: WITHIN THE PAST 12 MONTHS, YOU WORRIED THAT YOUR FOOD WOULD RUN OUT BEFORE YOU GOT MONEY TO BUY MORE.: NEVER TRUE

## 2024-08-09 SDOH — ECONOMIC STABILITY: INCOME INSECURITY: HOW HARD IS IT FOR YOU TO PAY FOR THE VERY BASICS LIKE FOOD, HOUSING, MEDICAL CARE, AND HEATING?: NOT HARD AT ALL

## 2024-08-09 SDOH — ECONOMIC STABILITY: FOOD INSECURITY: WITHIN THE PAST 12 MONTHS, THE FOOD YOU BOUGHT JUST DIDN'T LAST AND YOU DIDN'T HAVE MONEY TO GET MORE.: NEVER TRUE

## 2024-08-09 NOTE — PROGRESS NOTES
\"Have you been to the ER, urgent care clinic since your last visit?  Hospitalized since your last visit?\"    NO    “Have you seen or consulted any other health care providers outside of [facility] since your last visit?”    NO            Click Here for Release of Records Request

## 2024-08-09 NOTE — PATIENT INSTRUCTIONS
belly and imagine pulling your navel toward your spine. Hold this for 6 seconds, then relax. Remember to keep breathing normally as you tense your muscles.  Do curl-ups. Always do them with your knees bent. Keep your low back on the floor, and curl your shoulders toward your knees using a smooth, slow motion. Keep your arms folded across your chest. If this bothers your neck, try putting your hands behind your neck (not your head), with your elbows spread apart.  Lie on your back with your knees bent and your feet flat on the floor. Tighten your belly muscles, and then push with your feet and raise your buttocks up a few inches. Hold this position 6 seconds as you continue to breathe normally, then lower yourself slowly to the floor. Repeat 8 to 12 times.  If you like group exercise, try Pilates or yoga. These classes have poses that strengthen the core muscles.  Lead a healthy lifestyle   Stay at a healthy weight to avoid strain on your back.  Do not smoke. Smoking increases the risk of osteoporosis, which weakens the spine. If you need help quitting, talk to your doctor about stop-smoking programs and medicines. These can increase your chances of quitting for good.  Where can you learn more?  Go to https://www.[employer].net/patientEd and enter L315 to learn more about \"Learning About How to Have a Healthy Back.\"  Current as of: July 17, 2023  Content Version: 14.1  © 8590-9895 [employer].   Care instructions adapted under license by [employer]. If you have questions about a medical condition or this instruction, always ask your healthcare professional. [employer] disclaims any warranty or liability for your use of this information.

## 2024-08-09 NOTE — PROGRESS NOTES
Jaylin Quan is a 69 y.o. female who was seen by synchronous (real-time) audio-video technology on 2024 for Back Pain        Progress Note         PROGRESS NOTE  Name: Jaylin Quan   : 1955       ASSESSMENT/ PLAN:     Jaylin was seen today for back pain.    Diagnoses and all orders for this visit:    A. Upper back pain on right side: Probably muscular.  It could be radicular given that it has sometimes radiated around the side toward the front.    P. We will order a muscle relaxer, tizanidine.  She may take Tylenol.  Other self-care measures including massaging and gentle stretching were discussed.  She is to keep an eye out for any change in symptoms, such as development of a rash, and inform us immediately.        I have reviewed the patient's medications and risks/side effects/benefits were discussed. Diagnosis(-es) explained to patient and questions answered. Literature provided where appropriate.                       SUBJECTIVE  Ms. Jaylin Quan presents today acutely for     Chief Complaint   Patient presents with    Back Pain       She developed severe pain in R upper back, under shoulder pain, since 3 pm yesterday. If she turns a certain way she gets a severe stabbing pain rated 10 out of 10. \"If I do not move at all it does not really bother me.\"  The pain sometimes seems to radiate around her side and even into the very lateral part of her abdomen.    The pain essentially came out of the blue in the middle the day yesterday.  She was painting some chairs prior to the pain, but otherwise is unaware of any trauma, injury, or unusual exertion.     She does not notice a rash.  She is having some slight urinary frequency but no other urinary symptoms.  She denies any dysuria, malodor, hematuria, or cloudy appearance.    OBJECTIVE  There were no vitals taken for this visit.  Gen: Pleasant 69 y.o.  female in NAD.             Objective:          No data to display                 [INSTRUCTIONS:

## 2024-08-26 RX ORDER — BUPROPION HYDROCHLORIDE 300 MG/1
300 TABLET ORAL EVERY MORNING
Qty: 90 TABLET | Refills: 1 | Status: SHIPPED | OUTPATIENT
Start: 2024-08-26

## 2024-08-30 ENCOUNTER — PATIENT MESSAGE (OUTPATIENT)
Age: 69
End: 2024-08-30

## 2024-09-18 ENCOUNTER — OFFICE VISIT (OUTPATIENT)
Age: 69
End: 2024-09-18
Payer: MEDICARE

## 2024-09-18 VITALS — SYSTOLIC BLOOD PRESSURE: 120 MMHG | HEART RATE: 72 BPM | DIASTOLIC BLOOD PRESSURE: 76 MMHG

## 2024-09-18 DIAGNOSIS — G60.9 IDIOPATHIC SMALL FIBER PERIPHERAL NEUROPATHY: Primary | ICD-10-CM

## 2024-09-18 DIAGNOSIS — G63 IMMUNE-MEDIATED NEUROPATHY (HCC): ICD-10-CM

## 2024-09-18 DIAGNOSIS — D89.89 IMMUNE-MEDIATED NEUROPATHY (HCC): ICD-10-CM

## 2024-09-18 DIAGNOSIS — E11.42 DIABETIC PERIPHERAL NEUROPATHY ASSOCIATED WITH TYPE 2 DIABETES MELLITUS (HCC): ICD-10-CM

## 2024-09-18 PROCEDURE — G8417 CALC BMI ABV UP PARAM F/U: HCPCS | Performed by: PSYCHIATRY & NEUROLOGY

## 2024-09-18 PROCEDURE — 1090F PRES/ABSN URINE INCON ASSESS: CPT | Performed by: PSYCHIATRY & NEUROLOGY

## 2024-09-18 PROCEDURE — 1123F ACP DISCUSS/DSCN MKR DOCD: CPT | Performed by: PSYCHIATRY & NEUROLOGY

## 2024-09-18 PROCEDURE — 99213 OFFICE O/P EST LOW 20 MIN: CPT | Performed by: PSYCHIATRY & NEUROLOGY

## 2024-09-18 PROCEDURE — 3044F HG A1C LEVEL LT 7.0%: CPT | Performed by: PSYCHIATRY & NEUROLOGY

## 2024-09-18 PROCEDURE — G8399 PT W/DXA RESULTS DOCUMENT: HCPCS | Performed by: PSYCHIATRY & NEUROLOGY

## 2024-09-18 PROCEDURE — 2022F DILAT RTA XM EVC RTNOPTHY: CPT | Performed by: PSYCHIATRY & NEUROLOGY

## 2024-09-18 PROCEDURE — G8427 DOCREV CUR MEDS BY ELIG CLIN: HCPCS | Performed by: PSYCHIATRY & NEUROLOGY

## 2024-09-18 PROCEDURE — 3074F SYST BP LT 130 MM HG: CPT | Performed by: PSYCHIATRY & NEUROLOGY

## 2024-09-18 PROCEDURE — 3078F DIAST BP <80 MM HG: CPT | Performed by: PSYCHIATRY & NEUROLOGY

## 2024-09-18 PROCEDURE — 3017F COLORECTAL CA SCREEN DOC REV: CPT | Performed by: PSYCHIATRY & NEUROLOGY

## 2024-09-18 PROCEDURE — 1036F TOBACCO NON-USER: CPT | Performed by: PSYCHIATRY & NEUROLOGY

## 2024-10-07 RX ORDER — ARFORMOTEROL TARTRATE 15 UG/2ML
1 SOLUTION RESPIRATORY (INHALATION)
COMMUNITY

## 2024-10-07 RX ORDER — ROPINIROLE 1 MG/1
1 TABLET, FILM COATED ORAL NIGHTLY
COMMUNITY

## 2024-10-07 RX ORDER — BUDESONIDE 0.5 MG/2ML
1 INHALANT ORAL 2 TIMES DAILY
COMMUNITY

## 2024-10-08 ENCOUNTER — HOSPITAL ENCOUNTER (OUTPATIENT)
Facility: HOSPITAL | Age: 69
Setting detail: OUTPATIENT SURGERY
Discharge: HOME | End: 2024-10-08
Attending: SPECIALIST | Admitting: SPECIALIST
Payer: MEDICARE

## 2024-10-08 ENCOUNTER — ANESTHESIA (OUTPATIENT)
Facility: HOSPITAL | Age: 69
End: 2024-10-08
Payer: MEDICARE

## 2024-10-08 ENCOUNTER — ANESTHESIA EVENT (OUTPATIENT)
Facility: HOSPITAL | Age: 69
End: 2024-10-08
Payer: MEDICARE

## 2024-10-08 VITALS
WEIGHT: 252 LBS | HEART RATE: 67 BPM | BODY MASS INDEX: 38.19 KG/M2 | HEIGHT: 68 IN | TEMPERATURE: 97.8 F | RESPIRATION RATE: 15 BRPM | OXYGEN SATURATION: 100 % | DIASTOLIC BLOOD PRESSURE: 66 MMHG | SYSTOLIC BLOOD PRESSURE: 157 MMHG

## 2024-10-08 PROCEDURE — 7100000010 HC PHASE II RECOVERY - FIRST 15 MIN: Performed by: SPECIALIST

## 2024-10-08 PROCEDURE — 88305 TISSUE EXAM BY PATHOLOGIST: CPT

## 2024-10-08 PROCEDURE — 3700000001 HC ADD 15 MINUTES (ANESTHESIA): Performed by: SPECIALIST

## 2024-10-08 PROCEDURE — 3600007502: Performed by: SPECIALIST

## 2024-10-08 PROCEDURE — 3600007512: Performed by: SPECIALIST

## 2024-10-08 PROCEDURE — 2500000003 HC RX 250 WO HCPCS: Performed by: NURSE ANESTHETIST, CERTIFIED REGISTERED

## 2024-10-08 PROCEDURE — 2709999900 HC NON-CHARGEABLE SUPPLY: Performed by: SPECIALIST

## 2024-10-08 PROCEDURE — C1889 IMPLANT/INSERT DEVICE, NOC: HCPCS | Performed by: SPECIALIST

## 2024-10-08 PROCEDURE — 2580000003 HC RX 258: Performed by: SPECIALIST

## 2024-10-08 PROCEDURE — 6360000002 HC RX W HCPCS: Performed by: NURSE ANESTHETIST, CERTIFIED REGISTERED

## 2024-10-08 PROCEDURE — 3700000000 HC ANESTHESIA ATTENDED CARE: Performed by: SPECIALIST

## 2024-10-08 DEVICE — CLIP LIG L235CM RESOL 360 BX/20: Type: IMPLANTABLE DEVICE | Status: FUNCTIONAL

## 2024-10-08 RX ORDER — SODIUM CHLORIDE 0.9 % (FLUSH) 0.9 %
5-40 SYRINGE (ML) INJECTION EVERY 12 HOURS SCHEDULED
Status: DISCONTINUED | OUTPATIENT
Start: 2024-10-08 | End: 2024-10-08 | Stop reason: HOSPADM

## 2024-10-08 RX ORDER — SODIUM CHLORIDE 0.9 % (FLUSH) 0.9 %
5-40 SYRINGE (ML) INJECTION PRN
Status: DISCONTINUED | OUTPATIENT
Start: 2024-10-08 | End: 2024-10-08 | Stop reason: HOSPADM

## 2024-10-08 RX ORDER — SODIUM CHLORIDE 9 MG/ML
25 INJECTION, SOLUTION INTRAVENOUS PRN
Status: DISCONTINUED | OUTPATIENT
Start: 2024-10-08 | End: 2024-10-08 | Stop reason: HOSPADM

## 2024-10-08 RX ADMIN — PROPOFOL 100 MG: 10 INJECTION, EMULSION INTRAVENOUS at 14:28

## 2024-10-08 RX ADMIN — PROPOFOL 50 MG: 10 INJECTION, EMULSION INTRAVENOUS at 14:37

## 2024-10-08 RX ADMIN — SODIUM CHLORIDE: 9 INJECTION, SOLUTION INTRAVENOUS at 14:19

## 2024-10-08 RX ADMIN — PROPOFOL 50 MG: 10 INJECTION, EMULSION INTRAVENOUS at 14:33

## 2024-10-08 RX ADMIN — PROPOFOL 50 MG: 10 INJECTION, EMULSION INTRAVENOUS at 14:41

## 2024-10-08 RX ADMIN — LIDOCAINE HYDROCHLORIDE 50 MG: 20 INJECTION, SOLUTION EPIDURAL; INFILTRATION; INTRACAUDAL; PERINEURAL at 14:28

## 2024-10-08 ASSESSMENT — PAIN - FUNCTIONAL ASSESSMENT: PAIN_FUNCTIONAL_ASSESSMENT: NONE - DENIES PAIN

## 2024-10-08 NOTE — ANESTHESIA PRE PROCEDURE
normal                 ROS comment: LBBB    · LV: Estimated LVEF is 55 - 60%. Biplane method used to measure ejection fraction. Normal cavity size and systolic function (ejection fraction normal). Mild hypertrophy. Wall motion: normal. Age-appropriate left ventricular diastolic function.  · AV: Mild aortic valve regurgitation is present.  · MV: Mild mitral valve regurgitation is present.  · TV: Mild tricuspid valve regurgitation is present.       Neuro/Psych:   (+) neuromuscular disease:, psychiatric history:            GI/Hepatic/Renal:   (+) GERD:, renal disease: CRI          Endo/Other:    (+) DiabetesType II DM.          Pt had PAT visit.       Abdominal:              PE comment: Deferred   Vascular: negative vascular ROS.         Other Findings:       Anesthesia Plan      MAC     ASA 3       Induction: intravenous.      Anesthetic plan and risks discussed with patient.      Plan discussed with CRNA.    Attending anesthesiologist reviewed and agrees with Preprocedure content            Dee Dee Banerjee MD   10/8/2024

## 2024-10-08 NOTE — ANESTHESIA POSTPROCEDURE EVALUATION
Department of Anesthesiology  Postprocedure Note    Patient: Jaylin Quan  MRN: [id number]  YOB: 1955  Date of evaluation: 10/8/2024    Procedure Summary       Date: 10/08/24 Room / Location: [facility] ENDO 02 / [facility] ENDOSCOPY    Anesthesia Start: 1419 Anesthesia Stop: 1450    Procedure: COLONOSCOPY (Lower GI Region) Diagnosis:       Screening for colon cancer      (Screening for colon cancer [Z12.11])    Surgeons: Kirby Chavez MD Responsible Provider: Dee Dee Banerjee MD    Anesthesia Type: MAC, TIVA ASA Status: 3            Anesthesia Type: MAC, TIVA    Sheron Phase I: Sheron Score: 10    Sheron Phase II: Sheron Score: 9    Anesthesia Post Evaluation    Patient location during evaluation: bedside  Patient participation: complete - patient participated  Level of consciousness: awake and alert  Pain scale: Controlled per protocol.  Airway patency: patent  Nausea & Vomiting: no nausea and no vomiting  Cardiovascular status: hemodynamically stable  Respiratory status: acceptable  Hydration status: stable  Multimodal analgesia pain management approach  Pain management: adequate    No notable events documented.

## 2024-10-08 NOTE — PROGRESS NOTES
ARRIVAL INFORMATION:  Verified patient name and date of birth, scheduled procedure, and informed consent.     : Saúl husbandcontact number: [phone]  Physician and staff can share information with the .     Receive texts: y    Belongings with patient include:  Clothing,None    GI FOCUSED ASSESSMENT:  Neuro: Awake, alert, oriented x4  Respiratory: even and unlabored   GI: soft and non-distended  EKG Rhythm: normal sinus rhythm    Education:Reviewed general discharge instructions and  information.

## 2024-10-08 NOTE — H&P
(LIPITOR) 20 MG tablet TAKE 1 TABLET BY MOUTH DAILY 6/24/24  Yes Faisal Day MD   risperiDONE (RISPERDAL) 2 MG tablet Take 1 tablet by mouth nightly 5/7/24  Yes Faisal Day MD   Melatonin 5 MG CAPS Take 2 capsules every day by oral route at bedtime.   Yes Provider, MD Vida   albuterol sulfate HFA (PROVENTIL;VENTOLIN;PROAIR) 108 (90 Base) MCG/ACT inhaler every 4 hours as needed 9/19/23  Yes Provider, MD Vida   aspirin 81 MG EC tablet Take by mouth daily   Yes Automatic Reconciliation, Ar   carvedilol (COREG) 12.5 MG tablet Take by mouth 2 times daily 2/2/22  Yes Automatic Reconciliation, Ar   sacubitril-valsartan (ENTRESTO) 49-51 MG per tablet Take 1 tablet by mouth 2 times daily 1/10/22  Yes Automatic Reconciliation, Ar   vitamin D 25 MCG (1000 UT) CAPS Take 2 capsules by mouth daily    Automatic Reconciliation, Ar   furosemide (LASIX) 20 MG tablet Take by mouth daily 1/10/22   Automatic Reconciliation, Ar       Physical Exam:   General: NAD   HEENT: Head: Normocephalic, no lesions, without obvious abnormality.   Heart: regular rate and rhythm, S1, S2 normal, no murmur, click, rub or gallop   Lungs: chest clear, no wheezing, rales, normal symmetric air entry   Abdominal: soft, NT/ND+ BS   Neurological: Grossly normal   Extremities: extremities normal, atraumatic, no cyanosis or edema     Findings/Diagnosis: CRC screening    Plan of Care/Planned Procedure: Colonoscopy

## 2024-10-08 NOTE — PROGRESS NOTES
Endoscopy Case End Note:    1445:  Procedure scope was pre-cleaned, per protocol, at bedside by Rajeev.      1445:  Report received from anesthesia - SARAHI Hull.  See anesthesia flowsheet for intra-procedure vital signs and events.

## 2024-10-08 NOTE — OP NOTE
Colonoscopy Procedure Note    Indications:   Screening colonoscopy    Referring Physician: Faisal Day MD  Anesthesia/Sedation: MAC anesthesia Propofol  Endoscopist:  Dr. Kirby Chavez    Procedure in Detail:  Informed consent was obtained for the procedure, including sedation.  Risks of perforation, hemorrhage, adverse drug reaction, and aspiration were discussed. The patient was placed in the left lateral decubitus position.  Based on the pre-procedure assessment, including review of the patient's medical history, medications, allergies, and review of systems, she had been deemed to be an appropriate candidate for moderate sedation; she was therefore sedated with the medications listed above.   The patient was monitored continuously with ECG tracing, pulse oximetry, blood pressure monitoring, and direct observations.      A rectal examination was performed. The [id number] was inserted into the rectum and advanced under direct vision to the cecum, which was identified by the ileocecal valve and appendiceal orifice.  The quality of the colonic preparation was adequate.  A careful inspection was made as the colonoscope was withdrawn, including a retroflexed view of the rectum; findings and interventions are described below.  Appropriate photodocumentation was obtained.    Findings:    Scope advanced to the cecum.   Preparation was adequate.   (1) sessile 7 mm polyp in the ascending s/p cold snare removal s/p ultraclip placement   (1) sessile 6 mm polyp at hepatic flexure s/p cold snare removal   Mild sigmoid diverticulosis.    Therapies:  see above    Specimen: Specimens were collected as described above and sent to pathology.     Complications: None were encountered during the procedure.     EBL: < 10 ml.    Recommendations:     - Repeat colonoscopy in 5 years.    Signed By: Kirby Chavez MD                        October 8, 2024

## 2024-10-08 NOTE — DISCHARGE INSTRUCTIONS
physician  Resume your medications unless otherwise instructed    For 24 hours after general anesthesia or intravenous analgesia / sedation  you may experience:  Drowsiness, dizziness, sleepiness, or confusion  Difficulty remembering or delayed reaction times  Difficulty with your balance, especially while walking, move slowly and carefully, do not make sudden position changes  Difficulty focusing or blurred vision    You may not be aware of slight changes in your behavior and/or your reaction time because of the medication used during and after your procedure.    Report the following to your physician:  Excessive pain, swelling, redness or odor of or around the surgical area  Temperature over 100.5  Nausea and vomiting lasting longer than 4 hours or if unable to take medications  Any signs of decreased circulation or nerve impairment to extremity: change in color, persistent numbness, tingling, coldness or increase pain  Any questions or concerns    IF YOU REPORT TO AN EMERGENCY ROOM, DOCTOR'S OFFICE OR HOSPITAL WITHIN 24 HOURS AFTER YOUR PROCEDURE, BRING THIS SHEET AND YOUR AFTER VISIT SUMMARY WITH YOU AND GIVE IT TO THE PHYSICIAN OR NURSE ATTENDING YOU.      Colon Polyps: Care Instructions  Your Care Instructions     Colon polyps are growths in the colon or the rectum. The cause of most colon polyps is not known, and most people who get them do not have any problems. But a certain kind can turn into cancer. For this reason, regular testing for colon polyps is important for people as they get older. It is also important for anyone who has an increased risk for colon cancer.  Polyps are usually found through routine colon cancer screening tests. Although most colon polyps are not cancerous, they are usually removed and then tested for cancer. Screening for colon cancer saves lives because the cancer can usually be cured if it is caught early.  If you have a polyp that is the type that can turn into cancer, you may

## 2024-10-15 ENCOUNTER — OFFICE VISIT (OUTPATIENT)
Age: 69
End: 2024-10-15
Payer: MEDICARE

## 2024-10-15 VITALS
RESPIRATION RATE: 16 BRPM | WEIGHT: 257 LBS | SYSTOLIC BLOOD PRESSURE: 146 MMHG | OXYGEN SATURATION: 99 % | TEMPERATURE: 97.2 F | HEIGHT: 68 IN | HEART RATE: 60 BPM | BODY MASS INDEX: 38.95 KG/M2 | DIASTOLIC BLOOD PRESSURE: 83 MMHG

## 2024-10-15 DIAGNOSIS — R73.9 HYPERGLYCEMIA: ICD-10-CM

## 2024-10-15 DIAGNOSIS — I10 ESSENTIAL (PRIMARY) HYPERTENSION: ICD-10-CM

## 2024-10-15 DIAGNOSIS — Z00.00 MEDICARE ANNUAL WELLNESS VISIT, SUBSEQUENT: Primary | ICD-10-CM

## 2024-10-15 DIAGNOSIS — Z23 NEEDS FLU SHOT: ICD-10-CM

## 2024-10-15 DIAGNOSIS — E78.2 MIXED HYPERLIPIDEMIA: ICD-10-CM

## 2024-10-15 PROCEDURE — 1123F ACP DISCUSS/DSCN MKR DOCD: CPT | Performed by: INTERNAL MEDICINE

## 2024-10-15 PROCEDURE — G8482 FLU IMMUNIZE ORDER/ADMIN: HCPCS | Performed by: INTERNAL MEDICINE

## 2024-10-15 PROCEDURE — G8427 DOCREV CUR MEDS BY ELIG CLIN: HCPCS | Performed by: INTERNAL MEDICINE

## 2024-10-15 PROCEDURE — PBSHW INFLUENZA, FLUAD TRIVALENT, (AGE 65 Y+), IM, PRESERVATIVE FREE, 0.5ML: Performed by: INTERNAL MEDICINE

## 2024-10-15 PROCEDURE — G8399 PT W/DXA RESULTS DOCUMENT: HCPCS | Performed by: INTERNAL MEDICINE

## 2024-10-15 PROCEDURE — G8417 CALC BMI ABV UP PARAM F/U: HCPCS | Performed by: INTERNAL MEDICINE

## 2024-10-15 PROCEDURE — 3017F COLORECTAL CA SCREEN DOC REV: CPT | Performed by: INTERNAL MEDICINE

## 2024-10-15 PROCEDURE — 99214 OFFICE O/P EST MOD 30 MIN: CPT | Performed by: INTERNAL MEDICINE

## 2024-10-15 PROCEDURE — 1090F PRES/ABSN URINE INCON ASSESS: CPT | Performed by: INTERNAL MEDICINE

## 2024-10-15 PROCEDURE — G0439 PPPS, SUBSEQ VISIT: HCPCS | Performed by: INTERNAL MEDICINE

## 2024-10-15 PROCEDURE — 3077F SYST BP >= 140 MM HG: CPT | Performed by: INTERNAL MEDICINE

## 2024-10-15 PROCEDURE — 90653 IIV ADJUVANT VACCINE IM: CPT | Performed by: INTERNAL MEDICINE

## 2024-10-15 PROCEDURE — 1036F TOBACCO NON-USER: CPT | Performed by: INTERNAL MEDICINE

## 2024-10-15 PROCEDURE — 3079F DIAST BP 80-89 MM HG: CPT | Performed by: INTERNAL MEDICINE

## 2024-10-15 SDOH — ECONOMIC STABILITY: FOOD INSECURITY: WITHIN THE PAST 12 MONTHS, YOU WORRIED THAT YOUR FOOD WOULD RUN OUT BEFORE YOU GOT MONEY TO BUY MORE.: NEVER TRUE

## 2024-10-15 SDOH — ECONOMIC STABILITY: FOOD INSECURITY: WITHIN THE PAST 12 MONTHS, THE FOOD YOU BOUGHT JUST DIDN'T LAST AND YOU DIDN'T HAVE MONEY TO GET MORE.: NEVER TRUE

## 2024-10-15 SDOH — ECONOMIC STABILITY: INCOME INSECURITY: HOW HARD IS IT FOR YOU TO PAY FOR THE VERY BASICS LIKE FOOD, HOUSING, MEDICAL CARE, AND HEATING?: SOMEWHAT HARD

## 2024-10-15 ASSESSMENT — LIFESTYLE VARIABLES
HOW OFTEN DO YOU HAVE A DRINK CONTAINING ALCOHOL: NEVER
HOW MANY STANDARD DRINKS CONTAINING ALCOHOL DO YOU HAVE ON A TYPICAL DAY: PATIENT DOES NOT DRINK

## 2024-10-15 ASSESSMENT — PATIENT HEALTH QUESTIONNAIRE - PHQ9
2. FEELING DOWN, DEPRESSED OR HOPELESS: NOT AT ALL
SUM OF ALL RESPONSES TO PHQ QUESTIONS 1-9: 0
1. LITTLE INTEREST OR PLEASURE IN DOING THINGS: NOT AT ALL
SUM OF ALL RESPONSES TO PHQ QUESTIONS 1-9: 0
SUM OF ALL RESPONSES TO PHQ QUESTIONS 1-9: 0
SUM OF ALL RESPONSES TO PHQ9 QUESTIONS 1 & 2: 0
SUM OF ALL RESPONSES TO PHQ QUESTIONS 1-9: 0

## 2024-10-15 NOTE — PROGRESS NOTES
\"Have you been to the ER, urgent care clinic since your last visit?  Hospitalized since your last visit?\"    NO    “Have you seen or consulted any other health care providers outside our system since your last visit?”    YES - When: approximately 1  weeks ago.  Where and Why: Dr. Bocanegra for a colonscopy.      “Have you had a diabetic eye exam?”    NO     No diabetic eye exam on file        VORB per Faisal Day MD / Tati Wilkes LPN         
nebulizer suspension Take 2 mLs by nebulization 2 times daily Yes ProviderVida MD   buPROPion (WELLBUTRIN XL) 300 MG extended release tablet TAKE 1 TABLET BY MOUTH EVERY MORNING Yes Faisal Day MD   atorvastatin (LIPITOR) 20 MG tablet TAKE 1 TABLET BY MOUTH DAILY Yes Faisal Day MD   risperiDONE (RISPERDAL) 2 MG tablet Take 1 tablet by mouth nightly Yes Faisal aDy MD   Melatonin 5 MG CAPS Take 2 capsules every day by oral route at bedtime. Yes ProviderVida MD   albuterol sulfate HFA (PROVENTIL;VENTOLIN;PROAIR) 108 (90 Base) MCG/ACT inhaler every 4 hours as needed Yes Provider, MD Vida   aspirin 81 MG EC tablet Take by mouth daily Yes Automatic Reconciliation, Ar   carvedilol (COREG) 12.5 MG tablet Take by mouth 2 times daily Yes Automatic Reconciliation, Ar   vitamin D 25 MCG (1000 UT) CAPS Take 2 capsules by mouth daily Yes Automatic Reconciliation, Ar   furosemide (LASIX) 20 MG tablet Take by mouth daily Yes Automatic Reconciliation, Ar   sacubitril-valsartan (ENTRESTO) 49-51 MG per tablet Take 1 tablet by mouth 2 times daily Yes Automatic Reconciliation, Ar   rOPINIRole (REQUIP) 1 MG tablet Take 1 tablet by mouth nightly  Provider, Historical, MD       CareTeam (Including outside providers/suppliers regularly involved in providing care):   Patient Care Team:  Faisal Day MD as PCP - General  Faisal Day MD as PCP - Empaneled Provider  Claudia Yoder MD as Physician  Faisal Clinton MD as Physician  Suman Howe MD as Physician  Vinny Solomon MD as Surgeon      Reviewed and updated this visit:  Tobacco  Allergies  Meds  Med Hx  Surg Hx  Soc Hx  Fam Hx            
does not use drugs.   ROS: See above; Complete ROS otherwise negative.     OBJECTIVE:   Visit Vitals: Blood pressure (!) 146/83, pulse 60, temperature 97.2 °F (36.2 °C), temperature source Temporal, resp. rate 16, height 1.721 m (5' 7.75\"), weight 116.6 kg (257 lb), SpO2 99%.      Gen: Pleasant 69 y.o. female in NAD.  HEENT: PERRLA. EOMI. OP moist and pink.  Neck: Supple.  No LAD.  HEART: RRR, No M/G/R.   LUNGS: CTAB No W/R.   ABDOMEN: S, NT, ND, BS+.   EXTREMITIES: Warm. No C/C/E. MUSCULOSKELETAL: Normal ROM, muscle strength 5/5 all groups. NEURO: Alert and oriented x 3.  Cranial nerves grossly intact.  No focal sensory or motor deficits noted. SKIN: She has neck surgical wound that appears to have healed well.     Hemoglobin A1C   Date Value Ref Range Status   04/09/2024 6.0 (H) 4.0 - 5.6 % Final     Comment:     (NOTE)  HbA1C Interpretive Ranges  <5.7              Normal  5.7 - 6.4         Consider Prediabetes  >6.5              Consider Diabetes         Lab Results   Component Value Date    CHOL 143 04/09/2024    TRIG 88 04/09/2024    HDL 53 04/09/2024    LDL 72.4 04/09/2024    VLDL 17.6 04/09/2024    CHOLHDLRATIO 2.7 04/09/2024

## 2024-10-15 NOTE — PATIENT INSTRUCTIONS
Learning About Being Active as an Older Adult  Why is being active important as you get older?     Being active is one of the best things you can do for your health. And it's never too late to start. Being active--or getting active, if you aren't already--has definite benefits. It can:  Give you more energy,  Keep your mind sharp.  Improve balance to reduce your risk of falls.  Help you manage chronic illness with fewer medicines.  No matter how old you are, how fit you are, or what health problems you have, there is a form of activity that will work for you. And the more physical activity you can do, the better your overall health will be.  What kinds of activity can help you stay healthy?  Being more active will make your daily activities easier. Physical activity includes planned exercise and things you do in daily life. There are four types of activity:  Aerobic.  Doing aerobic activity makes your heart and lungs strong.  Includes walking, dancing, and gardening.  Aim for at least 2½ hours spread throughout the week.  It improves your energy and can help you sleep better.  Muscle-strengthening.  This type of activity can help maintain muscle and strengthen bones.  Includes climbing stairs, using resistance bands, and lifting or carrying heavy loads.  Aim for at least twice a week.  It can help protect the knees and other joints.  Stretching.  Stretching gives you better range of motion in joints and muscles.  Includes upper arm stretches, calf stretches, and gentle yoga.  Aim for at least twice a week, preferably after your muscles are warmed up from other activities.  It can help you function better in daily life.  Balancing.  This helps you stay coordinated and have good posture.  Includes heel-to-toe walking, mikey chi, and certain types of yoga.  Aim for at least 3 days a week.  It can reduce your risk of falling.  Even if you have a hard time meeting the recommendations, it's better to be more active

## 2024-10-16 ENCOUNTER — LAB (OUTPATIENT)
Age: 69
End: 2024-10-16

## 2024-10-16 DIAGNOSIS — I10 ESSENTIAL (PRIMARY) HYPERTENSION: ICD-10-CM

## 2024-10-16 DIAGNOSIS — E78.2 MIXED HYPERLIPIDEMIA: ICD-10-CM

## 2024-10-16 DIAGNOSIS — R73.9 HYPERGLYCEMIA: ICD-10-CM

## 2024-10-17 LAB
ALBUMIN SERPL-MCNC: 3.8 G/DL (ref 3.5–5)
ALBUMIN/GLOB SERPL: 1.3 (ref 1.1–2.2)
ALP SERPL-CCNC: 83 U/L (ref 45–117)
ALT SERPL-CCNC: 31 U/L (ref 12–78)
ANION GAP SERPL CALC-SCNC: 4 MMOL/L (ref 2–12)
AST SERPL-CCNC: 11 U/L (ref 15–37)
BASOPHILS # BLD: 0 K/UL (ref 0–0.1)
BASOPHILS NFR BLD: 1 % (ref 0–1)
BILIRUB SERPL-MCNC: 0.5 MG/DL (ref 0.2–1)
BUN SERPL-MCNC: 17 MG/DL (ref 6–20)
BUN/CREAT SERPL: 16 (ref 12–20)
CALCIUM SERPL-MCNC: 10.1 MG/DL (ref 8.5–10.1)
CHLORIDE SERPL-SCNC: 110 MMOL/L (ref 97–108)
CHOLEST SERPL-MCNC: 147 MG/DL
CO2 SERPL-SCNC: 28 MMOL/L (ref 21–32)
CREAT SERPL-MCNC: 1.04 MG/DL (ref 0.55–1.02)
CREAT UR-MCNC: 35.2 MG/DL
DIFFERENTIAL METHOD BLD: NORMAL
EOSINOPHIL # BLD: 0.2 K/UL (ref 0–0.4)
EOSINOPHIL NFR BLD: 3 % (ref 0–7)
ERYTHROCYTE [DISTWIDTH] IN BLOOD BY AUTOMATED COUNT: 14 % (ref 11.5–14.5)
EST. AVERAGE GLUCOSE BLD GHB EST-MCNC: 123 MG/DL
GLOBULIN SER CALC-MCNC: 3 G/DL (ref 2–4)
GLUCOSE SERPL-MCNC: 105 MG/DL (ref 65–100)
HBA1C MFR BLD: 5.9 % (ref 4–5.6)
HCT VFR BLD AUTO: 37.4 % (ref 35–47)
HDLC SERPL-MCNC: 60 MG/DL
HDLC SERPL: 2.5 (ref 0–5)
HGB BLD-MCNC: 12.1 G/DL (ref 11.5–16)
IMM GRANULOCYTES # BLD AUTO: 0 K/UL (ref 0–0.04)
IMM GRANULOCYTES NFR BLD AUTO: 0 % (ref 0–0.5)
LDLC SERPL CALC-MCNC: 68.4 MG/DL (ref 0–100)
LYMPHOCYTES # BLD: 0.8 K/UL (ref 0.8–3.5)
LYMPHOCYTES NFR BLD: 15 % (ref 12–49)
MCH RBC QN AUTO: 30.1 PG (ref 26–34)
MCHC RBC AUTO-ENTMCNC: 32.4 G/DL (ref 30–36.5)
MCV RBC AUTO: 93 FL (ref 80–99)
MICROALBUMIN UR-MCNC: 0.6 MG/DL
MICROALBUMIN/CREAT UR-RTO: 17 MG/G (ref 0–30)
MONOCYTES # BLD: 0.6 K/UL (ref 0–1)
MONOCYTES NFR BLD: 11 % (ref 5–13)
NEUTS SEG # BLD: 3.9 K/UL (ref 1.8–8)
NEUTS SEG NFR BLD: 70 % (ref 32–75)
NRBC # BLD: 0 K/UL (ref 0–0.01)
NRBC BLD-RTO: 0 PER 100 WBC
PLATELET # BLD AUTO: 231 K/UL (ref 150–400)
PMV BLD AUTO: 11.4 FL (ref 8.9–12.9)
POTASSIUM SERPL-SCNC: 4 MMOL/L (ref 3.5–5.1)
PROT SERPL-MCNC: 6.8 G/DL (ref 6.4–8.2)
RBC # BLD AUTO: 4.02 M/UL (ref 3.8–5.2)
SODIUM SERPL-SCNC: 142 MMOL/L (ref 136–145)
SPECIMEN HOLD: NORMAL
TRIGL SERPL-MCNC: 93 MG/DL
VLDLC SERPL CALC-MCNC: 18.6 MG/DL
WBC # BLD AUTO: 5.6 K/UL (ref 3.6–11)

## 2024-10-25 ASSESSMENT — SLEEP AND FATIGUE QUESTIONNAIRES
HOW LIKELY ARE YOU TO NOD OFF OR FALL ASLEEP WHEN YOU ARE A PASSENGER IN A CAR FOR AN HOUR WITHOUT A BREAK: WOULD NEVER DOZE
HOW LIKELY ARE YOU TO NOD OFF OR FALL ASLEEP WHILE SITTING AND READING: WOULD NEVER DOZE
DO YOU HAVE DIFFICULTY OPERATING A MOTOR VEHICLE FOR LONG DISTANCES (GREATER THAN 100 MILES) BECAUSE YOU BECOME SLEEPY: NO
DO YOU HAVE DIFFICULTY BEING AS ACTIVE AS YOU WANT TO BE IN THE EVENING BECAUSE YOU ARE SLEEPY OR TIRED: YES, LITTLE
HOW LIKELY ARE YOU TO NOD OFF OR FALL ASLEEP WHILE WATCHING TV: SLIGHT CHANCE OF DOZING
HOW LIKELY ARE YOU TO NOD OFF OR FALL ASLEEP WHEN YOU ARE A PASSENGER IN A CAR FOR AN HOUR WITHOUT A BREAK: WOULD NEVER DOZE
HOW LIKELY ARE YOU TO NOD OFF OR FALL ASLEEP WHILE SITTING AND READING: WOULD NEVER DOZE
HOW LIKELY ARE YOU TO NOD OFF OR FALL ASLEEP WHILE SITTING QUIETLY AFTER LUNCH WITHOUT ALCOHOL: WOULD NEVER DOZE
ESS TOTAL SCORE: 2
DO YOU HAVE DIFFICULTY VISITING YOUR FAMILY OR FRIENDS IN THEIR HOME BECAUSE YOU BECOME SLEEPY OR TIRED: NO
HOW LIKELY ARE YOU TO NOD OFF OR FALL ASLEEP WHILE SITTING QUIETLY AFTER LUNCH WITHOUT ALCOHOL: WOULD NEVER DOZE
HOW LIKELY ARE YOU TO NOD OFF OR FALL ASLEEP IN A CAR, WHILE STOPPED FOR A FEW MINUTES IN TRAFFIC: WOULD NEVER DOZE
DO YOU HAVE DIFFICULTY BEING AS ACTIVE AS YOU WANT TO BE IN THE MORNING BECAUSE YOU ARE SLEEPY OR TIRED: YES, LITTLE
FOSQ SCORE: 19
DO YOU HAVE DIFFICULTY OPERATING A MOTOR VEHICLE FOR SHORT DISTANCES (LESS THAN 100 MILES) BECAUSE YOU BECOME SLEEPY: NO
HAS YOUR MOOD BEEN AFFECTED BECAUSE YOU ARE SLEEPY OR TIRED: NO
HOW LIKELY ARE YOU TO NOD OFF OR FALL ASLEEP WHILE WATCHING TV: SLIGHT CHANCE OF DOZING
DO YOU HAVE DIFFICULTY WATCHING A MOVIE OR VIDEO BECAUSE YOU BECOME SLEEPY OR TIRED: NO
DO YOU GENERALLY HAVE DIFFICULTY REMEMBERING THINGS BECAUSE YOU ARE SLEEPY OR TIRED: NO
HOW LIKELY ARE YOU TO NOD OFF OR FALL ASLEEP WHILE SITTING INACTIVE IN A PUBLIC PLACE: WOULD NEVER DOZE
HOW LIKELY ARE YOU TO NOD OFF OR FALL ASLEEP WHILE SITTING AND TALKING TO SOMEONE: WOULD NEVER DOZE
HOW LIKELY ARE YOU TO NOD OFF OR FALL ASLEEP WHILE LYING DOWN TO REST IN THE AFTERNOON WHEN CIRCUMSTANCES PERMIT: SLIGHT CHANCE OF DOZING
HOW LIKELY ARE YOU TO NOD OFF OR FALL ASLEEP WHILE LYING DOWN TO REST IN THE AFTERNOON WHEN CIRCUMSTANCES PERMIT: SLIGHT CHANCE OF DOZING
HAS YOUR RELATIONSHIP WITH FAMILY, FRIENDS OR WORK COLLEAGUES BEEN AFFECTED BECAUSE YOU ARE SLEEPY OR TIRED: NO
HOW LIKELY ARE YOU TO NOD OFF OR FALL ASLEEP WHILE SITTING INACTIVE IN A PUBLIC PLACE: WOULD NEVER DOZE
HOW LIKELY ARE YOU TO NOD OFF OR FALL ASLEEP WHILE SITTING AND TALKING TO SOMEONE: WOULD NEVER DOZE
HOW LIKELY ARE YOU TO NOD OFF OR FALL ASLEEP IN A CAR, WHILE STOPPED FOR A FEW MINUTES IN TRAFFIC: WOULD NEVER DOZE
DO YOU HAVE DIFFICULTY CONCENTRATING ON THE THINGS YOU DO BECAUSE YOU ARE SLEEPY OR TIRED: NO

## 2024-10-28 ENCOUNTER — CLINICAL DOCUMENTATION (OUTPATIENT)
Age: 69
End: 2024-10-28

## 2024-10-28 ENCOUNTER — OFFICE VISIT (OUTPATIENT)
Age: 69
End: 2024-10-28
Payer: MEDICARE

## 2024-10-28 VITALS
TEMPERATURE: 98 F | HEIGHT: 68 IN | HEART RATE: 67 BPM | WEIGHT: 252.1 LBS | BODY MASS INDEX: 38.21 KG/M2 | DIASTOLIC BLOOD PRESSURE: 77 MMHG | SYSTOLIC BLOOD PRESSURE: 144 MMHG | OXYGEN SATURATION: 95 %

## 2024-10-28 DIAGNOSIS — I10 ESSENTIAL (PRIMARY) HYPERTENSION: ICD-10-CM

## 2024-10-28 DIAGNOSIS — G47.33 OBSTRUCTIVE SLEEP APNEA (ADULT) (PEDIATRIC): Primary | ICD-10-CM

## 2024-10-28 PROCEDURE — G8427 DOCREV CUR MEDS BY ELIG CLIN: HCPCS | Performed by: INTERNAL MEDICINE

## 2024-10-28 PROCEDURE — G8417 CALC BMI ABV UP PARAM F/U: HCPCS | Performed by: INTERNAL MEDICINE

## 2024-10-28 PROCEDURE — 99214 OFFICE O/P EST MOD 30 MIN: CPT | Performed by: INTERNAL MEDICINE

## 2024-10-28 PROCEDURE — 3017F COLORECTAL CA SCREEN DOC REV: CPT | Performed by: INTERNAL MEDICINE

## 2024-10-28 PROCEDURE — 1160F RVW MEDS BY RX/DR IN RCRD: CPT | Performed by: INTERNAL MEDICINE

## 2024-10-28 PROCEDURE — 3078F DIAST BP <80 MM HG: CPT | Performed by: INTERNAL MEDICINE

## 2024-10-28 PROCEDURE — 1123F ACP DISCUSS/DSCN MKR DOCD: CPT | Performed by: INTERNAL MEDICINE

## 2024-10-28 PROCEDURE — 1036F TOBACCO NON-USER: CPT | Performed by: INTERNAL MEDICINE

## 2024-10-28 PROCEDURE — G8482 FLU IMMUNIZE ORDER/ADMIN: HCPCS | Performed by: INTERNAL MEDICINE

## 2024-10-28 PROCEDURE — G8399 PT W/DXA RESULTS DOCUMENT: HCPCS | Performed by: INTERNAL MEDICINE

## 2024-10-28 PROCEDURE — 1159F MED LIST DOCD IN RCRD: CPT | Performed by: INTERNAL MEDICINE

## 2024-10-28 PROCEDURE — 3077F SYST BP >= 140 MM HG: CPT | Performed by: INTERNAL MEDICINE

## 2024-10-28 PROCEDURE — 1090F PRES/ABSN URINE INCON ASSESS: CPT | Performed by: INTERNAL MEDICINE

## 2024-10-28 NOTE — PROGRESS NOTES
[address]  [place] [address]  Tel.  [phone]  Fax. [phone] [address]  [place] [address]  Tel.  [phone]  Fax. [phone] [address] [place] [address]  Tel.  [phone]  Fax. [phone]     S>Jaylin Quan is a 69 y.o. female seen for a positive airway pressure follow-up.  She reports no problems using the device.  The following problems are identified:    Drowsiness no Problems exhaling no   Snoring no Forget to put on no   Mask Comfortable yes Can't fall asleep no   Dry Mouth no Mask falls off no   Air Leaking no Frequent awakenings no     Estimated AHI flow from download shows 1.5/hour.   intermittent significant leak  Averaging almost 9/hours use on nights used  Days with usage 100%  Adherence 100%  Weight from last visit 268 lb      She admits that her sleep has improved.     Allergies   Allergen Reactions    Erythromycin Rash       She has a current medication list which includes the following prescription(s): arformoterol tartrate, budesonide, bupropion, atorvastatin, risperidone, melatonin, albuterol sulfate hfa, aspirin, carvedilol, vitamin d, furosemide, and entresto..      She  has a past medical history of AICD (automatic cardioverter/defibrillator) present, Arthritis, Cancer (HCC), Chronic kidney disease, Chronic pain, CKD (chronic kidney disease) stage 2, GFR 60-89 ml/min, Congestive heart failure (HCC), Depression, Endometrial cancer (HCC), GERD (gastroesophageal reflux disease), Heart failure (HCC), Hyperlipidemia, Hypertension, Left bundle branch block, Long term current use of anticoagulant therapy, Menopause, Neuropathy, peripheral, MCKENNA on CPAP, Pacemaker, and SOB (shortness of breath).        10/25/2024     5:39 PM 10/23/2023    11:11 PM 4/30/2022     7:03 AM 3/2/2021     9:00 AM   Sleep Medicine   Sitting and reading 0 1 1 0   Watching TV 1 0 1 0   Sitting, inactive in a public place (e.g. a theatre or a

## 2024-10-28 NOTE — PATIENT INSTRUCTIONS
[address]  [place] [address]  Tel.  [phone]  Fax. [phone] [address]  [place] [address]  Tel.  [phone]  Fax. [phone] [address] [place] [place] [address]  Tel.  [phone]  Fax. [phone]     PROPER SLEEP HYGIENE    What to avoid  Do not have drinks with caffeine, such as coffee or black tea, for 8 hours before bed.  Do not smoke or use other types of tobacco near bedtime. Nicotine is a stimulant and can keep you awake.  Avoid drinking alcohol late in the evening, because it can cause you to wake in the middle of the night.  Do not eat a big meal close to bedtime. If you are hungry, eat a light snack.  Do not drink a lot of water close to bedtime, because the need to urinate may wake you up during the night.  Do not read or watch TV in bed. Use the bed only for sleeping and sexual activity.  What to try  Go to bed at the same time every night, and wake up at the same time every morning. Do not take naps during the day.  Keep your bedroom quiet, dark, and cool.  Get regular exercise, but not within 3 to 4 hours of your bedtime..  Sleep on a comfortable pillow and mattress.  If watching the clock makes you anxious, turn it facing away from you so you cannot see the time.  If you worry when you lie down, start a worry book. Well before bedtime, write down your worries, and then set the book and your concerns aside.  Try meditation or other relaxation techniques before you go to bed.  If you cannot fall asleep, get up and go to another room until you feel sleepy. Do something relaxing. Repeat your bedtime routine before you go to bed again.  Make your house quiet and calm about an hour before bedtime. Turn down the lights, turn off the TV, log off the computer, and turn down the volume on music. This can help you relax after a busy day.    Drowsy Driving  The [place] National Highway Traffic Safety Administration cites drowsiness as a

## 2024-11-10 ENCOUNTER — PATIENT MESSAGE (OUTPATIENT)
Age: 69
End: 2024-11-10

## 2024-11-10 DIAGNOSIS — G25.81 RLS (RESTLESS LEGS SYNDROME): Primary | ICD-10-CM

## 2024-11-11 RX ORDER — ROPINIROLE 1 MG/1
1 TABLET, FILM COATED ORAL NIGHTLY
Qty: 90 TABLET | Refills: 3 | Status: SHIPPED | OUTPATIENT
Start: 2024-11-11

## 2024-11-11 NOTE — TELEPHONE ENCOUNTER
PCP: Faisal Day MD    Last appt: 10/15/2024  Future Appointments   Date Time Provider Department Center   2/5/2025  2:00 PM Chi Sierra MD [facility]   3/13/2025  2:20 PM Claudia Yoder MD [facility]   4/15/2025  1:30 PM Faisal Day MD [facility]       Requested Prescriptions     Pending Prescriptions Disp Refills    rOPINIRole (REQUIP) 1 MG tablet 90 tablet 3     Sig: Take 1 tablet by mouth nightly

## 2024-11-19 RX ORDER — BUPROPION HYDROCHLORIDE 300 MG/1
300 TABLET ORAL EVERY MORNING
Qty: 90 TABLET | Refills: 3 | Status: SHIPPED | OUTPATIENT
Start: 2024-11-19

## 2024-12-02 ENCOUNTER — TELEPHONE (OUTPATIENT)
Age: 69
End: 2024-12-02

## 2024-12-02 NOTE — TELEPHONE ENCOUNTER
Patient phoned the office stating that she picked up her new device on 10/31/2024.  She has been continuously having dry mouth issues.  Complained that the heated tubing was to heavy and was causing discomfort.  She stated that the design of the machine is flawed.  Her H2O reservoir was to small.  Causing dry mouth and assumed tooth decay in the future.  Patient returned device to [facility] on 11/30/24.  PAP download of the past 30 days requested.

## 2024-12-05 ENCOUNTER — PATIENT MESSAGE (OUTPATIENT)
Age: 69
End: 2024-12-05

## 2024-12-05 DIAGNOSIS — G47.33 OSA (OBSTRUCTIVE SLEEP APNEA): Primary | ICD-10-CM

## 2025-01-15 RX ORDER — ATORVASTATIN CALCIUM 20 MG/1
20 TABLET, FILM COATED ORAL DAILY
Qty: 90 TABLET | Refills: 1 | Status: SHIPPED | OUTPATIENT
Start: 2025-01-15

## 2025-01-15 NOTE — TELEPHONE ENCOUNTER
Requested Prescriptions     Pending Prescriptions Disp Refills    atorvastatin (LIPITOR) 20 MG tablet 90 tablet 1     Sig: Take 1 tablet by mouth daily      LOV: 10/15/24     Next OV: 04/15/25

## 2025-01-23 ENCOUNTER — TELEPHONE (OUTPATIENT)
Age: 70
End: 2025-01-23

## 2025-01-23 DIAGNOSIS — G47.33 OBSTRUCTIVE SLEEP APNEA (ADULT) (PEDIATRIC): Primary | ICD-10-CM

## 2025-01-24 ENCOUNTER — CLINICAL DOCUMENTATION (OUTPATIENT)
Age: 70
End: 2025-01-24

## 2025-02-03 ENCOUNTER — TELEPHONE (OUTPATIENT)
Age: 70
End: 2025-02-03

## 2025-02-03 DIAGNOSIS — G47.33 OBSTRUCTIVE SLEEP APNEA (ADULT) (PEDIATRIC): Primary | ICD-10-CM

## 2025-02-03 NOTE — TELEPHONE ENCOUNTER
Patient reached out to our office via [employer].  Order for non heated tubing requested to be sent to [facility].

## 2025-02-05 ENCOUNTER — CLINICAL DOCUMENTATION (OUTPATIENT)
Age: 70
End: 2025-02-05

## 2025-02-05 ENCOUNTER — OFFICE VISIT (OUTPATIENT)
Age: 70
End: 2025-02-05
Payer: MEDICARE

## 2025-02-05 VITALS
HEIGHT: 68 IN | BODY MASS INDEX: 38.74 KG/M2 | DIASTOLIC BLOOD PRESSURE: 82 MMHG | HEART RATE: 69 BPM | SYSTOLIC BLOOD PRESSURE: 142 MMHG | WEIGHT: 255.6 LBS

## 2025-02-05 DIAGNOSIS — C54.1 ENDOMETRIAL CANCER (HCC): Primary | ICD-10-CM

## 2025-02-05 PROCEDURE — 1159F MED LIST DOCD IN RCRD: CPT | Performed by: OBSTETRICS & GYNECOLOGY

## 2025-02-05 PROCEDURE — 1160F RVW MEDS BY RX/DR IN RCRD: CPT | Performed by: OBSTETRICS & GYNECOLOGY

## 2025-02-05 PROCEDURE — 1090F PRES/ABSN URINE INCON ASSESS: CPT | Performed by: OBSTETRICS & GYNECOLOGY

## 2025-02-05 PROCEDURE — G8399 PT W/DXA RESULTS DOCUMENT: HCPCS | Performed by: OBSTETRICS & GYNECOLOGY

## 2025-02-05 PROCEDURE — 1123F ACP DISCUSS/DSCN MKR DOCD: CPT | Performed by: OBSTETRICS & GYNECOLOGY

## 2025-02-05 PROCEDURE — 3079F DIAST BP 80-89 MM HG: CPT | Performed by: OBSTETRICS & GYNECOLOGY

## 2025-02-05 PROCEDURE — 3017F COLORECTAL CA SCREEN DOC REV: CPT | Performed by: OBSTETRICS & GYNECOLOGY

## 2025-02-05 PROCEDURE — 99212 OFFICE O/P EST SF 10 MIN: CPT | Performed by: OBSTETRICS & GYNECOLOGY

## 2025-02-05 PROCEDURE — 1126F AMNT PAIN NOTED NONE PRSNT: CPT | Performed by: OBSTETRICS & GYNECOLOGY

## 2025-02-05 PROCEDURE — 1036F TOBACCO NON-USER: CPT | Performed by: OBSTETRICS & GYNECOLOGY

## 2025-02-05 PROCEDURE — 3077F SYST BP >= 140 MM HG: CPT | Performed by: OBSTETRICS & GYNECOLOGY

## 2025-02-05 PROCEDURE — G8427 DOCREV CUR MEDS BY ELIG CLIN: HCPCS | Performed by: OBSTETRICS & GYNECOLOGY

## 2025-02-05 PROCEDURE — G8417 CALC BMI ABV UP PARAM F/U: HCPCS | Performed by: OBSTETRICS & GYNECOLOGY

## 2025-02-05 ASSESSMENT — PATIENT HEALTH QUESTIONNAIRE - PHQ9
2. FEELING DOWN, DEPRESSED OR HOPELESS: NOT AT ALL
SUM OF ALL RESPONSES TO PHQ QUESTIONS 1-9: 0
SUM OF ALL RESPONSES TO PHQ9 QUESTIONS 1 & 2: 0
1. LITTLE INTEREST OR PLEASURE IN DOING THINGS: NOT AT ALL
SUM OF ALL RESPONSES TO PHQ QUESTIONS 1-9: 0

## 2025-02-05 NOTE — PROGRESS NOTES
NCCN Distress Thermometer    [facility] Gynecologic Oncology    Date Screening Completed: 2/5/25    Screening Declined:  [] Yes    Number that best describes how much distress you've experienced in the past week, including today?  0 [] - No distress 1 []      2 []      3 [x]      4 []       5 []       6 []      7 []      8 []      9 []       10 [] - Extreme distress    PROBLEM LIST  Have you had concerns about any of the items below in the past week, including today?      Physical Concerns Practical Concerns   [] Pain [] Taking care of myself    [] Sleep [] Taking care of others    [] Fatigue [] Safety   [] Tobacco use  [] Work   [] Substance use  [] School   [] Memory or concentration [] Housing/Utilities   [] Sexual health [] Finances   [] Changes in eating  [] Insurance   [x] Loss or change of physical abilities  [] Transportation    []    Emotional Concerns [] Having enough food   [x] Worry or anxiety [] Access to medicine   [] Sadness or depression [] Treatment decisions   [] Loss of interest or enjoyment     [] Grief or loss  Spiritual or Samaritan Concerns   [] Fear [] Sense of meaning or purpose   [] Loneliness  [] Changes in portia or beliefs   [] Anger [] Death, dying, or afterlife   [] Changes in appearance [] Conflict between beliefs and cancer treatments    [] Feelings of worthlessness or being a burden [] Relationship with the sacred    [] Ritual or dietary needs    Social Concerns     [] Relationship with spouse or partner     [] Relationship with children    [x] Relationship with family members     [] Relationship with friends or coworkers     [] Communication with health care team     [] Ability to have children     [] Prejudice or discrimination        Other Concerns: n/a

## 2025-02-05 NOTE — PROGRESS NOTES
[facility] GYNECOLOGIC ONCOLOGY  [address] [place] [address], [facility]  [place] [address]  P [phone]  F [phone]    Office Note  Patient ID:  Name:  Jaylin Quan  MRN:  [id number]  :  1955/67 y.o.  Date:  10/19/2022      HISTORY OF PRESENT ILLNESS:  Ms. Jaylin Quan is a 70 y.o.  female who on  underwent Robotic-assisted total laparoscopic hysterectomy, Bilateral salpingo-oophorectomy, Bilateral pelvic sentinel lymph node mapping and biopsy. Final pathology consistent with Stage Ib, FIGO Grade 1 endometrial cancer. 8mm out of 15mm invasion. Negative washings. Negative SLNDs. LVSI negative. Completed VBT 2020 (30Gy in 3 fractions).    Presents today for continued surveillance. Doing well overall. She reports that using vaginal dilators was painful and she is no longer using these. The patient is not sexually active. Denies vaginal bleeding/discharge, abdominal/pelvic pain, nausea, vomiting, constipation, diarrhea, CP, SOB, hematuria, hematochezia, change in appetite or bowel movements, bloating, fevers, chills, or urinary symptoms.       Initial History:  Ms. Jaylin Quan is a 67 y.o.  postmenopausal female who presents in consultation from Dr. Figueroa for FIGO Grade 1 endometrial cancer.    The patient first reports vaginal spotting/bleeding in 2019. She was ultimately referred to Dr. Figueroa by her PCP. On 2020 underwent hysteroscopy/D&C with final pathology consistent with FIGO Grade 1 endometrial cancer. Reports some spotting since her surgery. History of urinary incontinence for which she wears a pad. Denies hematuria or hematochezia. Denies change in appetite or bowel habits, nausea, vomiting, diarrhea, CP, SOB, fevers, or chills. Reports long history of constipation for which she uses senna S.     Pertinent PMH/PSH: h/o , obesity, HTN, MCKENNA on CPAP      Active, no restrictions.     Pathology Review:   :  FINAL PATHOLOGIC DIAGNOSIS   1.

## 2025-02-05 NOTE — PROGRESS NOTES
6 month follow up for endometrial cancer ,  pt reports no abnormal spotting or bleeding, pt states no questions or concerns for today's visit    1. Have you been to the ER, urgent care clinic since your last visit?  Hospitalized since your last visit?  no    2. Have you seen or consulted any other health care providers outside of the [facility] since your last visit?  Include any pap smears or colon screening.   no

## 2025-02-12 ENCOUNTER — CLINICAL DOCUMENTATION (OUTPATIENT)
Age: 70
End: 2025-02-12

## 2025-04-06 DIAGNOSIS — F32.A DEPRESSION, UNSPECIFIED DEPRESSION TYPE: ICD-10-CM

## 2025-04-07 RX ORDER — RISPERIDONE 2 MG/1
2 TABLET ORAL NIGHTLY
Qty: 90 TABLET | Refills: 3 | Status: SHIPPED | OUTPATIENT
Start: 2025-04-07

## 2025-04-09 DIAGNOSIS — R73.9 HYPERGLYCEMIA, UNSPECIFIED: ICD-10-CM

## 2025-04-09 DIAGNOSIS — I10 ESSENTIAL (PRIMARY) HYPERTENSION: ICD-10-CM

## 2025-04-09 DIAGNOSIS — E78.2 MIXED HYPERLIPIDEMIA: ICD-10-CM

## 2025-04-09 RX ORDER — ATORVASTATIN CALCIUM 20 MG/1
20 TABLET, FILM COATED ORAL DAILY
Qty: 90 TABLET | Refills: 1 | Status: SHIPPED | OUTPATIENT
Start: 2025-04-09

## 2025-04-10 ENCOUNTER — RESULTS FOLLOW-UP (OUTPATIENT)
Age: 70
End: 2025-04-10

## 2025-04-10 LAB
ALBUMIN SERPL-MCNC: 3.9 G/DL (ref 3.5–5)
ALBUMIN/GLOB SERPL: 1.3 (ref 1.1–2.2)
ALP SERPL-CCNC: 98 U/L (ref 45–117)
ALT SERPL-CCNC: 31 U/L (ref 12–78)
ANION GAP SERPL CALC-SCNC: 6 MMOL/L (ref 2–12)
AST SERPL-CCNC: 24 U/L (ref 15–37)
BASOPHILS # BLD: 0.05 K/UL (ref 0–0.1)
BASOPHILS NFR BLD: 1 % (ref 0–1)
BILIRUB SERPL-MCNC: 0.5 MG/DL (ref 0.2–1)
BUN SERPL-MCNC: 19 MG/DL (ref 6–20)
BUN/CREAT SERPL: 17 (ref 12–20)
CALCIUM SERPL-MCNC: 9.9 MG/DL (ref 8.5–10.1)
CHLORIDE SERPL-SCNC: 105 MMOL/L (ref 97–108)
CHOLEST SERPL-MCNC: 152 MG/DL
CO2 SERPL-SCNC: 30 MMOL/L (ref 21–32)
CREAT SERPL-MCNC: 1.13 MG/DL (ref 0.55–1.02)
DIFFERENTIAL METHOD BLD: NORMAL
EOSINOPHIL # BLD: 0.12 K/UL (ref 0–0.4)
EOSINOPHIL NFR BLD: 2.5 % (ref 0–7)
ERYTHROCYTE [DISTWIDTH] IN BLOOD BY AUTOMATED COUNT: 13.4 % (ref 11.5–14.5)
EST. AVERAGE GLUCOSE BLD GHB EST-MCNC: 128 MG/DL
GLOBULIN SER CALC-MCNC: 2.9 G/DL (ref 2–4)
GLUCOSE SERPL-MCNC: 117 MG/DL (ref 65–100)
HBA1C MFR BLD: 6.1 % (ref 4–5.6)
HCT VFR BLD AUTO: 38 % (ref 35–47)
HDLC SERPL-MCNC: 58 MG/DL
HDLC SERPL: 2.6 (ref 0–5)
HGB BLD-MCNC: 12 G/DL (ref 11.5–16)
IMM GRANULOCYTES # BLD AUTO: 0.02 K/UL (ref 0–0.04)
IMM GRANULOCYTES NFR BLD AUTO: 0.4 % (ref 0–0.5)
LDLC SERPL CALC-MCNC: 73 MG/DL (ref 0–100)
LYMPHOCYTES # BLD: 1.14 K/UL (ref 0.8–3.5)
LYMPHOCYTES NFR BLD: 23.8 % (ref 12–49)
MCH RBC QN AUTO: 29.7 PG (ref 26–34)
MCHC RBC AUTO-ENTMCNC: 31.6 G/DL (ref 30–36.5)
MCV RBC AUTO: 94.1 FL (ref 80–99)
MONOCYTES # BLD: 0.61 K/UL (ref 0–1)
MONOCYTES NFR BLD: 12.7 % (ref 5–13)
NEUTS SEG # BLD: 2.85 K/UL (ref 1.8–8)
NEUTS SEG NFR BLD: 59.6 % (ref 32–75)
NRBC # BLD: 0 K/UL (ref 0–0.01)
NRBC BLD-RTO: 0 PER 100 WBC
PLATELET # BLD AUTO: 216 K/UL (ref 150–400)
PMV BLD AUTO: 11.3 FL (ref 8.9–12.9)
POTASSIUM SERPL-SCNC: 4.4 MMOL/L (ref 3.5–5.1)
PROT SERPL-MCNC: 6.8 G/DL (ref 6.4–8.2)
RBC # BLD AUTO: 4.04 M/UL (ref 3.8–5.2)
SODIUM SERPL-SCNC: 141 MMOL/L (ref 136–145)
TRIGL SERPL-MCNC: 105 MG/DL
VLDLC SERPL CALC-MCNC: 21 MG/DL
WBC # BLD AUTO: 4.8 K/UL (ref 3.6–11)

## 2025-04-13 SDOH — ECONOMIC STABILITY: INCOME INSECURITY: IN THE LAST 12 MONTHS, WAS THERE A TIME WHEN YOU WERE NOT ABLE TO PAY THE MORTGAGE OR RENT ON TIME?: NO

## 2025-04-13 SDOH — ECONOMIC STABILITY: TRANSPORTATION INSECURITY
IN THE PAST 12 MONTHS, HAS THE LACK OF TRANSPORTATION KEPT YOU FROM MEDICAL APPOINTMENTS OR FROM GETTING MEDICATIONS?: NO

## 2025-04-13 SDOH — ECONOMIC STABILITY: FOOD INSECURITY: WITHIN THE PAST 12 MONTHS, YOU WORRIED THAT YOUR FOOD WOULD RUN OUT BEFORE YOU GOT MONEY TO BUY MORE.: NEVER TRUE

## 2025-04-13 SDOH — ECONOMIC STABILITY: FOOD INSECURITY: WITHIN THE PAST 12 MONTHS, THE FOOD YOU BOUGHT JUST DIDN'T LAST AND YOU DIDN'T HAVE MONEY TO GET MORE.: NEVER TRUE

## 2025-04-13 SDOH — ECONOMIC STABILITY: TRANSPORTATION INSECURITY
IN THE PAST 12 MONTHS, HAS LACK OF TRANSPORTATION KEPT YOU FROM MEETINGS, WORK, OR FROM GETTING THINGS NEEDED FOR DAILY LIVING?: NO

## 2025-04-15 ENCOUNTER — OFFICE VISIT (OUTPATIENT)
Age: 70
End: 2025-04-15
Payer: MEDICARE

## 2025-04-15 VITALS
OXYGEN SATURATION: 96 % | BODY MASS INDEX: 39.89 KG/M2 | DIASTOLIC BLOOD PRESSURE: 73 MMHG | WEIGHT: 263.2 LBS | TEMPERATURE: 97.9 F | HEIGHT: 68 IN | RESPIRATION RATE: 16 BRPM | SYSTOLIC BLOOD PRESSURE: 139 MMHG | HEART RATE: 73 BPM

## 2025-04-15 DIAGNOSIS — K21.9 GASTROESOPHAGEAL REFLUX DISEASE, UNSPECIFIED WHETHER ESOPHAGITIS PRESENT: ICD-10-CM

## 2025-04-15 DIAGNOSIS — N18.31 CHRONIC KIDNEY DISEASE, STAGE 3A (HCC): ICD-10-CM

## 2025-04-15 DIAGNOSIS — I10 ESSENTIAL (PRIMARY) HYPERTENSION: Primary | ICD-10-CM

## 2025-04-15 DIAGNOSIS — E78.2 MIXED HYPERLIPIDEMIA: ICD-10-CM

## 2025-04-15 DIAGNOSIS — I49.5 SICK SINUS SYNDROME (HCC): ICD-10-CM

## 2025-04-15 DIAGNOSIS — I50.22 CHRONIC SYSTOLIC (CONGESTIVE) HEART FAILURE: ICD-10-CM

## 2025-04-15 DIAGNOSIS — R73.9 HYPERGLYCEMIA, UNSPECIFIED: ICD-10-CM

## 2025-04-15 DIAGNOSIS — E66.01 MORBID (SEVERE) OBESITY DUE TO EXCESS CALORIES: ICD-10-CM

## 2025-04-15 PROCEDURE — 99214 OFFICE O/P EST MOD 30 MIN: CPT | Performed by: INTERNAL MEDICINE

## 2025-04-15 PROCEDURE — 3078F DIAST BP <80 MM HG: CPT | Performed by: INTERNAL MEDICINE

## 2025-04-15 PROCEDURE — 1123F ACP DISCUSS/DSCN MKR DOCD: CPT | Performed by: INTERNAL MEDICINE

## 2025-04-15 PROCEDURE — 1090F PRES/ABSN URINE INCON ASSESS: CPT | Performed by: INTERNAL MEDICINE

## 2025-04-15 PROCEDURE — G8427 DOCREV CUR MEDS BY ELIG CLIN: HCPCS | Performed by: INTERNAL MEDICINE

## 2025-04-15 PROCEDURE — 1036F TOBACCO NON-USER: CPT | Performed by: INTERNAL MEDICINE

## 2025-04-15 PROCEDURE — G8417 CALC BMI ABV UP PARAM F/U: HCPCS | Performed by: INTERNAL MEDICINE

## 2025-04-15 PROCEDURE — 3017F COLORECTAL CA SCREEN DOC REV: CPT | Performed by: INTERNAL MEDICINE

## 2025-04-15 PROCEDURE — 3075F SYST BP GE 130 - 139MM HG: CPT | Performed by: INTERNAL MEDICINE

## 2025-04-15 PROCEDURE — G8399 PT W/DXA RESULTS DOCUMENT: HCPCS | Performed by: INTERNAL MEDICINE

## 2025-04-15 PROCEDURE — 1159F MED LIST DOCD IN RCRD: CPT | Performed by: INTERNAL MEDICINE

## 2025-04-15 ASSESSMENT — PATIENT HEALTH QUESTIONNAIRE - PHQ9
SUM OF ALL RESPONSES TO PHQ QUESTIONS 1-9: 0
2. FEELING DOWN, DEPRESSED OR HOPELESS: NOT AT ALL
SUM OF ALL RESPONSES TO PHQ QUESTIONS 1-9: 0
1. LITTLE INTEREST OR PLEASURE IN DOING THINGS: NOT AT ALL

## 2025-04-15 NOTE — PROGRESS NOTES
Jaylin Quan is a 70 y.o. female    Chief Complaint   Patient presents with    Follow-up     6 month follow up       /73   Pulse 73   Temp 97.9 °F (36.6 °C)   Resp 16   Ht 1.721 m (5' 7.75\")   Wt 119.4 kg (263 lb 3.2 oz)   SpO2 96%   BMI 40.32 kg/m²         1. Have you been to the ER, urgent care clinic since your last visit?  Hospitalized since your last visit? No    2. Have you seen or consulted any other health care providers outside of the [facility] since your last visit?  Include any pap smears or colon screening. No    Learning Assessment:       No data to display                Fall Risk Assessment:      4/15/2025     1:31 PM 10/15/2024     2:03 PM 2/8/2024    12:43 PM 6/27/2023    11:40 AM 4/4/2023     2:01 PM 11/23/2022     1:28 PM 10/12/2022    11:13 AM   Amb Fall Risk Assessment and TUG Test   Do you feel unsteady or are you worried about falling?  no no no no      2 or more falls in past year? no no no no      Fall with injury in past year? no no no no      Timed Up and Go Test > 12 seconds?    no      Fall in past 12 months?     0 0 0   Able to walk?     Yes Yes Yes       Abuse Screening:       No data to display                ADL Screening:       No data to display                
smoked. She has never used smokeless tobacco. She reports that she does not drink alcohol and does not use drugs.   ROS: See above; Complete ROS otherwise negative.     OBJECTIVE:   Visit Vitals: Blood pressure 139/73, pulse 73, temperature 97.9 °F (36.6 °C), resp. rate 16, height 1.721 m (5' 7.75\"), weight 119.4 kg (263 lb 3.2 oz), SpO2 96%.      Gen: Pleasant 70 y.o. female in NAD.  HEENT: PERRLA. EOMI. OP moist and pink.  Neck: Supple.  No LAD.  HEART: RRR, No M/G/R.   LUNGS: CTAB No W/R.   ABDOMEN: S, NT, ND, BS+.   EXTREMITIES: Warm. No C/C/E. MUSCULOSKELETAL: Normal ROM, muscle strength 5/5 all groups. NEURO: Alert and oriented x 3.  Cranial nerves grossly intact.  No focal sensory or motor deficits noted. SKIN: She has neck surgical wound that appears to have healed well.     Hemoglobin A1C   Date Value Ref Range Status   04/09/2025 6.1 (H) 4.0 - 5.6 % Final     Comment:     (NOTE)  HbA1C Interpretive Ranges  <5.7              Normal  5.7 - 6.4         Consider Prediabetes  >6.5              Consider Diabetes         Lab Results   Component Value Date    CHOL 152 04/09/2025    TRIG 105 04/09/2025    HDL 58 04/09/2025    LDL 73 04/09/2025    VLDL 21 04/09/2025    CHOLHDLRATIO 2.6 04/09/2025

## 2025-05-08 ENCOUNTER — HOSPITAL ENCOUNTER (OUTPATIENT)
Facility: HOSPITAL | Age: 70
Discharge: HOME | End: 2025-05-11
Attending: INTERNAL MEDICINE
Payer: MEDICARE

## 2025-05-08 DIAGNOSIS — Z12.31 OTHER SCREENING MAMMOGRAM: ICD-10-CM

## 2025-05-08 PROCEDURE — 77063 BREAST TOMOSYNTHESIS BI: CPT

## 2025-05-22 NOTE — TELEPHONE ENCOUNTER
A previous note said that her insurance WOULD cover irbesartan. When I last talked to the patient she was leery of losartan. Surely something else will work.
Identified patient 2 identifiers verified. Patient aware of prescription for Micardis.
Today our office faxed our office a notice stating that irbesartan 300mg tab- pt's insurance will not cover; insurance will cover losartan 100mg tab.
Show Applicator Variable?: Yes
Application Tool (Optional): Cry-AC
Consent: The patient's consent was obtained including but not limited to risks of crusting, scabbing, blistering, scarring, darker or lighter pigmentary change, recurrence, incomplete removal and infection.
Post-Care Instructions: I reviewed with the patient in detail post-care instructions. Patient is to wear sunprotection, and avoid picking at any of the treated lesions. Pt may apply Vaseline to crusted or scabbing areas.
Detail Level: Zone
Render Note In Bullet Format When Appropriate: No

## 2025-08-05 DIAGNOSIS — G25.81 RLS (RESTLESS LEGS SYNDROME): ICD-10-CM

## 2025-08-05 RX ORDER — ROPINIROLE 1 MG/1
1 TABLET, FILM COATED ORAL NIGHTLY
Qty: 90 TABLET | Refills: 3 | Status: SHIPPED | OUTPATIENT
Start: 2025-08-05

## (undated) DEVICE — TUBING, SUCTION, 1/4" X 10', STRAIGHT: Brand: MEDLINE

## (undated) DEVICE — STERILE POLYISOPRENE POWDER-FREE SURGICAL GLOVES WITH EMOLLIENT COATING: Brand: PROTEXIS

## (undated) DEVICE — ARM DRAPE

## (undated) DEVICE — REM POLYHESIVE ADULT PATIENT RETURN ELECTRODE: Brand: VALLEYLAB

## (undated) DEVICE — 3M™ IOBAN™ 2 ANTIMICROBIAL INCISE DRAPE 6650EZ: Brand: IOBAN™ 2

## (undated) DEVICE — TR BAND RADIAL ARTERY COMPRESSION DEVICE: Brand: TR BAND

## (undated) DEVICE — SUTURE MCRYL SZ 4-0 L27IN ABSRB UD L19MM PS-2 1/2 CIR PRIM Y426H

## (undated) DEVICE — (D)PREP SKN CHLRAPRP APPL 26ML -- CONVERT TO ITEM 371833

## (undated) DEVICE — APPLICATOR MEDICATED 3 CC SOLUTION CLR STRL CHLORAPREP

## (undated) DEVICE — STRAP,POSITIONING,KNEE/BODY,FOAM,4X60": Brand: MEDLINE

## (undated) DEVICE — DRAPE PRB US TRNSDCR 6X96IN --

## (undated) DEVICE — SUTURE VCRL SZ 3-0 L27IN ABSRB UD L26MM SH 1/2 CIR J416H

## (undated) DEVICE — AIRSEAL 8 MM ACCESS PORT AND LOW PROFILE OBTURATOR WITH BLADELESS OPTICAL TIP, 120 MM LENGTH: Brand: AIRSEAL

## (undated) DEVICE — RADIFOCUS OPTITORQUE ANGIOGRAPHIC CATHETER: Brand: OPTITORQUE

## (undated) DEVICE — SUTURE ABSORBABLE ANTIBACT 2-0 CT-2 9 IN STRATAFIX PDS + SXPP1A422

## (undated) DEVICE — GOWN,SIRUS,NONRNF,SETINSLV,2XL,18/CS: Brand: MEDLINE

## (undated) DEVICE — CATHETER ETER ANGIO L110CM OD5FR ID046IN L75CM 038IN 145DEG CARD

## (undated) DEVICE — KIT,1200CC CANISTER,3/16"X6' TUBING: Brand: MEDLINE INDUSTRIES, INC.

## (undated) DEVICE — TOWEL,OR,DSP,ST,BLUE,STD,2/PK,40PK/CS: Brand: MEDLINE

## (undated) DEVICE — ADHESIVE SKIN CLSR 1ML TISS HI VISC EXOFIN

## (undated) DEVICE — ENDOSCOPIC KIT COMPLIANCE ENDOKIT

## (undated) DEVICE — SUTURE VCRL SZ 2-0 L27IN ABSRB VLT L26MM SH 1/2 CIR J317H

## (undated) DEVICE — SNARE ENDOSCP POLYP MED STD AD 2.4X27X240 CM 2.8 MM OVL SENS

## (undated) DEVICE — NEEDLE HYPO 22GA L1.5IN BLK S STL HUB POLYPR SHLD REG BVL

## (undated) DEVICE — PAD,SANITARY,11 IN,MAXI,N-STRL,IND WRAP: Brand: MEDLINE

## (undated) DEVICE — Device

## (undated) DEVICE — PACK PROCEDURE SURG HRT CATH

## (undated) DEVICE — STERILE POLYISOPRENE POWDER-FREE SURGICAL GLOVES: Brand: PROTEXIS

## (undated) DEVICE — 450 ML BOTTLE OF 0.05% CHLORHEXIDINE GLUCONATE IN 99.95% STERILE WATER FOR IRRIGATION, USP AND APPLICATOR.: Brand: IRRISEPT ANTIMICROBIAL WOUND LAVAGE

## (undated) DEVICE — APPLICATOR SEAL FLOSEAL 5MM+ --

## (undated) DEVICE — COPILOT BLEEDBACK CONTROL VALVE: Brand: COPILOT

## (undated) DEVICE — TIP COVER ACCESSORY

## (undated) DEVICE — LIMB HOLDER, WRIST/ANKLE: Brand: DEROYAL

## (undated) DEVICE — SOLUTION IRRIG 3000ML 0.9% SOD CHL FLX CONT 0797208] ICU MEDICAL INC]

## (undated) DEVICE — MAGNETIC INSTR DRAPE 20X16: Brand: MEDLINE INDUSTRIES, INC.

## (undated) DEVICE — SET GRAV CK VLV NEEDLESS ST 3 GANGED 4WAY STPCOCK HI FLO 10

## (undated) DEVICE — KIT ACCS INTRO 4FR L10CM NDL 21GA L7CM GWIRE L40CM

## (undated) DEVICE — SOL IRR SOD CL 0.9% 1000ML BTL --

## (undated) DEVICE — TRAY,IRRIGATION,PISTON SYRINGE,60ML,STRL: Brand: MEDLINE

## (undated) DEVICE — CONTINU-FLO SOLUTION SET, 2 INJECTION SITES, MALE LUER LOCK ADAPTER WITH RETRACTABLE COLLAR, LARGE BORE STOPCOCK WITH ROTATING MALE LUER LOCK EXTENSION SET, 2 INJECTION SITES, MALE LUER LOCK ADAPTER WITH RETRACTABLE COLLAR: Brand: INTERLINK/CONTINU-FLO

## (undated) DEVICE — DRAPE,REIN 53X77,STERILE: Brand: MEDLINE

## (undated) DEVICE — 3M™ TEGADERM™ TRANSPARENT FILM DRESSING FRAME STYLE, 1624W, 2-3/8 IN X 2-3/4 IN (6 CM X 7 CM), 100/CT 4CT/CASE: Brand: 3M™ TEGADERM™

## (undated) DEVICE — INTENDED FOR TISSUE SEPARATION, AND OTHER PROCEDURES THAT REQUIRE A SHARP SURGICAL BLADE TO PUNCTURE OR CUT.: Brand: BARD-PARKER ® CARBON RIB-BACK BLADES

## (undated) DEVICE — PACK,LAPAROTOMY,2 REINFORCED GOWNS: Brand: MEDLINE

## (undated) DEVICE — VISUALIZATION SYSTEM: Brand: CLEARIFY

## (undated) DEVICE — CONTAINER,SPECIMEN,4OZ,OR STRL: Brand: MEDLINE

## (undated) DEVICE — SUTURE PERMAHAND SZ 3-0 L30IN NONABSORBABLE BLK SILK BRAID A304H

## (undated) DEVICE — INSULATED BLADE ELECTRODE: Brand: EDGE

## (undated) DEVICE — ANGIOGRAPHY KIT CUST [K0910930B] [MERIT MEDICAL SYSTEMS INC]

## (undated) DEVICE — PREP PAD BNS: Brand: CONVERTORS

## (undated) DEVICE — BASIC SINGLE BASIN 1-LF: Brand: MEDLINE INDUSTRIES, INC.

## (undated) DEVICE — GARMENT,MEDLINE,DVT,INT,CALF,MED, GEN2: Brand: MEDLINE

## (undated) DEVICE — CLIP INT SM WIDE RED TI TRNSVRS GRV CHEVRON SHP W PRECIS

## (undated) DEVICE — SUT SLK 0 30IN SH BLK --

## (undated) DEVICE — TIP SUCT TRNSPAR RIB SURF STD BLB RIG NVENT W/ 5IN1 CONN DYND50138] MEDLINE INDUSTRIES INC]

## (undated) DEVICE — TRAY PREP DRY W/ PREM GLV 2 APPL 6 SPNG 2 UNDPD 1 OVERWRAP

## (undated) DEVICE — RADIFOCUS GLIDEWIRE: Brand: GLIDEWIRE

## (undated) DEVICE — GOWN,SIRUS,FABRNF,XL,20/CS: Brand: MEDLINE

## (undated) DEVICE — SUTURE COAT VCRL PC 5 PRECIS COSM CONVENTIONAL CUT PRIM 3 8 J823H

## (undated) DEVICE — GUIDE COR SNUS L40CM DIA9FR 0.035IN STD CRV ADV UNIQUE

## (undated) DEVICE — GLIDESHEATH SLENDER ACCESS KIT: Brand: GLIDESHEATH SLENDER

## (undated) DEVICE — 3M™ TEGADERM™ TRANSPARENT FILM DRESSING FRAME STYLE, 1626W, 4 IN X 4-3/4 IN (10 CM X 12 CM), 50/CT 4CT/CASE: Brand: 3M™ TEGADERM™

## (undated) DEVICE — SOL IRR STRL H2O 1000ML BTL --

## (undated) DEVICE — ROSEN CURVED WIRE GUIDE: Brand: ROSEN

## (undated) DEVICE — SPONGE GZ W4XL4IN COT RADPQ HIGHLY ABSRB

## (undated) DEVICE — KENDALL DL ECG CABLE AND LEAD WIRE SYSTEM, 3-LEAD, SINGLE PATIENT USE: Brand: KENDALL

## (undated) DEVICE — D&C/GYN-LF: Brand: MEDLINE INDUSTRIES, INC.

## (undated) DEVICE — NEEDLE SPNL 22GA L3.5IN BLK HUB S STL REG WALL FIT STYL W/

## (undated) DEVICE — PACK,BASIC,SIRUS,V: Brand: MEDLINE

## (undated) DEVICE — DERMABOND SKIN ADH 0.7ML -- DERMABOND ADVANCED 12/BX

## (undated) DEVICE — INFECTION CONTROL KIT SYS

## (undated) DEVICE — IV START KIT: Brand: MEDLINE

## (undated) DEVICE — ROCKER SWITCH PENCIL BLADE ELECTRODE, HOLSTER: Brand: EDGE

## (undated) DEVICE — TRI-LUMEN FILTERED TUBE SET WITH ACTIVATED CHARCOAL FILTER: Brand: AIRSEAL

## (undated) DEVICE — CONTAINER SPEC 20 ML LID NEUT BUFF FORMALIN 10 % POLYPR STS

## (undated) DEVICE — SUT VCRL 3-0 18IN TIE MP VIO --

## (undated) DEVICE — DRAPE SURG W25XL50IN E OPN CIR BND BG

## (undated) DEVICE — SURGICAL PROCEDURE PACK GYN LAPAROSCOPY CUST SMH LF

## (undated) DEVICE — FORCEPS BX L240CM JAW DIA2.4MM ORNG L CAP W/ NDL DISP RAD

## (undated) DEVICE — TOWEL SURG W17XL27IN STD BLU COT NONFENESTRATED PREWASHED

## (undated) DEVICE — COVER LT HNDL BLU PLAS

## (undated) DEVICE — FLOSEAL HEMOSTATIC MATRIX, 10ML: Brand: FLOSEAL HEMOSTATIC MATRIX

## (undated) DEVICE — CATHETER DIAG 5FR L75CM ID0.046IN HK CRV VEIN SEL UNIQUE

## (undated) DEVICE — PACEMAKER PACK: Brand: MEDLINE INDUSTRIES, INC.

## (undated) DEVICE — ELECTRODE PT RET AD L9FT HI MOIST COND ADH HYDRGEL CORDED

## (undated) DEVICE — TUBE ST FLD AQUILEX OUTFLO --

## (undated) DEVICE — STERILE NEOPRENE POWDER-FREE SURGICAL GLOVES WITH NITRILE COATING: Brand: PROTEXIS

## (undated) DEVICE — CLIP LIG M BLU TI HRT SHP WIRE HORZ 600 PER BX

## (undated) DEVICE — TRAP ENDOSCP POLYP 2 CHMBR DRAWER TYP

## (undated) DEVICE — SUT SLK 2-0SH 30IN BLK --

## (undated) DEVICE — SNARE ENDOSCP POLYP MED 2.4 MM 240 CM 27 MM 2.8 MM SHT SENS

## (undated) DEVICE — SUTURE V-LOC 180 SZ 0 L9IN ABSRB GRN GS-21 L37MM 1/2 CIR VLOCL0346

## (undated) DEVICE — SHEAR HARMONIC FOCUS OEM 9CM --

## (undated) DEVICE — SYR 50ML LR LCK 1ML GRAD NSAF --

## (undated) DEVICE — MEDI-TRACE CADENCE ADULT, DEFIBRILLATION ELECTRODE -RTS  (10 PR/PK) - PHYSIO-CONTROL: Brand: MEDI-TRACE CADENCE

## (undated) DEVICE — CATHETER DIAG 6FR L110CM INTRO 6FR BLLN DIA9MM 1CC PULM ART

## (undated) DEVICE — SEAL UNIV 5-8MM DISP BX/10 -- DA VINCI XI - SNGL USE

## (undated) DEVICE — ABSORBABLE WOUND CLOSURE DEVICE: Brand: V-LOC 90

## (undated) DEVICE — HI-TORQUE VERSACORE FLOPPY GUIDE WIRE SYSTEM 145 CM: Brand: HI-TORQUE VERSACORE

## (undated) DEVICE — ELECTRO LUBE IS A SINGLE PATIENT USE DEVICE THAT IS INTENDED TO BE USED ON ELECTROSURGICAL ELECTRODES TO REDUCE STICKING.: Brand: KEY SURGICAL ELECTRO LUBE

## (undated) DEVICE — STOPCOCK IV 4 W TRNSPAR

## (undated) DEVICE — COVADERM: Brand: DEROYAL

## (undated) DEVICE — SUTURE PERMAHAND SZ 4-0 L12X30IN NONABSORBABLE BLK SILK A303H

## (undated) DEVICE — SYR 10ML SLIP TIP 1/5ML GRAD --

## (undated) DEVICE — DEVICE TISS REMOVAL -- ORDER AS BX     APO CODE = DRP

## (undated) DEVICE — TOTAL TRAY, DB, 100% SILI FOLEY, 16FR 10: Brand: MEDLINE

## (undated) DEVICE — TUBE ST FLD CTRL AQUILEX INFLO --

## (undated) DEVICE — COVER LT HNDL PLAS RIG 1 PER PK

## (undated) DEVICE — TUBING SUCT 10FR MAL ALUM SHFT FN CAP VENT UNIV CONN W/ OBT

## (undated) DEVICE — SPONGE: SPECIALTY PEANUT XR 100/CS: Brand: MEDICAL ACTION INDUSTRIES

## (undated) DEVICE — SURGICAL PROCEDURE PACK BASIN MAJ SET CUST NO CAUT

## (undated) DEVICE — SET SEALS HYSTEROSCOPE DISP -- MYOSURE  EA=10

## (undated) DEVICE — TUBING PRSS MON L6IN PVC M FEM CONN

## (undated) DEVICE — INSUFFLATION NEEDLE: Brand: SURGINEEDLE

## (undated) DEVICE — PREP SKN CHLRAPRP APL 26ML STR --

## (undated) DEVICE — CUFF BLD PRSS AD CLTH SGL TB W/ BAYNT CONN ROUNDED CORNER

## (undated) DEVICE — INTRO SHTH 7FR 13X20CM -- TEARAWAY

## (undated) DEVICE — SPLINT WR POS F/ARTERIAL ACC -- BX/10

## (undated) DEVICE — SUTURE PERMAHAND SZ 2-0 L30IN NONABSORBABLE BLK SILK W/O A305H

## (undated) DEVICE — BLADELESS OBTURATOR: Brand: WECK VISTA

## (undated) DEVICE — NEEDLE HYPO 22GA L1.5IN BLK POLYPR HUB S STL REG BVL STR

## (undated) DEVICE — SYSTEM INTRO 10.5FR L13CM STD WHT CAP HEMSTAT SPLITTABLE

## (undated) DEVICE — SOLUTION IV 1000ML 0.9% SOD CHL

## (undated) DEVICE — SOLUTION LACTATED RINGERS INJECTION USP

## (undated) DEVICE — TAPE,CLOTH/SILK,CURAD,3"X10YD,LF,40/CS: Brand: CURAD

## (undated) DEVICE — SOCK SPEC L9IN WHT UNIV W/ STD PRT FOR FLD MGMT

## (undated) DEVICE — SYR 10ML LUER LOK 1/5ML GRAD --

## (undated) DEVICE — SYRINGE ANGIO 10 CC BRL STD PRNT POLYCARB LT BLU MEDALLION

## (undated) DEVICE — GDWIRE WHISPER HITORQ EDS CSJ -- ACUITY SOLD BY BX ONLY 4648